# Patient Record
Sex: MALE | Race: WHITE | Employment: UNEMPLOYED | URBAN - METROPOLITAN AREA
[De-identification: names, ages, dates, MRNs, and addresses within clinical notes are randomized per-mention and may not be internally consistent; named-entity substitution may affect disease eponyms.]

---

## 2017-01-01 ENCOUNTER — HOSPITAL ENCOUNTER (EMERGENCY)
Age: 32
Discharge: HOME | End: 2017-01-01
Attending: EMERGENCY MEDICINE
Payer: COMMERCIAL

## 2017-01-01 VITALS
HEIGHT: 74 IN | RESPIRATION RATE: 16 BRPM | OXYGEN SATURATION: 99 % | TEMPERATURE: 97.8 F | HEART RATE: 75 BPM | WEIGHT: 235 LBS | DIASTOLIC BLOOD PRESSURE: 80 MMHG | SYSTOLIC BLOOD PRESSURE: 133 MMHG | BODY MASS INDEX: 30.16 KG/M2

## 2017-01-01 DIAGNOSIS — M54.12 CERVICAL RADICULOPATHY: Primary | ICD-10-CM

## 2017-01-01 DIAGNOSIS — M50.90 CERVICAL BACK PAIN WITH EVIDENCE OF DISC DISEASE: ICD-10-CM

## 2017-01-01 DIAGNOSIS — M54.2 CERVICAL MUSCLE PAIN: ICD-10-CM

## 2017-01-01 DIAGNOSIS — R51.9 ACUTE NONINTRACTABLE HEADACHE, UNSPECIFIED HEADACHE TYPE: ICD-10-CM

## 2017-01-01 PROCEDURE — 99282 EMERGENCY DEPT VISIT SF MDM: CPT

## 2017-01-01 PROCEDURE — 74011250636 HC RX REV CODE- 250/636: Performed by: PHYSICIAN ASSISTANT

## 2017-01-01 PROCEDURE — 96361 HYDRATE IV INFUSION ADD-ON: CPT

## 2017-01-01 PROCEDURE — 96375 TX/PRO/DX INJ NEW DRUG ADDON: CPT

## 2017-01-01 PROCEDURE — 96374 THER/PROPH/DIAG INJ IV PUSH: CPT

## 2017-01-01 RX ORDER — ONDANSETRON 2 MG/ML
4 INJECTION INTRAMUSCULAR; INTRAVENOUS
Status: COMPLETED | OUTPATIENT
Start: 2017-01-01 | End: 2017-01-01

## 2017-01-01 RX ORDER — METHOCARBAMOL 500 MG/1
500 TABLET, FILM COATED ORAL 4 TIMES DAILY
Qty: 20 TAB | Refills: 0 | Status: SHIPPED | OUTPATIENT
Start: 2017-01-01 | End: 2017-01-03 | Stop reason: ALTCHOICE

## 2017-01-01 RX ORDER — IBUPROFEN 800 MG/1
800 TABLET ORAL
Qty: 20 TAB | Refills: 0 | Status: SHIPPED | OUTPATIENT
Start: 2017-01-01 | End: 2017-01-08

## 2017-01-01 RX ORDER — DULOXETIN HYDROCHLORIDE 30 MG/1
30 CAPSULE, DELAYED RELEASE ORAL 3 TIMES DAILY
COMMUNITY
End: 2018-01-21

## 2017-01-01 RX ORDER — ONDANSETRON 4 MG/1
4 TABLET, ORALLY DISINTEGRATING ORAL
Qty: 15 TAB | Refills: 0 | Status: SHIPPED | OUTPATIENT
Start: 2017-01-01 | End: 2017-07-24 | Stop reason: ALTCHOICE

## 2017-01-01 RX ORDER — DIPHENHYDRAMINE HYDROCHLORIDE 50 MG/ML
25 INJECTION, SOLUTION INTRAMUSCULAR; INTRAVENOUS
Status: COMPLETED | OUTPATIENT
Start: 2017-01-01 | End: 2017-01-01

## 2017-01-01 RX ORDER — KETOROLAC TROMETHAMINE 30 MG/ML
30 INJECTION, SOLUTION INTRAMUSCULAR; INTRAVENOUS
Status: COMPLETED | OUTPATIENT
Start: 2017-01-01 | End: 2017-01-01

## 2017-01-01 RX ORDER — MORPHINE SULFATE 2 MG/ML
2 INJECTION, SOLUTION INTRAMUSCULAR; INTRAVENOUS
Status: COMPLETED | OUTPATIENT
Start: 2017-01-01 | End: 2017-01-01

## 2017-01-01 RX ADMIN — DIPHENHYDRAMINE HYDROCHLORIDE 25 MG: 50 INJECTION INTRAMUSCULAR; INTRAVENOUS at 13:55

## 2017-01-01 RX ADMIN — SODIUM CHLORIDE 1000 ML: 900 INJECTION, SOLUTION INTRAVENOUS at 13:55

## 2017-01-01 RX ADMIN — KETOROLAC TROMETHAMINE 30 MG: 30 INJECTION, SOLUTION INTRAMUSCULAR; INTRAVENOUS at 13:55

## 2017-01-01 RX ADMIN — ONDANSETRON 4 MG: 2 INJECTION INTRAMUSCULAR; INTRAVENOUS at 13:55

## 2017-01-01 RX ADMIN — Medication 2 MG: at 14:52

## 2017-01-01 NOTE — ED TRIAGE NOTES
Known history of 4 herniated discs in neck and spinal stenosis. Here because pain  Is not under control. Had injection into C4 and C5 with some effect. Has appt with neuro surgeon on 1/9/17.

## 2017-01-01 NOTE — Clinical Note
SPECIFIC PATIENT INSTRUCTIONS FROM THE PROVIDER WHO TREATED YOU IN THE ER TODAY: 
1. Ibuprofen and flexeril as prescribed until finished. Take zofran as prescribed as needed for nausea. 2. Continue taking all other medications as prescribed. 3. Return  if any concerns or worsening condition(s). 4. Follow up with:  Your primary doctor or neurosurgery specialist in 24-48 hours.

## 2017-01-01 NOTE — ED NOTES
Radha Mosley is a 36 y.o. male that was discharged in stable. Pt was accompanied by mother. Pt is not  driving. The patients diagnosis, condition and treatment were explained to  patient and aftercare instructions were given. The patient verbalized understanding. Patient armband removed and shredded.

## 2017-01-01 NOTE — ED PROVIDER NOTES
HPI Comments: Polo Rowe 1985, is a 36 y.o. Male with a hx of cervical radiculopathy, migraines, and anxiety here today for worsening of his chronic cervical pain of left neck radiating into the arm all the way to the hand into the left interscapular area and also going up the back of his head causing headaches to the point of nausea/vomiting and photophobia are almost to the point of migraines and have become unbearable. He got an epidural injection 2 weeks ago with only minimal relief. He also was put on topamax 1 week ago by his PCP for the HA but reports it has not helped yet. He is seen for pain management in [facility] by Dr. Emmanuel Castaneda (neurosurgery) and prescribed percocet for the pain, however he is supposed to be seeing a new more local pain management 01/09/17. He has been using Percocet for the pain but has been increasing the dose for relief and is concerned about this, noting that he will be out of the medication sooner than usual due to increasing his dose for this pain. He also recently had an MRI of the cervical spine at [facility] in 11/16, noting multiple \"slipped discs\" per patient. He denies any new injury/fall/accident since that time. Further denies fever, chills, numbness, weakness, gait problem, saddle anesthesia, bowel or bladder incontinence, CP, SOB, IVDA. Pt PSH included lumbar laminectomy and diskectomy. Patient is a 36 y.o. male presenting with neck pain. The history is provided by the patient and a parent. Neck Pain    This is a chronic problem. Episode onset: 4 months ago. The problem occurs constantly. The problem has been gradually worsening. The pain is associated with nothing. There has been no fever. The pain is present in the left side. The quality of the pain is described as aching and shooting. The pain radiates to the left scapula (Head). The pain is at a severity of 8/10. The symptoms are aggravated by certain positions. The pain is the same all the time. Associated symptoms include photophobia and headaches. Pertinent negatives include no visual change, no chest pain, no syncope, no numbness, no weight loss, no bowel incontinence, no bladder incontinence, no leg pain, no paresis, no tingling and no weakness. Treatments tried: Percocet, topirimate. The treatment provided mild relief. Past Medical History:   Diagnosis Date    ADD (attention deficit disorder)     Anxiety     Migraine     Obesity     PTSD (post-traumatic stress disorder)     Seasonal allergic rhinitis     Sleep apnea        Past Surgical History:   Procedure Laterality Date    Hx back surgery  7/7/2011     lamenectomy & diskectomy.  Hx tonsil and adenoidectomy           Family History:   Problem Relation Age of Onset    Asthma Mother        Social History     Social History    Marital status: SINGLE     Spouse name: N/A    Number of children: N/A    Years of education: N/A     Occupational History    Not on file. Social History Main Topics    Smoking status: Former Smoker    Smokeless tobacco: Never Used    Alcohol use 0.0 oz/week      Comment: occasionally    Drug use: No    Sexual activity: Not on file     Other Topics Concern    Not on file     Social History Narrative         ALLERGIES: Review of patient's allergies indicates no known allergies. Review of Systems   Constitutional: Negative for chills, fever, unexpected weight change and weight loss. HENT: Negative for congestion, ear pain, rhinorrhea and sore throat. Eyes: Positive for photophobia. Negative for pain and redness. Respiratory: Negative for cough and shortness of breath. Cardiovascular: Negative for chest pain and syncope. Gastrointestinal: Positive for nausea and vomiting. Negative for abdominal pain, bowel incontinence, constipation and diarrhea. Genitourinary: Negative for bladder incontinence and dysuria. Musculoskeletal: Positive for back pain, myalgias and neck pain.  Negative for arthralgias, gait problem, joint swelling and neck stiffness. Skin: Negative. Neurological: Positive for headaches. Negative for dizziness, tingling, tremors, seizures, syncope, facial asymmetry, speech difficulty, weakness, light-headedness and numbness. Hematological: Negative for adenopathy. Does not bruise/bleed easily. Psychiatric/Behavioral: Negative. Vitals:    01/01/17 1314 01/01/17 1318   BP: 133/75    Pulse: 75    Resp: 16    Temp: 97.8 °F (36.6 °C)    SpO2: 98%    Weight: 152 kg (335 lb) 106.6 kg (235 lb)   Height: 6' 2\" (1.88 m)             Physical Exam   Constitutional: He is oriented to person, place, and time. He appears well-developed and well-nourished. No distress. HENT:   Head: Normocephalic and atraumatic. Right Ear: Tympanic membrane, external ear and ear canal normal.   Left Ear: Tympanic membrane, external ear and ear canal normal.   Nose: Nose normal.   Mouth/Throat: Oropharynx is clear and moist and mucous membranes are normal. No oropharyngeal exudate. Eyes: Conjunctivae and EOM are normal. Pupils are equal, round, and reactive to light. Right eye exhibits no discharge. Left eye exhibits no discharge. Neck: Normal range of motion. Neck supple. Cardiovascular: Normal rate, regular rhythm, normal heart sounds and intact distal pulses. Pulmonary/Chest: Effort normal and breath sounds normal. No respiratory distress. He has no wheezes. He has no rales. Abdominal: Soft. Bowel sounds are normal. There is no tenderness. Musculoskeletal: Normal range of motion. Right shoulder: Normal.        Left shoulder: He exhibits tenderness, pain and spasm. He exhibits normal range of motion, no bony tenderness, no swelling, no effusion, no crepitus, no deformity, no laceration, normal pulse and normal strength. Right elbow: Normal.       Left elbow: Normal.        Cervical back: He exhibits tenderness, pain and spasm.  He exhibits normal range of motion, no bony tenderness, no swelling, no edema, no deformity, no laceration and normal pulse. Thoracic back: Normal.        Arms:       Right hand: Normal. Normal sensation noted. Decreased sensation is not present in the ulnar distribution, is not present in the medial distribution and is not present in the radial distribution. Normal strength noted. He exhibits no finger abduction, no thumb/finger opposition and no wrist extension trouble. Left hand: Normal. Normal sensation noted. Decreased sensation is not present in the ulnar distribution, is not present in the medial redistribution and is not present in the radial distribution. Normal strength noted. He exhibits no finger abduction, no thumb/finger opposition and no wrist extension trouble. Right foot: Normal.        Left foot: Normal.   Lymphadenopathy:     He has no cervical adenopathy. Neurological: He is alert and oriented to person, place, and time. He has normal strength and normal reflexes. He is not disoriented. He displays no atrophy and no tremor. No cranial nerve deficit or sensory deficit. He exhibits normal muscle tone. He displays no seizure activity. Coordination and gait normal. GCS eye subscore is 4. GCS verbal subscore is 5. GCS motor subscore is 6. Skin: Skin is warm and dry. He is not diaphoretic. Psychiatric: He has a normal mood and affect. His behavior is normal. Judgment and thought content normal.   Nursing note and vitals reviewed.        MDM  Number of Diagnoses or Management Options  Acute nonintractable headache, unspecified headache type: established and worsening  Cervical back pain with evidence of disc disease: established and worsening  Cervical muscle pain: established and worsening  Cervical radiculopathy: established and worsening  Diagnosis management comments: DDx: sciatica, spinal stenosis, arthritis, DJD, spondylitis, muscular pain/spasm, Fx (traumatic, compression, etc.), cauda equina, epidural abscess, UTI, pyelo, STD,  Axvw-Msro-Eroxrm syndrome, kidney stones, preg, ectopic, ovarian cyst, ovarian torsion, GI etiology (constipation, pancreatitis, hepatitis, gastritis, diverticulitis, colitis, gastric cancer, etc), acute abdomen (appendicitis, SBO, etc.), AAA, malignancy     Pain is reproducible on exam. Increases with range of motion and palpation. No abdominal pain on exam. Pulsatile mass not palpated. Normal neurologic exam. Not immunosupressed. Doubt epidural abscess, infection, neoplastic etiology. Not consistent with cauda equina. Likely musculoskeletal etiology. The patient shows no signs or symptoms of developing complication requiring inpatient treatment or management. Further laboratory or radiological investigation is not indicated. Patient having HA and nausea at this time as well as neck pain, will start IV and bolus fluids while giving toradol, zofran, and benadryl to achieve symptom control here in the ED. Patient reassessed, reports no pain improvement at all with Toradol, but improvement of nausea. Will give additional dose of pain medication and reassess again. Pastor Jose Angel PA-C 2:40 PM     [place]  reviewed and pt flagged with warning stating: Please note that this person has received controlled substances prescriptions written by 5 prescribers and had them filled at 4 pharmacies during the past 3 months. This equals or exceeds the threshold of 3 prescribers and 3 pharmacies and while there may be a valid reason for this, it also may be indicative of the practice of prescriber and pharmacy shopping. Most recent rx was for OXYCODON-ACETAMINOPHEN 7.5-325 quantity of 90, filled 12/21/16. Patients pain is currently being managed by neurosurgery/PCP with rx for percocet (above what we would rx in the ED of note), will not prescribe additional narcotics at this time.  Patient instructed to continue taking all medications AS PRESCRIBED at home for pain and to contact PCP or neurosurgery about possible medication adjustments within 1-2 days. Will discharge home with addition of motrin and muscle relaxant. Based upon the patients presentation with noted HPI and PE, along with the work up done in the emergency department, I believe that the patient is having cervical pain with radiculopathy and associated headache. Medically stable for d/c home w/ continued fu as directed, RTED for acute changes. Pt agrees w/ plan. DIAGNOSIS:  1. Cervical pain  2. Cervical radiculopathy  3. Headache    SPECIFIC PATIENT INSTRUCTIONS FROM THE PROVIDER WHO TREATED YOU IN THE ER TODAY:  1. Ibuprofen and flexeril as prescribed until finished. Take zofran as prescribed as needed for nausea. 2. Continue taking all other medications as prescribed. 3. Return if any concerns or worsening condition(s). 4. Follow up with:  Your primary doctor or neurosurgery specialist in 24-48 hours. Please return to the ED for any weakness, numbness, incontinence or worsening of condition. Follow your discharge instructions and take any prescription medication as directed. Follow up with the healthcare referral we provided in the time period specified, and in the meantime please contact or return to the ED for any questions or concerns. Pt results have been reviewed with them. They have been counseled regarding diagnosis, treatment, and plan. Pt verbally conveys understanding and agreement of the signs, symptoms, diagnosis, treatment and prognosis and additionally agrees to follow up as discussed. Pt also agrees with the care-plan and conveys that all of their questions have been answered. I have also provided discharge instructions for them that include: educational information regarding their diagnosis and treatment, and list of reasons why they would want to return to the ED prior to their follow-up appointment, should their condition change.    Mikaela Grullon PA-C          Amount and/or Complexity of Data Reviewed  Discussion of test results with the performing providers: yes  Decide to obtain previous medical records or to obtain history from someone other than the patient: yes  Obtain history from someone other than the patient: yes  Review and summarize past medical records: yes  Discuss the patient with other providers: yes  Independent visualization of images, tracings, or specimens: yes    Risk of Complications, Morbidity, and/or Mortality  Presenting problems: low  Diagnostic procedures: low  Management options: low    Patient Progress  Patient progress: stable    ED Course       Procedures    Diagnosis:   1. Cervical radiculopathy    2. Cervical back pain with evidence of disc disease    3. Cervical muscle pain    4. Acute nonintractable headache, unspecified headache type          Disposition: home    Follow-up Information     Follow up With Details Comments Contact Info    [facility] EMERGENCY DEPT  As needed, If symptoms worsen [address] [facility]    Zay Mejia MD Schedule an appointment as soon as possible for a visit in 2 days  [address] [facility] [address] [place]      Lata Steen MD Schedule an appointment as soon as possible for a visit in 1 week  [address] [facility] [address] [facility]            Patient's Medications   Start Taking    IBUPROFEN (MOTRIN) 800 MG TABLET    Take 1 Tab by mouth every six (6) hours as needed for Pain for up to 7 days. METHOCARBAMOL (ROBAXIN) 500 MG TABLET    Take 1 Tab by mouth four (4) times daily for 5 days. ONDANSETRON (ZOFRAN ODT) 4 MG DISINTEGRATING TABLET    Take 1 Tab by mouth every eight (8) hours as needed for Nausea. Continue Taking    CLONAZEPAM (KLONOPIN) 1 MG TABLET    One tablet at bedtime as needed for sleep    DULOXETINE (CYMBALTA) 30 MG CAPSULE    Take 30 mg by mouth daily.     INHALATIONAL SPACING DEVICE    1 Each by Does Not Apply route as needed. OXYCODONE-ACETAMINOPHEN (PERCOCET) 7.5-325 MG PER TABLET    Take 1 Tab by mouth every four (4) hours as needed for Pain. Max Daily Amount: 6 Tabs. QUETIAPINE (SEROQUEL) 25 MG TABLET    Take  by mouth. TOPIRAMATE (TOPAMAX) 50 MG TABLET    2 tablets at bedtime or as directed    ZOLPIDEM (AMBIEN) 10 MG TABLET    Take 1 Tab by mouth nightly as needed. These Medications have changed    No medications on file   Stop Taking    ESCITALOPRAM OXALATE (LEXAPRO) 20 MG TABLET    Take 1 Tab by mouth daily. LAMOTRIGINE (LAMICTAL) 25 MG TABLET        NAPROXEN SODIUM (ALEVE) 220 MG CAP    Take  by mouth. OXYMETAZOLINE HCL (AFRIN NASAL SPRAY NA)    by Nasal route.

## 2017-01-03 ENCOUNTER — OFFICE VISIT (OUTPATIENT)
Dept: INTERNAL MEDICINE CLINIC | Age: 32
End: 2017-01-03

## 2017-01-03 VITALS
RESPIRATION RATE: 12 BRPM | BODY MASS INDEX: 32.7 KG/M2 | OXYGEN SATURATION: 98 % | WEIGHT: 254.8 LBS | HEART RATE: 84 BPM | SYSTOLIC BLOOD PRESSURE: 138 MMHG | HEIGHT: 74 IN | DIASTOLIC BLOOD PRESSURE: 88 MMHG | TEMPERATURE: 98.6 F

## 2017-01-03 DIAGNOSIS — G43.011 INTRACTABLE MIGRAINE WITHOUT AURA AND WITH STATUS MIGRAINOSUS: Primary | ICD-10-CM

## 2017-01-03 DIAGNOSIS — M54.12 CERVICAL RADICULOPATHY: ICD-10-CM

## 2017-01-03 RX ORDER — PREDNISONE 20 MG/1
TABLET ORAL
Qty: 23 TAB | Refills: 0 | Status: SHIPPED | OUTPATIENT
Start: 2017-01-03 | End: 2017-02-01 | Stop reason: ALTCHOICE

## 2017-01-03 RX ORDER — LAMOTRIGINE 100 MG/1
TABLET ORAL DAILY
COMMUNITY
End: 2017-02-01 | Stop reason: ALTCHOICE

## 2017-01-03 RX ORDER — SUMATRIPTAN 100 MG/1
TABLET, FILM COATED ORAL
Qty: 9 TAB | Refills: 0 | Status: SHIPPED | OUTPATIENT
Start: 2017-01-03 | End: 2017-07-24 | Stop reason: ALTCHOICE

## 2017-01-03 RX ORDER — OXYCODONE AND ACETAMINOPHEN 7.5; 325 MG/1; MG/1
1 TABLET ORAL
Qty: 60 TAB | Refills: 0 | Status: SHIPPED | OUTPATIENT
Start: 2017-01-03 | End: 2017-01-13 | Stop reason: SDUPTHER

## 2017-01-03 RX ORDER — OXYCODONE AND ACETAMINOPHEN 7.5; 325 MG/1; MG/1
1 TABLET ORAL
Qty: 90 TAB | Refills: 0 | Status: CANCELLED | OUTPATIENT
Start: 2017-01-03

## 2017-01-03 NOTE — MR AVS SNAPSHOT
Visit Information Date & Time Provider Department Dept. Phone Encounter #  
 1/3/2017  4:00 PM Sue Mullins MD Internist of [facility] [id number] Your Appointments 12/6/2017 10:55 AM  
LAB with [facility] NURSE VISIT Internist of [facility] ([address]) Appt Note: PE lab fasting [address], [address] [place]  
  
   
 [address], [facility]  
  
    
 12/13/2017  1:00 PM  
PHYSICAL with Sue Mullins MD  
Internist of [place] [address] [place] [address]) Appt Note: Physical  
 [address], [address] [place]  
  
   
 [address], [facility] Upcoming Health Maintenance Date Due DTaP/Tdap/Td series (1 - Tdap) 3/8/2017* *Topic was postponed. The date shown is not the original due date. Allergies as of 1/3/2017  Review Complete On: 1/3/2017 By: Sue Mullins MD  
 No Known Allergies Current Immunizations  Reviewed on 10/18/2013 No immunizations on file. Not reviewed this visit You Were Diagnosed With   
  
 Codes Comments Intractable migraine without aura and with status migrainosus    -  Primary ICD-10-CM: P58.879 
ICD-9-CM: 346.13 Cervical radiculopathy     ICD-10-CM: M54.12 
ICD-9-CM: 723.4 Vitals BP Pulse Temp Resp Height(growth percentile) Weight(growth percentile) 138/88 84 98.6 °F (37 °C) (Oral) 12 6' 2\" (1.88 m) 254 lb 12.8 oz (115.6 kg) SpO2 BMI Smoking Status 98% 32.71 kg/m2 Former Smoker Vitals History BMI and BSA Data Body Mass Index Body Surface Area 32.71 kg/m 2 2.46 m 2 Preferred Pharmacy Pharmacy Name Phone [facility] [address], [address] [phone] Your Updated Medication List  
  
   
This list is accurate as of: 1/3/17  4:46 PM.  Always use your most recent med list.  
  
  
  
  
 clonazePAM 1 mg tablet Commonly known as:  Mari Patch One tablet at bedtime as needed for sleep CYMBALTA 30 mg capsule Generic drug:  DULoxetine Take 30 mg by mouth daily. ibuprofen 800 mg tablet Commonly known as:  MOTRIN Take 1 Tab by mouth every six (6) hours as needed for Pain for up to 7 days. inhalational spacing device 1 Each by Does Not Apply route as needed. LaMICtal 100 mg tablet Generic drug:  lamoTRIgine Take  by mouth daily. ondansetron 4 mg disintegrating tablet Commonly known as:  ZOFRAN ODT Take 1 Tab by mouth every eight (8) hours as needed for Nausea. oxyCODONE-acetaminophen 7.5-325 mg per tablet Commonly known as:  PERCOCET Take 1 Tab by mouth every four (4) hours as needed for Pain. Max Daily Amount: 6 Tabs. predniSONE 20 mg tablet Commonly known as:  DELTASONE  
3 tablets daily for 3 days, then 2 tablets daily SEROquel 25 mg tablet Generic drug:  QUEtiapine Take  by mouth. SUMAtriptan 100 mg tablet Commonly known as:  IMITREX  
1 tablet by mouth daily as needed for migraine headache  
  
 topiramate 50 mg tablet Commonly known as:  TOPAMAX 2 tablets at bedtime or as directed  
  
 zolpidem 10 mg tablet Commonly known as:  AMBIEN Take 1 Tab by mouth nightly as needed. Prescriptions Printed Refills  
 oxyCODONE-acetaminophen (PERCOCET) 7.5-325 mg per tablet 0 Sig: Take 1 Tab by mouth every four (4) hours as needed for Pain. Max Daily Amount: 6 Tabs. Class: Print Route: Oral  
 predniSONE (DELTASONE) 20 mg tablet 0 Sig: 3 tablets daily for 3 days, then 2 tablets daily Class: Print SUMAtriptan (IMITREX) 100 mg tablet 0 Si tablet by mouth daily as needed for migraine headache Class: Print Introducing [facility]! Dear Salome Hirsch: Thank you for requesting a VFA account.   Our records indicate that you already have an active Mobile[employer] account. You can access your account anytime at [email or web address]. [email or web address] Did you know that you can access your hospital and ER discharge instructions at any time in MobileReactor? You can also review all of your test results from your hospital stay or ER visit. Additional Information If you have questions, please visit the Frequently Asked Questions section of the Mobile[employer] website at [email or web address] Remember, MobileReactor is NOT to be used for urgent needs. For medical emergencies, dial 911. Now available from your iPhone and Android! Please provide this summary of care documentation to your next provider. Your primary care clinician is listed as Marlys Marcus. Nany Arreola. If you have any questions after today's visit, please call [phone].

## 2017-01-03 NOTE — PROGRESS NOTES
Carlie Valladares 1985, is a 36 y.o. male, who is seen today for ongoing and worsening pain her back and his head. The left side of his head to the forehead and around the occiput of been hurting along with his neck and his left arm. He says his whole back hurts him. His appointment is coming up with the neurosurgeon 1 week from today and he will be seeing his psychiatrist again soon as well. He is here today with his mother again, I saw him with his mother less than 2 weeks ago. He has been taking a lot of Percocet and has been getting Percocet from other providers as well. He and his mother are aware of this. He says nothing else is been helpful. He has started topiramate and for the last 5 days has been using 100 mg daily. He does have some nausea but no vomiting. He has a history of migraine dating back to childhood. Past Medical History   Diagnosis Date    ADD (attention deficit disorder)     Anxiety     Migraine     Obesity     PTSD (post-traumatic stress disorder)     Seasonal allergic rhinitis     Sleep apnea      Current Outpatient Prescriptions   Medication Sig Dispense Refill    lamoTRIgine (LAMICTAL) 100 mg tablet Take  by mouth daily.  DULoxetine (CYMBALTA) 30 mg capsule Take 30 mg by mouth daily.  ibuprofen (MOTRIN) 800 mg tablet Take 1 Tab by mouth every six (6) hours as needed for Pain for up to 7 days. 20 Tab 0    ondansetron (ZOFRAN ODT) 4 mg disintegrating tablet Take 1 Tab by mouth every eight (8) hours as needed for Nausea. 15 Tab 0    zolpidem (AMBIEN) 10 mg tablet Take 1 Tab by mouth nightly as needed. 30 Tab 0    QUEtiapine (SEROQUEL) 25 mg tablet Take  by mouth.  oxyCODONE-acetaminophen (PERCOCET) 7.5-325 mg per tablet Take 1 Tab by mouth every four (4) hours as needed for Pain. Max Daily Amount: 6 Tabs.  90 Tab 0    topiramate (TOPAMAX) 50 mg tablet 2 tablets at bedtime or as directed 60 Tab 2    clonazePAM (KLONOPIN) 1 mg tablet One tablet at bedtime as needed for sleep 30 Tab 2    inhalational spacing device 1 Each by Does Not Apply route as needed. 1 Device 0     Visit Vitals    /88    Pulse 84    Temp 98.6 °F (37 °C) (Oral)    Resp 12    Ht 6' 2\" (1.88 m)    Wt 254 lb 12.8 oz (115.6 kg)    SpO2 98%    BMI 32.71 kg/m2     Extension of the neck with counterpressure produces no symptoms beyond the neck, no pain into the intrascapular region shoulder or arm on the left. There is no tenderness of the muscles. Back is all nontender. Scalp is nontender. Neck is actually quite supple. Assessment: Pain is far out of proportion to any findings. He has been using Percocet for well over a year now prescribed elsewhere mainly but I have given him a prescription recently. I will give him 60 more tablets but he must cut back on frequency of use. He may not use more than every 4 hours. This will last at least until he sees the neurosurgeon in a week. For his radicular pain he will continue topiramate at 100 mg daily but that could be increased. #2.  He may well have migraine but no significant nausea no vomiting. We will try him on sumatriptan 100 mg up to once a day but also started on steroids for his neck and back and head pain, hopefully that will help all of his pain except for any psychogenic component. He will be treated with 60 mg of prednisone this afternoon and then 60 mg each of the next 2 mornings and then 40 mg daily for 7 more days. Follow-up to be arranged. Tomy Starr MD FACP    Please note: This document has been produced using voice recognition software. Unrecognized errors in transcription may be present.

## 2017-01-13 RX ORDER — OXYCODONE AND ACETAMINOPHEN 7.5; 325 MG/1; MG/1
1 TABLET ORAL
Qty: 60 TAB | Refills: 0 | Status: SHIPPED | OUTPATIENT
Start: 2017-01-13 | End: 2017-01-25 | Stop reason: SDUPTHER

## 2017-01-13 NOTE — TELEPHONE ENCOUNTER
Patient is requesting a refill of percocet last office visit 01/03/17 last script date 01/03/17 60 tabs patient awaiting a call for neurology to reschedule his appointment due to snow

## 2017-01-13 NOTE — TELEPHONE ENCOUNTER
Pt asking for refill. Says his appt with neurology was cancelled on Monday due to the snow. Says he has not been able to get rescheduled as of yet. Asking for refill til he can get back in there.

## 2017-01-25 NOTE — TELEPHONE ENCOUNTER
Last date seen:1/13/17  Last date filled:1/16/17     report was pulled on 36 y.o. Graves Click, No discrepancies were found.

## 2017-01-25 NOTE — TELEPHONE ENCOUNTER
60 tablets given 9 days ago. Reviewed Dr Denisse Mantilla last office note 1/3/17, patient had been advised to use less percocet, and to follow up with his neurologist for headaches. Recommend following up with his neurologist for further pain control options.

## 2017-01-26 NOTE — TELEPHONE ENCOUNTER
We will need to wait to ask [facility] tomorrow for a 2 day supply, Im not sure if he will give it to him or not

## 2017-01-26 NOTE — TELEPHONE ENCOUNTER
Pt calling again. Says the neurologist - Dr. Gwen Reid in [facility] advised patient to continue to get any meds from primary care. Pt says RM was giving him a 10 day supply which will run out tomorrow. Asking if he can get refill til he can again meet with RM and figure out who is going to help with the meds? Did we get office note from Dr. Gwen Reid?

## 2017-01-27 RX ORDER — OXYCODONE AND ACETAMINOPHEN 7.5; 325 MG/1; MG/1
1 TABLET ORAL
Qty: 25 TAB | Refills: 0 | Status: SHIPPED | OUTPATIENT
Start: 2017-01-27 | End: 2017-02-01 | Stop reason: SDUPTHER

## 2017-02-01 ENCOUNTER — OFFICE VISIT (OUTPATIENT)
Dept: INTERNAL MEDICINE CLINIC | Age: 32
End: 2017-02-01

## 2017-02-01 VITALS
OXYGEN SATURATION: 98 % | BODY MASS INDEX: 33.11 KG/M2 | HEIGHT: 74 IN | DIASTOLIC BLOOD PRESSURE: 74 MMHG | SYSTOLIC BLOOD PRESSURE: 134 MMHG | TEMPERATURE: 98.8 F | RESPIRATION RATE: 12 BRPM | WEIGHT: 258 LBS | HEART RATE: 82 BPM

## 2017-02-01 DIAGNOSIS — Z86.69 HISTORY OF MIGRAINE: ICD-10-CM

## 2017-02-01 DIAGNOSIS — M54.12 CERVICAL RADICULOPATHY: Primary | ICD-10-CM

## 2017-02-01 DIAGNOSIS — F51.01 PRIMARY INSOMNIA: ICD-10-CM

## 2017-02-01 RX ORDER — TOPIRAMATE 100 MG/1
100 TABLET, FILM COATED ORAL 2 TIMES DAILY
Qty: 180 TAB | Refills: 1 | Status: SHIPPED | OUTPATIENT
Start: 2017-02-01 | End: 2017-11-17 | Stop reason: SDUPTHER

## 2017-02-01 RX ORDER — ZOLPIDEM TARTRATE 10 MG/1
10 TABLET ORAL
Qty: 30 TAB | Refills: 0 | Status: SHIPPED | OUTPATIENT
Start: 2017-02-01 | End: 2017-03-02 | Stop reason: SDUPTHER

## 2017-02-01 RX ORDER — OXYCODONE AND ACETAMINOPHEN 7.5; 325 MG/1; MG/1
1 TABLET ORAL
Qty: 60 TAB | Refills: 0 | Status: SHIPPED | OUTPATIENT
Start: 2017-02-01 | End: 2017-02-13 | Stop reason: ALTCHOICE

## 2017-02-01 NOTE — PROGRESS NOTES
Dorys Hull 1985, is a 36 y.o. male, who is seen today for reevaluation of migraine headaches neck pain diffuse back pain. Since starting the topiramate and increasing the dose he finds his migraine headaches occur about every 2 days instead of every day and the twitching in his left eye is better. He can stand his back pain a little better. He is almost out of Percocet. He is sleeping fairly well with the help of Ambien at this point. Cymbalta was increased to 60 mg daily by his psychiatrist recently. He will be getting an epidural injection in the cervical region tomorrow and follow-up with his neurosurgeon February 22. Past Medical History   Diagnosis Date    ADD (attention deficit disorder)     Anxiety     Migraine     Obesity     PTSD (post-traumatic stress disorder)     Seasonal allergic rhinitis     Sleep apnea      Current Outpatient Prescriptions   Medication Sig Dispense Refill    zolpidem (AMBIEN) 10 mg tablet Take 1 Tab by mouth nightly as needed. 30 Tab 0    oxyCODONE-acetaminophen (PERCOCET) 7.5-325 mg per tablet Take 1 Tab by mouth every four (4) hours as needed for Pain. Max Daily Amount: 6 Tabs. 60 Tab 0    topiramate (TOPAMAX) 100 mg tablet Take 1 Tab by mouth two (2) times a day. 180 Tab 1    SUMAtriptan (IMITREX) 100 mg tablet 1 tablet by mouth daily as needed for migraine headache 9 Tab 0    DULoxetine (CYMBALTA) 30 mg capsule Take 60 mg by mouth daily.  ondansetron (ZOFRAN ODT) 4 mg disintegrating tablet Take 1 Tab by mouth every eight (8) hours as needed for Nausea. 15 Tab 0    QUEtiapine (SEROQUEL) 25 mg tablet Take  by mouth.  clonazePAM (KLONOPIN) 1 mg tablet One tablet at bedtime as needed for sleep 30 Tab 2    inhalational spacing device 1 Each by Does Not Apply route as needed.  1 Device 0     Visit Vitals    /74    Pulse 82    Temp 98.8 °F (37.1 °C) (Oral)    Resp 12    Ht 6' 2\" (1.88 m)    Wt 258 lb (117 kg)    SpO2 98%    BMI 33.13 kg/m2     I did not reexamine him today. He was thoroughly examined couple weeks ago. Assessment: #1. Migraine headaches moderately improved. Will increase topiramate to 200 mg per day in divided doses. He will continue sumatriptan as needed. #2. Neck and diffuse back pain somewhat out of proportion to findings. We will see how he does with the epidural and follow-up with neurosurgery will give him some more Percocet for now but this should not be long-term. He understands. #3.  Chronic insomnia, I did renew his zolpidem for as needed use. #4.  Depression and chronic pain, increase in Cymbalta may be somewhat helpful. He will follow-up with his psychiatrist as well. Follow-up not currently scheduled but will be arranged based on results of upcoming treatments elsewhere. This visit lasted 30 minutes, greater than 50% of the time spent counseling on all of those issues in the assessment listed above. Tomy Krause MD FACP    Please note: This document has been produced using voice recognition software. Unrecognized errors in transcription may be present.

## 2017-02-01 NOTE — MR AVS SNAPSHOT
Visit Information Date & Time Provider Department Dept. Phone Encounter #  
 2/1/2017  3:30 PM Ric Retana MD Internist of [address] [id number] Your Appointments 12/6/2017 10:55 AM  
LAB with [facility] NURSE VISIT Internist of [facility] ([place]ing [place]) Appt Note: PE lab fasting [address], [address] [place] [address] [address] [place]  
  
   
 [address], [address]  
  
    
 12/13/2017  1:00 PM  
PHYSICAL with Ric Retana MD  
Internist of [address] [place] [address] [place]) Appt Note: Physical  
 [address], [address] [place] [address] [address]  
  
   
 [address], [address] Upcoming Health Maintenance Date Due DTaP/Tdap/Td series (1 - Tdap) 3/8/2017* *Topic was postponed. The date shown is not the original due date. Allergies as of 2/1/2017  Review Complete On: 2/1/2017 By: Ric Retana MD  
 No Known Allergies Current Immunizations  Reviewed on 10/18/2013 No immunizations on file. Not reviewed this visit Vitals BP Pulse Temp Resp Height(growth percentile) Weight(growth percentile) 134/74 82 98.8 °F (37.1 °C) (Oral) 12 6' 2\" (1.88 m) 258 lb (117 kg) SpO2 BMI Smoking Status 98% 33.13 kg/m2 Former Smoker BMI and BSA Data Body Mass Index Body Surface Area  
 33.13 kg/m 2 2.47 m 2 Preferred Pharmacy Pharmacy Name Phone [address] [address] [facility] [phone] Your Updated Medication List  
  
   
This list is accurate as of: 2/1/17  3:47 PM.  Always use your most recent med list.  
  
  
  
  
 clonazePAM 1 mg tablet Commonly known as:  Oliver Magallanes One tablet at bedtime as needed for sleep CYMBALTA 30 mg capsule Generic drug:  DULoxetine Take 60 mg by mouth daily. inhalational spacing device 1 Each by Does Not Apply route as needed. ondansetron 4 mg disintegrating tablet Commonly known as:  ZOFRAN ODT Take 1 Tab by mouth every eight (8) hours as needed for Nausea. oxyCODONE-acetaminophen 7.5-325 mg per tablet Commonly known as:  PERCOCET Take 1 Tab by mouth every four (4) hours as needed for Pain. Max Daily Amount: 6 Tabs. SEROquel 25 mg tablet Generic drug:  QUEtiapine Take  by mouth. SUMAtriptan 100 mg tablet Commonly known as:  IMITREX  
1 tablet by mouth daily as needed for migraine headache  
  
 topiramate 100 mg tablet Commonly known as:  TOPAMAX Take 1 Tab by mouth two (2) times a day. zolpidem 10 mg tablet Commonly known as:  AMBIEN Take 1 Tab by mouth nightly as needed. Prescriptions Printed Refills  
 zolpidem (AMBIEN) 10 mg tablet 0 Sig: Take 1 Tab by mouth nightly as needed. Class: Print Route: Oral  
 oxyCODONE-acetaminophen (PERCOCET) 7.5-325 mg per tablet 0 Sig: Take 1 Tab by mouth every four (4) hours as needed for Pain. Max Daily Amount: 6 Tabs. Class: Print Route: Oral  
  
Prescriptions Sent to Pharmacy Refills  
 topiramate (TOPAMAX) 100 mg tablet 1 Sig: Take 1 Tab by mouth two (2) times a day. Class: Normal  
 Pharmacy: [address] [place] [address] #: [phone] Route: Oral  
  
Introducing hospitals & HEALTH SERVICES! Dear Alejos Mohs: Thank you for requesting a Mapflow account. Our records indicate that you already have an active Mapflow account. You can access your account anytime at [email or web address] Did you know that you can access your hospital and ER discharge instructions at any time in Mapflow? You can also review all of your test results from your hospital stay or ER visit. Additional Information If you have questions, please visit the Frequently Asked Questions section of the Farmanhart website at [email or web address] Remember, CreatorBox is NOT to be used for urgent needs. For medical emergencies, dial 911. Now available from your iPhone and Android! Please provide this summary of care documentation to your next provider. Your primary care clinician is listed as Erika Lopez. Rachel Lynch. If you have any questions after today's visit, please call [phone].

## 2017-02-10 ENCOUNTER — TELEPHONE (OUTPATIENT)
Dept: INTERNAL MEDICINE CLINIC | Age: 32
End: 2017-02-10

## 2017-02-10 NOTE — TELEPHONE ENCOUNTER
PT says he called yesterday asking about decreasing Percocet. Please advise.   He needs a refill with the decreased dosage

## 2017-02-13 RX ORDER — OXYCODONE AND ACETAMINOPHEN 5; 325 MG/1; MG/1
1 TABLET ORAL
Qty: 60 TAB | Refills: 0 | Status: SHIPPED | OUTPATIENT
Start: 2017-02-13 | End: 2017-02-21 | Stop reason: DRUGHIGH

## 2017-02-13 NOTE — TELEPHONE ENCOUNTER
Pt is in office waiting for rx refill he has appt tomorrow at 130 pm but stated he needs his meds today he is out

## 2017-02-13 NOTE — TELEPHONE ENCOUNTER
Father came in to  rx. Scheduled appt for tomorrow afternoon to discuss med change.  If RM can change before appt call pt to cancel

## 2017-02-21 ENCOUNTER — TELEPHONE (OUTPATIENT)
Dept: INTERNAL MEDICINE CLINIC | Age: 32
End: 2017-02-21

## 2017-02-21 ENCOUNTER — OFFICE VISIT (OUTPATIENT)
Dept: INTERNAL MEDICINE CLINIC | Age: 32
End: 2017-02-21

## 2017-02-21 VITALS
WEIGHT: 255 LBS | RESPIRATION RATE: 12 BRPM | DIASTOLIC BLOOD PRESSURE: 80 MMHG | TEMPERATURE: 98.6 F | HEIGHT: 74 IN | HEART RATE: 91 BPM | SYSTOLIC BLOOD PRESSURE: 124 MMHG | OXYGEN SATURATION: 98 % | BODY MASS INDEX: 32.73 KG/M2

## 2017-02-21 DIAGNOSIS — G43.011 INTRACTABLE MIGRAINE WITHOUT AURA AND WITH STATUS MIGRAINOSUS: Primary | ICD-10-CM

## 2017-02-21 RX ORDER — PREDNISONE 20 MG/1
TABLET ORAL
Qty: 12 TAB | Refills: 0 | Status: SHIPPED | OUTPATIENT
Start: 2017-02-21 | End: 2017-04-20 | Stop reason: ALTCHOICE

## 2017-02-21 RX ORDER — OXYCODONE AND ACETAMINOPHEN 10; 325 MG/1; MG/1
1 TABLET ORAL
Qty: 120 TAB | Refills: 0 | Status: SHIPPED | OUTPATIENT
Start: 2017-02-21 | End: 2017-03-22 | Stop reason: SDUPTHER

## 2017-02-21 NOTE — MR AVS SNAPSHOT
Visit Information Date & Time Provider Department Dept. Phone Encounter #  
 2/21/2017 11:30 AM Bean Hodge MD Internist of [address] [id number] Your Appointments 4/10/2017 10:00 AM  
New Patient with Ranjana Valdez MD  
[facility] [address] [place] [address]) Appt Note: NP REF BY DR GUERRERO - [facility] DIVISION FOR CERVICAL PAIN -  [id number]  
 [address] [facility] [address]  
[phone] [address]  
  
    
 12/6/2017 10:55 AM  
LAB with [facility] [place] NURSE VISIT Internist of [facility] ([address] [facility]) Appt Note: PE lab fasting [address], [address] [place] [address] [place]  
  
   
 [address], [address]  
  
    
 12/13/2017  1:00 PM  
PHYSICAL with Bean Hodge MD  
Internist of [address] [place] [address]) Appt Note: Physical  
 [address], [address] [place] [address] [place]  
  
   
 [address], [address] Upcoming Health Maintenance Date Due DTaP/Tdap/Td series (1 - Tdap) 3/8/2017* *Topic was postponed. The date shown is not the original due date. Allergies as of 2/21/2017  Review Complete On: 2/21/2017 By: Bean Hodge MD  
 No Known Allergies Current Immunizations  Reviewed on 10/18/2013 No immunizations on file. Not reviewed this visit You Were Diagnosed With   
  
 Codes Comments Intractable migraine without aura and with status migrainosus    -  Primary ICD-10-CM: M36.749 
ICD-9-CM: 346.13 Vitals BP Pulse Temp Resp Height(growth percentile) Weight(growth percentile) 124/80 91 98.6 °F (37 °C) (Oral) 12 6' 2\" (1.88 m) 255 lb (115.7 kg) SpO2 BMI Smoking Status 98% 32.74 kg/m2 Former Smoker BMI and BSA Data Body Mass Index Body Surface Area 32.74 kg/m 2 2.46 m 2 Preferred Pharmacy Pharmacy Name Phone [address] [facility] [phone] Your Updated Medication List  
  
   
This list is accurate as of: 2/21/17 11:58 AM.  Always use your most recent med list.  
  
  
  
  
 clonazePAM 1 mg tablet Commonly known as:  Catha Daniel One tablet at bedtime as needed for sleep CYMBALTA 30 mg capsule Generic drug:  DULoxetine Take 60 mg by mouth daily. inhalational spacing device 1 Each by Does Not Apply route as needed. ondansetron 4 mg disintegrating tablet Commonly known as:  ZOFRAN ODT Take 1 Tab by mouth every eight (8) hours as needed for Nausea. oxyCODONE-acetaminophen  mg per tablet Commonly known as:  PERCOCET Take 1 Tab by mouth every six (6) hours as needed for Pain. Max Daily Amount: 4 Tabs. predniSONE 20 mg tablet Commonly known as:  DELTASONE  
3 tablets by mouth daily for 2 days, then 2 tablets daily for 3 days. SEROquel 25 mg tablet Generic drug:  QUEtiapine Take  by mouth. SUMAtriptan 100 mg tablet Commonly known as:  IMITREX  
1 tablet by mouth daily as needed for migraine headache  
  
 topiramate 100 mg tablet Commonly known as:  TOPAMAX Take 1 Tab by mouth two (2) times a day. zolpidem 10 mg tablet Commonly known as:  AMBIEN Take 1 Tab by mouth nightly as needed. Prescriptions Printed Refills  
 oxyCODONE-acetaminophen (PERCOCET)  mg per tablet 0 Sig: Take 1 Tab by mouth every six (6) hours as needed for Pain. Max Daily Amount: 4 Tabs. Class: Print Route: Oral  
 predniSONE (DELTASONE) 20 mg tablet 0 Sig: 3 tablets by mouth daily for 2 days, then 2 tablets daily for 3 days. Class: Print Introducing [facility] [place] & HEALTH SERVICES! Dear Antoni Castro: Thank you for requesting a Volvant account. Our records indicate that you already have an active Volvant account.   You can access your account anytime at [email or web address] Did you know that you can access your hospital and ER discharge instructions at any time in Explorer.io? You can also review all of your test results from your hospital stay or ER visit. Additional Information If you have questions, please visit the Frequently Asked Questions section of the Explorer.io website at [email or web address] Remember, Explorer.io is NOT to be used for urgent needs. For medical emergencies, dial 911. Now available from your iPhone and Android! Please provide this summary of care documentation to your next provider. Your primary care clinician is listed as Dwayne Prescott. Elliott Wilkerson. If you have any questions after today's visit, please call [phone].

## 2017-02-21 NOTE — PROGRESS NOTES
Martina Portillo 1985, is a 36 y.o. male, who is seen today for evaluation for headache and chronic back pain. He has a history of migraine headaches that usually occur on the left but have been occurring every day for the last week on the left, not totally relieved with sumatriptan or Percocet and then yesterday about 430 had a similar head pain in the right frontal area which several hours later moved to the left behind and under his eyes especially. He has also associated photophobia and phonophobia and nausea but no vomiting. He did try sumatriptan again since last evening without benefit and motor strength Percocet does not really take the edge off significantly. We had tapered his dose but he did finally get an appointment to see pain management, scheduled for April 10. He will be seeing his neurosurgeon tomorrow to see if anything needs to be done surgically again with his chronic back pains including neck and mid back pain. Past Medical History   Diagnosis Date    ADD (attention deficit disorder)     Anxiety     Migraine     Obesity     PTSD (post-traumatic stress disorder)     Seasonal allergic rhinitis     Sleep apnea      Current Outpatient Prescriptions   Medication Sig Dispense Refill    oxyCODONE-acetaminophen (PERCOCET)  mg per tablet Take 1 Tab by mouth every six (6) hours as needed for Pain. Max Daily Amount: 4 Tabs. 120 Tab 0    predniSONE (DELTASONE) 20 mg tablet 3 tablets by mouth daily for 2 days, then 2 tablets daily for 3 days. 12 Tab 0    zolpidem (AMBIEN) 10 mg tablet Take 1 Tab by mouth nightly as needed. 30 Tab 0    topiramate (TOPAMAX) 100 mg tablet Take 1 Tab by mouth two (2) times a day. 180 Tab 1    SUMAtriptan (IMITREX) 100 mg tablet 1 tablet by mouth daily as needed for migraine headache 9 Tab 0    DULoxetine (CYMBALTA) 30 mg capsule Take 60 mg by mouth daily.       ondansetron (ZOFRAN ODT) 4 mg disintegrating tablet Take 1 Tab by mouth every eight (8) hours as needed for Nausea. 15 Tab 0    QUEtiapine (SEROQUEL) 25 mg tablet Take  by mouth.  clonazePAM (KLONOPIN) 1 mg tablet One tablet at bedtime as needed for sleep 30 Tab 2    inhalational spacing device 1 Each by Does Not Apply route as needed. 1 Device 0     Visit Vitals    /80    Pulse 91    Temp 98.6 °F (37 °C) (Oral)    Resp 12    Ht 6' 2\" (1.88 m)    Wt 255 lb (115.7 kg)    SpO2 98%    BMI 32.74 kg/m2     Neck reveals no adenopathy or thyromegaly. Neck is quite supple. Scalp is all nontender. It was not further examined today. Assessment: #1. Migraine headaches now with status migrainous, daily headaches not totally going away and worsening since yesterday as described above. Will treat with prednisone 60 mg daily for 2 days and then 40 mg daily for 3 days. #2.  Chronic neck and back pain, he will be seeing his neurosurgeon tomorrow. He will be seeing pain management but not for another 6 weeks or so. I will increase his strength in his Percocet for now, to be taken up to every 6 hours as needed for back and neck pain. This visit lasted 25 minutes, greater than 50% of the time was spent counseling on migraine headaches and how that is best treated depending on the duration and type of head pain. We also discussed pain management and follow-up with his neurosurgeon. Tried to explain the processes and answers questions and he seemed satisfied with explanations given. Tomy Mann MD FACP    Please note: This document has been produced using voice recognition software. Unrecognized errors in transcription may be present.

## 2017-03-05 RX ORDER — ZOLPIDEM TARTRATE 10 MG/1
10 TABLET ORAL
Qty: 30 TAB | Refills: 0 | OUTPATIENT
Start: 2017-03-05 | End: 2017-04-04 | Stop reason: SDUPTHER

## 2017-03-22 RX ORDER — OXYCODONE AND ACETAMINOPHEN 10; 325 MG/1; MG/1
1 TABLET ORAL
Qty: 120 TAB | Refills: 0 | Status: SHIPPED | OUTPATIENT
Start: 2017-03-22 | End: 2017-03-22 | Stop reason: SDUPTHER

## 2017-03-22 RX ORDER — OXYCODONE AND ACETAMINOPHEN 10; 325 MG/1; MG/1
1 TABLET ORAL
Qty: 120 TAB | Refills: 0 | Status: SHIPPED | OUTPATIENT
Start: 2017-03-22 | End: 2017-04-20 | Stop reason: SDUPTHER

## 2017-03-22 NOTE — TELEPHONE ENCOUNTER
Refill req for percocet, pt's mom said pt left on vm yesterday, she is here in the lobby and said she will wait, RM

## 2017-04-05 RX ORDER — ZOLPIDEM TARTRATE 10 MG/1
10 TABLET ORAL
Qty: 30 TAB | Refills: 0 | OUTPATIENT
Start: 2017-04-05 | End: 2017-05-15 | Stop reason: SDUPTHER

## 2017-04-20 ENCOUNTER — OFFICE VISIT (OUTPATIENT)
Dept: INTERNAL MEDICINE CLINIC | Age: 32
End: 2017-04-20

## 2017-04-20 VITALS
BODY MASS INDEX: 32.06 KG/M2 | HEIGHT: 74 IN | TEMPERATURE: 98.5 F | WEIGHT: 249.8 LBS | RESPIRATION RATE: 12 BRPM | SYSTOLIC BLOOD PRESSURE: 130 MMHG | OXYGEN SATURATION: 97 % | HEART RATE: 106 BPM | DIASTOLIC BLOOD PRESSURE: 92 MMHG

## 2017-04-20 DIAGNOSIS — K52.9 ACUTE GASTROENTERITIS: Primary | ICD-10-CM

## 2017-04-20 DIAGNOSIS — M54.12 CERVICAL RADICULOPATHY: ICD-10-CM

## 2017-04-20 RX ORDER — OXYCODONE AND ACETAMINOPHEN 10; 325 MG/1; MG/1
1 TABLET ORAL
Qty: 120 TAB | Refills: 0 | Status: SHIPPED | OUTPATIENT
Start: 2017-04-20 | End: 2017-05-15 | Stop reason: SDUPTHER

## 2017-04-20 NOTE — MR AVS SNAPSHOT
Visit Information Date & Time Provider Department Dept. Phone Encounter #  
 4/20/2017 11:00 AM Klever Little MD Internist of [address] [place] [address] Place [id number] Your Appointments 4/26/2017  1:40 PM  
New Patient with Eva Trivedi MD  
[facility] [address] [facility]) Appt Note: NP REF BY DR Komal Peralta FOR CERVICAL PAIN - CS [id number]; PT WILL COME AND GET NP PKG BF APPT - IS AWARE NEEDS TO ARRIVE 1 HR EARLY TO APPT - BARRY 10:53AM; LM on both numbers for need to reschedule; NP REF BY DR Komal Peralta FOR CERVICAL PAIN - CS [id number]  
 [address] [facility] [address]  
[phone] [address]  
  
    
 12/6/2017 10:55 AM  
LAB with [facility] NURSE VISIT Internist of [facility] ([facility]) Appt Note: PE lab fasting [address], [address]  
  
   
 [address], [address]  
  
    
 12/13/2017  1:00 PM  
PHYSICAL with Klever Little MD  
Internist of [address] [place] [address]) Appt Note: Physical  
 [address], [address] [place] [address] [address]  
  
   
 [address], [address] Upcoming Health Maintenance Date Due DTaP/Tdap/Td series (1 - Tdap) 11/30/2008 Allergies as of 4/20/2017  Review Complete On: 4/20/2017 By: Klever Little MD  
 No Known Allergies Current Immunizations  Reviewed on 10/18/2013 No immunizations on file. Not reviewed this visit You Were Diagnosed With   
  
 Codes Comments Acute gastroenteritis    -  Primary ICD-10-CM: K52.9 ICD-9-CM: 558. 9 Cervical radiculopathy     ICD-10-CM: M54.12 
ICD-9-CM: 723.4 Vitals BP Pulse Temp Resp Height(growth percentile) Weight(growth percentile) (!) 130/92 (!) 106 98.5 °F (36.9 °C) (Oral) 12 6' 2\" (1.88 m) 249 lb 12.8 oz (113.3 kg) SpO2 BMI Smoking Status 97% 32.07 kg/m2 Former Smoker Vitals History BMI and BSA Data Body Mass Index Body Surface Area 32.07 kg/m 2 2.43 m 2 Preferred Pharmacy Pharmacy Name Phone [facility], [address] [place] [address] Road [phone] Your Updated Medication List  
  
   
This list is accurate as of: 4/20/17 11:49 AM.  Always use your most recent med list.  
  
  
  
  
 clonazePAM 1 mg tablet Commonly known as:  Shelba Hock One tablet at bedtime as needed for sleep CYMBALTA 30 mg capsule Generic drug:  DULoxetine Take 60 mg by mouth daily. inhalational spacing device 1 Each by Does Not Apply route as needed. ondansetron 4 mg disintegrating tablet Commonly known as:  ZOFRAN ODT Take 1 Tab by mouth every eight (8) hours as needed for Nausea. oxyCODONE-acetaminophen  mg per tablet Commonly known as:  PERCOCET Take 1 Tab by mouth every six (6) hours as needed for Pain. Max Daily Amount: 4 Tabs. SEROquel 25 mg tablet Generic drug:  QUEtiapine Take  by mouth. SUMAtriptan 100 mg tablet Commonly known as:  IMITREX  
1 tablet by mouth daily as needed for migraine headache  
  
 topiramate 100 mg tablet Commonly known as:  TOPAMAX Take 1 Tab by mouth two (2) times a day. zolpidem 10 mg tablet Commonly known as:  AMBIEN Take 1 Tab by mouth nightly as needed. Prescriptions Printed Refills  
 oxyCODONE-acetaminophen (PERCOCET)  mg per tablet 0 Sig: Take 1 Tab by mouth every six (6) hours as needed for Pain. Max Daily Amount: 4 Tabs. Class: Print Route: Oral  
  
Introducing [facility] & HEALTH SERVICES! Dear Valentina Castillo: Thank you for requesting a wufoo account. Our records indicate that you already have an active wufoo account. You can access your account anytime at [email or web address] Did you know that you can access your hospital and ER discharge instructions at any time in Zhenpu Education? You can also review all of your test results from your hospital stay or ER visit. Additional Information If you have questions, please visit the Frequently Asked Questions section of the Zhenpu Education website at [email or web address] [email or web address]/. Remember, Zhenpu Education is NOT to be used for urgent needs. For medical emergencies, dial 911. Now available from your iPhone and Android! Please provide this summary of care documentation to your next provider. Your primary care clinician is listed as Estela Thompson. Horatio Gitelman. If you have any questions after today's visit, please call [phone].

## 2017-04-20 NOTE — PROGRESS NOTES
Mary Handler 1985, is a 36 y.o. male, who is seen today for abdominal symptoms, chronic neck and back pain, nasal soreness. He says that yesterday was a heaviness in his mid upper abdomen all day and he did not eat much and on 4 AM he vomited. He has been burping quite a bit yesterday and through the night. Sometimes burps and brings up a little acid into his throat. He has had no real abdominal pain. Bowels it worked well until yesterday and nothing since then. No chills or fever. He does not feel nauseated right now. He has had chronic neck and back pain and does need a refill on Percocet. He last got 120 tablets a month ago. He also complains of a several month history of intermittent discomfort in the left narrowing medially. It comes and goes to some extent. Past Medical History:   Diagnosis Date    ADD (attention deficit disorder)     Anxiety     Migraine     Obesity     PTSD (post-traumatic stress disorder)     Seasonal allergic rhinitis     Sleep apnea      Current Outpatient Prescriptions   Medication Sig Dispense Refill    oxyCODONE-acetaminophen (PERCOCET)  mg per tablet Take 1 Tab by mouth every six (6) hours as needed for Pain. Max Daily Amount: 4 Tabs. 120 Tab 0    zolpidem (AMBIEN) 10 mg tablet Take 1 Tab by mouth nightly as needed. 30 Tab 0    topiramate (TOPAMAX) 100 mg tablet Take 1 Tab by mouth two (2) times a day. 180 Tab 1    SUMAtriptan (IMITREX) 100 mg tablet 1 tablet by mouth daily as needed for migraine headache 9 Tab 0    DULoxetine (CYMBALTA) 30 mg capsule Take 60 mg by mouth daily.  ondansetron (ZOFRAN ODT) 4 mg disintegrating tablet Take 1 Tab by mouth every eight (8) hours as needed for Nausea. 15 Tab 0    QUEtiapine (SEROQUEL) 25 mg tablet Take  by mouth.  clonazePAM (KLONOPIN) 1 mg tablet One tablet at bedtime as needed for sleep 30 Tab 2    inhalational spacing device 1 Each by Does Not Apply route as needed.  1 Device 0 Visit Vitals    BP (!) 130/92    Pulse (!) 106    Temp 98.5 °F (36.9 °C) (Oral)    Resp 12    Ht 6' 2\" (1.88 m)    Wt 249 lb 12.8 oz (113.3 kg)    SpO2 97%    BMI 32.07 kg/m2     There is no tenderness of the nose. I do not see any abnormalities internally, redness drainage swelling etc.  Abdomen is not distended and there is no tympany. Slightly diminished bowel sounds throughout. There is minimal periumbilical tenderness but no other tenderness and no rebound tenderness. Abdomen is very soft. No hepatosplenomegaly or masses. Assessment: #1. Abdominal symptoms as above probably a viral gastroenteritis, no indication of pancreatitis cholecystitis appendicitis bowel obstruction or other serious etiology. I recommended he use Gaviscon since he is still burping a fair amount and hopefully symptoms will resolve fairly quickly. He will gradually advance diet as he feels better and stick with liquids for now. #2. Shortness of the nose intermittently, I do not see anything abnormal but I recommended he consider using Polysporin twice daily for about 10 days. #3.  Persistent neck and back pain, will renew oxycodone, he is being evaluated by neurosurgery as well. Tomy Wayne MD FACP    Please note: This document has been produced using voice recognition software. Unrecognized errors in transcription may be present.

## 2017-05-09 ENCOUNTER — OFFICE VISIT (OUTPATIENT)
Dept: INTERNAL MEDICINE CLINIC | Age: 32
End: 2017-05-09

## 2017-05-09 VITALS
OXYGEN SATURATION: 98 % | RESPIRATION RATE: 20 BRPM | HEART RATE: 78 BPM | HEIGHT: 74 IN | TEMPERATURE: 98.9 F | DIASTOLIC BLOOD PRESSURE: 79 MMHG | BODY MASS INDEX: 31.7 KG/M2 | SYSTOLIC BLOOD PRESSURE: 124 MMHG | WEIGHT: 247 LBS

## 2017-05-09 DIAGNOSIS — R14.0 ABDOMINAL BLOATING: ICD-10-CM

## 2017-05-09 DIAGNOSIS — R10.13 EPIGASTRIC PAIN: Primary | ICD-10-CM

## 2017-05-09 NOTE — PROGRESS NOTES
1. Have you been to the ER, urgent care clinic since your last visit? Hospitalized since your last visit? No    2. Have you seen or consulted any other health care providers outside of the [address] [place] [address] since your last visit? Include any pap smears or colon screening.  No

## 2017-05-09 NOTE — MR AVS SNAPSHOT
Visit Information Date & Time Provider Department Dept. Phone Encounter #  
 5/9/2017  3:30 PM Nabila Duke MD Internist of [address] [place] [address] Place [id number] Your Appointments 5/11/2017  1:00 PM  
New Patient with Vicky Flores MD  
[facility] Neri Johnson) Appt Note: NP REF BY DR Ashish Rai FOR CERVICAL PAIN - CS [id number]; PT WILL COME AND GET NP PKG BF APPT - IS AWARE NEEDS TO ARRIVE 1 HR EARLY TO APPT - BARRY 10:53AM; LM on both numbers for need to reschedule; NP REF BY DR Ashish Rai FOR CERVICAL PAIN - [id number]; LMVM REMINDER CALL - BARRY [id number] 1:43PM; NP REF BY DR Ashish Rai FOR CERVICAL PAIN - [id number]  
 [address] [place] [address]  
[phone] [address]  
  
    
 12/6/2017 10:55 AM  
LAB with [facility] NURSE VISIT Internist of [facility] (Neri Alex) Appt Note: PE lab fasting [address], [address] [place] [id number] [place] [address] [address]  
  
   
 [address], [address]  
  
    
 12/13/2017  1:00 PM  
PHYSICAL with Nabila Duke MD  
Internist of [address] [place] [address] Neri Johnson) Appt Note: Physical  
 [address], [address] [place] [id number] [place] [address] [address]  
  
   
 [address], [address] Upcoming Health Maintenance Date Due DTaP/Tdap/Td series (1 - Tdap) 11/30/2008 INFLUENZA AGE 9 TO ADULT 8/1/2017 Allergies as of 5/9/2017  Review Complete On: 5/9/2017 By: Nabila Duke MD  
 No Known Allergies Current Immunizations  Reviewed on 10/18/2013 No immunizations on file. Not reviewed this visit You Were Diagnosed With   
  
 Codes Comments Epigastric pain    -  Primary ICD-10-CM: R10.13 ICD-9-CM: 789.06 Abdominal bloating     ICD-10-CM: R14.0 ICD-9-CM: 020. 3 Vitals BP Pulse Temp Resp Height(growth percentile) Weight(growth percentile) 124/79 78 98.9 °F (37.2 °C) 20 6' 2\" (1.88 m) 247 lb (112 kg) SpO2 BMI Smoking Status 98% 31.71 kg/m2 Former Smoker BMI and BSA Data Body Mass Index Body Surface Area 31.71 kg/m 2 2.42 m 2 Preferred Pharmacy Pharmacy Name Phone [facility] [address], [address] [phone] Your Updated Medication List  
  
   
This list is accurate as of: 5/9/17  4:11 PM.  Always use your most recent med list.  
  
  
  
  
 clonazePAM 1 mg tablet Commonly known as:  Georgette Puff One tablet at bedtime as needed for sleep CYMBALTA 30 mg capsule Generic drug:  DULoxetine Take 60 mg by mouth daily. inhalational spacing device 1 Each by Does Not Apply route as needed. ondansetron 4 mg disintegrating tablet Commonly known as:  ZOFRAN ODT Take 1 Tab by mouth every eight (8) hours as needed for Nausea. oxyCODONE-acetaminophen  mg per tablet Commonly known as:  PERCOCET Take 1 Tab by mouth every six (6) hours as needed for Pain. Max Daily Amount: 4 Tabs. SEROquel 25 mg tablet Generic drug:  QUEtiapine Take  by mouth. SUMAtriptan 100 mg tablet Commonly known as:  IMITREX  
1 tablet by mouth daily as needed for migraine headache  
  
 topiramate 100 mg tablet Commonly known as:  TOPAMAX Take 1 Tab by mouth two (2) times a day. zolpidem 10 mg tablet Commonly known as:  AMBIEN Take 1 Tab by mouth nightly as needed. To-Do List   
 Around 05/10/2017 Imaging:  US GALLBLADDER Introducing hospitals & HEALTH SERVICES! Dear Lissett Anaya: Thank you for requesting a [employer] account. Our records indicate that you already have an active [employer] account. You can access your account anytime at [email or web address] Did you know that you can access your hospital and ER discharge instructions at any time in [employer]? You can also review all of your test results from your hospital stay or ER visit. Additional Information If you have questions, please visit the Frequently Asked Questions section of the [employer] website at [email or web address] Remember, [employer] is NOT to be used for urgent needs. For medical emergencies, dial 911. Now available from your iPhone and Android! Please provide this summary of care documentation to your next provider. Your primary care clinician is listed as Seda James. If you have any questions after today's visit, please call [phone].

## 2017-05-09 NOTE — PROGRESS NOTES
Tabitha Prior 1985, is a 36 y.o. male, who is seen today for continuing GI symptoms. He was seen April 20 with what seemed like viral gastroenteritis including vomiting. The vomiting stopped soon thereafter but he has had ongoing belching diffuse upper and mid abdominal bloating initially constipation. He was constipated for about 5 days and since then the bowels are back to normal.  That has made no difference in the upper abdominal symptoms. When he swallows sometimes it burns briefly with the first bite and then that does not recur. He feels bloated after eating and Tums seems to relieve that. He often has a great deal of belching first thing in the morning when he wakes up and less so the rest of the day. He has been using omeprazole 20 mg daily for at least a week and that does not seem to have helped any of the symptoms. Different types of food do not seem to affect the symptoms. Past Medical History:   Diagnosis Date    ADD (attention deficit disorder)     Anxiety     Migraine     Obesity     PTSD (post-traumatic stress disorder)     Seasonal allergic rhinitis     Sleep apnea      Current Outpatient Prescriptions   Medication Sig Dispense Refill    oxyCODONE-acetaminophen (PERCOCET)  mg per tablet Take 1 Tab by mouth every six (6) hours as needed for Pain. Max Daily Amount: 4 Tabs. 120 Tab 0    zolpidem (AMBIEN) 10 mg tablet Take 1 Tab by mouth nightly as needed. 30 Tab 0    topiramate (TOPAMAX) 100 mg tablet Take 1 Tab by mouth two (2) times a day. 180 Tab 1    SUMAtriptan (IMITREX) 100 mg tablet 1 tablet by mouth daily as needed for migraine headache 9 Tab 0    DULoxetine (CYMBALTA) 30 mg capsule Take 60 mg by mouth daily.  ondansetron (ZOFRAN ODT) 4 mg disintegrating tablet Take 1 Tab by mouth every eight (8) hours as needed for Nausea. 15 Tab 0    QUEtiapine (SEROQUEL) 25 mg tablet Take  by mouth.       clonazePAM (KLONOPIN) 1 mg tablet One tablet at bedtime as needed for sleep 30 Tab 2    inhalational spacing device 1 Each by Does Not Apply route as needed. 1 Device 0     Visit Vitals    /79    Pulse 78    Temp 98.9 °F (37.2 °C)    Resp 20    Ht 6' 2\" (1.88 m)    Wt 247 lb (112 kg)    SpO2 98%    BMI 31.71 kg/m2     Abdomen is not distended and not tympanitic. Normoactive bowel sounds. No obvious hepatosplenomegaly or masses though he is moderately obese. Abdomen is actually soft and nontender throughout. Chest wall is nontender. Heart reveals a regular rhythm with normal S1 and S2 no murmur gallop click or rub. Assessment: Abdominal pain as above, cholelithiasis as a possible etiology so we will get an ultrasound of the gallbladder and right upper quadrant to start with. If this is negative we will have him see a gastroenterologist.  He agrees with this plan. He will continue using Tums as needed and for now will continue omeprazole even though that does not seem to be helping him. I have explained several of the more common etiologies for these symptoms. Tomy Rey MD [facility]    Please note: This document has been produced using voice recognition software. Unrecognized errors in transcription may be present. This visit lasted 25 minutes, greater than 50% of the time spent counseling on symptomatic treatment for now as noted above as well as proposed evaluation including ultrasound and what may follow that.

## 2017-05-11 ENCOUNTER — OFFICE VISIT (OUTPATIENT)
Dept: PAIN MANAGEMENT | Age: 32
End: 2017-05-11

## 2017-05-15 ENCOUNTER — HOSPITAL ENCOUNTER (OUTPATIENT)
Dept: ULTRASOUND IMAGING | Age: 32
Discharge: HOME | End: 2017-05-15
Attending: INTERNAL MEDICINE
Payer: COMMERCIAL

## 2017-05-15 DIAGNOSIS — R14.0 ABDOMINAL BLOATING: ICD-10-CM

## 2017-05-15 DIAGNOSIS — R10.13 EPIGASTRIC PAIN: ICD-10-CM

## 2017-05-15 PROCEDURE — 76705 ECHO EXAM OF ABDOMEN: CPT

## 2017-05-15 RX ORDER — ZOLPIDEM TARTRATE 10 MG/1
10 TABLET ORAL
Qty: 30 TAB | Refills: 0 | Status: SHIPPED | OUTPATIENT
Start: 2017-05-15 | End: 2017-06-15 | Stop reason: SDUPTHER

## 2017-05-15 RX ORDER — OXYCODONE AND ACETAMINOPHEN 10; 325 MG/1; MG/1
1 TABLET ORAL
Qty: 120 TAB | Refills: 0 | Status: SHIPPED | OUTPATIENT
Start: 2017-05-15 | End: 2017-07-11 | Stop reason: SDUPTHER

## 2017-05-15 NOTE — PROGRESS NOTES
Please notify the patient his gallbladder ultrasound does not show stones. I would recommend that she see Dr. Kendrick Arango or someone in that group.

## 2017-05-15 NOTE — TELEPHONE ENCOUNTER
Last ov 5/9/17  Last filled ambien 4/5/17 percocet 4/20/17  Pulled  report and did not see any discrepancies

## 2017-05-25 ENCOUNTER — OFFICE VISIT (OUTPATIENT)
Dept: PAIN MANAGEMENT | Age: 32
End: 2017-05-25

## 2017-05-25 VITALS
DIASTOLIC BLOOD PRESSURE: 78 MMHG | BODY MASS INDEX: 31.71 KG/M2 | WEIGHT: 247 LBS | SYSTOLIC BLOOD PRESSURE: 126 MMHG | HEART RATE: 60 BPM

## 2017-05-25 DIAGNOSIS — M54.12 CERVICAL RADICULOPATHY: Primary | ICD-10-CM

## 2017-05-25 DIAGNOSIS — M47.22 OSTEOARTHRITIS OF SPINE WITH RADICULOPATHY, CERVICAL REGION: ICD-10-CM

## 2017-05-25 DIAGNOSIS — Z79.899 ENCOUNTER FOR LONG-TERM (CURRENT) USE OF HIGH-RISK MEDICATION: ICD-10-CM

## 2017-05-25 DIAGNOSIS — M50.30 DDD (DEGENERATIVE DISC DISEASE), CERVICAL: ICD-10-CM

## 2017-05-25 DIAGNOSIS — G89.4 CHRONIC PAIN SYNDROME: ICD-10-CM

## 2017-05-25 DIAGNOSIS — Z86.69 HISTORY OF MIGRAINE: ICD-10-CM

## 2017-05-25 LAB
ALCOHOL UR POC: NORMAL
AMPHETAMINES UR POC: NEGATIVE
BARBITURATES UR POC: NEGATIVE
BENZODIAZEPINES UR POC: NEGATIVE
BUPRENORPHINE UR POC: NORMAL
CANNABINOIDS UR POC: NEGATIVE
CARISOPRODOL UR POC: NORMAL
COCAINE UR POC: NEGATIVE
FENTANYL UR POC: NORMAL
MDMA/ECSTASY UR POC: NEGATIVE
METHADONE UR POC: NEGATIVE
METHAMPHETAMINE UR POC: NEGATIVE
METHYLPHENIDATE UR POC: NEGATIVE
OPIATES UR POC: NEGATIVE
OXYCODONE UR POC: NEGATIVE
PHENCYCLIDINE UR POC: NEGATIVE
PROPOXYPHENE UR POC: NORMAL
TRAMADOL UR POC: NORMAL
TRICYCLICS UR POC: NEGATIVE

## 2017-05-25 RX ORDER — HYDROCODONE BITARTRATE AND ACETAMINOPHEN 10; 325 MG/1; MG/1
1 TABLET ORAL
Qty: 120 TAB | Refills: 0 | Status: SHIPPED | OUTPATIENT
Start: 2017-06-23 | End: 2017-07-11

## 2017-05-25 RX ORDER — DICLOFENAC SODIUM 75 MG/1
75 TABLET, DELAYED RELEASE ORAL 2 TIMES DAILY
Qty: 60 TAB | Refills: 5 | Status: SHIPPED | OUTPATIENT
Start: 2017-05-25 | End: 2017-05-25 | Stop reason: SDUPTHER

## 2017-05-25 RX ORDER — HYDROCODONE BITARTRATE AND ACETAMINOPHEN 10; 325 MG/1; MG/1
1 TABLET ORAL
Qty: 120 TAB | Refills: 0 | Status: SHIPPED | OUTPATIENT
Start: 2017-05-25 | End: 2017-06-24

## 2017-05-25 RX ORDER — TIZANIDINE 4 MG/1
TABLET ORAL
Qty: 60 TAB | Refills: 5 | Status: SHIPPED | OUTPATIENT
Start: 2017-05-25 | End: 2017-05-25 | Stop reason: SDUPTHER

## 2017-05-25 NOTE — MR AVS SNAPSHOT
Visit Information Date & Time Provider Department Dept. Phone Encounter #  
 5/25/2017  1:00 PM J[facility]itting[facility] [phone] [id number] Follow-up Instructions Return in about 2 months (around 7/25/2017). Your Appointments 7/21/2017  1:00 PM  
Office Visit with [facility] EDUC [facility] ([facility] SCHEDUL[facility]) Appt Note: new pt edu class [address] [facility] [address]  
[phone] [id number]  
  
    
 7/21/2017  2:00 PM  
Follow Up with Slava Stacy PA-C [facility]) Appt Note: Return in about 2 months (around 7/25/2017). [address] [facility] [address]  
[phone] [address]  
  
    
 12/6/2017 10:55 AM  
LAB with [facility] NURSE VISIT Internist of [facility]) Appt Note: PE lab fasting [address], [address] [place] [address] [place]  
  
   
 [address], [address]  
  
    
 12/13/2017  1:00 PM  
PHYSICAL with Janet Samson MD  
Internist of [facility] [address] [facility]) Appt Note: Physical  
 [address], [address] [place] [address]  
  
   
 [address], [address] Upcoming Health Maintenance Date Due DTaP/Tdap/Td series (1 - Tdap) 11/30/2008 INFLUENZA AGE 9 TO ADULT 8/1/2017 Allergies as of 5/25/2017  Review Complete On: 5/25/2017 By: Janna Whittington MD  
 No Known Allergies Current Immunizations  Reviewed on 10/18/2013 No immunizations on file. Not reviewed this visit You Were Diagnosed With   
  
 Codes Comments Encounter for long-term (current) use of high-risk medication    -  Primary ICD-10-CM: F78.533 ICD-9-CM: V58.69 Cervical radiculopathy     ICD-10-CM: M54.12 
ICD-9-CM: 723.4 DDD (degenerative disc disease), cervical     ICD-10-CM: M50.30 ICD-9-CM: 722.4 Osteoarthritis of spine with radiculopathy, cervical region     ICD-10-CM: M47.22 
ICD-9-CM: 721.0 Chronic pain syndrome     ICD-10-CM: G89.4 ICD-9-CM: 338.4 History of migraine     ICD-10-CM: Z86.69 
ICD-9-CM: V12.49 Vitals BP Pulse Weight(growth percentile) BMI Smoking Status 126/78 60 247 lb (112 kg) 31.71 kg/m2 Former Smoker Vitals History BMI and BSA Data Body Mass Index Body Surface Area 31.71 kg/m 2 2.42 m 2 Preferred Pharmacy Pharmacy Name Phone [address], [address] [place] [address] [address] [facility] [phone] Your Updated Medication List  
  
   
This list is accurate as of: 5/25/17  2:02 PM.  Always use your most recent med list.  
  
  
  
  
 clonazePAM 1 mg tablet Commonly known as:  Shanon Loge One tablet at bedtime as needed for sleep CYMBALTA 30 mg capsule Generic drug:  DULoxetine Take 60 mg by mouth daily. diclofenac EC 75 mg EC tablet Commonly known as:  VOLTAREN Take 1 Tab by mouth two (2) times a day for 30 days. Indications: OSTEOARTHRITIS  
  
 * HYDROcodone-acetaminophen  mg tablet Commonly known as:  [address] Take 1 Tab by mouth four (4) times daily as needed for Pain for up to 30 days. Max Daily Amount: 4 Tabs. Indications: Pain  
  
 * HYDROcodone-acetaminophen  mg tablet Commonly known as:  [address] Take 1 Tab by mouth four (4) times daily as needed for Pain for up to 30 days. Max Daily Amount: 4 Tabs. Indications: Pain Start taking on:  6/23/2017  
  
 ondansetron 4 mg disintegrating tablet Commonly known as:  ZOFRAN ODT Take 1 Tab by mouth every eight (8) hours as needed for Nausea. oxyCODONE-acetaminophen  mg per tablet Commonly known as:  PERCOCET  
 Take 1 Tab by mouth every six (6) hours as needed for Pain. Max Daily Amount: 4 Tabs. SEROquel 25 mg tablet Generic drug:  QUEtiapine Take  by mouth. SUMAtriptan 100 mg tablet Commonly known as:  IMITREX  
1 tablet by mouth daily as needed for migraine headache  
  
 tiZANidine 4 mg tablet Commonly known as:  Romy Culberson 1 po qhs x 14 days, then 2 po qhs  Indications: MUSCLE SPASM  
  
 topiramate 100 mg tablet Commonly known as:  TOPAMAX Take 1 Tab by mouth two (2) times a day. zolpidem 10 mg tablet Commonly known as:  AMBIEN Take 1 Tab by mouth nightly as needed. * Notice: This list has 2 medication(s) that are the same as other medications prescribed for you. Read the directions carefully, and ask your doctor or other care provider to review them with you. Prescriptions Printed Refills HYDROcodone-acetaminophen (NORCO)  mg tablet 0 Starting on: 2017 Sig: Take 1 Tab by mouth four (4) times daily as needed for Pain for up to 30 days. Max Daily Amount: 4 Tabs. Indications: Pain Class: Print Route: Oral  
 HYDROcodone-acetaminophen (NORCO)  mg tablet 0 Sig: Take 1 Tab by mouth four (4) times daily as needed for Pain for up to 30 days. Max Daily Amount: 4 Tabs. Indications: Pain Class: Print Route: Oral  
  
Prescriptions Sent to Pharmacy Refills  
 diclofenac EC (VOLTAREN) 75 mg EC tablet 5 Sig: Take 1 Tab by mouth two (2) times a day for 30 days. Indications: OSTEOARTHRITIS Class: Normal  
 Pharmacy: [address] [place] [address] Ph #: [phone] Route: Oral  
 tiZANidine (ZANAFLEX) 4 mg tablet 5 Si po qhs x 14 days, then 2 po qhs  Indications: MUSCLE SPASM Class: Normal  
 Pharmacy: [address] [place] [address] Ph #: [phone] We Performed the Following AMB POC DRUG SCREEN () [ [facility]] DRUG SCREEN [[id number] Custom] Follow-up Instructions Return in about 2 months (around 7/25/2017). Patient Instructions Current health maintenance issues were reviewed and the patient was advised to followup with his/her PCP for completion of these items. Introducing [facility] & HEALTH SERVICES! Dear Lieutenant Watkins: Thank you for requesting a "[employer] account. Our records indicate that you already have an active "[employer] account. You can access your account anytime at [email or web address] Did you know that you can access your hospital and ER discharge instructions at any time in "[employer]? You can also review all of your test results from your hospital stay or ER visit. Additional Information If you have questions, please visit the Frequently Asked Questions section of the "[employer] website at [email or web address] Remember, "[employer] is NOT to be used for urgent needs. For medical emergencies, dial 911. Now available from your iPhone and Android! Please provide this summary of care documentation to your next provider. Your primary care clinician is listed as Seda James. If you have any questions after today's visit, please call [phone].

## 2017-05-25 NOTE — PROGRESS NOTES
HISTORY OF PRESENT ILLNESS  Elijah Gamez is a 36 y.o. male. HPI he is seen in [facility]. Is referred for evaluation and treatment of chronic, severe pain involving the cervical spine with radiation towards the occiput and bifrontally as well as down the left upper extremity posteriorly into the hand where there is associated numbness and tingling. Extensive external medical records pertaining to his prior treatment were reviewed. He has long-standing problems with the cervical spine which includes pain radiating as noted above. He has been seen by Dr. Feliberto Mitchell in neurosurgical consultation who suggested epidural steroid injections which were performed with some relief ×2. Has not had an injection in a considerable period of time  He has also been treated with physical therapy and traction without benefit. He has been seen by a rheumatologist who did not find evidence for other underlying rheumatologic illness. He did undergo an MRI of the cervical spine which was reviewed with results as follows:    Findings: The visualized craniocervical junction and posterior fossa are unremarkable. Cervical cord:  No intrinsic signal abnormalities are demonstrated in the cervical cord. Marrow: No T1 or STIR marrow signal abnormalities are seen to suggest acute facet, marrow or soft tissue edema.      Alignment: Straightening to slight reversal  of cervical lordosis at C5/6.    C2/3: No significant disc protrusion or limiting stenosis is seen.        C3/4: Left >right uncovertebral and facet hypertrophy.  Relatively severe left and moderate to severe right foraminal narrowing.  No limiting central stenosis.         C4/5: Left >right uncovertebral and facet hypertrophy.  Severe left and relatively severe right foraminal narrowing.   No limiting central stenosis.     C5/6: Central disc protrusion with ventral cord deformity.  Moderate central stenosis (0.7 cm).  Mild foraminal stenosis.        C6/7:Left paracentral disc protrusion, with ventral cord deformity, and mild-moderate central stenosis (0.7-0.8 cm).  Mild left foraminal narrowing.        C7/T1: No significant disc protrusion or limiting stenosis is seen.        Upper thoracic spine:    Evaluated only in limited, visualized sagittal extent. The significance of the MRI findings was discussed at great length with the patient and interventional procedures to include cervical epidural steroid injection will be scheduled. The possibility of surgery was also discussed. Associated symptoms: Chills, weight loss, insomnia, excessive daytime sleepiness, fatigue, blurry vision, hearing loss, pressure in the ears, vertigo, dizziness, imbalance, tinnitus, hoarseness, shortness of breath, cold hands and feet, poor appetite, heartburn, diarrhea, constipation headache, nausea, vomiting, numbness and tingling in the face, arms, and legs, difficulty walking and speaking, poor memory and concentration, depression, oscar, muscle pain and weakness, joint pain and stiffness. Past history: ADD, anxiety and depression, PTSD, migraine, sleep apnea, lumbar degenerative disc disease. Past surgery: Tonsillectomy and adenoidectomy. Family history: Hypertension, coronary artery disease, myocardial infarction, headache. Social history:. He is single. He is a former smoker and a nondrinker. He does admit to rare and infrequent marijuana usage. He was counseled that, as marijuana is an illegal substance in the state of [place], continued usage of marijuana or any other illegal substance would preclude our ability to prescribe controlled substances to him and could result in termination of the therapeutic relationship. He understands the policy and is agreed to comply fully. Moderate risk is identified on the opioid risk screening tool.   This is due to his history of marijuana usage, his age, and history of ADHD and depression. Severe affect of distress is noted on the PHQ-9. His PCS score 42 is consistent with significant catastrophic behavior. Elevation of the rumination, magnification, and helplessness subscales is appreciated. A current review of the  does not identify any inconsistency. UDS obtained and reviewed; formal confirmation from laboratory is pending. A salivary fluid specimen is obtained and forwarded to the laboratory for cytogenetic analysis. Review of Systems   Constitutional: Positive for malaise/fatigue. Gastrointestinal: Positive for constipation. Musculoskeletal: Positive for myalgias (spasms in neck) and neck pain. Neurological: Positive for weakness (generalized). Psychiatric/Behavioral: The patient has insomnia. All other systems reviewed and are negative. Physical Exam   Constitutional: He is oriented to person, place, and time. No distress. HENT:   Head: Normocephalic and atraumatic. Right Ear: External ear normal.   Left Ear: External ear normal.   Nose: Nose normal.   Mouth/Throat: Oropharynx is clear and moist. No oropharyngeal exudate. Eyes: Conjunctivae and EOM are normal. Pupils are equal, round, and reactive to light. Right eye exhibits no discharge. Left eye exhibits no discharge. No scleral icterus. Neck: Muscular tenderness (spasms) present. Decreased range of motion present. No Brudzinski's sign and no Kernig's sign noted. No thyromegaly present. Cardiovascular: Normal rate, regular rhythm and normal heart sounds. Pulmonary/Chest: Effort normal and breath sounds normal. No respiratory distress. He has no wheezes. He has no rales. He exhibits no tenderness. Abdominal: Soft. He exhibits no distension. There is no tenderness. There is no rebound and no guarding. Neurological: He is alert and oriented to person, place, and time. He has normal reflexes. No cranial nerve deficit. He exhibits normal muscle tone.  Coordination normal.   Skin: Skin is warm and dry. No rash noted. Psychiatric: He has a normal mood and affect. His behavior is normal. Judgment and thought content normal.   Nursing note and vitals reviewed. ASSESSMENT and PLAN  Encounter Diagnoses   Name Primary?  Cervical radiculopathy Yes    Encounter for long-term (current) use of high-risk medication     DDD (degenerative disc disease), cervical     Osteoarthritis of spine with radiculopathy, cervical region     Chronic pain syndrome     History of migraine      Voltaren EC, 75 mm, twice daily will be initiated for its anti-arthritic, analgesic, and anti-inflammatory properties. Zanaflex will be initiated at 4 mg at bedtime which may be increased to 8 mg after 2 weeks for better control of his muscle spasm and headaches. Percocet is being discontinued and hydrocodone, 10/325, up to 4 times daily as needed for his chronic pain will be prescribed. Further recommendations will be based upon his response to the above. 1. Pain medications are prescribed with the objective of pain relief and improved physical and psychosocial function in this patient. 2. Counseled patient on proper use of prescribed medications and reviewed opioid contract. 3. Counseled patient about chronic medical conditions and their relationship to anxiety and depression and recommended mental health support as needed. 4. Reviewed with patient self-help tools, home exercise, and lifestyle changes to assist the patient in self-management of symptoms. 5. Advised patient to have a primary care provider to continue care for health maintenance and general medical conditions and support for referral to specialty care as needed. 6. Reviewed with patient the treatment plan, goals of treatment plan, and limitations of treatment plan, to include the potential for side effects from medications and procedures.  If side effects occur, it is the responsibility of the patient to inform the clinic so that a change in the treatment plan can be made in a safe manner. The patient is advised that stopping prescribed medication may cause an increase in symptoms and possible medication withdrawal symptoms. The patient is informed an emergency room evaluation may be necessary if this occurs. DISPOSITION: The patients condition and plan were discussed at length and all questions were answered. The patient agrees with the plan.     Counseling occupied > 50% of visit:  Total time: 65 minutes

## 2017-05-30 ENCOUNTER — TELEPHONE (OUTPATIENT)
Dept: INTERNAL MEDICINE CLINIC | Age: 32
End: 2017-05-30

## 2017-06-09 RX ORDER — SODIUM CHLORIDE 0.9 % (FLUSH) 0.9 %
5-10 SYRINGE (ML) INJECTION AS NEEDED
Status: CANCELLED | OUTPATIENT
Start: 2017-06-12

## 2017-06-09 RX ORDER — MIDAZOLAM HYDROCHLORIDE 1 MG/ML
.5-6 INJECTION, SOLUTION INTRAMUSCULAR; INTRAVENOUS
Status: CANCELLED | OUTPATIENT
Start: 2017-06-12

## 2017-06-12 ENCOUNTER — TELEPHONE (OUTPATIENT)
Dept: PAIN MANAGEMENT | Age: 32
End: 2017-06-12

## 2017-06-12 NOTE — TELEPHONE ENCOUNTER
Pt called requesting status of paperwork he gave to [facility]. Have returned call and asked him to call back wed when provider is in office to ask about such.

## 2017-06-16 RX ORDER — ZOLPIDEM TARTRATE 10 MG/1
10 TABLET ORAL
Qty: 30 TAB | Refills: 0 | OUTPATIENT
Start: 2017-06-16 | End: 2017-07-18 | Stop reason: SDUPTHER

## 2017-06-21 ENCOUNTER — TELEPHONE (OUTPATIENT)
Dept: PAIN MANAGEMENT | Age: 32
End: 2017-06-21

## 2017-06-21 NOTE — TELEPHONE ENCOUNTER
Attempted to contact patient to update regarding provider response; no answer at number noted; vm left to contact nurse triage line.

## 2017-06-21 NOTE — TELEPHONE ENCOUNTER
Received call from patient stating that muscle relaxer prescribed is causing him some stomach upset ; patient goes on to say that norco prescribed is not as effective as previous medication regimen; patient is requesting to go back to old medication regimen; patient is a new patient as of 05/25/2017, and has not had follow-up visit as of this time.

## 2017-06-23 ENCOUNTER — TELEPHONE (OUTPATIENT)
Dept: PAIN MANAGEMENT | Age: 32
End: 2017-06-23

## 2017-06-23 NOTE — TELEPHONE ENCOUNTER
The pt called the office requesting a call back. I called and spoke to the pt. He stated that he was having too much GI upset with the voltaren 75 mg medication. He was having nausea and vomiting and stopped the medication. He was asked if he was taking the medication with food and he stated that he was. He is tolerating the zanaflex. However, he is getting poor pain control with is norco . It was explained to the pt that the office did receive a message from the pt on 06/21/17 and it was documented. The message was relayed to  [facility] about the pt's med issues. He replied that the pt will need to wait to discuss these things with the provider at his follow up appt. It was explained to the pt that we do not make changes to medications without or between office visits. He verbalized understanding. His next follow up is not until 07/21/17. The pt was offered an earlier appt to 07/11/17 at 1540 with DIDI Stanton. The pt was ok with this and has no other questions or concerns to voice at this time. He states that he can still come to the 07/21/17 med ed class.

## 2017-07-11 ENCOUNTER — OFFICE VISIT (OUTPATIENT)
Dept: PAIN MANAGEMENT | Age: 32
End: 2017-07-11

## 2017-07-11 VITALS
DIASTOLIC BLOOD PRESSURE: 88 MMHG | HEART RATE: 78 BPM | BODY MASS INDEX: 30.81 KG/M2 | WEIGHT: 240 LBS | SYSTOLIC BLOOD PRESSURE: 132 MMHG

## 2017-07-11 DIAGNOSIS — M50.30 DDD (DEGENERATIVE DISC DISEASE), CERVICAL: ICD-10-CM

## 2017-07-11 DIAGNOSIS — M48.02 STENOSIS, CERVICAL SPINE: ICD-10-CM

## 2017-07-11 DIAGNOSIS — Z86.69 HISTORY OF MIGRAINE: ICD-10-CM

## 2017-07-11 DIAGNOSIS — M51.06 INTERVERTEBRAL DISC DISORDER OF LUMBAR REGION WITH MYELOPATHY: ICD-10-CM

## 2017-07-11 DIAGNOSIS — G03.9 ADHESIVE ARACHNOIDITIS: ICD-10-CM

## 2017-07-11 DIAGNOSIS — M47.26 OSTEOARTHRITIS OF SPINE WITH RADICULOPATHY, LUMBAR REGION: ICD-10-CM

## 2017-07-11 DIAGNOSIS — M54.2 CERVICALGIA: Primary | ICD-10-CM

## 2017-07-11 DIAGNOSIS — M48.061 SPINAL STENOSIS, LUMBAR: ICD-10-CM

## 2017-07-11 DIAGNOSIS — M54.12 CERVICAL RADICULOPATHY: ICD-10-CM

## 2017-07-11 RX ORDER — OXYCODONE AND ACETAMINOPHEN 10; 325 MG/1; MG/1
1 TABLET ORAL
Qty: 120 TAB | Refills: 0 | Status: SHIPPED | OUTPATIENT
Start: 2017-08-09 | End: 2017-10-06 | Stop reason: SDUPTHER

## 2017-07-11 RX ORDER — OXYCODONE AND ACETAMINOPHEN 10; 325 MG/1; MG/1
1 TABLET ORAL
Qty: 120 TAB | Refills: 0 | Status: SHIPPED | OUTPATIENT
Start: 2017-07-11 | End: 2017-07-11 | Stop reason: SDUPTHER

## 2017-07-11 NOTE — PROGRESS NOTES
Nursing Notes    Patient presents to the office today in follow-up. Patient rates his pain at 5/10 on the numerical pain scale. Reviewed medications with counts as follows:          Comments:  Patient is new to the practice he states he did not bring in his medicine. I will inform him that they are needed every appt. POC UDS was performed in office today    Any new labs or imaging since last appointment? NO    Have you been to an emergency room (ER) or urgent care clinic since your last visit? Yes [facility]          Have you been hospitalized since your last visit? NO     If yes, where, when, and reason for visit? Have you seen or consulted any other health care providers outside of the [address] [place] [address]  since your last visit? NO    Mr. Henry Vazquez has a reminder for a \"due or due soon\" health maintenance. I have asked that he contact his primary care provider for follow-up on this health maintenance. If yes, where, when, and reason for visit?

## 2017-07-11 NOTE — PROGRESS NOTES
HISTORY OF PRESENT ILLNESS  Sae Armas is a 36 y.o. male. He reports that back pain has been much worse over the past several weeks. This pain is described as stabbing, aching, pulling, tight, and burning. There is more frequent radicular pain in the right lower extremity, but he denies weakness or numbness. Brisk sacroiliac tenderness noted on both sides. He denies saddle anesthesia or bowel/bladder dysfunction. We discussed the results of his most recent MRI of the lumbar spine, which revealed the following:   Impression:  1.  Laminectomy changes at L4.  2.  There is a lobulated right paracentral recurrent protrusion/extrusion with a paracentral and more lateral component.  This does appear likely to affect the right L5 root which has a high takeoff in the upper lateral recess.  Only mild thecal sac distortion without significant central stenosis.  There is also some clumping of the nerve roots of the cauda equina at this operative level at L4-L5 consistent with an element of focal arachnoiditis which certainly could be asymptomatic. 3.  Broad central disc protrusion at L3-L4.  Laminectomy extends up to this level.  Only mild central stenosis at this level. 4.  No additional evidence of herniation or high-grade stenosis. Of note, he has lost over 100 lbs in the past three years through a combination of diet and regular exercise. He is exercising several days per week (usually swimming and light weight training), but we discussed that his years spent morbidly overweight likely accelerated damage to a genetically compromised spine. He is interested in pursuing SCS, and specifically Nevro HF10 device, which he has researched extensively. We discussed treatment options at length--see treatment plan below.    We discussed the role of central sensitization in the pathophysiology of chronic multifocal pain, particularly with his history of chronic anxiety, chronic insomnia, TMJ dysfunction, migraines, and daytime fatigue. He has seen multiple specialists for all of his multiple pain generators over the past ten years. We discussed the critical role of exercise in the management of osteoarthritic-related fatigue and pain. We also discussed finding ways to address the anxiety surrounding him pain using techniques such as meditation, biofeedback, and finding enjoyable activities to distract him from pain. Arturo Angelica is not effective for pain, and he would like to go back to percocet, which he notes was more effective and better tolerated for pain. We discussed the risks and limitations of using opioids to treat chronic spine pain, particularly in someone so young. We discussed treatment options at length--see treatment plan below. A total of 55 minutes was spent with the patient of which more than 50% of the time was spent counseling the patient. HPI--see above    ROS  Constitutional: Positive for malaise/fatigue. Gastrointestinal: Positive for constipation. Musculoskeletal: Positive for myalgias (spasms in neck) and neck pain. Neurological: Positive for weakness (generalized). Psychiatric/Behavioral: The patient has insomnia. All other systems reviewed and are negative. Physical Exam  Constitutional: He is oriented to person, place, and time. No distress. HENT:   Head: Normocephalic and atraumatic. Right Ear: External ear normal.   Left Ear: External ear normal.   Nose: Nose normal.   Mouth/Throat: Oropharynx is clear and moist. No oropharyngeal exudate. Eyes: Conjunctivae and EOM are normal. Pupils are equal, round, and reactive to light. Right eye exhibits no discharge. Left eye exhibits no discharge. No scleral icterus. Neck: Muscular tenderness (spasms) present. Decreased range of motion present. No Brudzinski's sign and no Kernig's sign noted. No thyromegaly present. Cardiovascular: Normal rate, regular rhythm and normal heart sounds.     Pulmonary/Chest: Effort normal and breath sounds normal. No respiratory distress. He has no wheezes. He has no rales. He exhibits no tenderness. Abdominal: Soft. He exhibits no distension. There is no tenderness. There is no rebound and no guarding. Neurological: He is alert and oriented to person, place, and time. He has normal reflexes. No cranial nerve deficit. He exhibits normal muscle tone. Coordination normal.   Skin: Skin is warm and dry. No rash noted. Psychiatric: He has a normal mood and affect. His behavior is normal. Judgment and thought content normal.   ASSESSMENT and PLAN    ICD-10-CM ICD-9-CM    1. Cervicalgia M54.2 723.1    2. Cervical radiculopathy M54.12 723.4    3. 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 Spinal stenosis, lumbar M48.06 724.02    5. History of migraine Z86.69 V12.49    6. 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 Osteoarthritis of spine with radiculopathy, lumbar region M47.26 721.3    8. DDD (degenerative disc disease), cervical M50.30 722.4    9. Stenosis, cervical spine M48.02 723.0       Plan:   We will return to [address] [place] [address] 10/325 up to four times daily as needed for chronic, severe pain  We will arrange a consult with Dr. Radha Warner to discuss the possibility of pursuing trialing for the Nevro HF 10 spinal cord stimulator  Consider starting methylated B vitamin complex supplements once daily for optimal nerve health and to prevent muscle cramps/twitches  Follow up in 2 months or sooner if needed  Regular exercise and attention to emotional health and diet remain the most effective ways to treat chronic pain of all kinds  You may contact me with questions or concerns through [address]

## 2017-07-11 NOTE — MR AVS SNAPSHOT
Visit Information Date & Time Provider Department Dept. Phone Encounter #  
 7/11/2017  3:40 PM Jillian Galvez [facility] [facility]  Follow-up Instructions Return in about 2 months (around 9/11/2017). Your Appointments 7/21/2017  1:00 PM  
Office Visit with [facility] [facility] (AZAM SCHEDULING) Appt Note: new pt [facility] class [address] [address]  
[phone] [address]  
  
    
 12/6/2017 10:55 AM  
LAB with [facility] NURSE VISIT Internist of [facility] ([address] [place] [address]) Appt Note: PE lab fasting [address], [address] [place]  
  
   
 [address], [address]  
  
    
 12/13/2017  1:00 PM  
PHYSICAL with Heber Sotomayor MD  
Internist of [address]) Appt Note: Physical  
 [address], [address] [place] [id number] [place] [address] [address] [place]  
  
   
 [address], [address] Upcoming Health Maintenance Date Due DTaP/Tdap/Td series (1 - Tdap) 11/30/2008 INFLUENZA AGE 9 TO ADULT 8/1/2017 Allergies as of 7/11/2017  Review Complete On: 7/11/2017 By: Uvaldo Mccarty LPN No Known Allergies Current Immunizations  Reviewed on 10/18/2013 No immunizations on file. Not reviewed this visit You Were Diagnosed With   
  
 Codes Comments Cervicalgia    -  Primary ICD-10-CM: M54.2 ICD-9-CM: 723.1 Cervical radiculopathy     ICD-10-CM: M54.12 
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 
ICD-9-CM: 722.73 Spinal stenosis, lumbar     ICD-10-CM: M48.06 
ICD-9-CM: 724.02 History of migraine     ICD-10-CM: Z86.69 
ICD-9-CM: V12.49 Adhesive arachnoiditis     ICD-10-CM: G03.9 ICD-9-CM: 322.9 Osteoarthritis of spine with radiculopathy, lumbar region     ICD-10-CM: M47.26 
ICD-9-CM: 721.3 DDD (degenerative disc disease), cervical     ICD-10-CM: M50.30 ICD-9-CM: 722.4 Stenosis, cervical spine     ICD-10-CM: M48.02 
ICD-9-CM: 723.0 Vitals BP Pulse Weight(growth percentile) BMI Smoking Status 132/88 78 240 lb (108.9 kg) 30.81 kg/m2 Former Smoker Vitals History BMI and BSA Data Body Mass Index Body Surface Area  
 30.81 kg/m 2 2.38 m 2 Preferred Pharmacy Pharmacy Name Phone [address], [address] [place] [address] [phone] [facility] [phone] Your Updated Medication List  
  
   
This list is accurate as of: 7/11/17  4:40 PM.  Always use your most recent med list.  
  
  
  
  
 clonazePAM 1 mg tablet Commonly known as:  Rosa Mortimer One tablet at bedtime as needed for sleep CYMBALTA 30 mg capsule Generic drug:  DULoxetine Take 60 mg by mouth daily. diclofenac EC 75 mg EC tablet Commonly known as:  VOLTAREN  
TAKE 1 TABLET BY MOUTH TWICE DAILY  
  
 ondansetron 4 mg disintegrating tablet Commonly known as:  ZOFRAN ODT Take 1 Tab by mouth every eight (8) hours as needed for Nausea. oxyCODONE-acetaminophen  mg per tablet Commonly known as:  PERCOCET Take 1 Tab by mouth every six (6) hours as needed for Pain. Max Daily Amount: 4 Tabs. Start taking on:  8/9/2017 SEROquel 25 mg tablet Generic drug:  QUEtiapine Take  by mouth. SUMAtriptan 100 mg tablet Commonly known as:  IMITREX  
1 tablet by mouth daily as needed for migraine headache  
  
 tiZANidine 4 mg tablet Commonly known as:  Ora Jerome TAKE 1 BY MOUTH EVERY NIGHT AT BEDTIME FOR 14 DAYS THEN 2 BY MOUTH EVERY NIGHT AT BEDTIME AFTER  
  
 topiramate 100 mg tablet Commonly known as:  TOPAMAX Take 1 Tab by mouth two (2) times a day. zolpidem 10 mg tablet Commonly known as:  AMBIEN  
 Take 1 Tab by mouth nightly as needed. Prescriptions Printed Refills  
 oxyCODONE-acetaminophen (PERCOCET)  mg per tablet 0 Starting on: 8/9/2017 Sig: Take 1 Tab by mouth every six (6) hours as needed for Pain. Max Daily Amount: 4 Tabs. Class: Print Route: Oral  
  
Follow-up Instructions Return in about 2 months (around 9/11/2017). Patient Instructions Plan: We will return to [address] [place] [address] 10/325 up to four times daily as needed for chronic, severe pain We will arrange a consult with Dr. Arielle Betancourt to discuss the possibility of pursuing trialing for the [facility] HF 10 spinal cord stimulator Consider starting methylated B vitamin complex supplements once daily for optimal nerve health and to prevent muscle cramps/twitches Follow up in 2 months or sooner if needed Regular exercise and attention to emotional health and diet remain the most effective ways to treat chronic pain of all kinds You may contact me with questions or concerns through [address] [facility] [place] [facility]! Dear Philipp Salazar: Thank you for requesting a [employer] account. Our records indicate that you already have an active [employer] account. You can access your account anytime at [email or web address] Did you know that you can access your hospital and ER discharge instructions at any time in [employer]? You can also review all of your test results from your hospital stay or ER visit. Additional Information If you have questions, please visit the Frequently Asked Questions section of the [employer] website at [email or web address] Remember, [employer]ela is NOT to be used for urgent needs. For medical emergencies, dial 911. Now available from your iPhone and Android! Please provide this summary of care documentation to your next provider. Your primary care clinician is listed as Nasrin Mcguire. Penny De La Rosa.  If you have any questions after today's visit, please call [phone].

## 2017-07-11 NOTE — PATIENT INSTRUCTIONS
Plan:  We will return to [address] [place] [address] 10/325 up to four times daily as needed for chronic, severe pain  We will arrange a consult with Dr. Radha Warner to discuss the possibility of pursuing trialing for the [employer] HF 10 spinal cord stimulator  Consider starting methylated B vitamin complex supplements once daily for optimal nerve health and to prevent muscle cramps/twitches  Follow up in 2 months or sooner if needed  Regular exercise and attention to emotional health and diet remain the most effective ways to treat chronic pain of all kinds  You may contact me with questions or concerns through [address]

## 2017-07-18 ENCOUNTER — TELEPHONE (OUTPATIENT)
Dept: INTERNAL MEDICINE CLINIC | Age: 32
End: 2017-07-18

## 2017-07-18 RX ORDER — ZOLPIDEM TARTRATE 10 MG/1
10 TABLET ORAL
Qty: 30 TAB | Refills: 0 | OUTPATIENT
Start: 2017-07-18 | End: 2017-08-18 | Stop reason: SDUPTHER

## 2017-07-18 NOTE — TELEPHONE ENCOUNTER
Called in Zolpidem (Ambien) 10 mg tablets to [facility] on [facility] in [place] per the directions of Dr. Ann Thomas.

## 2017-07-24 ENCOUNTER — OFFICE VISIT (OUTPATIENT)
Dept: INTERNAL MEDICINE CLINIC | Age: 32
End: 2017-07-24

## 2017-07-24 VITALS
SYSTOLIC BLOOD PRESSURE: 132 MMHG | TEMPERATURE: 98.4 F | OXYGEN SATURATION: 98 % | HEIGHT: 74 IN | RESPIRATION RATE: 14 BRPM | WEIGHT: 249 LBS | BODY MASS INDEX: 31.95 KG/M2 | HEART RATE: 79 BPM | DIASTOLIC BLOOD PRESSURE: 86 MMHG

## 2017-07-24 DIAGNOSIS — R30.0 DYSURIA: Primary | ICD-10-CM

## 2017-07-24 LAB
BILIRUB UR QL STRIP: NEGATIVE
GLUCOSE UR-MCNC: NEGATIVE MG/DL
KETONES P FAST UR STRIP-MCNC: NEGATIVE MG/DL
PH UR STRIP: 7.5 [PH] (ref 4.6–8)
PROT UR QL STRIP: NEGATIVE MG/DL
SP GR UR STRIP: 1.02 (ref 1–1.03)
UA UROBILINOGEN AMB POC: NORMAL (ref 0.2–1)
URINALYSIS CLARITY POC: NORMAL
URINALYSIS COLOR POC: YELLOW
URINE BLOOD POC: NEGATIVE
URINE LEUKOCYTES POC: NEGATIVE
URINE NITRITES POC: NEGATIVE

## 2017-07-24 NOTE — PROGRESS NOTES
1. Have you been to the ER, urgent care clinic or hospitalized since your last visit? YES. Patient states he went to [facility] for groin pain over a month ago. 2. Have you seen or consulted any other health care providers outside of the [address] [place] [address] since your last visit (Include any pap smears or colon screening)? NO          Patient states that he went swimming about a month ago and as he was swimming he started to feel a shooting pain in his inner thigh and it has gotten worse and started to affect his right lower abdomen and left side of his groin area. Patient states that he has rested and used his Percocet to try to subside the pain but it is still there.

## 2017-07-24 NOTE — PROGRESS NOTES
Jose Us 1985, is a 36 y.o. male, who is seen today for a one-month history of discomfort in the right inguinal region, medial thigh and sometimes in the right lower abdomen. He first noticed it soon after her workout in the gym but was actually lying supine when he felt it. He went to an urgent care center and was told he did not have UTI and did not have STD. His symptoms feels a vague sense of dysuria but generally not. No change in urinary frequency. When he sits and bends forward that seems to cause symptoms to be a little worse but no other activity makes symptoms worse. Walking and supine does not seem to cause pain, though pain sometimes is present at those times. No chills sweats fever. Sometimes he feels something moving in his scrotum. Past Medical History:   Diagnosis Date    ADD (attention deficit disorder)     Anxiety     Depression     Migraine     Obesity     PTSD (post-traumatic stress disorder)     Seasonal allergic rhinitis     Sleep apnea      Current Outpatient Prescriptions   Medication Sig Dispense Refill    zolpidem (AMBIEN) 10 mg tablet Take 1 Tab by mouth nightly as needed. 30 Tab 0    [START ON 8/9/2017] oxyCODONE-acetaminophen (PERCOCET)  mg per tablet Take 1 Tab by mouth every six (6) hours as needed for Pain. Max Daily Amount: 4 Tabs. 120 Tab 0    tiZANidine (ZANAFLEX) 4 mg tablet TAKE 1 BY MOUTH EVERY NIGHT AT BEDTIME FOR 14 DAYS THEN 2 BY MOUTH EVERY NIGHT AT BEDTIME AFTER 180 Tab 3    topiramate (TOPAMAX) 100 mg tablet Take 1 Tab by mouth two (2) times a day. 180 Tab 1    DULoxetine (CYMBALTA) 30 mg capsule Take 60 mg by mouth daily.  QUEtiapine (SEROQUEL) 25 mg tablet Take  by mouth.       clonazePAM (KLONOPIN) 1 mg tablet One tablet at bedtime as needed for sleep 30 Tab 2     Visit Vitals    /86    Pulse 79    Temp 98.4 °F (36.9 °C) (Oral)    Resp 14    Ht 6' 2\" (1.88 m)    Wt 249 lb (112.9 kg)    SpO2 98%    BMI 31.97 kg/m2     There is no CVA tenderness. Quite good range of motion of the lumbar spine with no pain with flexion or extension or side-to-side movement of the back. There is no definite right lower abdominal tenderness or other abdominal tenderness. No tenderness of the inner thigh. Good range of motion of his right hip without pain or crepitation. The testicles are equal in size and there is no tenderness. No hernia is detectable in the inguinal region. Penis is nontender. No rash. Assessment: Vague symptoms for a month sounds more musculoskeletal than anything else but not improving and no change with changes in position and other maneuvers as indicated above. We will check urinalysis now. Results for orders placed or performed in visit on 07/24/17   AMB POC URINALYSIS DIP STICK AUTO W/ MICRO   Result Value Ref Range    Color (UA POC) Yellow     Clarity (UA POC) Cloudy     Glucose (UA POC) Negative Negative    Bilirubin (UA POC) Negative Negative    Ketones (UA POC) Negative Negative    Specific gravity (UA POC) 1.020 1.001 - 1.035    Blood (UA POC) Negative Negative    pH (UA POC) 7.5 4.6 - 8.0    Protein (UA POC) Negative Negative mg/dL    Urobilinogen (UA POC) 0.2 mg/dL 0.2 - 1    Nitrites (UA POC) Negative Negative    Leukocyte esterase (UA POC) Negative Negative     Addendum: Urinalysis is normal as above. Symptoms could be musculoskeletal versus urologic, will arrange for him to see Dr. Anuj Carson for further evaluation. Patient is agreeable. Tomy Morocho MD FACP    Please note: This document has been produced using voice recognition software. Unrecognized errors in transcription may be present. This visit lasted 25 minutes, greater than 50% of the time spent counseling on the various possible etiologies and their treatments.

## 2017-07-24 NOTE — MR AVS SNAPSHOT
Visit Information Date & Time Provider Department Dept. Phone Encounter #  
 7/24/2017  4:30 PM Heber Sotomayor MD Internist of [address] [id number] Your Appointments 9/7/2017  3:20 PM  
Follow Up with Guillermina Hull PA-C [facility] ([facility]AM SCHEDULING) Appt Note: Return in about 2 months (around 9/11/2017). [address] [place] [address]  
[phone] [address]  
  
    
 12/6/2017 10:55 AM  
LAB with [facility] NURSE VISIT Internist of [facility] Appt Note: PE lab fasting [address], [address]  
  
   
 [address], [address]  
  
    
 12/13/2017  1:00 PM  
PHYSICAL with Heber Sotomayor MD  
Internist of [address] [place] [address] [facility] Appt Note: Physical  
 [address], [address]  
  
   
 [address], [address] Upcoming Health Maintenance Date Due DTaP/Tdap/Td series (1 - Tdap) 11/30/2008 INFLUENZA AGE 9 TO ADULT 8/1/2017 Allergies as of 7/24/2017  Review Complete On: 7/24/2017 By: Heber Sotomayor MD  
 No Known Allergies Current Immunizations  Reviewed on 10/18/2013 No immunizations on file. Not reviewed this visit You Were Diagnosed With   
  
 Codes Comments Dysuria    -  Primary ICD-10-CM: R30.0 ICD-9-CM: 114. 1 Vitals BP Pulse Temp Resp Height(growth percentile) Weight(growth percentile) 132/86 79 98.4 °F (36.9 °C) (Oral) 14 6' 2\" (1.88 m) 249 lb (112.9 kg) SpO2 BMI Smoking Status 98% 31.97 kg/m2 Former Smoker Vitals History BMI and BSA Data Body Mass Index Body Surface Area  
 31.97 kg/m 2 2.43 m 2 Preferred Pharmacy Pharmacy Name Phone [address], [place] [phone]  [facility] [phone] Your Updated Medication List  
  
   
This list is accurate as of: 7/24/17  5:08 PM.  Always use your most recent med list.  
  
  
  
  
 clonazePAM 1 mg tablet Commonly known as:  Marbekah Baronald One tablet at bedtime as needed for sleep CYMBALTA 30 mg capsule Generic drug:  DULoxetine Take 60 mg by mouth daily. oxyCODONE-acetaminophen  mg per tablet Commonly known as:  PERCOCET Take 1 Tab by mouth every six (6) hours as needed for Pain. Max Daily Amount: 4 Tabs. Start taking on:  8/9/2017 SEROquel 25 mg tablet Generic drug:  QUEtiapine Take  by mouth. tiZANidine 4 mg tablet Commonly known as:  [employer] [place] [employer] TAKE 1 BY MOUTH EVERY NIGHT AT BEDTIME FOR 14 DAYS THEN 2 BY MOUTH EVERY NIGHT AT BEDTIME AFTER  
  
 topiramate 100 mg tablet Commonly known as:  TOPAMAX Take 1 Tab by mouth two (2) times a day. zolpidem 10 mg tablet Commonly known as:  AMBIEN Take 1 Tab by mouth nightly as needed. We Performed the Following AMB POC URINALYSIS DIP STICK AUTO W/ MICRO [32408 CPT(R)] Introducing [facility]! Dear Elvin Villafana: Thank you for requesting a StatusPage account. Our records indicate that you already have an active StatusPage account. You can access your account anytime at [email or web address] Did you know that you can access your hospital and ER discharge instructions at any time in StatusPage? You can also review all of your test results from your hospital stay or ER visit. Additional Information If you have questions, please visit the Frequently Asked Questions section of the StatusPage website at [email or web address] Remember, StatusPage is NOT to be used for urgent needs. For medical emergencies, dial 911. Now available from your iPhone and Android! Please provide this summary of care documentation to your next provider. Your primary care clinician is listed as Huong Gordon. Leonela Livingston. If you have any questions after today's visit, please call [phone].

## 2017-08-18 RX ORDER — ZOLPIDEM TARTRATE 10 MG/1
10 TABLET ORAL
Qty: 30 TAB | Refills: 0 | OUTPATIENT
Start: 2017-08-18 | End: 2017-09-19 | Stop reason: SDUPTHER

## 2017-09-19 RX ORDER — ZOLPIDEM TARTRATE 10 MG/1
10 TABLET ORAL
Qty: 30 TAB | Refills: 0 | OUTPATIENT
Start: 2017-09-19 | End: 2017-10-19 | Stop reason: SDUPTHER

## 2017-09-29 NOTE — PROGRESS NOTES
Frequent Urination: Care Instructions  Your Care Instructions  An urge to urinate frequently but usually passing only small amounts of urine is a common symptom of urinary problems, such as urinary tract infections. The bladder may become inflamed. This can cause the urge to urinate. You may try to urinate more often than usual to try to soothe that urge. Frequent urination also may be caused by sexually transmitted infections (STIs) or kidney stones. Or it may happen when something irritates the tube that carries urine from the bladder to the outside of the body (urethra). It may also be a sign of diabetes. The cause may be hard to find. You may need tests. Follow-up care is a key part of your treatment and safety. Be sure to make and go to all appointments, and call your doctor if you are having problems. It's also a good idea to know your test results and keep a list of the medicines you take. How can you care for yourself at home? · Drink extra water for the next day or two. This will help make the urine less concentrated. (If you have kidney, heart, or liver disease and have to limit fluids, talk with your doctor before you increase the amount of fluids you drink.)  · Avoid drinks that are carbonated or have caffeine. They can irritate the bladder. For women:  · Urinate right after you have sex. · After you go to the bathroom, wipe from front to back. · Avoid douches, bubble baths, and feminine hygiene sprays. And avoid other feminine hygiene products that have deodorants. When should you call for help? Call your doctor now or seek immediate medical care if:  · You have new symptoms, such as fever, nausea, or vomiting. · You have new or worse symptoms of a urinary problem. For example:  ¨ You have blood or pus in your urine. ¨ You have chills or body aches. ¨ It hurts to urinate. ¨ You have groin or belly pain. ¨ You have pain in your back just below your rib cage (the flank area).   Watch Patient notified of ultrasound results via my chart closely for changes in your health, and be sure to contact your doctor if you feel thirstier than usual.  Where can you learn more? Go to http://french-conner.info/. Enter [id number] in the search box to learn more about \"Frequent Urination: Care Instructions. \"  Current as of: May 5, 2017  Content Version: 11.3  © 7442-9814 [employer]. Care instructions adapted under license by [employer] (which disclaims liability or warranty for this information). If you have questions about a medical condition or this instruction, always ask your healthcare professional. [place] [address] any warranty or liability for your use of this information.

## 2017-10-06 ENCOUNTER — TELEPHONE (OUTPATIENT)
Dept: PAIN MANAGEMENT | Age: 32
End: 2017-10-06

## 2017-10-06 NOTE — TELEPHONE ENCOUNTER
He may  11 day bridge. No further bridge RX will be offered. No show letter should be sent.  Thanks (Routing comment) /kg

## 2017-10-06 NOTE — TELEPHONE ENCOUNTER
Patient is requesting bridge prescription until appt scheduled on 10/17/17; patient is on percocet 10/325mg (#120) and last filled on 09/07/17; patient is new patient as of 04/2017 and has multiple cancelled appts and no show appts ; please advise.

## 2017-10-09 RX ORDER — OXYCODONE AND ACETAMINOPHEN 10; 325 MG/1; MG/1
1 TABLET ORAL
Qty: 44 TAB | Refills: 0 | Status: SHIPPED | OUTPATIENT
Start: 2017-10-09 | End: 2018-02-15

## 2017-10-17 ENCOUNTER — OFFICE VISIT (OUTPATIENT)
Dept: PAIN MANAGEMENT | Age: 32
End: 2017-10-17

## 2017-10-17 VITALS
SYSTOLIC BLOOD PRESSURE: 134 MMHG | DIASTOLIC BLOOD PRESSURE: 83 MMHG | BODY MASS INDEX: 30.81 KG/M2 | WEIGHT: 240 LBS | HEART RATE: 75 BPM | TEMPERATURE: 98.4 F | RESPIRATION RATE: 14 BRPM

## 2017-10-17 DIAGNOSIS — M48.02 STENOSIS, CERVICAL SPINE: ICD-10-CM

## 2017-10-17 DIAGNOSIS — R51.9 CHRONIC DAILY HEADACHE: ICD-10-CM

## 2017-10-17 DIAGNOSIS — M47.26 OSTEOARTHRITIS OF SPINE WITH RADICULOPATHY, LUMBAR REGION: ICD-10-CM

## 2017-10-17 DIAGNOSIS — M50.30 DDD (DEGENERATIVE DISC DISEASE), CERVICAL: ICD-10-CM

## 2017-10-17 DIAGNOSIS — F51.01 PRIMARY INSOMNIA: ICD-10-CM

## 2017-10-17 DIAGNOSIS — M51.06 INTERVERTEBRAL DISC DISORDER OF LUMBAR REGION WITH MYELOPATHY: ICD-10-CM

## 2017-10-17 DIAGNOSIS — G03.9 ADHESIVE ARACHNOIDITIS: Primary | ICD-10-CM

## 2017-10-17 RX ORDER — OXYCODONE AND ACETAMINOPHEN 10; 325 MG/1; MG/1
1 TABLET ORAL
Qty: 120 TAB | Refills: 0 | Status: SHIPPED | OUTPATIENT
Start: 2017-11-15 | End: 2018-01-03 | Stop reason: SDUPTHER

## 2017-10-17 RX ORDER — CLONAZEPAM 0.5 MG/1
0.5 TABLET ORAL
Refills: 0 | COMMUNITY
Start: 2017-10-15 | End: 2018-01-21

## 2017-10-17 RX ORDER — OXYCODONE AND ACETAMINOPHEN 10; 325 MG/1; MG/1
1 TABLET ORAL
Qty: 44 TAB | Refills: 0 | Status: SHIPPED | OUTPATIENT
Start: 2017-12-14 | End: 2017-12-25

## 2017-10-17 RX ORDER — OXYCODONE AND ACETAMINOPHEN 10; 325 MG/1; MG/1
1 TABLET ORAL
Qty: 120 TAB | Refills: 0 | Status: SHIPPED | OUTPATIENT
Start: 2017-10-17 | End: 2018-01-03 | Stop reason: SDUPTHER

## 2017-10-17 RX ORDER — DULOXETIN HYDROCHLORIDE 30 MG/1
60 CAPSULE, DELAYED RELEASE ORAL DAILY
COMMUNITY
End: 2019-05-22 | Stop reason: DRUGHIGH

## 2017-10-17 NOTE — PROGRESS NOTES
HISTORY OF PRESENT ILLNESS  Ashia Caicedo is a 36 y.o. male. Patient presents for follow up of chronic pain due to lumbar spondylosis and degenerative disc disease. He reports that back pain has been much worse over the past several weeks. This pain is described as stabbing, aching, pulling, tight, and burning. There is more frequent radicular pain in the right lower extremity, but he denies weakness or numbness. Brisk sacroiliac tenderness noted on both sides. He denies saddle anesthesia or bowel/bladder dysfunction. Pt reports average of 4-5/10 pain scale most days. He feels that the changes made to his regimen several months ago (changing to percocet) has been much more beneficial for his pain overall but he may wish to discuss interventions that may improve his back pain further. He declines at this time setting up a consult with Dr. Dorie Jimenez, but will let us know if he wishes to proceed in the future. We discussed options that he has not yet tried, such as RFA and SCS interventions. We also discussed the departure of Dr. Ayala Joyce and myself from the practice and discussed at length \"next steps\". He chooses to stay with the practice for the time being but may choose to follow Dr. Ayala Joyce if this practice declines to continue this current regimen. Medications continue to work well for pain control overall. Ashia Caicedo is tolerating medications well, with no untoward side effects noted. He is able to stay more active with less discomfort with these current doses. The patient reports an average of 60% relief with this regimen. Most recent UDS and  were consistent with prescribed medications. Pill counts are appropriate. He is informed of side effects, risks, and benefits of this regimen, and emphasizes that he derives a significant improvement in functionality and quality of life, and notes that non-opioid medications and therapies in the past have not offered significant benefit.    He denies new or worsening insomnia or constipation issues. He denies any falls, injuries, or hospitalizations since the last visit. A total of 45 minutes was spent with the patient of which more than 50% of the time was spent counseling the patient. HPI--see above    ROS  Constitutional: Positive for malaise/fatigue. Gastrointestinal: Positive for constipation. Musculoskeletal: Positive for myalgias (spasms in neck) and neck pain. Neurological: Positive for weakness (generalized). Psychiatric/Behavioral: The patient has insomnia. All other systems reviewed and are negative. Physical Exam  Constitutional: He is oriented to person, place, and time. No distress. HENT:   Head: Normocephalic and atraumatic. Right Ear: External ear normal.   Left Ear: External ear normal.   Nose: Nose normal.   Mouth/Throat: Oropharynx is clear and moist. No oropharyngeal exudate. Eyes: Conjunctivae and EOM are normal. Pupils are equal, round, and reactive to light. Right eye exhibits no discharge. Left eye exhibits no discharge. No scleral icterus. Neck: Muscular tenderness (spasms) present. Decreased range of motion present. No Brudzinski's sign and no Kernig's sign noted. No thyromegaly present. Cardiovascular: Normal rate, regular rhythm and normal heart sounds. Pulmonary/Chest: Effort normal and breath sounds normal. No respiratory distress. He has no wheezes. He has no rales. He exhibits no tenderness. Abdominal: Soft. He exhibits no distension. There is no tenderness. There is no rebound and no guarding. Neurological: He is alert and oriented to person, place, and time. He has normal reflexes. No cranial nerve deficit. He exhibits normal muscle tone. Coordination normal.   Skin: Skin is warm and dry. No rash noted. Psychiatric: He has a normal mood and affect. His behavior is normal. Judgment and thought content normal.   ASSESSMENT and PLAN    ICD-10-CM ICD-9-CM    1. 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 Chronic daily headache R51 784.0    3. 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 Osteoarthritis of spine with radiculopathy, lumbar region M47.26 721.3    5. DDD (degenerative disc disease), cervical M50.30 722.4    6. Stenosis, cervical spine M48.02 723.0    7.  Primary insomnia F51.01 307.42       Plan:  Continue same medications as prescribed for chronic pain  Follow up in 3 months or sooner if needed  Regular exercise and attention to emotional health and diet remain the most effective ways to treat chronic pain of all kinds  You may contact me with questions or concerns through [employer]

## 2017-10-17 NOTE — PROGRESS NOTES
Nursing Notes    Patient presents to the office today in follow-up. Patient rates his pain at 5/10 on the numerical pain scale. Reviewed medications with counts as follows:    Rx Date filled Qty Dispensed Pill Count Last Dose Short   Percocet 10 mg 09/07/17 120 0 Week ago no         Comments:  Patient is here today for a follow up appt today he states his pain level today is a 5  He states his head is hurting worst today. POC UDS was not performed in office today    Any new labs or imaging since last appointment? NO    Have you been to an emergency room (ER) or urgent care clinic since your last visit? NO            Have you been hospitalized since your last visit? NO     If yes, where, when, and reason for visit? Have you seen or consulted any other health care providers outside of the [address] [place] [address]  since your last visit? NO     If yes, where, when, and reason for visit? Mr. Kevin Bowser has a reminder for a \"due or due soon\" health maintenance. I have asked that he contact his primary care provider for follow-up on this health maintenance.

## 2017-10-17 NOTE — MR AVS SNAPSHOT
Visit Information Date & Time Provider Department Dept. Phone Encounter #  
 10/17/2017  4:00 PM [facility] [phone] [id number] Follow-up Instructions Return in about 12 weeks (around 1/9/2018). Your Appointments 12/6/2017 10:55 AM  
LAB with [facility] NURSE VISIT Internists of [facility] ([facility]) Appt Note: PE lab fasting [address], Suite [facility] [address]  
  
   
 [address], [address]  
  
    
 12/13/2017  1:00 PM  
PHYSICAL with Carolyne Santiago MD  
Internists of [facility]) Appt Note: Physical  
 [address], [facility] [address] [address]  
  
   
 [address], [address]  
  
    
  
 11/27/2017  1:45 PM  
Any with Helga Villareal MD  
Urology of [facility] ([facility]) Appt Note: 2 mo fu US prior at [facility]  
 [address] 
[address] [facility] [address]  
[address] [facility] [address] [facility] [address] Upcoming Health Maintenance Date Due DTaP/Tdap/Td series (1 - Tdap) 11/30/2008 INFLUENZA AGE 9 TO ADULT 8/1/2017 Allergies as of 10/17/2017  Review Complete On: 10/17/2017 By: Marbella Balderrama LPN No Known Allergies Current Immunizations  Reviewed on 10/18/2013 No immunizations on file. Not reviewed this visit You Were Diagnosed With   
  
 Codes Comments Adhesive arachnoiditis    -  Primary ICD-10-CM: G03.9 ICD-9-CM: 322.9 Chronic daily headache     ICD-10-CM: R51 ICD-9-CM: 784.0 Intervertebral disc disorder of lumbar region with myelopathy     ICD-10-CM: M51.06 
ICD-9-CM: 722.73 Osteoarthritis of spine with radiculopathy, lumbar region     ICD-10-CM: M47.26 
ICD-9-CM: 721.3 DDD (degenerative disc disease), cervical     ICD-10-CM: M50.30 ICD-9-CM: 722.4 Stenosis, cervical spine     ICD-10-CM: M48.02 
ICD-9-CM: 723.0 Primary insomnia     ICD-10-CM: F51.01 
ICD-9-CM: 307.42 Vitals BP Pulse Temp Resp Weight(growth percentile) BMI  
 134/83 75 98.4 °F (36.9 °C) 14 240 lb (108.9 kg) 30.81 kg/m2 Smoking Status Former Smoker BMI and BSA Data Body Mass Index Body Surface Area  
 30.81 kg/m 2 2.38 m 2 Preferred Pharmacy Pharmacy Name Phone [address], [address] [facility] [address] [facility] [phone] Your Updated Medication List  
  
   
This list is accurate as of: 10/17/17  5:05 PM.  Always use your most recent med list.  
  
  
  
  
 Kiley Bonilla Take  by mouth. * clonazePAM 1 mg tablet Commonly known as:  Rosio Blaze One tablet at bedtime as needed for sleep * clonazePAM 0.5 mg tablet Commonly known as:  Rosio Blaze Take 0.5 mg by mouth nightly. * CYMBALTA 30 mg capsule Generic drug:  DULoxetine Take 30 mg by mouth three (3) times daily. * CYMBALTA 30 mg capsule Generic drug:  DULoxetine Take 30 mg by mouth daily. * oxyCODONE-acetaminophen  mg per tablet Commonly known as:  PERCOCET Take 1 Tab by mouth every six (6) hours as needed for Pain. Max Daily Amount: 4 Tabs. * oxyCODONE-acetaminophen  mg per tablet Commonly known as:  PERCOCET Take 1 Tab by mouth every six (6) hours as needed for Pain. Max Daily Amount: 4 Tabs. Start taking on:  11/15/2017 * oxyCODONE-acetaminophen  mg per tablet Commonly known as:  PERCOCET Take 1 Tab by mouth every six (6) hours as needed for Pain for up to 11 days. Max Daily Amount: 4 Tabs. Start taking on:  12/14/2017 SEROquel 25 mg tablet Generic drug:  QUEtiapine Take  by mouth. tiZANidine 4 mg tablet Commonly known as:  Kip Schirmer TAKE 1 BY MOUTH EVERY NIGHT AT BEDTIME FOR 14 DAYS THEN 2 BY MOUTH EVERY NIGHT AT BEDTIME AFTER  
  
 topiramate 100 mg tablet Commonly known as:  TOPAMAX Take 1 Tab by mouth two (2) times a day. zolpidem 10 mg tablet Commonly known as:  AMBIEN Take 1 Tab by mouth nightly as needed. * Notice: This list has 7 medication(s) that are the same as other medications prescribed for you. Read the directions carefully, and ask your doctor or other care provider to review them with you. Prescriptions Printed Refills  
 oxyCODONE-acetaminophen (PERCOCET)  mg per tablet 0 Sig: Take 1 Tab by mouth every six (6) hours as needed for Pain. Max Daily Amount: 4 Tabs. Class: Print Route: Oral  
 oxyCODONE-acetaminophen (PERCOCET)  mg per tablet 0 Starting on: 11/15/2017 Sig: Take 1 Tab by mouth every six (6) hours as needed for Pain. Max Daily Amount: 4 Tabs. Class: Print Route: Oral  
 oxyCODONE-acetaminophen (PERCOCET)  mg per tablet 0 Starting on: 12/14/2017 Sig: Take 1 Tab by mouth every six (6) hours as needed for Pain for up to 11 days. Max Daily Amount: 4 Tabs. Class: Print Route: Oral  
  
Follow-up Instructions Return in about 12 weeks (around 1/9/2018). Patient Instructions Plan: 
Continue same medications as prescribed for chronic pain Follow up in 3 months or sooner if needed Regular exercise and attention to emotional health and diet remain the most effective ways to treat chronic pain of all kinds You may contact me with questions or concerns through [address] [facility]! Dear Ziggy Petit: Thank you for requesting a OneDoc account. Our records indicate that you already have an active OneDoc account. You can access your account anytime at [email or web address] Did you know that you can access your hospital and ER discharge instructions at any time in OneDoc? You can also review all of your test results from your hospital stay or ER visit. Additional Information If you have questions, please visit the Frequently Asked Questions section of the Embedded Chatt website at [email or web address] Remember, [employer] is NOT to be used for urgent needs. For medical emergencies, dial 911. Now available from your iPhone and Android! Please provide this summary of care documentation to your next provider. Your primary care clinician is listed as Ashutosh Castellanos. Violeta Chapman. If you have any questions after today's visit, please call [phone].

## 2017-10-19 NOTE — TELEPHONE ENCOUNTER
PHONE IN [facility] reports the last fill date as 09/20/2017. Appropriate start date has been added to the pending order. Last Visit: 07/24/2017 with MD Slava Gomez    Next Appointment: 12/13/2017 with MD Slava Gomez   Previous Refill Encounters: 09/19/2017 per MD Slava Gomez #[id number]     Requested Prescriptions     Pending Prescriptions Disp Refills    zolpidem (AMBIEN) 10 mg tablet 30 Tab 0     Sig: Take 1 Tab by mouth nightly as needed.  DO NOT FILL BEFORE 10/20/2017

## 2017-10-20 RX ORDER — ZOLPIDEM TARTRATE 10 MG/1
10 TABLET ORAL
Qty: 30 TAB | Refills: 0 | OUTPATIENT
Start: 2017-10-20 | End: 2017-11-22 | Stop reason: SDUPTHER

## 2017-11-17 RX ORDER — TOPIRAMATE 100 MG/1
100 TABLET, FILM COATED ORAL 2 TIMES DAILY
Qty: 180 TAB | Refills: 1 | Status: SHIPPED | OUTPATIENT
Start: 2017-11-17 | End: 2018-11-22 | Stop reason: SDUPTHER

## 2017-11-17 NOTE — TELEPHONE ENCOUNTER
Last Visit: 07/24/2017 with MD Jessica Alicia    Next Appointment: 12/13/2017 with MD Jessica Alicia   Previous Refill Encounters: 02/01/2017 per MD Jessica Alicia #[id number] with 1 refill     Requested Prescriptions     Pending Prescriptions Disp Refills    topiramate (TOPAMAX) 100 mg tablet 180 Tab 1     Sig: Take 1 Tab by mouth two (2) times a day.

## 2017-11-22 RX ORDER — ZOLPIDEM TARTRATE 10 MG/1
10 TABLET ORAL
Qty: 30 TAB | Refills: 0 | OUTPATIENT
Start: 2017-11-22 | End: 2017-12-29 | Stop reason: SDUPTHER

## 2017-11-22 NOTE — TELEPHONE ENCOUNTER
PHONE IN [facility] reports the last fill date for Ambien as 10/20/2017 for a 30 d/s. There appears to be no inconsistencies in regards to the prescribing of this medication. Last Visit: 07/24/2017 with MD Darylene Mori    Next Appointment: 12/13/2017 with MD Darylene Mori   Previous Refill Encounters: 10/20/2017 per MD Darylene Mori #[id number]     Requested Prescriptions     Pending Prescriptions Disp Refills    zolpidem (AMBIEN) 10 mg tablet 30 Tab 0     Sig: Take 1 Tab by mouth nightly as needed.

## 2017-12-06 ENCOUNTER — TELEPHONE (OUTPATIENT)
Dept: INTERNAL MEDICINE CLINIC | Age: 32
End: 2017-12-06

## 2017-12-29 ENCOUNTER — TELEPHONE (OUTPATIENT)
Dept: PAIN MANAGEMENT | Age: 32
End: 2017-12-29

## 2017-12-29 DIAGNOSIS — F51.01 PRIMARY INSOMNIA: Primary | ICD-10-CM

## 2017-12-29 NOTE — TELEPHONE ENCOUNTER
Received call from patient stating that his December prescription for percocet was erroneous; prescription noted to be for 11 days/ 44 tabs as opposed to 120 tabs; upon chart review , it is noted that patient last saw (KG) in 10/2017 , and at that time there was no mention of changes in regimen, and prescriptions for 10/2017 and 11/2017 reflect 120 tabs; patient asked when did he fill December prescription as it was dated to be filled on 12/14/17; patient states he filled it on 12/14/17, however he did not notify this office until 12/28/17, according to patient, and voice message was not noted until 4pm on 12/29/17; patient has upcoming appt on 01/03/17; patient advised that concern would be forwarded to provider for review and if any changes were made , he would be notified; please advise if further is needed.

## 2017-12-29 NOTE — TELEPHONE ENCOUNTER
From: Chad Gurrola  To: Thao Burgos MD  Sent: 12/29/2017 1:52 PM EST  Subject: Medication Renewal Request    Original authorizing provider: MD Chad Reddy would like a refill of the following medications:  zolpidem (AMBIEN) 10 mg tablet Thao Burgos MD]    Preferred pharmacy: [facility] , Christian Walden [address] [place] [address] [facility]    Comment:

## 2018-01-02 RX ORDER — ZOLPIDEM TARTRATE 10 MG/1
10 TABLET ORAL
Qty: 30 TAB | Refills: 0 | OUTPATIENT
Start: 2018-01-02 | End: 2018-02-01 | Stop reason: SDUPTHER

## 2018-01-03 ENCOUNTER — OFFICE VISIT (OUTPATIENT)
Dept: PAIN MANAGEMENT | Age: 33
End: 2018-01-03

## 2018-01-03 VITALS
WEIGHT: 240 LBS | DIASTOLIC BLOOD PRESSURE: 90 MMHG | RESPIRATION RATE: 22 BRPM | HEART RATE: 81 BPM | BODY MASS INDEX: 30.81 KG/M2 | TEMPERATURE: 96.3 F | SYSTOLIC BLOOD PRESSURE: 134 MMHG

## 2018-01-03 DIAGNOSIS — M48.02 STENOSIS, CERVICAL SPINE: ICD-10-CM

## 2018-01-03 DIAGNOSIS — M48.061 SPINAL STENOSIS OF LUMBAR REGION, UNSPECIFIED WHETHER NEUROGENIC CLAUDICATION PRESENT: ICD-10-CM

## 2018-01-03 DIAGNOSIS — M51.06 INTERVERTEBRAL DISC DISORDER OF LUMBAR REGION WITH MYELOPATHY: ICD-10-CM

## 2018-01-03 DIAGNOSIS — F43.10 PTSD (POST-TRAUMATIC STRESS DISORDER): ICD-10-CM

## 2018-01-03 DIAGNOSIS — M50.30 DDD (DEGENERATIVE DISC DISEASE), CERVICAL: ICD-10-CM

## 2018-01-03 DIAGNOSIS — M47.26 OSTEOARTHRITIS OF SPINE WITH RADICULOPATHY, LUMBAR REGION: ICD-10-CM

## 2018-01-03 DIAGNOSIS — G03.9 ADHESIVE ARACHNOIDITIS: ICD-10-CM

## 2018-01-03 DIAGNOSIS — M54.12 CERVICAL RADICULOPATHY: ICD-10-CM

## 2018-01-03 DIAGNOSIS — Z86.59 HISTORY OF DEPRESSION: ICD-10-CM

## 2018-01-03 DIAGNOSIS — M54.2 CERVICALGIA: Primary | ICD-10-CM

## 2018-01-03 DIAGNOSIS — Z79.899 ENCOUNTER FOR LONG-TERM (CURRENT) USE OF HIGH-RISK MEDICATION: ICD-10-CM

## 2018-01-03 LAB
ALCOHOL UR POC: NORMAL
AMPHETAMINES UR POC: NEGATIVE
BARBITURATES UR POC: NEGATIVE
BENZODIAZEPINES UR POC: NEGATIVE
BUPRENORPHINE UR POC: NORMAL
CANNABINOIDS UR POC: NEGATIVE
CARISOPRODOL UR POC: NORMAL
COCAINE UR POC: NEGATIVE
FENTANYL UR POC: NORMAL
MDMA/ECSTASY UR POC: NEGATIVE
METHADONE UR POC: NEGATIVE
METHAMPHETAMINE UR POC: NEGATIVE
METHYLPHENIDATE UR POC: NEGATIVE
OPIATES UR POC: NEGATIVE
OXYCODONE UR POC: NEGATIVE
PHENCYCLIDINE UR POC: NORMAL
PROPOXYPHENE UR POC: NORMAL
TRAMADOL UR POC: NORMAL
TRICYCLICS UR POC: NEGATIVE

## 2018-01-03 RX ORDER — OXYCODONE AND ACETAMINOPHEN 10; 325 MG/1; MG/1
1 TABLET ORAL
Qty: 120 TAB | Refills: 0 | Status: SHIPPED | OUTPATIENT
Start: 2018-03-01 | End: 2018-01-21

## 2018-01-03 RX ORDER — TIZANIDINE 4 MG/1
TABLET ORAL
Qty: 180 TAB | Refills: 3 | Status: SHIPPED | OUTPATIENT
Start: 2018-01-03 | End: 2019-01-01

## 2018-01-03 RX ORDER — OXYCODONE AND ACETAMINOPHEN 10; 325 MG/1; MG/1
1 TABLET ORAL
Qty: 120 TAB | Refills: 0 | Status: SHIPPED | OUTPATIENT
Start: 2018-02-02 | End: 2018-01-21

## 2018-01-03 RX ORDER — OXYCODONE AND ACETAMINOPHEN 10; 325 MG/1; MG/1
1 TABLET ORAL
Qty: 120 TAB | Refills: 0 | Status: SHIPPED | OUTPATIENT
Start: 2018-01-03 | End: 2018-03-28 | Stop reason: SDUPTHER

## 2018-01-03 NOTE — MR AVS SNAPSHOT
Visit Information Date & Time Provider Department Dept. Phone Encounter #  
 1/3/2018  3:45 PM Jacoby Taylor MD [address] [place] [address] for Pain Management [phone] Follow-up Instructions Return in about 3 months (around 4/3/2018). Your Appointments 1/18/2018  9:15 AM  
PHYSICAL with Martina Diaz MD  
[facility] [address] [place] [address]) Appt Note: rpe  
 [address] [facility], [facility] [address] Wilfrid [address] [place]  
  
   
 [address], [address] Upcoming Health Maintenance Date Due DTaP/Tdap/Td series (1 - Tdap) 11/30/2008 Influenza Age 5 to Adult 8/1/2017 Allergies as of 1/3/2018  Review Complete On: 1/3/2018 By: Jacoby Taylor MD  
 No Known Allergies Current Immunizations  Reviewed on 10/18/2013 No immunizations on file. Not reviewed this visit You Were Diagnosed With   
  
 Codes Comments Cervicalgia    -  Primary ICD-10-CM: M54.2 ICD-9-CM: 723.1 Encounter for long-term (current) use of high-risk medication     ICD-10-CM: Z79.899 ICD-9-CM: V58.69 Adhesive arachnoiditis     ICD-10-CM: G03.9 ICD-9-CM: 322.9 Osteoarthritis of spine with radiculopathy, lumbar region     ICD-10-CM: M47.26 
ICD-9-CM: 721.3 DDD (degenerative disc disease), cervical     ICD-10-CM: M50.30 ICD-9-CM: 722.4 Stenosis, cervical spine     ICD-10-CM: M48.02 
ICD-9-CM: 723.0 Cervical radiculopathy     ICD-10-CM: M54.12 
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 
ICD-9-CM: 722.73 Spinal stenosis of lumbar region, unspecified whether neurogenic claudication present     ICD-10-CM: M48.061 
ICD-9-CM: 724.02   
 PTSD (post-traumatic stress disorder)     ICD-10-CM: F43.10 ICD-9-CM: 309.81 History of depression     ICD-10-CM: Z86.59 
ICD-9-CM: V11.8 Vitals BP Pulse Temp Resp Weight(growth percentile) BMI  
 134/90 81 96.3 °F (35.7 °C) (Axillary) 22 240 lb (108.9 kg) 30.81 kg/m2 Smoking Status Former Smoker BMI and BSA Data Body Mass Index Body Surface Area  
 30.81 kg/m 2 2.38 m 2 Preferred Pharmacy Pharmacy Name Phone [address], [address] [facility] 22 17[address] [facility] [phone] Your Updated Medication List  
  
   
This list is accurate as of: 1/3/18  4:29 PM.  Always use your most recent med list.  
  
  
  
  
 Eva Ruiz Take  by mouth. * clonazePAM 1 mg tablet Commonly known as:  Tiney Player One tablet at bedtime as needed for sleep * clonazePAM 0.5 mg tablet Commonly known as:  Tiney Player Take 0.5 mg by mouth nightly. * CYMBALTA 30 mg capsule Generic drug:  DULoxetine Take 30 mg by mouth three (3) times daily. * CYMBALTA 30 mg capsule Generic drug:  DULoxetine Take 30 mg by mouth daily. * oxyCODONE-acetaminophen  mg per tablet Commonly known as:  PERCOCET Take 1 Tab by mouth every six (6) hours as needed for Pain. Max Daily Amount: 4 Tabs. * oxyCODONE-acetaminophen  mg per tablet Commonly known as:  PERCOCET Take 1 Tab by mouth every six (6) hours as needed for Pain. Max Daily Amount: 4 Tabs. Start taking on:  2/2/2018 * oxyCODONE-acetaminophen  mg per tablet Commonly known as:  PERCOCET Take 1 Tab by mouth every six (6) hours as needed for Pain. Max Daily Amount: 4 Tabs. Start taking on:  3/1/2018 SEROquel 25 mg tablet Generic drug:  QUEtiapine Take  by mouth. tiZANidine 4 mg tablet Commonly known as:  Loetta Torres TAKE 1 BY MOUTH EVERY NIGHT AT BEDTIME FOR 14 DAYS THEN 2 BY MOUTH EVERY NIGHT AT BEDTIME AFTER  
  
 topiramate 100 mg tablet Commonly known as:  TOPAMAX Take 1 Tab by mouth two (2) times a day. zolpidem 10 mg tablet Commonly known as:  AMBIEN Take 1 Tab by mouth nightly as needed. * Notice: This list has 7 medication(s) that are the same as other medications prescribed for you. Read the directions carefully, and ask your doctor or other care provider to review them with you. Prescriptions Printed Refills  
 oxyCODONE-acetaminophen (PERCOCET)  mg per tablet 0 Sig: Take 1 Tab by mouth every six (6) hours as needed for Pain. Max Daily Amount: 4 Tabs. Class: Print Route: Oral  
 oxyCODONE-acetaminophen (PERCOCET)  mg per tablet 0 Starting on: 3/1/2018 Sig: Take 1 Tab by mouth every six (6) hours as needed for Pain. Max Daily Amount: 4 Tabs. Class: Print Route: Oral  
 oxyCODONE-acetaminophen (PERCOCET)  mg per tablet 0 Starting on: 2/2/2018 Sig: Take 1 Tab by mouth every six (6) hours as needed for Pain. Max Daily Amount: 4 Tabs. Class: Print Route: Oral  
  
Prescriptions Sent to Pharmacy Refills  
 tiZANidine (ZANAFLEX) 4 mg tablet 3 Sig: TAKE 1 BY MOUTH EVERY NIGHT AT BEDTIME FOR 14 DAYS THEN 2 BY MOUTH EVERY NIGHT AT BEDTIME AFTER Class: Normal  
 Pharmacy: [address] [place] [address] #: [phone] We Performed the Following AMB POC DRUG SCREEN () [ [facility]] DRUG SCREEN [[id number] Custom] Follow-up Instructions Return in about 3 months (around 4/3/2018). Introducing [facility]! Dear Lilia Lo: Thank you for requesting a Decurate account. Our records indicate that you already have an active Decurate account. You can access your account anytime at [email or web address] Did you know that you can access your hospital and ER discharge instructions at any time in Decurate? You can also review all of your test results from your hospital stay or ER visit. Additional Information If you have questions, please visit the Frequently Asked Questions section of the [employer] website at [email or web address] Remember, [employer] is NOT to be used for urgent needs. For medical emergencies, dial 911. Now available from your iPhone and Android! Please provide this summary of care documentation to your next provider. Your primary care clinician is listed as Ernestine He. Mita Pal. If you have any questions after today's visit, please call [phone].

## 2018-01-03 NOTE — PROGRESS NOTES
HISTORY OF PRESENT ILLNESS  Hang Chua is a 29 y.o. male. HPI Comments: Visit survey reviewed  Chief complaint- chronic pain syndrome-neck pain, low back pain  The patient will continue with Percocet 10 mg, 4 times a day as needed  Urine test today, I will not be surprised if oxycodone is not noted. The last prescription for Percocet was not for the full, total number of tablets. Therefore he has been out for a few days. Cymbalta, Seroquel prescribed by psychiatry, along with his Klonopin. His Topamax is prescribed by his primary care physician to help with headaches. He does also use Zanaflex, prescribed by our clinic  He has reported a history of depression, anxiety, posttraumatic stress disorder.    -The visit survey indicates that medications help general activity, mood, walking, sleep, enjoyment of life. The patient will continue medications. No new significant side effects reported  Medication continues to help improve quality of life. Medications reviewed including risk and benefits. Recent average level of pain-5    The patient's care with this clinic has been under the direction of Dr. Yany Cruz. I have reviewed medical information today including progress notes. Review of past treatments, past medications, current medications. Review of diagnoses. I have obtained history from the patient today as well. There have been changes concerning pain management across our country and in the state of [place]. Also, changes within our own clinic. Nursing discussed these changes with the patient. I also spoke with the patient on these matters today. The patient's questions were answered. The patient is aware that Dr. Yany Cruz is no longer with this clinic. The patient is also aware that they are free to choose a different pain clinic if they wish and our clinic will help transition them if necessary. Chronic pain and opioids.   Also, when appropriate, the patient will receive a copy of a research study, titled- Benzodiazepines and risk of all cause mortality in adults. Back Pain    Associated symptoms include weakness (generalized). Neck Pain         Review of Systems   Constitutional: Positive for malaise/fatigue. Gastrointestinal: Positive for constipation. Musculoskeletal: Positive for back pain, myalgias (spasms in neck) and neck pain. Neurological: Positive for weakness (generalized). Psychiatric/Behavioral: The patient has insomnia. All other systems reviewed and are negative. Physical Exam   Constitutional: He appears well-developed and well-nourished. He is cooperative. He does not have a sickly appearance. HENT:   Head: Normocephalic and atraumatic. Right Ear: External ear normal. No drainage. Left Ear: External ear normal. No drainage. Nose: Nose normal.   Eyes: Lids are normal. Right eye exhibits no discharge. Left eye exhibits no discharge. Right conjunctiva has no hemorrhage. Left conjunctiva has no hemorrhage. Neck: Neck supple. No tracheal deviation present. No thyroid mass present. Pulmonary/Chest: Effort normal. No respiratory distress. Neurological: He is alert. No cranial nerve deficit. Skin: Skin is intact. No rash noted. Psychiatric: His speech is normal. His affect is not angry. He does not express inappropriate judgment. Nursing note and vitals reviewed. ASSESSMENT and PLAN  Encounter Diagnoses   Name Primary?    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  reviewed. Pain Meds and Quality Of Life have been reviewed. Nonpharmacologic therapy and non-opioid pharmacologic therapy were considered. If opioid therapy is prescribed, this is only if the expected benefits are anticipated to outweigh risks. Possible changes to treatment plan considered. Support/education given as needed. Today-medications are as listed. No significant changes to medications. Follow up --3 months. Pain is a complex sensory and emotional experience intimately related to the concept of suffering and the life experiences and psychological makeup of the individual. There is a host of psychosocial issues,including depression,anxiety,fear,altered social roles,and loss of activities which have a strong scientific basis for contributing to the chronic pain state. The effective treatment of chronic pain involves understanding the individual with pain. Issues of fear avoidance,catastrophizing,belief systems about exercise and injury,sleep disturbance,family dynamics,and other factors may play a role in the patient's experience of pain.

## 2018-01-03 NOTE — PROGRESS NOTES
Nursing Notes    Patient presents to the office today in follow-up. Patient rates his pain at 5/10 on the numerical pain scale. Reviewed medications with counts as follows:    Rx Date filled Qty Dispensed Pill Count Last Dose Short   Percocet 10/325mg 12/14/17 44 ( 11 day supply )  0 12/31/17 No                                           Comments:     POC UDS was performed in office today per verbal order of provider (GS) ; rbv'd. Any new labs or imaging since last appointment? NO    Have you been to an emergency room (ER) or urgent care clinic since your last visit? NO            Have you been hospitalized since your last visit? NO     If yes, where, when, and reason for visit? Have you seen or consulted any other health care providers outside of the [address] [place] [address]  since your last visit? YES     If yes, where, when, and reason for visit? Psych       HM deferred to pcp.

## 2018-01-21 ENCOUNTER — HOSPITAL ENCOUNTER (EMERGENCY)
Age: 33
Discharge: HOME | End: 2018-01-21
Attending: EMERGENCY MEDICINE | Admitting: EMERGENCY MEDICINE
Payer: COMMERCIAL

## 2018-01-21 VITALS
HEIGHT: 74 IN | TEMPERATURE: 98.2 F | OXYGEN SATURATION: 96 % | HEART RATE: 61 BPM | DIASTOLIC BLOOD PRESSURE: 72 MMHG | BODY MASS INDEX: 32.08 KG/M2 | WEIGHT: 250 LBS | SYSTOLIC BLOOD PRESSURE: 146 MMHG | RESPIRATION RATE: 18 BRPM

## 2018-01-21 DIAGNOSIS — N20.1 URETER, CALCULUS: Primary | ICD-10-CM

## 2018-01-21 LAB
AMORPH CRY URNS QL MICRO: ABNORMAL
APPEARANCE UR: ABNORMAL
BACTERIA URNS QL MICRO: NEGATIVE /HPF
BILIRUB UR QL: NEGATIVE
COLOR UR: ABNORMAL
EPITH CASTS URNS QL MICRO: ABNORMAL /LPF (ref 0–5)
GLUCOSE UR STRIP.AUTO-MCNC: NEGATIVE MG/DL
HGB UR QL STRIP: ABNORMAL
KETONES UR QL STRIP.AUTO: NEGATIVE MG/DL
LEUKOCYTE ESTERASE UR QL STRIP.AUTO: ABNORMAL
MUCOUS THREADS URNS QL MICRO: ABNORMAL /LPF
NITRITE UR QL STRIP.AUTO: NEGATIVE
PH UR STRIP: 7.5 [PH] (ref 5–8)
PROT UR STRIP-MCNC: ABNORMAL MG/DL
RBC #/AREA URNS HPF: ABNORMAL /HPF (ref 0–5)
SP GR UR REFRACTOMETRY: 1.02 (ref 1–1.03)
UROBILINOGEN UR QL STRIP.AUTO: 0.2 EU/DL (ref 0.2–1)
WBC URNS QL MICRO: ABNORMAL /HPF (ref 0–4)

## 2018-01-21 PROCEDURE — 96361 HYDRATE IV INFUSION ADD-ON: CPT

## 2018-01-21 PROCEDURE — 96375 TX/PRO/DX INJ NEW DRUG ADDON: CPT

## 2018-01-21 PROCEDURE — 81001 URINALYSIS AUTO W/SCOPE: CPT | Performed by: EMERGENCY MEDICINE

## 2018-01-21 PROCEDURE — 96376 TX/PRO/DX INJ SAME DRUG ADON: CPT

## 2018-01-21 PROCEDURE — 99284 EMERGENCY DEPT VISIT MOD MDM: CPT

## 2018-01-21 PROCEDURE — 74011250636 HC RX REV CODE- 250/636: Performed by: EMERGENCY MEDICINE

## 2018-01-21 PROCEDURE — 96374 THER/PROPH/DIAG INJ IV PUSH: CPT

## 2018-01-21 RX ORDER — ONDANSETRON 2 MG/ML
4 INJECTION INTRAMUSCULAR; INTRAVENOUS
Status: COMPLETED | OUTPATIENT
Start: 2018-01-21 | End: 2018-01-21

## 2018-01-21 RX ORDER — KETOROLAC TROMETHAMINE 30 MG/ML
15 INJECTION, SOLUTION INTRAMUSCULAR; INTRAVENOUS
Status: COMPLETED | OUTPATIENT
Start: 2018-01-21 | End: 2018-01-21

## 2018-01-21 RX ORDER — HYDROMORPHONE HYDROCHLORIDE 1 MG/ML
0.5 INJECTION, SOLUTION INTRAMUSCULAR; INTRAVENOUS; SUBCUTANEOUS ONCE
Status: COMPLETED | OUTPATIENT
Start: 2018-01-21 | End: 2018-01-21

## 2018-01-21 RX ORDER — HYDROMORPHONE HYDROCHLORIDE 1 MG/ML
1 INJECTION, SOLUTION INTRAMUSCULAR; INTRAVENOUS; SUBCUTANEOUS ONCE
Status: COMPLETED | OUTPATIENT
Start: 2018-01-21 | End: 2018-01-21

## 2018-01-21 RX ORDER — IBUPROFEN 800 MG/1
TABLET ORAL
Qty: 20 TAB | Refills: 0 | Status: SHIPPED | OUTPATIENT
Start: 2018-01-21 | End: 2018-04-04 | Stop reason: ALTCHOICE

## 2018-01-21 RX ADMIN — ONDANSETRON 4 MG: 2 INJECTION INTRAMUSCULAR; INTRAVENOUS at 14:01

## 2018-01-21 RX ADMIN — SODIUM CHLORIDE 1000 ML: 900 INJECTION, SOLUTION INTRAVENOUS at 12:48

## 2018-01-21 RX ADMIN — KETOROLAC TROMETHAMINE 15 MG: 30 INJECTION, SOLUTION INTRAMUSCULAR at 12:47

## 2018-01-21 RX ADMIN — ONDANSETRON 4 MG: 2 INJECTION INTRAMUSCULAR; INTRAVENOUS at 12:47

## 2018-01-21 RX ADMIN — HYDROMORPHONE HYDROCHLORIDE 1 MG: 1 INJECTION, SOLUTION INTRAMUSCULAR; INTRAVENOUS; SUBCUTANEOUS at 14:01

## 2018-01-21 RX ADMIN — HYDROMORPHONE HYDROCHLORIDE 0.5 MG: 1 INJECTION, SOLUTION INTRAMUSCULAR; INTRAVENOUS; SUBCUTANEOUS at 12:47

## 2018-01-21 NOTE — ED NOTES
Gilford Harrow is a 29 y.o. male that was discharged in stable condition. The patients diagnosis, condition and treatment were explained to  patient and aftercare instructions were given. The patient verbalized understanding. Patient armband removed and shredded.

## 2018-01-21 NOTE — ED PROVIDER NOTES
EMERGENCY DEPARTMENT HISTORY AND PHYSICAL EXAM    12:34 PM      Date: 1/21/2018  Patient Name: Hang Chua    History of Presenting Illness     Chief Complaint   Patient presents with    Flank Pain         History Provided By: Patient    Chief Complaint: Flank Pain   Duration: 6 Hours  Timing:  Constant and Worsening  Location: Right flank   Quality: Sharp  Severity: 8 out of 10  Modifying Factors: Pain worsens with deep breaths   Associated Symptoms: nausea, vomiting, abdominal pain and hematuria      Additional History (Context): Hang Chua is a 29 y.o. male with hx of back surgery and kidney stones presenting to the ED with c/o constant, worsening right flank pain that began 6 hours ago. Pt reports he has sharp right sided flank pain. States sx are similar to last onset of kidney stones, however notes current pain is much worse. Pt denies CP, SOB, fever, chills, diarrhea or dysuria. Associated sx include nausea, vomiting, abdominal pain and hematuria. Severity is 8/10. Notes pain radiates from right flank to abdomen and worsens with deep breaths. Pt took Percocet PTA with no relief. No other sx or complaints given at this time. PCP: Mark Steinberg MD    Current Outpatient Prescriptions   Medication Sig Dispense Refill    tiZANidine (ZANAFLEX) 4 mg tablet TAKE 1 BY MOUTH EVERY NIGHT AT BEDTIME FOR 14 DAYS THEN 2 BY MOUTH EVERY NIGHT AT BEDTIME AFTER 180 Tab 3    oxyCODONE-acetaminophen (PERCOCET)  mg per tablet Take 1 Tab by mouth every six (6) hours as needed for Pain. Max Daily Amount: 4 Tabs. 120 Tab 0    zolpidem (AMBIEN) 10 mg tablet Take 1 Tab by mouth nightly as needed. 30 Tab 0    topiramate (TOPAMAX) 100 mg tablet Take 1 Tab by mouth two (2) times a day. 180 Tab 1    QUEtiapine (SEROQUEL) 25 mg tablet Take  by mouth.  DULoxetine (CYMBALTA) 30 mg capsule Take 30 mg by mouth daily.  NAPROXEN SODIUM (ALEVE PO) Take  by mouth.       clonazePAM (KLONOPIN) 1 mg tablet One tablet at bedtime as needed for sleep 30 Tab 2       Past History     Past Medical History:  Past Medical History:   Diagnosis Date    ADD (attention deficit disorder)     Anxiety     Depression     Migraine     Obesity     PTSD (post-traumatic stress disorder)     Seasonal allergic rhinitis     Sleep apnea        Past Surgical History:  Past Surgical History:   Procedure Laterality Date    HX BACK SURGERY  7/7/2011    lamenectomy & diskectomy.  HX TONSIL AND ADENOIDECTOMY         Family History:  Family History   Problem Relation Age of Onset    Asthma Mother        Social History:  Social History   Substance Use Topics    Smoking status: Former Smoker    Smokeless tobacco: Never Used    Alcohol use 0.0 oz/week      Comment: occasionally       Allergies:  No Known Allergies      Review of Systems       Review of Systems   Constitutional: Negative for activity change, fatigue and fever. HENT: Negative for congestion and rhinorrhea. Eyes: Negative for visual disturbance. Respiratory: Negative for shortness of breath. Cardiovascular: Negative for chest pain and palpitations. Gastrointestinal: Positive for abdominal pain, nausea and vomiting. Negative for diarrhea. Genitourinary: Positive for flank pain (right) and hematuria. Negative for dysuria. Musculoskeletal: Negative for back pain. Skin: Negative for rash. Neurological: Negative for dizziness, weakness and light-headedness. All other systems reviewed and are negative. Physical Exam     Visit Vitals    /72    Pulse 61    Temp 98.2 °F (36.8 °C)    Resp 18    Ht 6' 2\" (1.88 m)    Wt 113.4 kg (250 lb)    SpO2 96%    BMI 32.1 kg/m2         Physical Exam   Constitutional: He is oriented to person, place, and time. He appears well-developed and well-nourished. No distress. HENT:   Head: Normocephalic and atraumatic.    Right Ear: External ear normal.   Left Ear: External ear normal.   Nose: Nose normal. Mouth/Throat: Oropharynx is clear and moist.   Eyes: Conjunctivae and EOM are normal. Pupils are equal, round, and reactive to light. No scleral icterus. Neck: Normal range of motion. Neck supple. No JVD present. No tracheal deviation present. No thyromegaly present. Cardiovascular: Normal rate, regular rhythm, normal heart sounds and intact distal pulses. Exam reveals no gallop and no friction rub. No murmur heard. Pulmonary/Chest: Effort normal and breath sounds normal. He exhibits no tenderness. Abdominal: Soft. Bowel sounds are normal. He exhibits no distension. There is no tenderness. There is no rebound and no guarding. Musculoskeletal: Normal range of motion. He exhibits no edema. Mild right CVA tenderness    Lymphadenopathy:     He has no cervical adenopathy. Neurological: He is alert and oriented to person, place, and time. No cranial nerve deficit. Coordination normal.   No sensory loss, Gait normal, Motor 5/5   Skin: Skin is warm and dry. Psychiatric: He has a normal mood and affect. His behavior is normal. Judgment and thought content normal.   Nursing note and vitals reviewed.         Diagnostic Study Results     Labs -  Recent Results (from the past 12 hour(s))   URINALYSIS W/ RFLX MICROSCOPIC    Collection Time: 01/21/18 12:55 PM   Result Value Ref Range    Color VANESSA      Appearance TURBID      Specific gravity 1.017 1.005 - 1.030      pH (UA) 7.5 5.0 - 8.0      Protein TRACE (A) NEG mg/dL    Glucose NEGATIVE  NEG mg/dL    Ketone NEGATIVE  NEG mg/dL    Bilirubin NEGATIVE  NEG      Blood LARGE (A) NEG      Urobilinogen 0.2 0.2 - 1.0 EU/dL    Nitrites NEGATIVE  NEG      Leukocyte Esterase SMALL (A) NEG     URINE MICROSCOPIC ONLY    Collection Time: 01/21/18 12:55 PM   Result Value Ref Range    WBC 0 to 3 0 - 4 /hpf    RBC TOO NUMEROUS TO COUNT 0 - 5 /hpf    Epithelial cells FEW 0 - 5 /lpf    Bacteria NEGATIVE  NEG /hpf    Mucus 1+ (A) NEG /lpf    Amorphous Crystals 3+ (A) NEG Radiologic Studies -   No orders to display         Medical Decision Making   I am the first provider for this patient. I reviewed the vital signs, available nursing notes, past medical history, past surgical history, family history and social history. Vital Signs-Reviewed the patient's vital signs. Pulse Oximetry Analysis -  98% on room air, stable     Cardiac Monitor:  Rate: 61  Rhythm:  Normal Sinus Rhythm     Records Reviewed: Nursing Notes and Old Medical Records (Time of Review: 12:34 PM)    ED Course: Progress Notes, Reevaluation, and Consults:      Provider Notes (Medical Decision Making):     Diagnosis     Clinical Impression:   1. Ureter, calculus        Disposition: Discharge     Follow-up Information     Follow up With Details Comments [address] Cantu MD Call in 1 week  [place] [address] [address] [facility] [address] [phone] [address] EMERGENCY DEPT  As needed, If symptoms worsen [address] [facility]  [phone]           Patient's Medications   Start Taking    No medications on file   Continue Taking    CLONAZEPAM (KLONOPIN) 1 MG TABLET    One tablet at bedtime as needed for sleep    DULOXETINE (CYMBALTA) 30 MG CAPSULE    Take 30 mg by mouth daily. NAPROXEN SODIUM (ALEVE PO)    Take  by mouth. OXYCODONE-ACETAMINOPHEN (PERCOCET)  MG PER TABLET    Take 1 Tab by mouth every six (6) hours as needed for Pain. Max Daily Amount: 4 Tabs. QUETIAPINE (SEROQUEL) 25 MG TABLET    Take  by mouth. TIZANIDINE (ZANAFLEX) 4 MG TABLET    TAKE 1 BY MOUTH EVERY NIGHT AT BEDTIME FOR 14 DAYS THEN 2 BY MOUTH EVERY NIGHT AT BEDTIME AFTER    TOPIRAMATE (TOPAMAX) 100 MG TABLET    Take 1 Tab by mouth two (2) times a day. ZOLPIDEM (AMBIEN) 10 MG TABLET    Take 1 Tab by mouth nightly as needed.    These Medications have changed    No medications on file   Stop Taking    CLONAZEPAM (KLONOPIN) 0.5 MG TABLET    Take 0.5 mg by mouth nightly. DULOXETINE (CYMBALTA) 30 MG CAPSULE    Take 30 mg by mouth three (3) times daily. OXYCODONE-ACETAMINOPHEN (PERCOCET)  MG PER TABLET    Take 1 Tab by mouth every six (6) hours as needed for Pain. Max Daily Amount: 4 Tabs. OXYCODONE-ACETAMINOPHEN (PERCOCET)  MG PER TABLET    Take 1 Tab by mouth every six (6) hours as needed for Pain. Max Daily Amount: 4 Tabs.     _______________________________    Attestations:  Scribe [address] [place] [address] acting as a scribe for and in the presence of Lolita Vieyra MD      January 21, 2018 at 12:34 PM       Provider Attestation:      I personally performed the services described in the documentation, reviewed the documentation, as recorded by the scribe in my presence, and it accurately and completely records my words and actions. January 21, 2018 at 12:34 PM - Lolita Vieyra MD    _______________________________  Pt feeling better after meds, wants to go home, agrees with dispo and F/U plan.   Lolita Vieyra MD  4:35 PM

## 2018-01-21 NOTE — DISCHARGE INSTRUCTIONS

## 2018-01-21 NOTE — ED NOTES
Rounding completed:    Paid had improved but is not starting to worsen again. Family member at bedside. Call bell within reach.

## 2018-01-21 NOTE — ED NOTES
Rounding completed:  Patient pain improved to 4/10. Call bell within reach. Awaiting further orders from MD.  No other needs at this time.

## 2018-01-23 ENCOUNTER — HOSPITAL ENCOUNTER (EMERGENCY)
Age: 33
Discharge: HOME | End: 2018-01-23
Attending: EMERGENCY MEDICINE | Admitting: EMERGENCY MEDICINE
Payer: COMMERCIAL

## 2018-01-23 ENCOUNTER — APPOINTMENT (OUTPATIENT)
Dept: CT IMAGING | Age: 33
End: 2018-01-23
Attending: EMERGENCY MEDICINE
Payer: COMMERCIAL

## 2018-01-23 VITALS
BODY MASS INDEX: 32.08 KG/M2 | SYSTOLIC BLOOD PRESSURE: 137 MMHG | TEMPERATURE: 97.8 F | RESPIRATION RATE: 18 BRPM | HEIGHT: 74 IN | DIASTOLIC BLOOD PRESSURE: 83 MMHG | HEART RATE: 90 BPM | WEIGHT: 250 LBS | OXYGEN SATURATION: 98 %

## 2018-01-23 DIAGNOSIS — N13.5 URETEROVESICAL JUNCTION (UVJ) OBSTRUCTION: ICD-10-CM

## 2018-01-23 DIAGNOSIS — N20.1 RIGHT URETERAL STONE: Primary | ICD-10-CM

## 2018-01-23 DIAGNOSIS — Z87.898 HISTORY OF CHRONIC PAIN: ICD-10-CM

## 2018-01-23 DIAGNOSIS — R11.2 NON-INTRACTABLE VOMITING WITH NAUSEA, UNSPECIFIED VOMITING TYPE: ICD-10-CM

## 2018-01-23 LAB
ANION GAP SERPL CALC-SCNC: 10 MMOL/L (ref 3–18)
APPEARANCE UR: CLEAR
BACTERIA URNS QL MICRO: NEGATIVE /HPF
BASOPHILS # BLD: 0 K/UL (ref 0–0.06)
BASOPHILS NFR BLD: 0 % (ref 0–2)
BILIRUB UR QL: NEGATIVE
BUN SERPL-MCNC: 12 MG/DL (ref 7–18)
BUN/CREAT SERPL: 14 (ref 12–20)
CALCIUM SERPL-MCNC: 8.8 MG/DL (ref 8.5–10.1)
CHLORIDE SERPL-SCNC: 103 MMOL/L (ref 100–108)
CO2 SERPL-SCNC: 25 MMOL/L (ref 21–32)
COLOR UR: YELLOW
CREAT SERPL-MCNC: 0.86 MG/DL (ref 0.6–1.3)
DIFFERENTIAL METHOD BLD: ABNORMAL
EOSINOPHIL # BLD: 0.2 K/UL (ref 0–0.4)
EOSINOPHIL NFR BLD: 2 % (ref 0–5)
EPITH CASTS URNS QL MICRO: ABNORMAL /LPF (ref 0–5)
ERYTHROCYTE [DISTWIDTH] IN BLOOD BY AUTOMATED COUNT: 13.1 % (ref 11.6–14.5)
GLUCOSE SERPL-MCNC: 93 MG/DL (ref 74–99)
GLUCOSE UR STRIP.AUTO-MCNC: NEGATIVE MG/DL
HCT VFR BLD AUTO: 41.8 % (ref 36–48)
HGB BLD-MCNC: 13.8 G/DL (ref 13–16)
HGB UR QL STRIP: ABNORMAL
KETONES UR QL STRIP.AUTO: ABNORMAL MG/DL
LEUKOCYTE ESTERASE UR QL STRIP.AUTO: ABNORMAL
LYMPHOCYTES # BLD: 3.6 K/UL (ref 0.9–3.6)
LYMPHOCYTES NFR BLD: 38 % (ref 21–52)
MCH RBC QN AUTO: 29.9 PG (ref 24–34)
MCHC RBC AUTO-ENTMCNC: 33 G/DL (ref 31–37)
MCV RBC AUTO: 90.5 FL (ref 74–97)
MONOCYTES # BLD: 1 K/UL (ref 0.05–1.2)
MONOCYTES NFR BLD: 11 % (ref 3–10)
MUCOUS THREADS URNS QL MICRO: ABNORMAL /LPF
NEUTS SEG # BLD: 4.7 K/UL (ref 1.8–8)
NEUTS SEG NFR BLD: 49 % (ref 40–73)
NITRITE UR QL STRIP.AUTO: NEGATIVE
PH UR STRIP: 5 [PH] (ref 5–8)
PLATELET # BLD AUTO: 323 K/UL (ref 135–420)
PMV BLD AUTO: 8.5 FL (ref 9.2–11.8)
POTASSIUM SERPL-SCNC: 3.4 MMOL/L (ref 3.5–5.5)
PROT UR STRIP-MCNC: ABNORMAL MG/DL
RBC # BLD AUTO: 4.62 M/UL (ref 4.7–5.5)
RBC #/AREA URNS HPF: ABNORMAL /HPF (ref 0–5)
SODIUM SERPL-SCNC: 138 MMOL/L (ref 136–145)
SP GR UR REFRACTOMETRY: 1.02 (ref 1–1.03)
UROBILINOGEN UR QL STRIP.AUTO: 1 EU/DL (ref 0.2–1)
WBC # BLD AUTO: 9.5 K/UL (ref 4.6–13.2)
WBC URNS QL MICRO: ABNORMAL /HPF (ref 0–4)

## 2018-01-23 PROCEDURE — 74011250637 HC RX REV CODE- 250/637: Performed by: EMERGENCY MEDICINE

## 2018-01-23 PROCEDURE — 74011250636 HC RX REV CODE- 250/636: Performed by: EMERGENCY MEDICINE

## 2018-01-23 PROCEDURE — 85025 COMPLETE CBC W/AUTO DIFF WBC: CPT | Performed by: EMERGENCY MEDICINE

## 2018-01-23 PROCEDURE — 74176 CT ABD & PELVIS W/O CONTRAST: CPT

## 2018-01-23 PROCEDURE — 99283 EMERGENCY DEPT VISIT LOW MDM: CPT

## 2018-01-23 PROCEDURE — 87086 URINE CULTURE/COLONY COUNT: CPT | Performed by: EMERGENCY MEDICINE

## 2018-01-23 PROCEDURE — 96374 THER/PROPH/DIAG INJ IV PUSH: CPT

## 2018-01-23 PROCEDURE — 80048 BASIC METABOLIC PNL TOTAL CA: CPT | Performed by: EMERGENCY MEDICINE

## 2018-01-23 PROCEDURE — 81001 URINALYSIS AUTO W/SCOPE: CPT | Performed by: EMERGENCY MEDICINE

## 2018-01-23 PROCEDURE — 96375 TX/PRO/DX INJ NEW DRUG ADDON: CPT

## 2018-01-23 PROCEDURE — 74011250636 HC RX REV CODE- 250/636

## 2018-01-23 RX ORDER — MORPHINE SULFATE 4 MG/ML
4 INJECTION, SOLUTION INTRAMUSCULAR; INTRAVENOUS
Status: COMPLETED | OUTPATIENT
Start: 2018-01-23 | End: 2018-01-23

## 2018-01-23 RX ORDER — MORPHINE SULFATE 4 MG/ML
INJECTION INTRAVENOUS
Status: DISCONTINUED
Start: 2018-01-23 | End: 2018-01-23 | Stop reason: HOSPADM

## 2018-01-23 RX ORDER — KETOROLAC TROMETHAMINE 30 MG/ML
30 INJECTION, SOLUTION INTRAMUSCULAR; INTRAVENOUS
Status: COMPLETED | OUTPATIENT
Start: 2018-01-23 | End: 2018-01-23

## 2018-01-23 RX ORDER — ONDANSETRON 2 MG/ML
4 INJECTION INTRAMUSCULAR; INTRAVENOUS
Status: COMPLETED | OUTPATIENT
Start: 2018-01-23 | End: 2018-01-23

## 2018-01-23 RX ORDER — OXYCODONE AND ACETAMINOPHEN 5; 325 MG/1; MG/1
2 TABLET ORAL
Status: COMPLETED | OUTPATIENT
Start: 2018-01-23 | End: 2018-01-23

## 2018-01-23 RX ORDER — TAMSULOSIN HYDROCHLORIDE 0.4 MG/1
0.4 CAPSULE ORAL DAILY
Qty: 4 CAP | Refills: 0 | Status: SHIPPED | OUTPATIENT
Start: 2018-01-23 | End: 2018-01-27

## 2018-01-23 RX ORDER — TAMSULOSIN HYDROCHLORIDE 0.4 MG/1
0.4 CAPSULE ORAL
Status: COMPLETED | OUTPATIENT
Start: 2018-01-23 | End: 2018-01-23

## 2018-01-23 RX ADMIN — MORPHINE SULFATE 4 MG: 4 INJECTION, SOLUTION INTRAMUSCULAR; INTRAVENOUS at 08:16

## 2018-01-23 RX ADMIN — TAMSULOSIN HYDROCHLORIDE 0.4 MG: 0.4 CAPSULE ORAL at 09:12

## 2018-01-23 RX ADMIN — KETOROLAC TROMETHAMINE 30 MG: 30 INJECTION, SOLUTION INTRAMUSCULAR at 07:34

## 2018-01-23 RX ADMIN — OXYCODONE HYDROCHLORIDE AND ACETAMINOPHEN 2 TABLET: 5; 325 TABLET ORAL at 09:21

## 2018-01-23 RX ADMIN — ONDANSETRON 4 MG: 2 INJECTION INTRAMUSCULAR; INTRAVENOUS at 07:34

## 2018-01-23 NOTE — ED PROVIDER NOTES
EMERGENCY DEPARTMENT HISTORY AND PHYSICAL EXAM    7:12 AM      Date: 1/23/2018  Patient Name: Gilford Harrow    History of Presenting Illness     Chief Complaint   Patient presents with    Groin Pain     right         History Provided By: Patient    Chief Complaint: right flank pain  Duration:  Days  Timing:  Acute  Location: right flank  Quality: Aching  Severity: 8 out of 10  Modifying Factors: n/a  Associated Symptoms: Nausea, vomiting, back pain, and dysuria onset when he is finishing urinating. All other sx denied. No other complaints at this time. Additional History (Context): HPI Comments: 7:12 AM Gilford Harrow is a 29 y.o. male with a h/o Anxiety, Kidney stones, and Smoking presents to the ED with c/o sharp right flank pain onset 3  Days ago. Pt was evaluated 2 days ago in the ED for similar sx's. Pt noted his pain to be 8/10. Pt reports nausea, vomiting, back pain, and dysuria onset when he is finishing urinating. Pt denied n/v/d, CP, SOB, fever, numbness, weakness, or cough. Pt noted he has been taking Ibuprofen that he last took 14 hours ago, and Percocet that he takes through pain management for spinal stenosis. All other sx denied. No other complaints at this time. PCP-Tomy Ritter MD            PCP: Martina Diaz MD        Past History     Past Medical History:  Past Medical History:   Diagnosis Date    ADD (attention deficit disorder)     Anxiety     Depression     Migraine     Obesity     PTSD (post-traumatic stress disorder)     Seasonal allergic rhinitis     Sleep apnea        Past Surgical History:  Past Surgical History:   Procedure Laterality Date    HX BACK SURGERY  7/7/2011    lamenectomy & diskectomy.     HX TONSIL AND ADENOIDECTOMY         Family History:  Family History   Problem Relation Age of Onset    Asthma Mother        Social History:  Social History   Substance Use Topics    Smoking status: Former Smoker    Smokeless tobacco: Never Used  Alcohol use 0.0 oz/week      Comment: occasionally       Allergies:  No Known Allergies      Review of Systems     Review of Systems   Constitutional: Negative for fever. HENT: Negative for sore throat. Eyes: Negative for redness and visual disturbance. Respiratory: Negative for shortness of breath and wheezing. Cardiovascular: Negative for chest pain. Gastrointestinal: Positive for nausea and vomiting. Negative for abdominal pain. Endocrine: Negative for polyuria. Genitourinary: Positive for dysuria and flank pain (right). Musculoskeletal: Positive for back pain. Negative for arthralgias and neck stiffness. Skin: Negative for rash. Neurological: Negative for headaches. All other systems reviewed and are negative. Physical Exam     Visit Vitals    /83 (BP 1 Location: Left arm, BP Patient Position: At rest)    Pulse 90    Temp 97.8 °F (36.6 °C)    Resp 18    Ht 6' 2\" (1.88 m)    Wt 113.4 kg (250 lb)    SpO2 98%    BMI 32.1 kg/m2       Physical Exam   Constitutional: He is oriented to person, place, and time. He appears well-developed and well-nourished. No distress. HENT:   Head: Normocephalic and atraumatic. Mouth/Throat: Oropharynx is clear and moist.   Eyes: Conjunctivae are normal. Pupils are equal, round, and reactive to light. No scleral icterus. Neck: Normal range of motion. Neck supple. Cardiovascular: Normal rate and intact distal pulses. Capillary refill < 3 seconds   Pulmonary/Chest: Effort normal and breath sounds normal. No respiratory distress. He has no wheezes. Abdominal: Soft. Bowel sounds are normal. He exhibits no distension. There is no tenderness. There is CVA tenderness. Mild right flak tenderness. CVA tenderness. Genitourinary:   Genitourinary Comments: Inguinal tenderness. Musculoskeletal: Normal range of motion. He exhibits no edema. Lymphadenopathy:     He has no cervical adenopathy.    Neurological: He is alert and oriented to person, place, and time. No cranial nerve deficit. Skin: Skin is warm and dry. He is not diaphoretic. Nursing note and vitals reviewed. Diagnostic Study Results     Labs -  Recent Results (from the past 12 hour(s))   CBC WITH AUTOMATED DIFF    Collection Time: 01/23/18  7:30 AM   Result Value Ref Range    WBC 9.5 4.6 - 13.2 K/uL    RBC 4.62 (L) 4.70 - 5.50 M/uL    HGB 13.8 13.0 - 16.0 g/dL    HCT 41.8 36.0 - 48.0 %    MCV 90.5 74.0 - 97.0 FL    MCH 29.9 24.0 - 34.0 PG    MCHC 33.0 31.0 - 37.0 g/dL    RDW 13.1 11.6 - 14.5 %    PLATELET 579 624 - 948 K/uL    MPV 8.5 (L) 9.2 - 11.8 FL    NEUTROPHILS 49 40 - 73 %    LYMPHOCYTES 38 21 - 52 %    MONOCYTES 11 (H) 3 - 10 %    EOSINOPHILS 2 0 - 5 %    BASOPHILS 0 0 - 2 %    ABS. NEUTROPHILS 4.7 1.8 - 8.0 K/UL    ABS. LYMPHOCYTES 3.6 0.9 - 3.6 K/UL    ABS. MONOCYTES 1.0 0.05 - 1.2 K/UL    ABS. EOSINOPHILS 0.2 0.0 - 0.4 K/UL    ABS.  BASOPHILS 0.0 0.0 - 0.06 K/UL    DF AUTOMATED     METABOLIC PANEL, BASIC    Collection Time: 01/23/18  7:30 AM   Result Value Ref Range    Sodium 138 136 - 145 mmol/L    Potassium 3.4 (L) 3.5 - 5.5 mmol/L    Chloride 103 100 - 108 mmol/L    CO2 25 21 - 32 mmol/L    Anion gap 10 3.0 - 18 mmol/L    Glucose 93 74 - 99 mg/dL    BUN 12 7.0 - 18 MG/DL    Creatinine 0.86 0.6 - 1.3 MG/DL    BUN/Creatinine ratio 14 12 - 20      GFR est AA >60 >60 ml/min/1.73m2    GFR est non-AA >60 >60 ml/min/1.73m2    Calcium 8.8 8.5 - 10.1 MG/DL   URINALYSIS W/ RFLX MICROSCOPIC    Collection Time: 01/23/18  7:30 AM   Result Value Ref Range    Color YELLOW      Appearance CLEAR      Specific gravity 1.024 1.005 - 1.030      pH (UA) 5.0 5.0 - 8.0      Protein TRACE (A) NEG mg/dL    Glucose NEGATIVE  NEG mg/dL    Ketone TRACE (A) NEG mg/dL    Bilirubin NEGATIVE  NEG      Blood LARGE (A) NEG      Urobilinogen 1.0 0.2 - 1.0 EU/dL    Nitrites NEGATIVE  NEG      Leukocyte Esterase TRACE (A) NEG     URINE MICROSCOPIC ONLY    Collection Time: 01/23/18  7:30 AM   Result Value Ref Range    WBC 0 to 3 0 - 4 /hpf    RBC 21 to 35 0 - 5 /hpf    Epithelial cells FEW 0 - 5 /lpf    Bacteria NEGATIVE  NEG /hpf    Mucus 1+ (A) NEG /lpf       Radiologic Studies -   CT ABD PELV WO CONT   Final Result   IMPRESSIONS:           Right ureteric calculus, 4 mm in diameter, located at the inner ostium of the  distal end of right ureter at the inner aspect of right UVJ, as described. Mild  right hydronephrosis and hydroureter.     No additional urinary calculus.     No additional diagnostic finding         Medical Decision Making   I am the first provider for this patient. I reviewed the vital signs, available nursing notes, past medical history, past surgical history, family history and social history. Vital Signs-Reviewed the patient's vital signs. Pulse Oximetry Analysis -  98 on room air (Interpretation)normal      Records Reviewed: Nursing Notes and Old Medical Records (Time of Review: 7:12 AM)    ED Course: Progress Notes, Reevaluation, and Consults:      Provider Notes (Medical Decision Making):   MDM  Number of Diagnoses or Management Options  Diagnosis management comments: 7:23 AM   DDX: Uretal stone, polynephritis, infectious, muscular, hernia, appendicitis. Will get CT abd pelvis, check labs urine, give IV fluid, Toradol, and IV Zofran. Has 4mm uvj stone    Gave another dose analgesia, dose flomax in ED. Pain improved  Pt admits he was not taking his percocet for this kidney stone pain. He will take at home. I have reassessed the patient. I have discussed the workup, results and plan with the patient and patient is in agreement. Patient is feeling better. Patient will be prescribed flomax. Patient was discharge in stable condition. Patient was given outpatient follow up. Patient is to return to emergency department if any new or worsening condition.        Medications   ondansetron (ZOFRAN) injection 4 mg (4 mg IntraVENous Given 1/23/18 0734) ketorolac (TORADOL) injection 30 mg (30 mg IntraVENous Given 1/23/18 8042)   morphine injection 4 mg (4 mg IntraVENous Given 1/23/18 0816)   tamsulosin (FLOMAX) capsule 0.4 mg (0.4 mg Oral Given 1/23/18 0912)   oxyCODONE-acetaminophen (PERCOCET) 5-325 mg per tablet 2 Tab (2 Tabs Oral Given 1/23/18 0184)         Diagnosis     Clinical Impression:   1. Right ureteral stone    2. Ureterovesical junction (UVJ) obstruction    3. Non-intractable vomiting with nausea, unspecified vomiting type    4. History of chronic pain        Disposition: d/c    Follow-up Information     Follow up With Details Comments Contact Info    Emerson Talley MD Go in 2 days For follow up [facility] at [address] [place] [address] EMERGENCY DEPT Go to As needed, If symptoms worsen [address] [facility]  [phone]          Discharge Medication List as of 1/23/2018  9:09 AM      START taking these medications    Details   tamsulosin (FLOMAX) 0.4 mg capsule Take 1 Cap by mouth daily for 4 days. Indications: Urolithiasis, Print, Disp-4 Cap, R-0         CONTINUE these medications which have NOT CHANGED    Details   ibuprofen (MOTRIN) 800 mg tablet 1 tab q 6-8 hours PRN pain, Print, Disp-20 Tab, R-0      tiZANidine (ZANAFLEX) 4 mg tablet TAKE 1 BY MOUTH EVERY NIGHT AT BEDTIME FOR 14 DAYS THEN 2 BY MOUTH EVERY NIGHT AT BEDTIME AFTER, Normal**Patient requests 90 days supply**Disp-180 Tab, R-3      oxyCODONE-acetaminophen (PERCOCET)  mg per tablet Take 1 Tab by mouth every six (6) hours as needed for Pain. Max Daily Amount: 4 Tabs., Print, Disp-120 Tab, R-0      zolpidem (AMBIEN) 10 mg tablet Take 1 Tab by mouth nightly as needed. , Phone In, Disp-30 Tab, R-0      topiramate (TOPAMAX) 100 mg tablet Take 1 Tab by mouth two (2) times a day., Normal, Disp-180 Tab, R-1      DULoxetine (CYMBALTA) 30 mg capsule Take 30 mg by mouth daily. , Historical Med      QUEtiapine (SEROQUEL) 25 mg tablet Take  by mouth., Historical Med             _______________________________    Attestations:  Rubén [address] [place] [address] acting as a scribe for and in the presence of Riley Rai,       January 23, 2018 at 8:58 AM       Provider Attestation:      I personally performed the services described in the documentation, reviewed the documentation, as recorded by the scribe in my presence, and it accurately and completely records my words and actions.  January 23, 2018 at 8:58 AM - Riley Rai DO    _______________________________

## 2018-01-23 NOTE — ED NOTES
Current Discharge Medication List      START taking these medications    Details   tamsulosin (FLOMAX) 0.4 mg capsule Take 1 Cap by mouth daily for 4 days. Indications: Urolithiasis  Qty: 4 Cap, Refills: 0         CONTINUE these medications which have NOT CHANGED    Details   ibuprofen (MOTRIN) 800 mg tablet 1 tab q 6-8 hours PRN pain  Qty: 20 Tab, Refills: 0      oxyCODONE-acetaminophen (PERCOCET)  mg per tablet Take 1 Tab by mouth every six (6) hours as needed for Pain. Max Daily Amount: 4 Tabs. Qty: 120 Tab, Refills: 0    Associated Diagnoses: Cervicalgia           Patient armband removed and shredded  Prescription given and reviewed with patient.

## 2018-01-25 LAB
BACTERIA SPEC CULT: NORMAL
SERVICE CMNT-IMP: NORMAL

## 2018-02-01 DIAGNOSIS — F51.01 PRIMARY INSOMNIA: ICD-10-CM

## 2018-02-02 RX ORDER — ZOLPIDEM TARTRATE 10 MG/1
10 TABLET ORAL
Qty: 30 TAB | Refills: 0 | OUTPATIENT
Start: 2018-02-02 | End: 2018-03-01 | Stop reason: SDUPTHER

## 2018-02-07 ENCOUNTER — OFFICE VISIT (OUTPATIENT)
Dept: INTERNAL MEDICINE CLINIC | Age: 33
End: 2018-02-07

## 2018-02-07 VITALS
RESPIRATION RATE: 14 BRPM | WEIGHT: 273 LBS | DIASTOLIC BLOOD PRESSURE: 80 MMHG | OXYGEN SATURATION: 98 % | HEIGHT: 74 IN | BODY MASS INDEX: 35.04 KG/M2 | HEART RATE: 84 BPM | SYSTOLIC BLOOD PRESSURE: 136 MMHG | TEMPERATURE: 98.7 F

## 2018-02-07 DIAGNOSIS — B34.9 VIRAL SYNDROME: Primary | ICD-10-CM

## 2018-02-07 NOTE — PROGRESS NOTES
1. Have you been to the ER, urgent care clinic or hospitalized since your last visit? YES. ER, kidney stone    2. Have you seen or consulted any other health care providers outside of the [address] [place] [address] since your last visit (Include any pap smears or colon screening)? NO      Do you have an Advanced Directive? NO    Would you like information on Advanced Directives?  NO

## 2018-02-07 NOTE — PROGRESS NOTES
Dario Ray 1985, is a 29 y.o. male, who is seen today for a one-week history of just not feeling right. Initially he had some chilliness and sweatiness but checked his temperature several times and had no fever. He had a slight cough which is nonproductive and that seems to be better. He does have an area on his scrotum they thought it might be staph and is worried about that. No other area in the right inguinal area he wanted me to check that has not gone away and has been a little sore. He has had no nausea or vomiting or diarrhea and in fact is a little constipated. Past Medical History:   Diagnosis Date    ADD (attention deficit disorder)     Anxiety     Depression     Migraine     Obesity     PTSD (post-traumatic stress disorder)     Seasonal allergic rhinitis     Sleep apnea      Current Outpatient Prescriptions   Medication Sig Dispense Refill    zolpidem (AMBIEN) 10 mg tablet Take 1 Tab by mouth nightly as needed. 30 Tab 0    ibuprofen (MOTRIN) 800 mg tablet 1 tab q 6-8 hours PRN pain 20 Tab 0    tiZANidine (ZANAFLEX) 4 mg tablet TAKE 1 BY MOUTH EVERY NIGHT AT BEDTIME FOR 14 DAYS THEN 2 BY MOUTH EVERY NIGHT AT BEDTIME AFTER 180 Tab 3    oxyCODONE-acetaminophen (PERCOCET)  mg per tablet Take 1 Tab by mouth every six (6) hours as needed for Pain. Max Daily Amount: 4 Tabs. 120 Tab 0    topiramate (TOPAMAX) 100 mg tablet Take 1 Tab by mouth two (2) times a day. 180 Tab 1    DULoxetine (CYMBALTA) 30 mg capsule Take 30 mg by mouth daily.  QUEtiapine (SEROQUEL) 25 mg tablet Take  by mouth. Visit Vitals    /80 (BP 1 Location: Left arm, BP Patient Position: Sitting)    Pulse 84    Temp 98.7 °F (37.1 °C) (Oral)    Resp 14    Ht 6' 2\" (1.88 m)    Wt 273 lb (123.8 kg)    SpO2 98%    BMI 35.05 kg/m2     Pharynx reveals no redness exudate or drainage. Neck reveals no adenopathy. Lungs are clear to percussion.   Good breath sounds with no wheezing or crackles. Abdomen is soft and nontender with no hepatosplenomegaly or masses. There is a tiny cyst on his scrotum that he had a hard time finding on the left that is certainly just assist, does not seem to be coming to ahead and is not really tender. In the right inguinal area there is an area of hyperpigmentation but no nodularity or tenderness. Assessment: Probably viral syndrome and concerns about 2 areas on his skin that seem to be just fine. I reassured him there is no evidence of a staph infection. Symptoms should clear on their own and he will call me if symptoms worsen.

## 2018-02-07 NOTE — MR AVS SNAPSHOT
Anabelle Janelle 
 
 
 [address] 
[phone] Patient: Vicky Caputo MRN: [id number] Visit Information Date & Time Provider Department Dept. Phone Encounter #  
 2/7/2018  4:00 PM Kristen Root MD [facility] [id number] Follow-up Instructions Follow-up and Disposition History Your Appointments 3/28/2018  3:00 PM  
Follow Up with DIDI Perez [facility] (AZAM SCHEDULING) Appt Note: Return in about 3 months (around 4/3/2018). [address] [facility] [address]  
[phone] [facility] [id number] Upcoming Health Maintenance Date Due DTaP/Tdap/Td series (1 - Tdap) 11/30/2008 Influenza Age 5 to Adult 8/1/2017 Allergies as of 2/7/2018  Review Complete On: 2/7/2018 By: Kristen Root MD  
 No Known Allergies Current Immunizations  Reviewed on 10/18/2013 No immunizations on file. Not reviewed this visit You Were Diagnosed With   
  
 Codes Comments Viral syndrome    -  Primary ICD-10-CM: B34.9 ICD-9-CM: 079.99 Vitals BP Pulse Temp Resp Height(growth percentile) Weight(growth percentile) 136/80 (BP 1 Location: Left arm, BP Patient Position: Sitting) 84 98.7 °F (37.1 °C) (Oral) 14 6' 2\" (1.88 m) 273 lb (123.8 kg) SpO2 BMI Smoking Status 98% 35.05 kg/m2 Former Smoker Vitals History BMI and BSA Data Body Mass Index Body Surface Area 35.05 kg/m 2 2.54 m 2 Preferred Pharmacy Pharmacy Name Phone [address] [place] [phone] [facility] [phone] Your Updated Medication List  
  
   
This list is accurate as of: 2/7/18  4:25 PM.  Always use your most recent med list.  
  
  
  
  
 CYMBALTA 30 mg capsule Generic drug:  DULoxetine Take 30 mg by mouth daily. ibuprofen 800 mg tablet Commonly known as:  MOTRIN  
1 tab q 6-8 hours PRN pain  
  
 oxyCODONE-acetaminophen  mg per tablet Commonly known as:  PERCOCET Take 1 Tab by mouth every six (6) hours as needed for Pain. Max Daily Amount: 4 Tabs. SEROquel 25 mg tablet Generic drug:  QUEtiapine Take  by mouth. tiZANidine 4 mg tablet Commonly known as:  Caleen Glatter TAKE 1 BY MOUTH EVERY NIGHT AT BEDTIME FOR 14 DAYS THEN 2 BY MOUTH EVERY NIGHT AT BEDTIME AFTER  
  
 topiramate 100 mg tablet Commonly known as:  TOPAMAX Take 1 Tab by mouth two (2) times a day. zolpidem 10 mg tablet Commonly known as:  AMBIEN Take 1 Tab by mouth nightly as needed. Introducing [facility]! Dear Anai Wynn: Thank you for requesting a M[employer] account. Our records indicate that you already have an active Mpax account. You can access your account anytime at [email or web address]. [employer] Did you know that you can access your hospital and ER discharge instructions at any time in M[employer]ax? You can also review all of your test results from your hospital stay or ER visit. Additional Information If you have questions, please visit the Frequently Asked Questions section of the Mpax website at [email or web address]. [employer]/. Remember, Mpax is NOT to be used for urgent needs. For medical emergencies, dial 911. Now available from your iPhone and Android! Please provide this summary of care documentation to your next provider. Your primary care clinician is listed as Norma Gilbert. If you have any questions after today's visit, please call [phone].

## 2018-03-01 DIAGNOSIS — F51.01 PRIMARY INSOMNIA: ICD-10-CM

## 2018-03-01 NOTE — TELEPHONE ENCOUNTER
PHONE IN RX    [address] [facility] reports the last fill date for Ambien as 02/02/2018 for a 30 d/s. There appears to be no inconsistencies in regards to the prescribing of this medication. Last Visit: 02/07/2018 with MD January Iglesias    Next Appointment: none   Previous Refill Encounters: 02/02/2018 per MD January Iglesias #[id number]    Requested Prescriptions     Pending Prescriptions Disp Refills    zolpidem (AMBIEN) 10 mg tablet 30 Tab 0     Sig: Take 1 Tab by mouth nightly as needed.

## 2018-03-02 ENCOUNTER — HOSPITAL ENCOUNTER (OUTPATIENT)
Dept: LAB | Age: 33
Discharge: HOME | End: 2018-03-02

## 2018-03-02 LAB — SENTARA SPECIMEN COL,SENBCF: NORMAL

## 2018-03-02 PROCEDURE — 99001 SPECIMEN HANDLING PT-LAB: CPT

## 2018-03-02 RX ORDER — ZOLPIDEM TARTRATE 10 MG/1
10 TABLET ORAL
Qty: 30 TAB | Refills: 0 | OUTPATIENT
Start: 2018-03-02 | End: 2018-04-02 | Stop reason: SDUPTHER

## 2018-03-15 ENCOUNTER — HOSPITAL ENCOUNTER (OUTPATIENT)
Dept: NUCLEAR MEDICINE | Age: 33
Discharge: HOME | End: 2018-03-15
Attending: PHYSICIAN ASSISTANT
Payer: COMMERCIAL

## 2018-03-15 VITALS — BODY MASS INDEX: 35.31 KG/M2 | WEIGHT: 275 LBS

## 2018-03-15 DIAGNOSIS — R10.11 RUQ ABDOMINAL PAIN: ICD-10-CM

## 2018-03-15 PROCEDURE — 74011000258 HC RX REV CODE- 258

## 2018-03-15 PROCEDURE — 74011250636 HC RX REV CODE- 250/636

## 2018-03-15 PROCEDURE — 78227 HEPATOBIL SYST IMAGE W/DRUG: CPT

## 2018-03-15 RX ORDER — SODIUM CHLORIDE 9 MG/ML
50 INJECTION, SOLUTION INTRAVENOUS CONTINUOUS
Status: DISCONTINUED | OUTPATIENT
Start: 2018-03-15 | End: 2018-03-19 | Stop reason: HOSPADM

## 2018-03-15 RX ADMIN — SODIUM CHLORIDE 50 ML/HR: 900 INJECTION, SOLUTION INTRAVENOUS at 10:31

## 2018-03-15 RX ADMIN — SINCALIDE 2.4 MCG: 5 INJECTION, POWDER, LYOPHILIZED, FOR SOLUTION INTRAVENOUS at 10:30

## 2018-03-28 ENCOUNTER — OFFICE VISIT (OUTPATIENT)
Dept: PAIN MANAGEMENT | Age: 33
End: 2018-03-28

## 2018-03-28 VITALS
DIASTOLIC BLOOD PRESSURE: 84 MMHG | TEMPERATURE: 98.8 F | HEART RATE: 87 BPM | HEIGHT: 74 IN | RESPIRATION RATE: 16 BRPM | BODY MASS INDEX: 34.91 KG/M2 | SYSTOLIC BLOOD PRESSURE: 131 MMHG | WEIGHT: 272 LBS

## 2018-03-28 DIAGNOSIS — M54.2 CERVICALGIA: ICD-10-CM

## 2018-03-28 DIAGNOSIS — M51.06 INTERVERTEBRAL DISC DISORDER OF LUMBAR REGION WITH MYELOPATHY: ICD-10-CM

## 2018-03-28 DIAGNOSIS — M54.12 CERVICAL RADICULOPATHY: ICD-10-CM

## 2018-03-28 DIAGNOSIS — M54.41 CHRONIC BILATERAL LOW BACK PAIN WITH BILATERAL SCIATICA: Primary | ICD-10-CM

## 2018-03-28 DIAGNOSIS — M54.42 CHRONIC BILATERAL LOW BACK PAIN WITH BILATERAL SCIATICA: Primary | ICD-10-CM

## 2018-03-28 DIAGNOSIS — G89.29 CHRONIC BILATERAL LOW BACK PAIN WITH BILATERAL SCIATICA: Primary | ICD-10-CM

## 2018-03-28 DIAGNOSIS — M48.061 SPINAL STENOSIS OF LUMBAR REGION, UNSPECIFIED WHETHER NEUROGENIC CLAUDICATION PRESENT: ICD-10-CM

## 2018-03-28 DIAGNOSIS — M48.02 STENOSIS, CERVICAL SPINE: ICD-10-CM

## 2018-03-28 DIAGNOSIS — M50.30 DDD (DEGENERATIVE DISC DISEASE), CERVICAL: ICD-10-CM

## 2018-03-28 RX ORDER — OXYCODONE AND ACETAMINOPHEN 10; 325 MG/1; MG/1
1 TABLET ORAL
Qty: 120 TAB | Refills: 0 | Status: SHIPPED | OUTPATIENT
Start: 2018-04-29 | End: 2018-04-09

## 2018-03-28 RX ORDER — OXYCODONE AND ACETAMINOPHEN 10; 325 MG/1; MG/1
1 TABLET ORAL
Qty: 120 TAB | Refills: 0 | Status: SHIPPED | OUTPATIENT
Start: 2018-05-28 | End: 2018-04-09

## 2018-03-28 RX ORDER — OXYCODONE AND ACETAMINOPHEN 10; 325 MG/1; MG/1
1 TABLET ORAL
Qty: 120 TAB | Refills: 0 | Status: SHIPPED | OUTPATIENT
Start: 2018-03-30 | End: 2018-06-27 | Stop reason: SDUPTHER

## 2018-03-28 NOTE — PATIENT INSTRUCTIONS
1. Continue current plan with no evidence of addiction or diversion. Stable on current medication without adverse events. 2. Refill oxycodone 10/325 mg up to 4 times daily as needed. 3. Continue tizanidine 4 mg. Please discuss this medication further treatment for migraine headaches with your PCP as soon as possible. 4. Naloxone 4 mg nasal spray for opioid induced respiratory depression emergency only. 5. Discussed risks of addiction, dependency, and opioid induced hyperalgesia.    6. Return to clinic in 3 months with Dr. Lisa Frye

## 2018-03-28 NOTE — PROGRESS NOTES
Nursing Notes    Patient presents to the office today in follow-up. Patient rates his pain at 5/10 on the numerical pain scale. Reviewed medications with counts as follows:    Rx Date filled Qty Dispensed Pill Count Last Dose Short   Percocet 10 mg 03/01/18 120 0 2 days ago           Comments:  Patient is here today for a follow up appt today he states his pain level today is a 5  He states labs were taken at [facility] in [address] [place] [address] was not performed in office today    Any new labs or imaging since last appointment? Labs taken at [facility] in [place]    Have you been to an emergency room (ER) or urgent care clinic since your last visit? NO            Have you been hospitalized since your last visit? NO     If yes, where, when, and reason for visit? Have you seen or consulted any other health care providers outside of the [facility]  since your last visit? NO     If yes, where, when, and reason for visit? HM deferred to pcp.

## 2018-03-28 NOTE — MR AVS SNAPSHOT
[address] [facility] [address] 
[phone] Patient: Dio Ulloa MRN: [id number]:[id number] Visit Information Date & Time Provider Department Dept. Phone Encounter #  
 3/28/2018  3:00 PM [facility], [address] [facility] [phone] [id number] Follow-up Instructions Return in about 3 months (around 6/28/2018). Follow-up and Disposition History Your Appointments 5/9/2019  3:30 PM  
Office Visit with Apolinar Graves MD  
[facility]) Appt Note: check up [address] [facility] [address] [facility] [id number] [phone] [address] [facility] [address] [place] [address] Upcoming Health Maintenance Date Due DTaP/Tdap/Td series (1 - Tdap) 11/30/2008 Influenza Age 5 to Adult 8/1/2018 Allergies as of 3/28/2018  Review Complete On: 3/28/2018 By: DIDI Sanchez No Known Allergies Current Immunizations  Reviewed on 10/18/2013 No immunizations on file. Not reviewed this visit You Were Diagnosed With   
  
 Codes Comments Chronic bilateral low back pain with bilateral sciatica    -  Primary ICD-10-CM: M54.42, M54.41, G89.29 ICD-9-CM: 724.2, 724.3, 338.29 Cervicalgia     ICD-10-CM: M54.2 ICD-9-CM: 723.1 Cervical radiculopathy     ICD-10-CM: M54.12 
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 
ICD-9-CM: 722.73   
 DDD (degenerative disc disease), cervical     ICD-10-CM: M50.30 ICD-9-CM: 722.4 Stenosis, cervical spine     ICD-10-CM: M48.02 
ICD-9-CM: 723.0 Spinal stenosis of lumbar region, unspecified whether neurogenic claudication present     ICD-10-CM: M48.061 
ICD-9-CM: 724.02 Vitals BP Pulse Temp Resp Height(growth percentile) Weight(growth percentile) 131/84 (BP 1 Location: Right arm, BP Patient Position: Sitting) 87 98.8 °F (37.1 °C) 16 6' 2\" (1.88 m) 272 lb (123.4 kg) BMI Smoking Status 34.92 kg/m2 Former Smoker BMI and BSA Data Body Mass Index Body Surface Area 34.92 kg/m 2 2.54 m 2 Preferred Pharmacy Pharmacy Name Phone [address], [address] [place] [phone] [facility] [phone] Your Updated Medication List  
  
   
This list is accurate as of 3/28/18 11:59 PM.  Always use your most recent med list.  
  
  
  
  
 CYMBALTA 30 mg capsule Generic drug:  DULoxetine Take 30 mg by mouth daily. ibuprofen 800 mg tablet Commonly known as:  MOTRIN  
1 tab q 6-8 hours PRN pain * oxyCODONE-acetaminophen  mg per tablet Commonly known as:  PERCOCET Take 1 Tab by mouth every six (6) hours as needed for Pain. Max Daily Amount: 4 Tabs. * oxyCODONE-acetaminophen  mg per tablet Commonly known as:  PERCOCET Take 1 Tab by mouth every six (6) hours as needed for Pain. Max Daily Amount: 4 Tabs. * oxyCODONE-acetaminophen  mg per tablet Commonly known as:  PERCOCET Take 1 Tab by mouth every six (6) hours as needed for Pain. Max Daily Amount: 4 Tabs. SEROquel 25 mg tablet Generic drug:  QUEtiapine Take  by mouth. tiZANidine 4 mg tablet Commonly known as:  Jolinda Puff TAKE 1 BY MOUTH EVERY NIGHT AT BEDTIME FOR 14 DAYS THEN 2 BY MOUTH EVERY NIGHT AT BEDTIME AFTER  
  
 topiramate 100 mg tablet Commonly known as:  TOPAMAX Take 1 Tab by mouth two (2) times a day. zolpidem 10 mg tablet Commonly known as:  AMBIEN Take 1 Tab by mouth nightly as needed. * Notice: This list has 3 medication(s) that are the same as other medications prescribed for you. Read the directions carefully, and ask your doctor or other care provider to review them with you. Prescriptions Printed Refills oxyCODONE-acetaminophen (PERCOCET)  mg per tablet 0 Starting on: 3/30/2018 Sig: Take 1 Tab by mouth every six (6) hours as needed for Pain. Max Daily Amount: 4 Tabs. Class: Print Route: Oral  
 oxyCODONE-acetaminophen (PERCOCET)  mg per tablet (Discontinued) 0 Sig: Take 1 Tab by mouth every six (6) hours as needed for Pain. Max Daily Amount: 4 Tabs. Class: Print Route: Oral  
 Reason for Discontinue: Not A Current Medication  
 oxyCODONE-acetaminophen (PERCOCET)  mg per tablet (Discontinued) 0 Sig: Take 1 Tab by mouth every six (6) hours as needed for Pain. Max Daily Amount: 4 Tabs. Class: Print Route: Oral  
 Reason for Discontinue: Not A Current Medication Follow-up Instructions Return in about 3 months (around 6/28/2018). Patient Instructions 1. Continue current plan with no evidence of addiction or diversion. Stable on current medication without adverse events. 2. Refill oxycodone 10/325 mg up to 4 times daily as needed. 3. Continue tizanidine 4 mg. Please discuss this medication further treatment for migraine headaches with your PCP as soon as possible. 4. Naloxone 4 mg nasal spray for opioid induced respiratory depression emergency only. 5. Discussed risks of addiction, dependency, and opioid induced hyperalgesia. 6. Return to clinic in 3 months with Dr. Pratt Introducing \[facility]\"" & HEALTH SERVICES! Dear Rik Cost: Thank you for requesting a Met[employer] account. Our records indicate that you already have an active MetrixLab account. You can access your account anytime at [email or web address] Did you know that you can access your hospital and ER discharge instructions at any time in MetrixLab? You can also review all of your test results from your hospital stay or ER visit. Additional Information If you have questions, please visit the Frequently Asked Questions section of the [employer] website at [email or web address] Remember, Indisys is NOT to be used for urgent needs. For medical emergencies, dial 911. Now available from your iPhone and Android! Please provide this summary of care documentation to your next provider. Your primary care clinician is listed as Miranda Huffman. Johnny Kay. If you have any questions after today's visit, please call [phone].

## 2018-03-28 NOTE — PROGRESS NOTES
HISTORY OF PRESENT ILLNESS  Sylvia Valera is a 29 y.o. male    HPI: Mr. Loki Tomlinson  returns today for f/u of chronic pain due to lumbar spondylosis and degenerative disc disease. Today is my first visit with Mr. Loki Tomlinson. He continues with pain unchanged since last visit. We discussed his current condition medications in detail today. He is doing well with his current treatment plan which has been offering significant pain control. He has been receiving treatment for headaches in the past.  We discussed that his provider, Dr. Everardo Oliver is no longer with our practice and I have asked him to discuss anything related to his headaches with his PCP moving forward. He has been receiving tizanidine through our practice for headaches and will discuss this medication further with his PCP. He is otherwise doing well with no new complaints today. I will have him follow-up in 3 months with Dr. Juliana Fortune for further evaluation and recommendation. Current medication management includes Oxycodone 10/325 mg 4 times daily PRN and Zanaflex. Cymbalta, Topamax, Seroquel, and Klonopin prescribed from outside provider. Medications are helping with pain control and quality of life. His pain is 2/10 with medication and 8/10 without. Pt describes pain as aching, stabbing, pulling, tight, and burning. . Aggravating factors include bending and certain movments. Relieved with rest, medication, and avoiding painful activities. Current treatment is helping to improve general activity, mood, sleep, and enjoyment of life    Mr. Loki Tomlinson is tolerating medications well, with no side effects noted. He is able to stay more active with less discomfort with these current doses. The patient reports an average of 70% pain relief with current treatment/medications.   He is informed of side effects, risks, and benefits of this regimen, and emphasizes that he derives a significant improvement in functionality and quality of life, and notes that non-opioid medications and therapies in the past have not offered significant benefit. Pt does report constipation but is well controlled with conservative treatment. He  is otherwise doing well with no other complaints today. He denies any adverse events including nausea, vomiting, dizziness, increased constipation, hallucinations, or seizures. Because the patient's current regimen places him/her at increased risk for possible overdose, a prescription for naloxone nasal spray has been provided. The patient understands that this medication is only to be used in the setting of a possible overdose and that inadvertent use of this medication could precipitate overt withdrawal.       No Known Allergies    Past Surgical History:   Procedure Laterality Date    HX BACK SURGERY  7/7/2011    lamenectomy & diskectomy.  HX TONSIL AND ADENOIDECTOMY           Review of Systems   Constitutional: Negative for chills, fever and weight loss. HENT: Negative for congestion and sore throat. Eyes: Negative for blurred vision and double vision. Respiratory: Negative for cough, shortness of breath and wheezing. Cardiovascular: Negative for chest pain and palpitations. Gastrointestinal: Positive for heartburn. Negative for abdominal pain, constipation, diarrhea, nausea and vomiting. Genitourinary: Negative. Musculoskeletal: Positive for back pain and neck pain. Negative for joint pain. Neurological: Positive for headaches. Negative for dizziness, seizures and loss of consciousness. Endo/Heme/Allergies: Does not bruise/bleed easily. Psychiatric/Behavioral: Positive for depression. The patient is nervous/anxious and has insomnia. Physical Exam   Constitutional: He is oriented to person, place, and time and well-developed, well-nourished, and in no distress. No distress. HENT:   Head: Normocephalic and atraumatic.    Eyes: EOM are normal.   Pulmonary/Chest: Effort normal.   Neurological: He is alert and oriented to person, place, and time. Skin: Skin is dry. No rash noted. No erythema. Psychiatric: Mood, memory, affect and judgment normal.   Nursing note and vitals reviewed. ASSESSMENT:    1. Chronic bilateral low back pain with bilateral sciatica    2. Cervicalgia    3. Cervical radiculopathy    4. 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 DDD (degenerative disc disease), cervical    6. Stenosis, cervical spine    7. Spinal stenosis of lumbar region, unspecified whether neurogenic claudication present           [address] [facility] Program was reviewed which does not demonstrate aberrancies and/or inconsistencies with regard to the historical prescribing of controlled medications to this patient by other providers. PLAN / Pt Instructions:  1. Continue current plan with no evidence of addiction or diversion. Stable on current medication without adverse events. 2. Refill oxycodone 10/325 mg up to 4 times daily as needed. 3. Continue tizanidine 4 mg. Please discuss this medication further treatment for migraine headaches with your PCP as soon as possible. 4. Naloxone 4 mg nasal spray for opioid induced respiratory depression emergency only. 5. Discussed risks of addiction, dependency, and opioid induced hyperalgesia. 6. Return to clinic in 3 months with Dr. Aundrea Urbina      Medications Ordered Today   Medications    oxyCODONE-acetaminophen (PERCOCET)  mg per tablet     Sig: Take 1 Tab by mouth every six (6) hours as needed for Pain. Max Daily Amount: 4 Tabs. Dispense:  120 Tab     Refill:  0    oxyCODONE-acetaminophen (PERCOCET)  mg per tablet     Sig: Take 1 Tab by mouth every six (6) hours as needed for Pain. Max Daily Amount: 4 Tabs. Dispense:  120 Tab     Refill:  0    oxyCODONE-acetaminophen (PERCOCET)  mg per tablet     Sig: Take 1 Tab by mouth every six (6) hours as needed for Pain. Max Daily Amount: 4 Tabs.      Dispense:  120 Tab     Refill:  0         DISPOSITION     Pain medications are prescribed with the objective of pain relief and improved physical and psychosocial function in this patient.  Pain Meds and Quality Of Life have been reviewed. Nonpharmacologic therapy and non-opioid pharmacologic therapy have been considered. Opioid therapy is only prescribed if the expected benefits are anticipated to outweigh risks.  Counseled patient on proper use of prescribed medications.  Reviewed with patient self-help tools, home exercise, and lifestyle changes to assist the patient in self-management of symptoms.  Reviewed with patient the treatment plan, goals of treatment plan, and limitations of treatment plan, to include the potential for side effects from medications and procedures. If side effects occur, it is the responsibility of the patient to inform the clinic so that a change in the treatment plan can be made in a safe manner. The patient is advised that stopping prescribed medication may cause an increase in symptoms and possible medication withdrawal symptoms. The patient is informed an emergency room evaluation may be necessary if this occurs. Spent 25 minutes with patient today which more than 50% of that time was spent on counseling and coordination of care. Romain Salo, [place]ama 3/28/2018        Note: Please excuse any typographical errors. Voice recognition software was used for this note and may cause mistakes.

## 2018-04-02 DIAGNOSIS — F51.01 PRIMARY INSOMNIA: ICD-10-CM

## 2018-04-02 RX ORDER — ZOLPIDEM TARTRATE 10 MG/1
10 TABLET ORAL
Qty: 30 TAB | Refills: 0 | OUTPATIENT
Start: 2018-04-02 | End: 2018-05-01 | Stop reason: SDUPTHER

## 2018-04-04 ENCOUNTER — OFFICE VISIT (OUTPATIENT)
Dept: INTERNAL MEDICINE CLINIC | Age: 33
End: 2018-04-04

## 2018-04-04 VITALS
DIASTOLIC BLOOD PRESSURE: 88 MMHG | RESPIRATION RATE: 12 BRPM | OXYGEN SATURATION: 98 % | HEART RATE: 72 BPM | HEIGHT: 74 IN | WEIGHT: 276 LBS | SYSTOLIC BLOOD PRESSURE: 138 MMHG | TEMPERATURE: 98.3 F | BODY MASS INDEX: 35.42 KG/M2

## 2018-04-04 DIAGNOSIS — E66.9 OBESITY (BMI 30-39.9): ICD-10-CM

## 2018-04-04 DIAGNOSIS — E55.9 HYPOVITAMINOSIS D: ICD-10-CM

## 2018-04-04 DIAGNOSIS — L30.9 ECZEMA, UNSPECIFIED TYPE: Primary | ICD-10-CM

## 2018-04-04 RX ORDER — TRIAMCINOLONE ACETONIDE 5 MG/G
CREAM TOPICAL
Qty: 15 G | Refills: 11 | Status: SHIPPED | OUTPATIENT
Start: 2018-04-04 | End: 2018-10-11

## 2018-04-04 NOTE — PROGRESS NOTES
Kylah Mo 1985, is a 29 y.o. male, who is seen today for evaluation of a one-week history of rash. There has been no itchiness and he first noticed it on the back of his left hand and then the dorsal aspect of his lower left arm and then within a couple of days it was on the other hand and arm and he has had 2 other spots that he has found one on his abdomen one on his chest but nowhere else. They are not itchy but he has a history of anxiety and is nervous about it. He asked whether this could be STD related even though he has not been sexually active for a long time. He also notes that another doctor was testing him for diabetes and found that he had a low vitamin D level and he tells me that that doctor wanted me to order vitamin D. His record does not indicate what vitamin D level he might of had, I see no recent lab other than what the emergency room has done and that was not included in that lab. He cannot tell me why the doctor order vitamin D, possibly for GI reasons possibly while looking for diabetes. Past Medical History:   Diagnosis Date    ADD (attention deficit disorder)     Anxiety     Depression     Migraine     Obesity     PTSD (post-traumatic stress disorder)     Seasonal allergic rhinitis     Sleep apnea      Current Outpatient Prescriptions   Medication Sig Dispense Refill    zolpidem (AMBIEN) 10 mg tablet Take 1 Tab by mouth nightly as needed. 30 Tab 0    oxyCODONE-acetaminophen (PERCOCET)  mg per tablet Take 1 Tab by mouth every six (6) hours as needed for Pain. Max Daily Amount: 4 Tabs. 120 Tab 0    [START ON 4/29/2018] oxyCODONE-acetaminophen (PERCOCET)  mg per tablet Take 1 Tab by mouth every six (6) hours as needed for Pain. Max Daily Amount: 4 Tabs. 120 Tab 0    [START ON 5/28/2018] oxyCODONE-acetaminophen (PERCOCET)  mg per tablet Take 1 Tab by mouth every six (6) hours as needed for Pain. Max Daily Amount: 4 Tabs.  120 Tab 0    tiZANidine (ZANAFLEX) 4 mg tablet TAKE 1 BY MOUTH EVERY NIGHT AT BEDTIME FOR 14 DAYS THEN 2 BY MOUTH EVERY NIGHT AT BEDTIME AFTER 180 Tab 3    topiramate (TOPAMAX) 100 mg tablet Take 1 Tab by mouth two (2) times a day. 180 Tab 1    DULoxetine (CYMBALTA) 30 mg capsule Take 30 mg by mouth daily.  QUEtiapine (SEROQUEL) 25 mg tablet Take  by mouth. Visit Vitals    /88    Pulse 72    Temp 98.3 °F (36.8 °C) (Oral)    Resp 12    Ht 6' 2\" (1.88 m)    Wt 276 lb (125.2 kg)    SpO2 98%    BMI 35.44 kg/m2     Skin reveals minimal scaly slightly reddish rash on the dorsum of the hands and lower arms 2 tiny spots on the trunk one on the chest when the upper abdomen, each of which appears to be very very mild eczema. It is not specific otherwise. Does not look like a drug rash or food related rash or contact dermatitis. Assessment: Probable eczema, very mild. Will treat with triamcinolone cream 0.5% and if the area enlarges considerably he might be a candidate for short-term prednisone treatment which we discussed. #2.  Obesity, is working on that it has improved. #3. He says he has low vitamin D but I do not have access to the vitamin D level and I told him I have no idea why the doctor was checking vitamin D level, it makes no sense to me. Nonetheless, since his D was low and will hurt him to supplement with 2000 units per day so have written it down for him and he will take 2000 units daily indefinitely. He will not need vitamin D checked again while taking this fairly high-dose supplement. #4.  He is generally anxious and hence came in for this minimal rash. He is treated elsewhere for that. Follow-up as needed    Rajan Dacosta. Mirza Almeida MD FACP    Please note: This document has been produced using voice recognition software. Unrecognized errors in transcription may be present.     This visit lasted 25 minutes, greater than 50% of the time was spent counseling on the rash, possible causes for an eczematous rash, and we talked about vitamin D at some length, it is unfortunate that the doctor even bother checking a vitamin D level.

## 2018-04-04 NOTE — MR AVS SNAPSHOT
[address] [address] [facility], [address] [facility] 
[phone] Patient: Carlos Enrique Sawant MRN: [id number] Visit Information Date & Time Provider Department Dept. Phone Encounter #  
 4/4/2018  2:30 PM Cristy Rea MD [facility] [id number] Your Appointments 4/6/2018 10:30 AM  
New Patient with Rajinder Monroy MD  
[facility] [address] [facility]) Appt Note: urination problems [address] [place] [address] [facility] [address]  
[phone] [address] [place] [address] Upcoming Health Maintenance Date Due DTaP/Tdap/Td series (1 - Tdap) 11/30/2008 Influenza Age 5 to Adult 8/1/2017 Allergies as of 4/4/2018  Review Complete On: 4/4/2018 By: Cristy Rea MD  
 No Known Allergies Current Immunizations  Reviewed on 10/18/2013 No immunizations on file. Not reviewed this visit You Were Diagnosed With   
  
 Codes Comments Eczema, unspecified type    -  Primary ICD-10-CM: L30.9 ICD-9-CM: 692.9 Obesity (BMI 30-39. 9)     ICD-10-CM: E66.9 ICD-9-CM: 278.00 Hypovitaminosis D     ICD-10-CM: E55.9 ICD-9-CM: 268.9 Vitals BP Pulse Temp Resp Height(growth percentile) Weight(growth percentile) 138/88 72 98.3 °F (36.8 °C) (Oral) 12 6' 2\" (1.88 m) 276 lb (125.2 kg) SpO2 BMI Smoking Status 98% 35.44 kg/m2 Former Smoker Vitals History BMI and BSA Data Body Mass Index Body Surface Area  
 35.44 kg/m 2 2.56 m 2 Preferred Pharmacy Pharmacy Name Phone [address], [address] [facility] 22 [address] [facility] [phone] Your Updated Medication List  
  
   
This list is accurate as of 4/4/18  3:06 PM.  Always use your most recent med list.  
  
  
  
  
 CYMBALTA 30 mg capsule Generic drug:  DULoxetine Take 30 mg by mouth daily. * oxyCODONE-acetaminophen  mg per tablet Commonly known as:  PERCOCET Take 1 Tab by mouth every six (6) hours as needed for Pain. Max Daily Amount: 4 Tabs. * oxyCODONE-acetaminophen  mg per tablet Commonly known as:  PERCOCET Take 1 Tab by mouth every six (6) hours as needed for Pain. Max Daily Amount: 4 Tabs. Start taking on:  4/29/2018 * oxyCODONE-acetaminophen  mg per tablet Commonly known as:  PERCOCET Take 1 Tab by mouth every six (6) hours as needed for Pain. Max Daily Amount: 4 Tabs. Start taking on:  5/28/2018 SEROquel 25 mg tablet Generic drug:  QUEtiapine Take  by mouth. tiZANidine 4 mg tablet Commonly known as:  [place] Bouche TAKE 1 BY MOUTH EVERY NIGHT AT BEDTIME FOR 14 DAYS THEN 2 BY MOUTH EVERY NIGHT AT BEDTIME AFTER  
  
 topiramate 100 mg tablet Commonly known as:  TOPAMAX Take 1 Tab by mouth two (2) times a day. zolpidem 10 mg tablet Commonly known as:  AMBIEN Take 1 Tab by mouth nightly as needed. * Notice: This list has 3 medication(s) that are the same as other medications prescribed for you. Read the directions carefully, and ask your doctor or other care provider to review them with you. Introducing \[facility]\"" & HEALTH SERVICES! Dear Artemio Escobedo: Thank you for requesting a OGPlan[employer] account. Our records indicate that you already have an active OGPlan[employer] account. You can access your account anytime at [email or web address] Did you know that you can access your hospital and ER discharge instructions at any time in OGPlanet? You can also review all of your test results from your hospital stay or ER visit. Additional Information If you have questions, please visit the Frequently Asked Questions section of the OGPlan[employer] website at [email or web address] Remember, OGPlanet is NOT to be used for urgent needs. For medical emergencies, dial 911. Now available from your iPhone and Android! Please provide this summary of care documentation to your next provider. Your primary care clinician is listed as Corine Ford. Gregory Cisneros. If you have any questions after today's visit, please call [phone].

## 2018-04-06 ENCOUNTER — OFFICE VISIT (OUTPATIENT)
Dept: UROLOGY | Age: 33
End: 2018-04-06

## 2018-04-06 VITALS
DIASTOLIC BLOOD PRESSURE: 91 MMHG | BODY MASS INDEX: 34.78 KG/M2 | TEMPERATURE: 97.9 F | HEART RATE: 69 BPM | OXYGEN SATURATION: 98 % | SYSTOLIC BLOOD PRESSURE: 136 MMHG | HEIGHT: 74 IN | WEIGHT: 271 LBS

## 2018-04-06 DIAGNOSIS — R35.1 NOCTURIA: ICD-10-CM

## 2018-04-06 DIAGNOSIS — R35.0 URINARY FREQUENCY: Primary | ICD-10-CM

## 2018-04-06 LAB
BILIRUB UR QL STRIP: NEGATIVE
GLUCOSE UR-MCNC: NEGATIVE MG/DL
KETONES P FAST UR STRIP-MCNC: NEGATIVE MG/DL
PH UR STRIP: 6.5 [PH] (ref 4.6–8)
PROT UR QL STRIP: NEGATIVE
SP GR UR STRIP: 1.02 (ref 1–1.03)
UA UROBILINOGEN AMB POC: NORMAL (ref 0.2–1)
URINALYSIS CLARITY POC: CLEAR
URINALYSIS COLOR POC: YELLOW
URINE BLOOD POC: NEGATIVE
URINE LEUKOCYTES POC: NEGATIVE
URINE NITRITES POC: NEGATIVE

## 2018-04-06 RX ORDER — TAMSULOSIN HYDROCHLORIDE 0.4 MG/1
0.4 CAPSULE ORAL DAILY
Qty: 30 CAP | Refills: 0 | Status: SHIPPED | OUTPATIENT
Start: 2018-04-06 | End: 2018-10-11

## 2018-04-06 NOTE — MR AVS SNAPSHOT
[address] [facility] [place] [address] [address] 
[phone] Patient: Riay Humphreys MRN: [id number] Visit Information Date & Time Provider Department Dept. Phone Encounter #  
 4/6/2018 10:30 AM Danilo Quiroz, [place] [facility] [phone] [id number] Follow-up Instructions Return in about 4 weeks (around 5/4/2018). Follow-up and Disposition History Your Appointments 5/10/2018  3:30 PM  
Office Visit with Danilo Quiroz MD  
[facility]) Appt Note: check up [address] [place] [address] [address]  
[phone] [address] [facility] [address] Upcoming Health Maintenance Date Due DTaP/Tdap/Td series (1 - Tdap) 11/30/2008 Influenza Age 5 to Adult 8/1/2017 Allergies as of 4/6/2018  Review Complete On: 4/6/2018 By: Alisha Pelaez No Known Allergies Current Immunizations  Reviewed on 10/18/2013 No immunizations on file. Not reviewed this visit You Were Diagnosed With   
  
 Codes Comments Urinary frequency    -  Primary ICD-10-CM: R35.0 ICD-9-CM: 788.41 Nocturia     ICD-10-CM: R35.1 ICD-9-CM: 788.43 Vitals BP Pulse Temp Height(growth percentile) Weight(growth percentile) SpO2  
 (!) 136/91 (BP 1 Location: Left arm, BP Patient Position: Sitting) 69 97.9 °F (36.6 °C) (Oral) 6' 2\" (1.88 m) 271 lb (122.9 kg) 98% BMI Smoking Status 34.79 kg/m2 Former Smoker Vitals History BMI and BSA Data Body Mass Index Body Surface Area 34.79 kg/m 2 2.53 m 2 Preferred Pharmacy Pharmacy Name Phone [address], [place] [facility] [phone] Your Updated Medication List  
  
   
This list is accurate as of 4/6/18  3:52 PM.  Always use your most recent med list.  
  
  
  
  
 CYMBALTA 30 mg capsule Generic drug:  DULoxetine Take 30 mg by mouth daily. * oxyCODONE-acetaminophen  mg per tablet Commonly known as:  PERCOCET Take 1 Tab by mouth every six (6) hours as needed for Pain. Max Daily Amount: 4 Tabs. * oxyCODONE-acetaminophen  mg per tablet Commonly known as:  PERCOCET Take 1 Tab by mouth every six (6) hours as needed for Pain. Max Daily Amount: 4 Tabs. Start taking on:  4/29/2018 * oxyCODONE-acetaminophen  mg per tablet Commonly known as:  PERCOCET Take 1 Tab by mouth every six (6) hours as needed for Pain. Max Daily Amount: 4 Tabs. Start taking on:  5/28/2018 SEROquel 25 mg tablet Generic drug:  QUEtiapine Take  by mouth. tamsulosin 0.4 mg capsule Commonly known as:  FLOMAX Take 1 Cap by mouth daily. tiZANidine 4 mg tablet Commonly known as:  Eugena Babinski TAKE 1 BY MOUTH EVERY NIGHT AT BEDTIME FOR 14 DAYS THEN 2 BY MOUTH EVERY NIGHT AT BEDTIME AFTER  
  
 topiramate 100 mg tablet Commonly known as:  TOPAMAX Take 1 Tab by mouth two (2) times a day. triamcinolone 0.5 % topical cream  
Commonly known as:  ARISTOCORT Apply thin layer to rash twice a day  
  
 zolpidem 10 mg tablet Commonly known as:  AMBIEN Take 1 Tab by mouth nightly as needed. * Notice: This list has 3 medication(s) that are the same as other medications prescribed for you. Read the directions carefully, and ask your doctor or other care provider to review them with you. Prescriptions Sent to Pharmacy Refills  
 tamsulosin (FLOMAX) 0.4 mg capsule 0 Sig: Take 1 Cap by mouth daily. Class: Normal  
 Pharmacy: [address] [place] [address] #: [phone] Route: Oral  
  
We Performed the Following AMB POC URINALYSIS DIP STICK AUTO W/O MICRO [46398 CPT(R)] [place] MIRTHA,POST-VOID RES,US,NON-IMAGING [id number] CPT(R)] Follow-up Instructions Return in about 4 weeks (around 5/4/2018). Introducing [address]! Dear Yung Estevez: Thank you for requesting a [employer] account. Our records indicate that you already have an active [employer] account. You can access your account anytime at [email or web address] Did you know that you can access your hospital and ER discharge instructions at any time in [employer]? You can also review all of your test results from your hospital stay or ER visit. Additional Information If you have questions, please visit the Frequently Asked Questions section of the [employer] website at [email or web address] Remember, [employer] is NOT to be used for urgent needs. For medical emergencies, dial 911. Now available from your iPhone and Android! Please provide this summary of care documentation to your next provider. Your primary care clinician is listed as Duy Ruth. Vidya Lino. If you have any questions after today's visit, please call [phone].

## 2018-04-06 NOTE — PROGRESS NOTES
Mr. Jarrell Jasmine has a reminder for a \"due or due soon\" health maintenance. I have asked that he contact his primary care provider for follow-up on this health maintenance.

## 2018-04-06 NOTE — PROGRESS NOTES
Chief Complaint   Patient presents with    New Patient    Urinary Frequency    Nocturia       HISTORY OF PRESENT ILLNESS:  Tabitha Morales is a 29 y.o. male who presents today with a history of urinary frequency and intermittent urinary issues going back for several months. In summary, in January 2018 he passed a small stone from the right ureter measuring 4 mm. Since that time, he has had some progressive problems with urinary frequency and some urgency. As an aside, he also has some significant GI issues and is being evaluated by his gastroenterologist.  He is had an EGD and he just had a HIDA scan for suspicious cholelithiasis but that was normal.  He vacillates between having GI issues with constipation and urinary issues with frequency. No blood, no urinary retention but just the urinary urgency and frequency. ROS documented on the chart    Past Medical History:   Diagnosis Date    ADD (attention deficit disorder)     Anxiety     Depression     Migraine     Obesity     PTSD (post-traumatic stress disorder)     Seasonal allergic rhinitis     Sleep apnea        Past Surgical History:   Procedure Laterality Date    HX BACK SURGERY  7/7/2011    lamenectomy & diskectomy.  HX TONSIL AND ADENOIDECTOMY         Social History   Substance Use Topics    Smoking status: Former Smoker    Smokeless tobacco: Never Used    Alcohol use 0.0 oz/week      Comment: occasionally       No Known Allergies    Family History   Problem Relation Age of Onset    Asthma Mother        Current Outpatient Prescriptions   Medication Sig Dispense Refill    tamsulosin (FLOMAX) 0.4 mg capsule Take 1 Cap by mouth daily. 30 Cap 0    triamcinolone (ARISTOCORT) 0.5 % topical cream Apply thin layer to rash twice a day 15 g 11    zolpidem (AMBIEN) 10 mg tablet Take 1 Tab by mouth nightly as needed.  30 Tab 0    [START ON 5/28/2018] oxyCODONE-acetaminophen (PERCOCET)  mg per tablet Take 1 Tab by mouth every six (6) hours as needed for Pain. Max Daily Amount: 4 Tabs. 120 Tab 0    tiZANidine (ZANAFLEX) 4 mg tablet TAKE 1 BY MOUTH EVERY NIGHT AT BEDTIME FOR 14 DAYS THEN 2 BY MOUTH EVERY NIGHT AT BEDTIME AFTER 180 Tab 3    topiramate (TOPAMAX) 100 mg tablet Take 1 Tab by mouth two (2) times a day. 180 Tab 1    DULoxetine (CYMBALTA) 30 mg capsule Take 30 mg by mouth daily.  QUEtiapine (SEROQUEL) 25 mg tablet Take  by mouth.  oxyCODONE-acetaminophen (PERCOCET)  mg per tablet Take 1 Tab by mouth every six (6) hours as needed for Pain. Max Daily Amount: 4 Tabs. 120 Tab 0    [START ON 4/29/2018] oxyCODONE-acetaminophen (PERCOCET)  mg per tablet Take 1 Tab by mouth every six (6) hours as needed for Pain. Max Daily Amount: 4 Tabs. 120 Tab 0         PHYSICAL EXAMINATION:   Visit Vitals    BP (!) 136/91 (BP 1 Location: Left arm, BP Patient Position: Sitting)    Pulse 69    Temp 97.9 °F (36.6 °C) (Oral)    Ht 6' 2\" (1.88 m)    Wt 271 lb (122.9 kg)    SpO2 98%    BMI 34.79 kg/m2     Constitutional: WDWN, Pleasant and appropriate affect, No acute distress. CV:  No peripheral swelling noted  Respiratory: No respiratory distress or difficulties  Abdomen:  No abdominal masses or tenderness. No CVA tenderness. No inguinal hernias noted.  Male:    JOSE ALFREDO:Perineum normal to visual inspection, no erythema or irritation, sphincter is normal and I do not feel any rectal lesions and there is no blood. His prostate is about 20 g in size and compatible with his age. I do not feel any pathology noted in it and it is nonnodular and generally benign in consistency. SCROTUM:  No scrotal rash or lesions noticed. Normal bilateral testes and epididymis. He does have some small sebaceous cysts on his scrotum but the biggest one is about 6 mm in diameter. PENIS: Urethral meatus normal in location and size. No urethral discharge. Skin: No jaundice. Neuro/Psych:  Alert and oriented x 3, affect appropriate. Lymphatic:   No enlarged inguinal lymph nodes. Results for orders placed or performed in visit on 04/06/18   AMB POC URINALYSIS DIP STICK AUTO W/O MICRO   Result Value Ref Range    Color (UA POC) Yellow     Clarity (UA POC) Clear     Glucose (UA POC) Negative Negative    Bilirubin (UA POC) Negative Negative    Ketones (UA POC) Negative Negative    Specific gravity (UA POC) 1.020 1.001 - 1.035    Blood (UA POC) Negative Negative    pH (UA POC) 6.5 4.6 - 8.0    Protein (UA POC) Negative Negative    Urobilinogen (UA POC) 0.2 mg/dL 0.2 - 1    Nitrites (UA POC) Negative Negative    Leukocyte esterase (UA POC) Negative Negative     Bladder scan shows only a small amount of post void residual.  Less than 35 cc. REVIEW OF LABS AND IMAGING:     Imaging Report Reviewed? YES     Images Reviewed? YES           Other Lab Data Reviewed? YES         ASSESSMENT:     ICD-10-CM ICD-9-CM    1. Urinary frequency R35.0 788.41 AMB POC URINALYSIS DIP STICK AUTO W/O MICRO      MD MIRTHA,POST-VOID RES,US,NON-IMAGING      tamsulosin (FLOMAX) 0.4 mg capsule   2. Nocturia R35.1 788.43 AMB POC URINALYSIS DIP STICK AUTO W/O MICRO      MD MIRTHA,POST-VOID RES,US,NON-IMAGING      tamsulosin (FLOMAX) 0.4 mg capsule            PLAN / DISCUSSION: : I had a talk with him about his urinary frequency and urgency. I really do not think this has anything to do with his prior stone but I have no good explanation for it either. I am going to give him an empiric trial of Flomax daily and will see him back in a month's for follow-up to see if there is anything else that needs to be done. The patient expresses understanding and agreement of the discussion and plan. Maryana Genao MD on 4/6/2018         Please note: This document has been produced using voice recognition software. Unrecognized errors in transcription may be present.

## 2018-04-09 ENCOUNTER — HOSPITAL ENCOUNTER (EMERGENCY)
Age: 33
Discharge: HOME | End: 2018-04-09
Attending: EMERGENCY MEDICINE
Payer: COMMERCIAL

## 2018-04-09 ENCOUNTER — APPOINTMENT (OUTPATIENT)
Dept: GENERAL RADIOLOGY | Age: 33
End: 2018-04-09
Attending: EMERGENCY MEDICINE
Payer: COMMERCIAL

## 2018-04-09 VITALS
DIASTOLIC BLOOD PRESSURE: 72 MMHG | BODY MASS INDEX: 34.65 KG/M2 | HEART RATE: 68 BPM | HEIGHT: 74 IN | RESPIRATION RATE: 20 BRPM | WEIGHT: 270 LBS | SYSTOLIC BLOOD PRESSURE: 130 MMHG | OXYGEN SATURATION: 100 %

## 2018-04-09 DIAGNOSIS — R10.9 NONSPECIFIC ABDOMINAL PAIN: Primary | ICD-10-CM

## 2018-04-09 LAB
ALBUMIN SERPL-MCNC: 4.2 G/DL (ref 3.4–5)
ALBUMIN/GLOB SERPL: 1.4 {RATIO} (ref 0.8–1.7)
ALP SERPL-CCNC: 86 U/L (ref 45–117)
ALT SERPL-CCNC: 38 U/L (ref 16–61)
ANION GAP SERPL CALC-SCNC: 10 MMOL/L (ref 3–18)
APPEARANCE UR: NORMAL
AST SERPL-CCNC: 19 U/L (ref 15–37)
BASOPHILS # BLD: 0 K/UL (ref 0–0.06)
BASOPHILS NFR BLD: 0 % (ref 0–2)
BILIRUB SERPL-MCNC: 0.4 MG/DL (ref 0.2–1)
BILIRUB UR QL: NEGATIVE
BUN SERPL-MCNC: 8 MG/DL (ref 7–18)
BUN/CREAT SERPL: 10 (ref 12–20)
CALCIUM SERPL-MCNC: 9 MG/DL (ref 8.5–10.1)
CHLORIDE SERPL-SCNC: 107 MMOL/L (ref 100–108)
CO2 SERPL-SCNC: 27 MMOL/L (ref 21–32)
COLOR UR: YELLOW
CREAT SERPL-MCNC: 0.83 MG/DL (ref 0.6–1.3)
DIFFERENTIAL METHOD BLD: ABNORMAL
EOSINOPHIL # BLD: 0.1 K/UL (ref 0–0.4)
EOSINOPHIL NFR BLD: 1 % (ref 0–5)
ERYTHROCYTE [DISTWIDTH] IN BLOOD BY AUTOMATED COUNT: 13 % (ref 11.6–14.5)
GLOBULIN SER CALC-MCNC: 2.9 G/DL (ref 2–4)
GLUCOSE SERPL-MCNC: 87 MG/DL (ref 74–99)
GLUCOSE UR STRIP.AUTO-MCNC: NEGATIVE MG/DL
HCT VFR BLD AUTO: 44.1 % (ref 36–48)
HGB BLD-MCNC: 14.8 G/DL (ref 13–16)
HGB UR QL STRIP: NEGATIVE
KETONES UR QL STRIP.AUTO: NEGATIVE MG/DL
LEUKOCYTE ESTERASE UR QL STRIP.AUTO: NEGATIVE
LIPASE SERPL-CCNC: 155 U/L (ref 73–393)
LYMPHOCYTES # BLD: 1.9 K/UL (ref 0.9–3.6)
LYMPHOCYTES NFR BLD: 29 % (ref 21–52)
MCH RBC QN AUTO: 30.2 PG (ref 24–34)
MCHC RBC AUTO-ENTMCNC: 33.6 G/DL (ref 31–37)
MCV RBC AUTO: 90 FL (ref 74–97)
MONOCYTES # BLD: 0.5 K/UL (ref 0.05–1.2)
MONOCYTES NFR BLD: 8 % (ref 3–10)
NEUTS SEG # BLD: 4.1 K/UL (ref 1.8–8)
NEUTS SEG NFR BLD: 62 % (ref 40–73)
NITRITE UR QL STRIP.AUTO: NEGATIVE
PH UR STRIP: 7.5 [PH] (ref 5–8)
PLATELET # BLD AUTO: 363 K/UL (ref 135–420)
PMV BLD AUTO: 8.3 FL (ref 9.2–11.8)
POTASSIUM SERPL-SCNC: 3.5 MMOL/L (ref 3.5–5.5)
PROT SERPL-MCNC: 7.1 G/DL (ref 6.4–8.2)
PROT UR STRIP-MCNC: NEGATIVE MG/DL
RBC # BLD AUTO: 4.9 M/UL (ref 4.7–5.5)
SODIUM SERPL-SCNC: 144 MMOL/L (ref 136–145)
SP GR UR REFRACTOMETRY: 1.01 (ref 1–1.03)
UROBILINOGEN UR QL STRIP.AUTO: 0.2 EU/DL (ref 0.2–1)
WBC # BLD AUTO: 6.6 K/UL (ref 4.6–13.2)

## 2018-04-09 PROCEDURE — 83690 ASSAY OF LIPASE: CPT | Performed by: EMERGENCY MEDICINE

## 2018-04-09 PROCEDURE — 81003 URINALYSIS AUTO W/O SCOPE: CPT | Performed by: EMERGENCY MEDICINE

## 2018-04-09 PROCEDURE — 99284 EMERGENCY DEPT VISIT MOD MDM: CPT

## 2018-04-09 PROCEDURE — 80053 COMPREHEN METABOLIC PANEL: CPT | Performed by: EMERGENCY MEDICINE

## 2018-04-09 PROCEDURE — 85025 COMPLETE CBC W/AUTO DIFF WBC: CPT | Performed by: EMERGENCY MEDICINE

## 2018-04-09 PROCEDURE — 74019 RADEX ABDOMEN 2 VIEWS: CPT

## 2018-04-09 RX ORDER — DICYCLOMINE HYDROCHLORIDE 20 MG/1
20 TABLET ORAL EVERY 6 HOURS
Qty: 20 TAB | Refills: 0 | Status: SHIPPED | OUTPATIENT
Start: 2018-04-09 | End: 2018-04-14

## 2018-04-09 NOTE — ED TRIAGE NOTES
Pt c/o abd pain for several months. Also c/o urinary freq and constipation. Saw his urologist on 4/6 and has U/A which was neg. Wasplaced on Flomax. Has also seen a GI doc for this. Does not appear to be in any distress.  Pt is here alone

## 2018-04-09 NOTE — DISCHARGE INSTRUCTIONS
Abdominal Pain: Care Instructions  Your Care Instructions    Abdominal pain has many possible causes. Some aren't serious and get better on their own in a few days. Others need more testing and treatment. If your pain continues or gets worse, you need to be rechecked and may need more tests to find out what is wrong. You may need surgery to correct the problem. Don't ignore new symptoms, such as fever, nausea and vomiting, urination problems, pain that gets worse, and dizziness. These may be signs of a more serious problem. Your doctor may have recommended a follow-up visit in the next 8 to 12 hours. If you are not getting better, you may need more tests or treatment. The doctor has checked you carefully, but problems can develop later. If you notice any problems or new symptoms, get medical treatment right away. Follow-up care is a key part of your treatment and safety. Be sure to make and go to all appointments, and call your doctor if you are having problems. It's also a good idea to know your test results and keep a list of the medicines you take. How can you care for yourself at home? · Rest until you feel better. · To prevent dehydration, drink plenty of fluids, enough so that your urine is light yellow or clear like water. Choose water and other caffeine-free clear liquids until you feel better. If you have kidney, heart, or liver disease and have to limit fluids, talk with your doctor before you increase the amount of fluids you drink. · If your stomach is upset, eat mild foods, such as rice, dry toast or crackers, bananas, and applesauce. Try eating several small meals instead of two or three large ones. · Wait until 48 hours after all symptoms have gone away before you have spicy foods, alcohol, and drinks that contain caffeine. · Do not eat foods that are high in fat. · Avoid anti-inflammatory medicines such as aspirin, ibuprofen (Advil, Motrin), and naproxen (Aleve).  These can cause stomach upset. Talk to your doctor if you take daily aspirin for another health problem. When should you call for help? Call 911 anytime you think you may need emergency care. For example, call if:  ? · You passed out (lost consciousness). ? · You pass maroon or very bloody stools. ? · You vomit blood or what looks like coffee grounds. ? · You have new, severe belly pain. ?Call your doctor now or seek immediate medical care if:  ? · Your pain gets worse, especially if it becomes focused in one area of your belly. ? · You have a new or higher fever. ? · Your stools are black and look like tar, or they have streaks of blood. ? · You have unexpected vaginal bleeding. ? · You have symptoms of a urinary tract infection. These may include:  ¨ Pain when you urinate. ¨ Urinating more often than usual.  ¨ Blood in your urine. ? · You are dizzy or lightheaded, or you feel like you may faint. ? Watch closely for changes in your health, and be sure to contact your doctor if:  ? · You are not getting better after 1 day (24 hours). Where can you learn more? Go to http://natalia-pardeep.info/. Enter Z710 in the search box to learn more about \"Abdominal Pain: Care Instructions. \"  Current as of: March 20, 2017  Content Version: 11.4  © 1963-8670 [employer]. Care instructions adapted under license by [employer] (which disclaims liability or warranty for this information). If you have questions about a medical condition or this instruction, always ask your healthcare professional. [place] [address] any warranty or liability for your use of this information.

## 2018-04-09 NOTE — ED PROVIDER NOTES
EMERGENCY DEPARTMENT HISTORY AND PHYSICAL EXAM    4:35 PM      Date: 4/9/2018  Patient Name: Tabitha Morales    History of Presenting Illness     Chief Complaint   Patient presents with    Abdominal Pain    Urinary Frequency    Constipation         History Provided By: Patient    Chief Complaint: Abdominal Pain   Duration: 2 Months  Timing:  Intermittent and Worsening  Location: Right side of abdomen   Quality: Pressure   Severity: Moderate  Modifying Factors: Tried Miralax, no relief of Sx   Associated Symptoms: fatigue, diaphoresis, constipation, diarrhea, decrease in appetite, testicular pain, urine urgency, urine retention sensation       Additional History (Context): Tabitha Morales is a 29 y.o. male with PMHx of obesity, anxiety, sleep apnea, chronic neck and back pain who presents c/o intermittent/worsening moderate right sided abdominal pain ongoing for the past x 2 months. Associated Sx include fatigue, diaphoresis, constipation, diarrhea, testicular pain, urine urgency, urine retention sensation, and a decrease in appetite. He reports being unable to sleep at night secondary to the Sx worsening. Pt states in the morning he has an episode of diarrhea, however, as the day continues he still has constipation and \"feels backed up\". After straining he reports he starts to have some testicular pain. He is followed by a GI Physician (Dr. Vera Davis) and was prescribed medication to help relieve his constipation. Dr. Raya Bowling also instructed the pt to take Miralax. Pt reports since starting Miralax he has diarrhea in the morning. Prior to taking the Miralax he admits to having intermittent irregular bowel movements. He is not taking a probiotic or Bentyl. Pt has a hx of a herniated disc in his neck and lower back. States Dr. Ky Vann thought the cause of Sx were from the herniated disc. Pt saw a Urologist x 3 days ago for his urinary Sx, had an ultrasound which was unremarkable and was prescribed Flomax.  Pt also admits to a decrease in appetite secondary to an increase in having \"gas\". He tried Lisinopril for the acid reflux, no relief of Sx. Pt is a former smoker. No EtOH use. Denies any further complaints or symptoms at the moment. PCP: Kera Matias MD    Current Outpatient Prescriptions   Medication Sig Dispense Refill    dicyclomine (BENTYL) 20 mg tablet Take 1 Tab by mouth every six (6) hours for 20 doses. 20 Tab 0    tamsulosin (FLOMAX) 0.4 mg capsule Take 1 Cap by mouth daily. 30 Cap 0    triamcinolone (ARISTOCORT) 0.5 % topical cream Apply thin layer to rash twice a day 15 g 11    oxyCODONE-acetaminophen (PERCOCET)  mg per tablet Take 1 Tab by mouth every six (6) hours as needed for Pain. Max Daily Amount: 4 Tabs. 120 Tab 0    tiZANidine (ZANAFLEX) 4 mg tablet TAKE 1 BY MOUTH EVERY NIGHT AT BEDTIME FOR 14 DAYS THEN 2 BY MOUTH EVERY NIGHT AT BEDTIME AFTER 180 Tab 3    topiramate (TOPAMAX) 100 mg tablet Take 1 Tab by mouth two (2) times a day. 180 Tab 1    DULoxetine (CYMBALTA) 30 mg capsule Take 30 mg by mouth daily.  zolpidem (AMBIEN) 10 mg tablet Take 1 Tab by mouth nightly as needed. 30 Tab 0    QUEtiapine (SEROQUEL) 25 mg tablet Take  by mouth. Past History     Past Medical History:  Past Medical History:   Diagnosis Date    ADD (attention deficit disorder)     Anxiety     Depression     Ill-defined condition     chronic neck and back pain    Migraine     Obesity     PTSD (post-traumatic stress disorder)     Seasonal allergic rhinitis     Sleep apnea        Past Surgical History:  Past Surgical History:   Procedure Laterality Date    HX BACK SURGERY  7/7/2011    lamenectomy & diskectomy.     HX TONSIL AND ADENOIDECTOMY         Family History:  Family History   Problem Relation Age of Onset    Asthma Mother        Social History:  Social History   Substance Use Topics    Smoking status: Former Smoker    Smokeless tobacco: Never Used    Alcohol use No       Allergies:  No Known Allergies      Review of Systems       Review of Systems   Gastrointestinal: Positive for abdominal pain, constipation and diarrhea. Genitourinary: Positive for difficulty urinating. All other systems reviewed and are negative. Physical Exam     Visit Vitals    /83    Pulse 68    Resp 20    Ht 6' 2\" (1.88 m)    Wt 122.5 kg (270 lb)    SpO2 100%    BMI 34.67 kg/m2         Physical Exam   Constitutional: He is oriented to person, place, and time. He appears well-developed and well-nourished. No distress. HENT:   Head: Normocephalic and atraumatic. Nose: Nose normal.   Eyes: Right eye exhibits no discharge. Left eye exhibits no discharge. No scleral icterus. Neck: Neck supple. No JVD present. Cardiovascular: Normal rate, regular rhythm and normal heart sounds. No murmur heard. Pulmonary/Chest: Effort normal and breath sounds normal. No respiratory distress. He has no wheezes. Abdominal: Soft. Bowel sounds are normal. He exhibits no distension. There is no tenderness. There is no rebound and no guarding. Musculoskeletal: He exhibits no edema or tenderness. Neurological: He is alert and oriented to person, place, and time. He exhibits normal muscle tone. Coordination normal.   Skin: Skin is warm and dry. No rash noted. He is not diaphoretic. Psychiatric: He has a normal mood and affect. Nursing note and vitals reviewed.         Diagnostic Study Results     Labs -  Recent Results (from the past 12 hour(s))   CBC WITH AUTOMATED DIFF    Collection Time: 04/09/18  5:15 PM   Result Value Ref Range    WBC 6.6 4.6 - 13.2 K/uL    RBC 4.90 4.70 - 5.50 M/uL    HGB 14.8 13.0 - 16.0 g/dL    HCT 44.1 36.0 - 48.0 %    MCV 90.0 74.0 - 97.0 FL    MCH 30.2 24.0 - 34.0 PG    MCHC 33.6 31.0 - 37.0 g/dL    RDW 13.0 11.6 - 14.5 %    PLATELET 462 294 - 361 K/uL    MPV 8.3 (L) 9.2 - 11.8 FL    NEUTROPHILS 62 40 - 73 %    LYMPHOCYTES 29 21 - 52 %    MONOCYTES 8 3 - 10 % EOSINOPHILS 1 0 - 5 %    BASOPHILS 0 0 - 2 %    ABS. NEUTROPHILS 4.1 1.8 - 8.0 K/UL    ABS. LYMPHOCYTES 1.9 0.9 - 3.6 K/UL    ABS. MONOCYTES 0.5 0.05 - 1.2 K/UL    ABS. EOSINOPHILS 0.1 0.0 - 0.4 K/UL    ABS. BASOPHILS 0.0 0.0 - 0.06 K/UL    DF AUTOMATED     METABOLIC PANEL, COMPREHENSIVE    Collection Time: 04/09/18  5:15 PM   Result Value Ref Range    Sodium 144 136 - 145 mmol/L    Potassium 3.5 3.5 - 5.5 mmol/L    Chloride 107 100 - 108 mmol/L    CO2 27 21 - 32 mmol/L    Anion gap 10 3.0 - 18 mmol/L    Glucose 87 74 - 99 mg/dL    BUN 8 7.0 - 18 MG/DL    Creatinine 0.83 0.6 - 1.3 MG/DL    BUN/Creatinine ratio 10 (L) 12 - 20      GFR est AA >60 >60 ml/min/1.73m2    GFR est non-AA >60 >60 ml/min/1.73m2    Calcium 9.0 8.5 - 10.1 MG/DL    Bilirubin, total 0.4 0.2 - 1.0 MG/DL    ALT (SGPT) 38 16 - 61 U/L    AST (SGOT) 19 15 - 37 U/L    Alk. phosphatase 86 45 - 117 U/L    Protein, total 7.1 6.4 - 8.2 g/dL    Albumin 4.2 3.4 - 5.0 g/dL    Globulin 2.9 2.0 - 4.0 g/dL    A-G Ratio 1.4 0.8 - 1.7     LIPASE    Collection Time: 04/09/18  5:15 PM   Result Value Ref Range    Lipase 155 73 - 393 U/L   URINALYSIS W/ RFLX MICROSCOPIC    Collection Time: 04/09/18  5:20 PM   Result Value Ref Range    Color YELLOW      Appearance CLOUDY      Specific gravity 1.011 1.005 - 1.030      pH (UA) 7.5 5.0 - 8.0      Protein NEGATIVE  NEG mg/dL    Glucose NEGATIVE  NEG mg/dL    Ketone NEGATIVE  NEG mg/dL    Bilirubin NEGATIVE  NEG      Blood NEGATIVE  NEG      Urobilinogen 0.2 0.2 - 1.0 EU/dL    Nitrites NEGATIVE  NEG      Leukocyte Esterase NEGATIVE  NEG         Radiologic Studies -   XR ABD FLAT/ ERECT   Final Result      XR Abd Flat  IMPRESSION:  Normal abdominal series. Medical Decision Making   I am the first provider for this patient. I reviewed the vital signs, available nursing notes, past medical history, past surgical history, family history and social history. Vital Signs-Reviewed the patient's vital signs.     Pulse Oximetry Analysis -  99 % on RA, normal     Records Reviewed: Nursing Notes (Time of Review: 4:35 PM)    ED Course: Progress Notes, Reevaluation, and Consults:      Provider Notes (Medical Decision Making):     Imp: Chronic abdominal pain w/ sxs most suggestive of IBS. Screening labs ok. Benign abdominal exam.      Diagnosis     Clinical Impression:   1. Nonspecific abdominal pain      1) Routine labs normal  2) X-rays with moderate stool in the right side of the colon without blockage. 3) Continue current medications  4) Add a probiotic (over the counter)  5) Bentyl 20 mg for cramping and pain  6) GI follow up for ongoing treatment management. Disposition: home    Follow-up Information     Follow up With Details Comments Contact Info    Edith Fernandez MD Schedule an appointment as soon as possible for a visit in 1 week for recheck of ongoing symptoms [address] [place] [address]  [phone]             Patient's Medications   Start Taking    DICYCLOMINE (BENTYL) 20 MG TABLET    Take 1 Tab by mouth every six (6) hours for 20 doses. Continue Taking    DULOXETINE (CYMBALTA) 30 MG CAPSULE    Take 30 mg by mouth daily. OXYCODONE-ACETAMINOPHEN (PERCOCET)  MG PER TABLET    Take 1 Tab by mouth every six (6) hours as needed for Pain. Max Daily Amount: 4 Tabs. QUETIAPINE (SEROQUEL) 25 MG TABLET    Take  by mouth. TAMSULOSIN (FLOMAX) 0.4 MG CAPSULE    Take 1 Cap by mouth daily. TIZANIDINE (ZANAFLEX) 4 MG TABLET    TAKE 1 BY MOUTH EVERY NIGHT AT BEDTIME FOR 14 DAYS THEN 2 BY MOUTH EVERY NIGHT AT BEDTIME AFTER    TOPIRAMATE (TOPAMAX) 100 MG TABLET    Take 1 Tab by mouth two (2) times a day. TRIAMCINOLONE (ARISTOCORT) 0.5 % TOPICAL CREAM    Apply thin layer to rash twice a day    ZOLPIDEM (AMBIEN) 10 MG TABLET    Take 1 Tab by mouth nightly as needed.    These Medications have changed    No medications on file   Stop Taking    OXYCODONE-ACETAMINOPHEN (PERCOCET)  MG PER TABLET    Take 1 Tab by mouth every six (6) hours as needed for Pain. Max Daily Amount: 4 Tabs. OXYCODONE-ACETAMINOPHEN (PERCOCET)  MG PER TABLET    Take 1 Tab by mouth every six (6) hours as needed for Pain. Max Daily Amount: 4 Tabs.     _______________________________    Attestations:  Scribe Attestation     Ramandeep Suarez (Aj) acting as a scribe for and in the presence of Aleta Trinidad MD      April 09, 2018 at 4:35 PM       Provider Attestation:      I personally performed the services described in the documentation, reviewed the documentation, as recorded by the scribe in my presence, and it accurately and completely records my words and actions.  April 09, 2018 at 4:35 PM - Aleta Trinidad MD    _______________________________

## 2018-05-01 DIAGNOSIS — F51.01 PRIMARY INSOMNIA: ICD-10-CM

## 2018-05-01 RX ORDER — ZOLPIDEM TARTRATE 10 MG/1
10 TABLET ORAL
Qty: 30 TAB | Refills: 0 | OUTPATIENT
Start: 2018-05-01 | End: 2018-06-05 | Stop reason: SDUPTHER

## 2018-05-01 NOTE — TELEPHONE ENCOUNTER
PHONE IN [facility] reports the last fill date for Ambien as 04/02/2018. There appears to be no inconsistencies in regards to the prescribing of this medication. Last Visit: 04/04/2018 with MD Kenny Rea    Next Appointment: none   Previous Refill Encounters: 04/02/2018 per MD Kenny Rea #[id number]    Requested Prescriptions     Pending Prescriptions Disp Refills    zolpidem (AMBIEN) 10 mg tablet 30 Tab 0     Sig: Take 1 Tab by mouth nightly as needed.

## 2018-05-10 ENCOUNTER — OFFICE VISIT (OUTPATIENT)
Dept: UROLOGY | Age: 33
End: 2018-05-10

## 2018-05-10 VITALS
HEART RATE: 91 BPM | SYSTOLIC BLOOD PRESSURE: 131 MMHG | OXYGEN SATURATION: 96 % | BODY MASS INDEX: 34.39 KG/M2 | DIASTOLIC BLOOD PRESSURE: 87 MMHG | WEIGHT: 268 LBS | HEIGHT: 74 IN

## 2018-05-10 DIAGNOSIS — R35.1 NOCTURIA: Primary | ICD-10-CM

## 2018-05-10 DIAGNOSIS — R35.0 URINARY FREQUENCY: ICD-10-CM

## 2018-05-10 LAB
BILIRUB UR QL STRIP: NORMAL
GLUCOSE UR-MCNC: NEGATIVE MG/DL
KETONES P FAST UR STRIP-MCNC: NORMAL MG/DL
PH UR STRIP: 6 [PH] (ref 4.6–8)
PROT UR QL STRIP: NEGATIVE
SP GR UR STRIP: 1.02 (ref 1–1.03)
UA UROBILINOGEN AMB POC: NORMAL (ref 0.2–1)
URINALYSIS CLARITY POC: CLEAR
URINALYSIS COLOR POC: YELLOW
URINE BLOOD POC: NEGATIVE
URINE LEUKOCYTES POC: NEGATIVE
URINE NITRITES POC: NEGATIVE

## 2018-05-10 RX ORDER — TAMSULOSIN HYDROCHLORIDE 0.4 MG/1
0.4 CAPSULE ORAL DAILY
Qty: 30 CAP | Refills: 11 | Status: SHIPPED | OUTPATIENT
Start: 2018-05-10 | End: 2018-07-24 | Stop reason: SDUPTHER

## 2018-05-10 RX ORDER — PANTOPRAZOLE SODIUM 20 MG/1
20 TABLET, DELAYED RELEASE ORAL DAILY
COMMUNITY
End: 2018-10-11

## 2018-05-10 NOTE — MR AVS SNAPSHOT
[address] [facility] [place] [address] [address] 
[phone] Patient: Jade Wilkinson MRN: [id number] Visit Information Date & Time Provider Department Dept. Phone Encounter #  
 5/10/2018  3:30 PM Aaron Rice [place] [facility] [phone] [id number] Your Appointments 5/9/2019  3:30 PM  
Office Visit with Governor Jessica MD  
[facility] Appt Note: check up [address] [place] [address] [address]  
[phone] [address] [facility] [address] Upcoming Health Maintenance Date Due DTaP/Tdap/Td series (1 - Tdap) 11/30/2008 Influenza Age 5 to Adult 8/1/2018 Allergies as of 5/10/2018  Review Complete On: 5/10/2018 By: Governor Jessica MD  
 No Known Allergies Current Immunizations  Reviewed on 10/18/2013 No immunizations on file. Not reviewed this visit You Were Diagnosed With   
  
 Codes Comments Nocturia    -  Primary ICD-10-CM: R35.1 ICD-9-CM: 788.43 Urinary frequency     ICD-10-CM: R35.0 ICD-9-CM: 788.41 Vitals BP Pulse Height(growth percentile) Weight(growth percentile) SpO2 BMI  
 131/87 (BP 1 Location: Left arm, BP Patient Position: Sitting) 91 6' 2\" (1.88 m) 268 lb (121.6 kg) 96% 34.41 kg/m2 Smoking Status Former Smoker Vitals History BMI and BSA Data Body Mass Index Body Surface Area 34.41 kg/m 2 2.52 m 2 Preferred Pharmacy Pharmacy Name Phone [address] [facility] [phone] [facility] [phone] Your Updated Medication List  
  
   
This list is accurate as of 5/10/18  4:31 PM.  Always use your most recent med list.  
  
  
  
  
 CYMBALTA 30 mg capsule Generic drug:  DULoxetine Take 30 mg by mouth daily. oxyCODONE-acetaminophen  mg per tablet Commonly known as:  PERCOCET Take 1 Tab by mouth every six (6) hours as needed for Pain. Max Daily Amount: 4 Tabs. pantoprazole 20 mg tablet Commonly known as:  PROTONIX Take 20 mg by mouth daily. SEROquel 25 mg tablet Generic drug:  QUEtiapine Take  by mouth. tamsulosin 0.4 mg capsule Commonly known as:  FLOMAX Take 1 Cap by mouth daily. tiZANidine 4 mg tablet Commonly known as:  Eugena Babinski TAKE 1 BY MOUTH EVERY NIGHT AT BEDTIME FOR 14 DAYS THEN 2 BY MOUTH EVERY NIGHT AT BEDTIME AFTER  
  
 topiramate 100 mg tablet Commonly known as:  TOPAMAX Take 1 Tab by mouth two (2) times a day. triamcinolone 0.5 % topical cream  
Commonly known as:  ARISTOCORT Apply thin layer to rash twice a day  
  
 zolpidem 10 mg tablet Commonly known as:  AMBIEN Take 1 Tab by mouth nightly as needed. We Performed the Following AMB POC URINALYSIS DIP STICK AUTO W/O MICRO [49090 CPT(R)] Patient Instructions Frequent Urination: Care Instructions Your Care Instructions An urge to urinate frequently but usually passing only small amounts of urine is a common symptom of urinary problems, such as urinary tract infections. The bladder may become inflamed. This can cause the urge to urinate. You may try to urinate more often than usual to try to soothe that urge. Frequent urination also may be caused by sexually transmitted infections (STIs) or kidney stones. Or it may happen when something irritates the tube that carries urine from the bladder to the outside of the body (urethra). It may also be a sign of diabetes. The cause may be hard to find. You may need tests. Follow-up care is a key part of your treatment and safety. Be sure to make and go to all appointments, and call your doctor if you are having problems. It's also a good idea to know your test results and keep a list of the medicines you take. How can you care for yourself at home? · Drink extra water for the next day or two. This will help make the urine less concentrated. (If you have kidney, heart, or liver disease and have to limit fluids, talk with your doctor before you increase the amount of fluids you drink.) · Avoid drinks that are carbonated or have caffeine. They can irritate the bladder. For women: · Urinate right after you have sex. · After you go to the bathroom, wipe from front to back. · Avoid douches, bubble baths, and feminine hygiene sprays. And avoid other feminine hygiene products that have deodorants. When should you call for help? Call your doctor now or seek immediate medical care if: 
? · You have new symptoms, such as fever, nausea, or vomiting. ? · You have new or worse symptoms of a urinary problem. For example: ¨ You have blood or pus in your urine. ¨ You have chills or body aches. ¨ It hurts to urinate. ¨ You have groin or belly pain. ¨ You have pain in your back just below your rib cage (the flank area). ? Watch closely for changes in your health, and be sure to contact your doctor if you feel thirstier than usual. 
Where can you learn more? Go to http://natalia-pardeep.info/. Enter [id number] [id number] in the search box to learn more about \"Frequent Urination: Care Instructions. \" Current as of: May 12, 2017 Content Version: 11.4 © 0420-6222 [employer]. Care instructions adapted under license by [employer] (which disclaims liability or warranty for this information). If you have questions about a medical condition or this instruction, always ask your healthcare professional. [place] [address] any warranty or liability for your use of this information. Introducing [facility]! Dear Xiomara Newsome: Thank you for requesting a [employer] account. Our records indicate that you already have an active [employer] account.   You can access your account anytime at [email or web address] Did you know that you can access your hospital and ER discharge instructions at any time in [employer]? You can also review all of your test results from your hospital stay or ER visit. Additional Information If you have questions, please visit the Frequently Asked Questions section of the [employer] website at [email or web address] Remember, [employer] is NOT to be used for urgent needs. For medical emergencies, dial 911. Now available from your iPhone and Android! Please provide this summary of care documentation to your next provider. Your primary care clinician is listed as Alea Duran. If you have any questions after today's visit, please call [phone].

## 2018-05-10 NOTE — PROGRESS NOTES
Chief Complaint   Patient presents with    Urinary Frequency    Nocturia       HISTORY OF PRESENT ILLNESS:  Rosa Chappell is a 29 y.o. male who presents today in follow-up to his urinary frequency. I had placed him on Flomax last month with the idea that he was having some frequency and urgency following his stone surgery but he does have better flow and now sleeps through the night. He is now having more problems with neuropathy in his legs and his urinary symptoms have taken a backseat in irritative problems to him. No further pain or stone symptoms at all. He is having some ejaculatory disturbances related to the Flomax but he thinks taking the medication is worth the trade off. ROS documented on the chart    Past Medical History:   Diagnosis Date    ADD (attention deficit disorder)     Anxiety     Depression     Ill-defined condition     chronic neck and back pain    Migraine     Obesity     PTSD (post-traumatic stress disorder)     Seasonal allergic rhinitis     Sleep apnea        Past Surgical History:   Procedure Laterality Date    HX BACK SURGERY  7/7/2011    lamenectomy & diskectomy.  HX TONSIL AND ADENOIDECTOMY         Social History   Substance Use Topics    Smoking status: Former Smoker    Smokeless tobacco: Never Used    Alcohol use No       No Known Allergies    Family History   Problem Relation Age of Onset    Asthma Mother        Current Outpatient Prescriptions   Medication Sig Dispense Refill    pantoprazole (PROTONIX) 20 mg tablet Take 20 mg by mouth daily.  zolpidem (AMBIEN) 10 mg tablet Take 1 Tab by mouth nightly as needed. 30 Tab 0    tamsulosin (FLOMAX) 0.4 mg capsule Take 1 Cap by mouth daily. 30 Cap 0    triamcinolone (ARISTOCORT) 0.5 % topical cream Apply thin layer to rash twice a day 15 g 11    oxyCODONE-acetaminophen (PERCOCET)  mg per tablet Take 1 Tab by mouth every six (6) hours as needed for Pain. Max Daily Amount: 4 Tabs.  106 Naida Claros Tab 0    tiZANidine (ZANAFLEX) 4 mg tablet TAKE 1 BY MOUTH EVERY NIGHT AT BEDTIME FOR 14 DAYS THEN 2 BY MOUTH EVERY NIGHT AT BEDTIME AFTER 180 Tab 3    topiramate (TOPAMAX) 100 mg tablet Take 1 Tab by mouth two (2) times a day. 180 Tab 1    DULoxetine (CYMBALTA) 30 mg capsule Take 30 mg by mouth daily.  QUEtiapine (SEROQUEL) 25 mg tablet Take  by mouth. PHYSICAL EXAMINATION:   Visit Vitals    /87 (BP 1 Location: Left arm, BP Patient Position: Sitting)    Pulse 91    Ht 6' 2\" (1.88 m)    Wt 268 lb (121.6 kg)    SpO2 96%    BMI 34.41 kg/m2     Constitutional: WDWN, Pleasant and appropriate affect, No acute distress. CV:  No peripheral swelling noted  Respiratory: No respiratory distress or difficulties  Abdomen:  No abdominal masses or tenderness. No CVA tenderness. No inguinal hernias noted.  Male:    Deferred today. Skin: No jaundice. Neuro/Psych:  Alert and oriented x 3, affect appropriate. Lymphatic:   No enlarged inguinal lymph nodes. Results for orders placed or performed in visit on 05/10/18   AMB POC URINALYSIS DIP STICK AUTO W/O MICRO   Result Value Ref Range    Color (UA POC) Yellow     Clarity (UA POC) Clear     Glucose (UA POC) Negative Negative    Bilirubin (UA POC) 1+ Negative    Ketones (UA POC) Trace Negative    Specific gravity (UA POC) 1.020 1.001 - 1.035    Blood (UA POC) Negative Negative    pH (UA POC) 6.0 4.6 - 8.0    Protein (UA POC) Negative Negative    Urobilinogen (UA POC) 0.2 mg/dL 0.2 - 1    Nitrites (UA POC) Negative Negative    Leukocyte esterase (UA POC) Negative Negative         REVIEW OF LABS AND IMAGING:     Imaging Report Reviewed? NO     Images Reviewed? NO           Other Lab Data Reviewed? YES         ASSESSMENT:     ICD-10-CM ICD-9-CM    1. Nocturia R35.1 788.43 AMB POC URINALYSIS DIP STICK AUTO W/O MICRO   2.  Urinary frequency R35.0 788.41 AMB POC URINALYSIS DIP STICK AUTO W/O MICRO            PLAN / DISCUSSION: : Having a good improvement with the Flomax so he wants to stay on that. I will continue that unless he has any problems I will see him back in a year. The patient expresses understanding and agreement of the discussion and plan. Rip Yu MD on 5/10/2018         Please note: This document has been produced using voice recognition software. Unrecognized errors in transcription may be present.

## 2018-05-10 NOTE — PATIENT INSTRUCTIONS
Frequent Urination: Care Instructions  Your Care Instructions  An urge to urinate frequently but usually passing only small amounts of urine is a common symptom of urinary problems, such as urinary tract infections. The bladder may become inflamed. This can cause the urge to urinate. You may try to urinate more often than usual to try to soothe that urge. Frequent urination also may be caused by sexually transmitted infections (STIs) or kidney stones. Or it may happen when something irritates the tube that carries urine from the bladder to the outside of the body (urethra). It may also be a sign of diabetes. The cause may be hard to find. You may need tests. Follow-up care is a key part of your treatment and safety. Be sure to make and go to all appointments, and call your doctor if you are having problems. It's also a good idea to know your test results and keep a list of the medicines you take. How can you care for yourself at home? · Drink extra water for the next day or two. This will help make the urine less concentrated. (If you have kidney, heart, or liver disease and have to limit fluids, talk with your doctor before you increase the amount of fluids you drink.)  · Avoid drinks that are carbonated or have caffeine. They can irritate the bladder. For women:  · Urinate right after you have sex. · After you go to the bathroom, wipe from front to back. · Avoid douches, bubble baths, and feminine hygiene sprays. And avoid other feminine hygiene products that have deodorants. When should you call for help? Call your doctor now or seek immediate medical care if:  ? · You have new symptoms, such as fever, nausea, or vomiting. ? · You have new or worse symptoms of a urinary problem. For example:  ¨ You have blood or pus in your urine. ¨ You have chills or body aches. ¨ It hurts to urinate. ¨ You have groin or belly pain. ¨ You have pain in your back just below your rib cage (the flank area). ?Watch closely for changes in your health, and be sure to contact your doctor if you feel thirstier than usual.  Where can you learn more? Go to http://natalia-pardeep.info/. Enter [id number] in the search box to learn more about \"Frequent Urination: Care Instructions. \"  Current as of: May 12, 2017  Content Version: 11.4  © 4894-8488 [employer]. Care instructions adapted under license by [employer] (which disclaims liability or warranty for this information). If you have questions about a medical condition or this instruction, always ask your healthcare professional. [place] [address] any warranty or liability for your use of this information.

## 2018-05-10 NOTE — PROGRESS NOTES
Mr. Dorothy Colin has a reminder for a \"due or due soon\" health maintenance. I have asked that he contact his primary care provider for follow-up on this health maintenance. RBV Per Dr. Chad Santacruzet Flomax 0.4 mg one daily #30 with no refills sent to pharmacy.

## 2018-06-05 DIAGNOSIS — F51.01 PRIMARY INSOMNIA: ICD-10-CM

## 2018-06-05 RX ORDER — ZOLPIDEM TARTRATE 10 MG/1
10 TABLET ORAL
Qty: 30 TAB | Refills: 0 | OUTPATIENT
Start: 2018-06-05 | End: 2018-07-05 | Stop reason: SDUPTHER

## 2018-06-05 NOTE — TELEPHONE ENCOUNTER
PHONE IN [facility] reports the last fill date for Ambien as 05/01/2018. There appears to be no inconsistencies in regards to the prescribing of this medication. Last Visit: 04/04/2018 with MD Mihaela Chung    Next Appointment: noted to f/u prn   Previous Refill Encounters: 05/01/2018 per MD Mihaela Chung #30    Requested Prescriptions     Pending Prescriptions Disp Refills    zolpidem (AMBIEN) 10 mg tablet 30 Tab 0     Sig: Take 1 Tab by mouth nightly as needed.

## 2018-06-27 DIAGNOSIS — M54.2 CERVICALGIA: ICD-10-CM

## 2018-06-27 RX ORDER — OXYCODONE AND ACETAMINOPHEN 10; 325 MG/1; MG/1
1 TABLET ORAL
Qty: 120 TAB | Refills: 0 | Status: SHIPPED | OUTPATIENT
Start: 2018-06-27 | End: 2018-10-11

## 2018-06-27 NOTE — TELEPHONE ENCOUNTER
Jeanie Julian has called requesting a refill of their controlled medication,Oxycodone/apap 10/325mg, for the management of chronic low back pain . Last office visit date: 03/28/18    Date last  was pulled and reviewed : 06/27/2018 (Pt receiving ambien from outside provider last filled 6/6/18)    Was the patient compliant when the above report was pulled? Yes    Analgesia: Pt states 50% relief with oxycodone/apap    Aberrancies: None within the past 30 days    ADL's: Pt is able to do ADL's while on current medication regimen    Adverse Reaction: No adverse reactions, only problem is some constipation    Provider's last note and plan of care reviewed? Yes    Request forwarded to provider for review.

## 2018-07-05 DIAGNOSIS — F51.01 PRIMARY INSOMNIA: ICD-10-CM

## 2018-07-05 RX ORDER — ZOLPIDEM TARTRATE 10 MG/1
10 TABLET ORAL
Qty: 30 TAB | Refills: 0 | OUTPATIENT
Start: 2018-07-05 | End: 2018-08-03 | Stop reason: SDUPTHER

## 2018-07-05 NOTE — TELEPHONE ENCOUNTER
PHONE IN RX    [facility] reports the last fill date for Ambien as 06/06/2018. There appears to be no inconsistencies in regards to the prescribing of this medication. Last Visit: 04/04/2018 with MD Marcy Bailey    Next Appointment: noted to f/u prn   Previous Refill Encounters: 06/05/2018 per MD Marcy Bailey #30    Requested Prescriptions     Pending Prescriptions Disp Refills    zolpidem (AMBIEN) 10 mg tablet 30 Tab 0     Sig: Take 1 Tab by mouth nightly as needed.

## 2018-07-23 RX ORDER — QUETIAPINE FUMARATE 50 MG/1
100 TABLET, FILM COATED ORAL
Qty: 60 TAB | Refills: 0 | Status: SHIPPED | OUTPATIENT
Start: 2018-07-23 | End: 2018-08-19 | Stop reason: SDUPTHER

## 2018-07-23 NOTE — TELEPHONE ENCOUNTER
Last Visit: 04/04/2018 with MD Yolanda Lacey    Next Appointment: noted to f/u prn     Requested Prescriptions     Pending Prescriptions Disp Refills    QUEtiapine (SEROQUEL) 50 mg tablet 60 Tab 0     Sig: Take 2 Tabs by mouth nightly.

## 2018-07-24 ENCOUNTER — OFFICE VISIT (OUTPATIENT)
Dept: PAIN MANAGEMENT | Age: 33
End: 2018-07-24

## 2018-07-24 VITALS
RESPIRATION RATE: 18 BRPM | HEIGHT: 74 IN | SYSTOLIC BLOOD PRESSURE: 140 MMHG | BODY MASS INDEX: 34.39 KG/M2 | DIASTOLIC BLOOD PRESSURE: 89 MMHG | WEIGHT: 268 LBS | HEART RATE: 79 BPM | TEMPERATURE: 98.6 F

## 2018-07-24 DIAGNOSIS — G89.4 CHRONIC PAIN SYNDROME: ICD-10-CM

## 2018-07-24 DIAGNOSIS — M54.2 CERVICALGIA: ICD-10-CM

## 2018-07-24 DIAGNOSIS — M46.1 SACROILIITIS (HCC): ICD-10-CM

## 2018-07-24 DIAGNOSIS — M54.41 CHRONIC BILATERAL LOW BACK PAIN WITH BILATERAL SCIATICA: Primary | ICD-10-CM

## 2018-07-24 DIAGNOSIS — Z79.899 ENCOUNTER FOR LONG-TERM (CURRENT) USE OF HIGH-RISK MEDICATION: ICD-10-CM

## 2018-07-24 DIAGNOSIS — G89.29 CHRONIC BILATERAL LOW BACK PAIN WITH BILATERAL SCIATICA: Primary | ICD-10-CM

## 2018-07-24 DIAGNOSIS — M47.817 FACET ARTHROPATHY, LUMBOSACRAL: ICD-10-CM

## 2018-07-24 DIAGNOSIS — M54.12 CERVICAL RADICULOPATHY: ICD-10-CM

## 2018-07-24 DIAGNOSIS — M47.817 SPONDYLOSIS OF LUMBOSACRAL REGION, UNSPECIFIED SPINAL OSTEOARTHRITIS COMPLICATION STATUS: ICD-10-CM

## 2018-07-24 DIAGNOSIS — M54.42 CHRONIC BILATERAL LOW BACK PAIN WITH BILATERAL SCIATICA: Primary | ICD-10-CM

## 2018-07-24 LAB
ALCOHOL UR POC: NORMAL
AMPHETAMINES UR POC: NEGATIVE
BARBITURATES UR POC: NORMAL
BENZODIAZEPINES UR POC: NORMAL
BUPRENORPHINE UR POC: NEGATIVE
CANNABINOIDS UR POC: NORMAL
CARISOPRODOL UR POC: NORMAL
COCAINE UR POC: NEGATIVE
FENTANYL UR POC: NORMAL
MDMA/ECSTASY UR POC: NORMAL
METHADONE UR POC: NEGATIVE
METHAMPHETAMINE UR POC: NORMAL
METHYLPHENIDATE UR POC: NORMAL
OPIATES UR POC: NEGATIVE
OXYCODONE UR POC: NEGATIVE
PHENCYCLIDINE UR POC: NORMAL
PROPOXYPHENE UR POC: NORMAL
TRAMADOL UR POC: NORMAL
TRICYCLICS UR POC: NORMAL

## 2018-07-24 RX ORDER — OXYCODONE AND ACETAMINOPHEN 7.5; 325 MG/1; MG/1
1 TABLET ORAL
Qty: 120 TAB | Refills: 0 | Status: SHIPPED | OUTPATIENT
Start: 2018-07-24 | End: 2018-08-23

## 2018-07-24 NOTE — PROGRESS NOTES
Referral date 05/25/17, source Ortho and for cervical radiculopathy and low back pain. HPI:  Thao Vazquez is a 29 y.o. male here for f/u visit for ongoing evaluation of neck and back pain. Pt was last seen here on 03/28/18. Pt denies interval changes on the character or distribution of pain. Pain is located lower back radiating down left leg to the foot and neck pain radiating down left arm to fingers. The pain is described as lower back aching, feet pins and needles, numbness and tignling. Pain at its best is 2/10. Pain at its worse is 9/10. The pain is worsened by standing and sitting prolonged period of time for back, sleeping the wrong for the neck Symptoms are relieved by stretching, physical therapy, [employer], cold packs,,laying down, decreasing noise,muscle relaxers. Pt has tried Lidocaine patches with no perceived benefit. Pt has not used a TENS unit,topicals, RFA, SCS trial. Pt has follow up with Neurosurgeon for new numbness in samuel feet. Pt S/P laminectomy, discectomy. Patient is optimistic to try new modalities to help with this pain. Social History     Social History    Marital status: SINGLE     Spouse name: N/A    Number of children: N/A    Years of education: N/A     Occupational History    Not on file.      Social History Main Topics    Smoking status: Former Smoker    Smokeless tobacco: Never Used    Alcohol use No    Drug use: No    Sexual activity: Not on file     Other Topics Concern    Not on file     Social History Narrative     Family History   Problem Relation Age of Onset    Asthma Mother      No Known Allergies  Past Medical History:   Diagnosis Date    ADD (attention deficit disorder)     Anxiety     Depression     Ill-defined condition     chronic neck and back pain    Migraine     Obesity     PTSD (post-traumatic stress disorder)     Seasonal allergic rhinitis     Sleep apnea      Past Surgical History:   Procedure Laterality Date    HX BACK SURGERY  7/7/2011 lamenectomy & diskectomy.  HX TONSIL AND ADENOIDECTOMY       Current Outpatient Prescriptions on File Prior to Visit   Medication Sig    QUEtiapine (SEROQUEL) 50 mg tablet Take 2 Tabs by mouth nightly.  zolpidem (AMBIEN) 10 mg tablet Take 1 Tab by mouth nightly as needed.  oxyCODONE-acetaminophen (PERCOCET)  mg per tablet Take 1 Tab by mouth every six (6) hours as needed for Pain. Max Daily Amount: 4 Tabs.  pantoprazole (PROTONIX) 20 mg tablet Take 20 mg by mouth daily.  tamsulosin (FLOMAX) 0.4 mg capsule Take 1 Cap by mouth daily.  triamcinolone (ARISTOCORT) 0.5 % topical cream Apply thin layer to rash twice a day    tiZANidine (ZANAFLEX) 4 mg tablet TAKE 1 BY MOUTH EVERY NIGHT AT BEDTIME FOR 14 DAYS THEN 2 BY MOUTH EVERY NIGHT AT BEDTIME AFTER    topiramate (TOPAMAX) 100 mg tablet Take 1 Tab by mouth two (2) times a day.  DULoxetine (CYMBALTA) 30 mg capsule Take 30 mg by mouth daily. No current facility-administered medications on file prior to visit. ROS:  Denies fever, chills, nausea, vomiting, diarrhea, constipation, abdominal pain, chest pain, shortness or breath/trouble breathing, weakness, trouble swallowing, changes in vision, changes in hearing, falls, dizziness, bladder incontinence, bowel incontinence, depression, anxiety, suicidal ideations, homicidal ideations or alcohol use. Positive findings include chills, nausea, diarrhea, constipation, abdominal pain, depression ongoing and anxiety ongoing       Opioid specific risk: sleep apnea, anxiety, depression, PTSD, obesity, ADD. Opioid specific history: concurrent use of opioids for approximately three years, with no opioid holiday.     Vitals:    07/24/18 1445 07/24/18 1446   BP: (!) 143/95 140/89   Pulse: 79 79   Resp: 18    Temp: 98.6 °F (37 °C)    TempSrc: Oral    Weight: 121.6 kg (268 lb)    Height: 6' 2\" (1.88 m)    PainSc:   4    PainLoc: Back         Imaging:    \"\"\"\"\" 7/14/15 MRI lumbar spine [facility] CONT IMPRESSION:   1. New L4/L5 right central disc extrusion with impingement on the right crossing  L5 nerve root as discussed. 2. Slightly increased L3/L4 central disc protrusion with right greater than left  narrowing of the superior lateral recesses and likely impingement on the right  crossing L4 nerve root. 3. Otherwise similar appearing postsurgical changes at L3/L4 and L4/L5. 4. No high-grade central canal stenosis or foraminal stenosis\"\"\"\"\"\"      Physical exam:  AFVS elevated BP, no acute distress, normal body habitus. A&OXs 3. Normocephalic, atraumatic. Conjugate gaze, clear sclerae. Speech is clear and appropriate. Mood is appropriate for situation  Gait and balance are within functional limits. Non-labored breathing. Lungs CTA B/L. No wheezing, rales or rhonchi. Heart RRR with S1 and S2 noted. No murmurs. Left SIJ, left piriformis TTP. UE:   Strength for right upper extremity is 5/5 for shoulder abduction, elbow flexion, wrist extension, elbow extension, finger abduction, flexor digitorum profundus to digits 2 through 5. Strength for left upper extremity is 5/5 for shoulder abduction, elbow flexion, wrist extension, elbow extension, finger abduction, flexor digitorum profundus to digits 2 through 5. Sensation to light touch is intact for left C4-T1. Sensation to light touch is intact for right C4-T1. Muscle stretch reflexes for right upper extremity are 1+ for C5, C6, C7. Muscle stretch reflexes for left upper extremity are 1+ for C5, C6, C7. LE:   Strength for right lower extremity is 5/5 for hip flexion, knee extension, dorsiflexion, extensor hallucis longus, plantar flexion. Strength for left lower extremity is 5/5 for hip flexion, knee extension, dorsiflexion, extensor hallucis longus, plantar flexion. Sensation to light touch is intact for right L2-S2. Sensation to light touch is intact for left L2, 3, 5, S1 and S2. Decreased sensation L4 compared to right  Muscle Stretch reflexes for right lower extremity are 1+ for L4, absent S1. Muscle Stretch reflexes for left lower extremity are 1+ for L4, absent S1. Full AROM lumbar flexion decreased by 50% to endrange reproduction of primary pain. Full AROM lumbar extension decreased by 50% to endrange reproduction of primary pain. Negative seated straight leg raise bilaterally. Negative supine straight leg raise bilaterally. Negative Stinchfield's on the right and a left. Negative FABERs on the right and the left. Negative FADIRS on the right and the left. Calculated MEQ - 60  Naloxone rescue - Yes  Prophylactic bowel program - no  Date of last OCA 07/24/18  Last UDS today, awaiting results    date checked today, findings consistent    Primary Care Physician  Gris Arceo [address]  [phone]    Today    ORT -     ROLO -40%    PGIC -4 and 6    COMM -23      PHQ -- . PHQ over the last two weeks 7/24/2018   PHQ Not Done Active Diagnosis of Depression or Bipolar Disorder   Little interest or pleasure in doing things -   Feeling down, depressed, irritable, or hopeless -   Total Score PHQ 2 -   Trouble falling or staying asleep, or sleeping too much -   Feeling tired or having little energy -   Poor appetite, weight loss, or overeating -   Feeling bad about yourself - or that you are a failure or have let yourself or your family down -   Trouble concentrating on things such as school, work, reading, or watching TV -   Moving or speaking so slowly that other people could have noticed; or the opposite being so fidgety that others notice -   Thoughts of being better off dead, or hurting yourself in some way -   PHQ 9 Score -   How difficult have these problems made it for you to do your work, take care of your home and get along with others -       DSM V-OUD Screen - deferred to next visit     Assessment/Plan:     ICD-10-CM ICD-9-CM    1.  Chronic bilateral low back pain with bilateral sciatica M54.42 724.2 oxyCODONE-acetaminophen (PERCOCET 7.5) 7.5-325 mg per tablet    M54.41 724.3 TENS Units (TENS 504) madai    G89.29 338.29 DISCONTINUED: TENS Units (TENS 504) madai   2. Encounter for long-term (current) use of high-risk medication Z79.899 V58.69 DRUG SCREEN      AMB POC DRUG SCREEN ()   3. Cervicalgia M54.2 723.1 oxyCODONE-acetaminophen (PERCOCET 7.5) 7.5-325 mg per tablet      TENS Units (TENS 504) madai      DISCONTINUED: TENS Units (TENS 504) madai   4. Chronic pain syndrome G89.4 338.4 oxyCODONE-acetaminophen (PERCOCET 7.5) 7.5-325 mg per tablet      TENS Units (TENS 504) madai      DISCONTINUED: TENS Units (TENS 504) madai   5. Sacroiliitis (HCC) M46.1 720.2    6. Cervical radiculopathy M54.12 723.4    7. Spondylosis of lumbosacral region, unspecified spinal osteoarthritis complication status K62.751 721.3    8. Facet arthropathy, lumbosacral (HCC) M46.97 721.3       UDS today    Patient counseled on positive THC found in UDS today and reinforced strict compliance of opioid care agreement. Patient to follow-up and obtain chiropractor care    Order Tens unit applied up to 3 times a day as needed for back pain, muscle spasms to possibly decrease the need for pharmaceuticals. Patient given piriformis stretching exercises to be performed at home. Do not recommend long term opioid therapy for this patient. Pt currently taking oxycodone/acetaminophen 10/325 mg tablet 1 tablet up to 4 times a day as needed for pain with MME of 60. Today, we will continue the weaning of patients opioid medication with a goal of being opioid free, pending safety and compliance. Pt instructed to call if they experience any signs of withdrawal. Today, pt given prescription for oxycodone/acetaminophen 7.5/325 mg tablet take 1 tablet every 6 hours as needed. Their new MME is 39. Next prescription will be oxycodone/acetaminophen 5/325 mg tablet take 1 tablet every 6 hours as needed for pain. Their new MME will be 30. If patient has difficulty with the wean or difficulty with cravings we will consider referral to mental health for ongoing assessment and treatment for opioid use disorder. Consider RFA, physical therapy, H wave, SCS trial.    Follow up ongoing assessment and ongoing development of integrative and comprehensive plan of care for chronic pain. Goals: To establish complementary and integrative plan of care to address chronic pain issues while minimizing pharmaceuticals to maximize patient's function improve quality of life. Education:  Patient again educated on the importance of strict compliance with the opioid care agreement while on opioid therapy. Patient also again educated that they should avoid driving while on chronic opioid therapy. Also advised to avoid alcohol and to avoid benzodiazepines while on opioid therapy. Handouts given regarding opioid safety and sleep health. Patient Homework:  Continue to independently investigate yoga for persons with chronic pain and core strengthening exercises. Follow-up Disposition:  Return in about 12 weeks (around 10/16/2018) for 30 mins.

## 2018-07-24 NOTE — PATIENT INSTRUCTIONS
Learning About Sleeping Well What does sleeping well mean? Sleeping well means getting enough sleep. How much sleep is enough varies among people. The number of hours you sleep is not as important as how you feel when you wake up. If you do not feel refreshed, you probably need more sleep. Another sign of not getting enough sleep is feeling tired during the day. The average total nightly sleep time is 7½ to 8 hours. Healthy adults may need a little more or a little less than this. Why is getting enough sleep important? Getting enough quality sleep is a basic part of good health. When your sleep suffers, your mood and your thoughts can suffer too. You may find yourself feeling more grumpy or stressed. Not getting enough sleep also can lead to serious problems, including injury, accidents, anxiety, and depression. What might cause poor sleeping? Many things can cause sleep problems, including: · Stress. Stress can be caused by fear about a single event, such as giving a speech. Or you may have ongoing stress, such as worry about work or school. · Depression, anxiety, and other mental or emotional conditions. · Changes in your sleep habits or surroundings. This includes changes that happen where you sleep, such as noise, light, or sleeping in a different bed. It also includes changes in your sleep pattern, such as having jet lag or working a late shift. · Health problems, such as pain, breathing problems, and restless legs syndrome. · Lack of regular exercise. How can you help yourself? Here are some tips that may help you sleep more soundly and wake up feeling more refreshed. Your sleeping area · Use your bedroom only for sleeping and sex. A bit of light reading may help you fall asleep. But if it doesn't, do your reading elsewhere in the house. Don't watch TV in bed. · Be sure your bed is big enough to stretch out comfortably, especially if you have a sleep partner.  
· Keep your bedroom quiet, dark, and cool. Use curtains, blinds, or a sleep mask to block out light. To block out noise, use earplugs, soothing music, or a \"white noise\" machine. Your evening and bedtime routine · Create a relaxing bedtime routine. You might want to take a warm shower or bath, listen to soothing music, or drink a cup of noncaffeinated tea. · Go to bed at the same time every night. And get up at the same time every morning, even if you feel tired. What to avoid · Limit caffeine (coffee, tea, caffeinated sodas) during the day, and don't have any for at least 4 to 6 hours before bedtime. · Don't drink alcohol before bedtime. Alcohol can cause you to wake up more often during the night. · Don't smoke or use tobacco, especially in the evening. Nicotine can keep you awake. · Don't take naps during the day, especially close to bedtime. · Don't lie in bed awake for too long. If you can't fall asleep, or if you wake up in the middle of the night and can't get back to sleep within 15 minutes or so, get out of bed and go to another room until you feel sleepy. · Don't take medicine right before bed that may keep you awake or make you feel hyper or energized. Your doctor can tell you if your medicine may do this and if you can take it earlier in the day. If you can't sleep · Imagine yourself in a peaceful, pleasant scene. Focus on the details and feelings of being in a place that is relaxing. · Get up and do a quiet or boring activity until you feel sleepy. · Don't drink any liquids after 6 p.m. if you wake up often because you have to go to the bathroom. Where can you learn more? Go to http://natalia-pardeep.info/. Enter P120 in the search box to learn more about \"Learning About Sleeping Well. \" Current as of: December 7, 2017 Content Version: 11.7 © 7538-6205 [employer].  Care instructions adapted under license by [employer] (which disclaims liability or warranty for this information). If you have questions about a medical condition or this instruction, always ask your healthcare professional. [address] any warranty or liability for your use of this information. Safe Use of Opioid Pain Medicine: Care Instructions Your Care Instructions Pain is your body's way of warning you that something is wrong. Pain feels different for everybody. Only you can describe your pain. A doctor can suggest or prescribe many types of medicines for pain. These range from over-the-counter medicines like acetaminophen (Tylenol) to powerful medicines called opioids. Examples of opioids are fentanyl, hydrocodone, morphine, and oxycodone. Heroin is an illegal opioid Opioids are strong medicines. They can help you manage pain when you use them the right way. But if you misuse them, they can cause serious harm and even death. For these reasons, doctors are very careful about how they prescribe opioids. If you decide to take opioids, here are some things to remember. · Keep your doctor informed. You can get addicted to opioids. The risk is higher if you have a history of substance use. Your doctor will monitor you closely for signs of misuse and addiction and to figure out when you no longer need to take opioids. · Make a treatment plan. The goal of your plan is to be able to function and do the things you need to do, even if you still have some pain. You might be able to manage your pain with other non-opioid options like physical therapy, relaxation, or over-the-counter pain medicines. · Be aware of the side effects. Opioids can cause serious side effects, such as constipation, dry mouth, and nausea. And over time, you may need a higher dose to get pain relief. This is called tolerance. Your body also gets used to opioids. This is called physical dependence. If you suddenly stop taking them, you may have withdrawal symptoms.  
The doctor carefully considered what pain medicine is right for you. You may not have received opioids if your doctor was concerned about drug interactions or your safety, or if he or she had other concerns. It is best to have one doctor or clinic treat your pain. This way you will get the pain medicine that will help you the most. And a doctor will be able to watch for any problems that the medicine might cause. The doctor has checked you carefully, but problems can develop later. If you notice any problems or new symptoms,  get medical treatment right away. Follow-up care is a key part of your treatment and safety. Be sure to make and go to all appointments, and call your doctor if you are having problems. It's also a good idea to know your test results and keep a list of the medicines you take. How can you care for yourself at home? · If you need to take opioids to manage your pain, remember these safety tips. ¨ Follow directions carefully. It's easy to misuse opioids if you take a dose other than what's prescribed by your doctor. This can lead to overdose and even death. Even sharing them with someone they weren't meant for is misuse. ¨ Be cautious. Opioids may affect your judgment and decision making. Do not drive or operate machinery until you can think clearly. Talk with your doctor about when it is safe to drive. ¨ Reduce the risk of drug interactions. Opioids can be dangerous if you take them with alcohol or with certain drugs like sleeping pills and muscle relaxers. Make sure your doctor knows about all the other medicines you take, including over-the-counter medicines. Don't start any new medicines before you talk to your doctor or pharmacist. 
There Space Keep others safe. Store opioids in a safe and secure place. Make sure that pets, children, friends, and family can't get to them. When you're done using opioids, make sure to properly dispose of them.  You can either use a community drug take-back program or your drugstore's mail-back program. If one of these programs isn't available, you can flush opioid skin patches and unused opioid pills down the toilet. ¨ Reduce the risk of overdose. Misuse of opioids can be very dangerous. Protect yourself by asking your doctor about a naloxone rescue kit. It can help you-and even save your life-if you take too much of an opioid. · Try other ways to reduce pain. ¨ Relax, and reduce stress. Relaxation techniques such as deep breathing or meditation can help. ¨ Keep moving. Gentle, daily exercise can help reduce pain over the long run. Try low- or no-impact exercises such as walking, swimming, and stationary biking. Do stretches to stay flexible. ¨ Try heat, cold packs, and massage. ¨ Get enough sleep. Pain can make you tired and drain your energy. Talk with your doctor if you have trouble sleeping because of pain. ¨ Think positive. Your thoughts can affect your pain level. Do things that you enjoy to distract yourself when you have pain instead of focusing on the pain. See a movie, read a book, listen to music, or spend time with a friend. · If you are not taking a prescription pain medicine, ask your doctor if you can take an over-the-counter medicine. When should you call for help? Call your doctor now or seek immediate medical care if: 
  · You have a new kind of pain.  
  · You have new symptoms, such as a fever or rash, along with the pain.  
 Watch closely for changes in your health, and be sure to contact your doctor if: 
  · You think you might be using too much pain medicine, and you need help to use less or stop.  
  · Your pain gets worse.  
  · You would like a referral to a doctor or clinic that specializes in pain management. Where can you learn more? Go to http://natalia-pardeep.info/. Enter R108 in the search box to learn more about \"Safe Use of Opioid Pain Medicine: Care Instructions. \" Current as of: September 10, 2017 Content Version: 11.7 © 6286-8343 [employer]. Care instructions adapted under license by "[employer]" (which disclaims liability or warranty for this information). If you have questions about a medical condition or this instruction, always ask your healthcare professional. [address] any warranty or liability for your use of this information.

## 2018-07-24 NOTE — PROGRESS NOTES
Nursing Notes    Patient presents to the office today in follow-up. Patient rates his pain at 4/10 on the numerical pain scale. Reviewed medications with counts as follows:    Rx Date filled Qty Dispensed Pill Count Last Dose Short   Percocet  mg 6/27/18 120 0 7/20/18 Yes 12 pills                                      Patient noted to be short on medications Percocet  mg 12 pills short this office visit; advised to follow dosing schedule as prescribed by [facility]. Patient stated that he been self increasing due to increase pain. Patient understand that he suppose to take opioid medication as prescribed. POC UDS was performed in office today    Any new labs or imaging since last appointment? NO    Have you been to an emergency room (ER) or urgent care clinic since your last visit? NO            Have you been hospitalized since your last visit? NO     If yes, where, when, and reason for visit? Have you seen or consulted any other health care providers outside of the [facility]  since your last visit? NO     If yes, where, when, and reason for visit? Mr. Zoraida Peacock has a reminder for a \"due or due soon\" health maintenance. I have asked that he contact his primary care provider for follow-up on this health maintenance. Patient stated that he does have Narcan.   PHQ over the last two weeks 7/24/2018   PHQ Not Done Active Diagnosis of Depression or Bipolar Disorder   Little interest or pleasure in doing things -   Feeling down, depressed, irritable, or hopeless -   Total Score PHQ 2 -   Trouble falling or staying asleep, or sleeping too much -   Feeling tired or having little energy -   Poor appetite, weight loss, or overeating -   Feeling bad about yourself - or that you are a failure or have let yourself or your family down -   Trouble concentrating on things such as school, work, reading, or watching TV -   Moving or speaking so slowly that other people could have noticed; or the opposite being so fidgety that others notice -   Thoughts of being better off dead, or hurting yourself in some way -   PHQ 9 Score -   How difficult have these problems made it for you to do your work, take care of your home and get along with others -

## 2018-07-24 NOTE — MR AVS SNAPSHOT
[address] [facility] [address] 
[phone] Patient: Damon Jo MRN: [id number] JCD:[id number]/15/[id number] Visit Information Date & Time Provider Department Dept. Phone Encounter #  
 7/24/2018  3:30 PM Placido Duncan NP [facility] [phone] Follow-up Instructions Return in about 12 weeks (around 10/16/2018) for 30 mins. Your Appointments 5/9/2019  3:30 PM  
Office Visit with Osiris Davila MD  
[facility] [address] [place] [address]) Appt Note: check up [address] [facility] [address] [facility] [id number] [phone] [address] [place] [address] Upcoming Health Maintenance Date Due DTaP/Tdap/Td series (1 - Tdap) 11/30/2008 Influenza Age 5 to Adult 8/1/2018 Allergies as of 7/24/2018  Review Complete On: 5/10/2018 By: Osiris Davila MD  
 No Known Allergies Current Immunizations  Reviewed on 10/18/2013 No immunizations on file. Not reviewed this visit You Were Diagnosed With   
  
 Codes Comments Encounter for long-term (current) use of high-risk medication    -  Primary ICD-10-CM: B93.714 ICD-9-CM: V58.69 Cervicalgia     ICD-10-CM: M54.2 ICD-9-CM: 723.1 Chronic bilateral low back pain with bilateral sciatica     ICD-10-CM: M54.42, M54.41, G89.29 ICD-9-CM: 724.2, 724.3, 338.29 Chronic pain syndrome     ICD-10-CM: G89.4 ICD-9-CM: 338. 4 Vitals BP Pulse Temp Resp Height(growth percentile) Weight(growth percentile) 140/89 (BP 1 Location: Right arm, BP Patient Position: Sitting) 79 98.6 °F (37 °C) (Oral) 18 6' 2\" (1.88 m) 268 lb (121.6 kg) BMI Smoking Status 34.41 kg/m2 Former Smoker Vitals History BMI and BSA Data Body Mass Index Body Surface Area 34.41 kg/m 2 2.52 m 2 Preferred Pharmacy Pharmacy Name Phone [address], [address] [facility] [address]  Tom [facility] [phone] Your Updated Medication List  
  
   
This list is accurate as of 7/24/18  3:46 PM.  Always use your most recent med list.  
  
  
  
  
 CYMBALTA 30 mg capsule Generic drug:  DULoxetine Take 30 mg by mouth daily. * oxyCODONE-acetaminophen  mg per tablet Commonly known as:  PERCOCET Take 1 Tab by mouth every six (6) hours as needed for Pain. Max Daily Amount: 4 Tabs. * oxyCODONE-acetaminophen 7.5-325 mg per tablet Commonly known as:  PERCOCET 7.5 Take 1 Tab by mouth every six (6) hours as needed for Pain for up to 30 days. Max Daily Amount: 4 Tabs. pantoprazole 20 mg tablet Commonly known as:  PROTONIX Take 20 mg by mouth daily. QUEtiapine 50 mg tablet Commonly known as:  SEROquel Take 2 Tabs by mouth nightly. tamsulosin 0.4 mg capsule Commonly known as:  FLOMAX Take 1 Cap by mouth daily. [facility] Commonly known as:  TENS 504 Please provide patient tens unit device and pads to be applied to affected area as needed for pain  
  
 tiZANidine 4 mg tablet Commonly known as:  Elenore Piggs TAKE 1 BY MOUTH EVERY NIGHT AT BEDTIME FOR 14 DAYS THEN 2 BY MOUTH EVERY NIGHT AT BEDTIME AFTER  
  
 topiramate 100 mg tablet Commonly known as:  TOPAMAX Take 1 Tab by mouth two (2) times a day. triamcinolone 0.5 % topical cream  
Commonly known as:  ARISTOCORT Apply thin layer to rash twice a day  
  
 zolpidem 10 mg tablet Commonly known as:  AMBIEN Take 1 Tab by mouth nightly as needed. * Notice: This list has 2 medication(s) that are the same as other medications prescribed for you. Read the directions carefully, and ask your doctor or other care provider to review them with you. Prescriptions Printed  Refills  
 oxyCODONE-acetaminophen (PERCOCET 7.5) 7.5-325 mg per tablet 0  
 Sig: Take 1 Tab by mouth every six (6) hours as needed for Pain for up to 30 days. Max Daily Amount: 4 Tabs. Class: Print Route: Oral  
 TENS Units (TENS 504) madai 0 Sig: Please provide patient tens unit device and pads to be applied to affected area as needed for pain  
 Class: Print We Performed the Following AMB POC DRUG SCREEN () [ [facility]] DRUG SCREEN [[id number] Custom] Follow-up Instructions Return in about 12 weeks (around 10/16/2018) for 30 mins. Patient Instructions Learning About Sleeping Well What does sleeping well mean? Sleeping well means getting enough sleep. How much sleep is enough varies among people. The number of hours you sleep is not as important as how you feel when you wake up. If you do not feel refreshed, you probably need more sleep. Another sign of not getting enough sleep is feeling tired during the day. The average total nightly sleep time is 7½ to 8 hours. Healthy adults may need a little more or a little less than this. Why is getting enough sleep important? Getting enough quality sleep is a basic part of good health. When your sleep suffers, your mood and your thoughts can suffer too. You may find yourself feeling more grumpy or stressed. Not getting enough sleep also can lead to serious problems, including injury, accidents, anxiety, and depression. What might cause poor sleeping? Many things can cause sleep problems, including: · Stress. Stress can be caused by fear about a single event, such as giving a speech. Or you may have ongoing stress, such as worry about work or school. · Depression, anxiety, and other mental or emotional conditions. · Changes in your sleep habits or surroundings. This includes changes that happen where you sleep, such as noise, light, or sleeping in a different bed. It also includes changes in your sleep pattern, such as having jet lag or working a late shift. · Health problems, such as pain, breathing problems, and restless legs syndrome. · Lack of regular exercise. How can you help yourself? Here are some tips that may help you sleep more soundly and wake up feeling more refreshed. Your sleeping area · Use your bedroom only for sleeping and sex. A bit of light reading may help you fall asleep. But if it doesn't, do your reading elsewhere in the house. Don't watch TV in bed. · Be sure your bed is big enough to stretch out comfortably, especially if you have a sleep partner. · Keep your bedroom quiet, dark, and cool. Use curtains, blinds, or a sleep mask to block out light. To block out noise, use earplugs, soothing music, or a \"white noise\" machine. Your evening and bedtime routine · Create a relaxing bedtime routine. You might want to take a warm shower or bath, listen to soothing music, or drink a cup of noncaffeinated tea. · Go to bed at the same time every night. And get up at the same time every morning, even if you feel tired. What to avoid · Limit caffeine (coffee, tea, caffeinated sodas) during the day, and don't have any for at least 4 to 6 hours before bedtime. · Don't drink alcohol before bedtime. Alcohol can cause you to wake up more often during the night. · Don't smoke or use tobacco, especially in the evening. Nicotine can keep you awake. · Don't take naps during the day, especially close to bedtime. · Don't lie in bed awake for too long. If you can't fall asleep, or if you wake up in the middle of the night and can't get back to sleep within 15 minutes or so, get out of bed and go to another room until you feel sleepy. · Don't take medicine right before bed that may keep you awake or make you feel hyper or energized. Your doctor can tell you if your medicine may do this and if you can take it earlier in the day. If you can't sleep · Imagine yourself in a peaceful, pleasant scene.  Focus on the details and feelings of being in a place that is relaxing. · Get up and do a quiet or boring activity until you feel sleepy. · Don't drink any liquids after 6 p.m. if you wake up often because you have to go to the bathroom. Where can you learn more? Go to http://natalia-pardeep.info/. Enter X881 in the search box to learn more about \"Learning About Sleeping Well. \" Current as of: December 7, 2017 Content Version: 11.7 © 9599-1876 [employer]. Care instructions adapted under license by [employer] (which disclaims liability or warranty for this information). If you have questions about a medical condition or this instruction, always ask your healthcare professional. [address] any warranty or liability for your use of this information. Safe Use of Opioid Pain Medicine: Care Instructions Your Care Instructions Pain is your body's way of warning you that something is wrong. Pain feels different for everybody. Only you can describe your pain. A doctor can suggest or prescribe many types of medicines for pain. These range from over-the-counter medicines like acetaminophen (Tylenol) to powerful medicines called opioids. Examples of opioids are fentanyl, hydrocodone, morphine, and oxycodone. Heroin is an illegal opioid Opioids are strong medicines. They can help you manage pain when you use them the right way. But if you misuse them, they can cause serious harm and even death. For these reasons, doctors are very careful about how they prescribe opioids. If you decide to take opioids, here are some things to remember. · Keep your doctor informed. You can get addicted to opioids. The risk is higher if you have a history of substance use. Your doctor will monitor you closely for signs of misuse and addiction and to figure out when you no longer need to take opioids. · Make a treatment plan.  The goal of your plan is to be able to function and do the things you need to do, even if you still have some pain. You might be able to manage your pain with other non-opioid options like physical therapy, relaxation, or over-the-counter pain medicines. · Be aware of the side effects. Opioids can cause serious side effects, such as constipation, dry mouth, and nausea. And over time, you may need a higher dose to get pain relief. This is called tolerance. Your body also gets used to opioids. This is called physical dependence. If you suddenly stop taking them, you may have withdrawal symptoms. The doctor carefully considered what pain medicine is right for you. You may not have received opioids if your doctor was concerned about drug interactions or your safety, or if he or she had other concerns. It is best to have one doctor or clinic treat your pain. This way you will get the pain medicine that will help you the most. And a doctor will be able to watch for any problems that the medicine might cause. The doctor has checked you carefully, but problems can develop later. If you notice any problems or new symptoms,  get medical treatment right away. Follow-up care is a key part of your treatment and safety. Be sure to make and go to all appointments, and call your doctor if you are having problems. It's also a good idea to know your test results and keep a list of the medicines you take. How can you care for yourself at home? · If you need to take opioids to manage your pain, remember these safety tips. ¨ Follow directions carefully. It's easy to misuse opioids if you take a dose other than what's prescribed by your doctor. This can lead to overdose and even death. Even sharing them with someone they weren't meant for is misuse. ¨ Be cautious. Opioids may affect your judgment and decision making. Do not drive or operate machinery until you can think clearly. Talk with your doctor about when it is safe to drive. ¨ Reduce the risk of drug interactions. Opioids can be dangerous if you take them with alcohol or with certain drugs like sleeping pills and muscle relaxers. Make sure your doctor knows about all the other medicines you take, including over-the-counter medicines. Don't start any new medicines before you talk to your doctor or pharmacist. 
Caro Jovanyjermaine Keep others safe. Store opioids in a safe and secure place. Make sure that pets, children, friends, and family can't get to them. When you're done using opioids, make sure to properly dispose of them. You can either use a community drug take-back program or your drugstore's mail-back program. If one of these programs isn't available, you can flush opioid skin patches and unused opioid pills down the toilet. ¨ Reduce the risk of overdose. Misuse of opioids can be very dangerous. Protect yourself by asking your doctor about a naloxone rescue kit. It can help you-and even save your life-if you take too much of an opioid. · Try other ways to reduce pain. ¨ Relax, and reduce stress. Relaxation techniques such as deep breathing or meditation can help. ¨ Keep moving. Gentle, daily exercise can help reduce pain over the long run. Try low- or no-impact exercises such as walking, swimming, and stationary biking. Do stretches to stay flexible. ¨ Try heat, cold packs, and massage. ¨ Get enough sleep. Pain can make you tired and drain your energy. Talk with your doctor if you have trouble sleeping because of pain. ¨ Think positive. Your thoughts can affect your pain level. Do things that you enjoy to distract yourself when you have pain instead of focusing on the pain. See a movie, read a book, listen to music, or spend time with a friend. · If you are not taking a prescription pain medicine, ask your doctor if you can take an over-the-counter medicine. When should you call for help? Call your doctor now or seek immediate medical care if: 
  · You have a new kind of pain.   · You have new symptoms, such as a fever or rash, along with the pain.  
 Watch closely for changes in your health, and be sure to contact your doctor if: 
  · You think you might be using too much pain medicine, and you need help to use less or stop.  
  · Your pain gets worse.  
  · You would like a referral to a doctor or clinic that specializes in pain management. Where can you learn more? Go to http://natalia-pardeep.info/. Enter R108 in the search box to learn more about \"Safe Use of Opioid Pain Medicine: Care Instructions. \" Current as of: September 10, 2017 Content Version: 11.7 © 1223-4338 [employer]. Care instructions adapted under license by [employer] (which disclaims liability or warranty for this information). If you have questions about a medical condition or this instruction, always ask your healthcare professional. [address] any warranty or liability for your use of this information. Introducing [facility]! Dear Sree Carrasco: Thank you for requesting a Lightspeed account. Our records indicate that you already have an active Lightspeed account. You can access your account anytime at [email or web address] Did you know that you can access your hospital and ER discharge instructions at any time in Lightspeed? You can also review all of your test results from your hospital stay or ER visit. Additional Information If you have questions, please visit the Frequently Asked Questions section of the Lightspeed website at [email or web address] Remember, Lightspeed is NOT to be used for urgent needs. For medical emergencies, dial 911. Now available from your iPhone and Android! Please provide this summary of care documentation to your next provider. Your primary care clinician is listed as Morene Hodgkin. April Dietrich.  If you have any questions after today's visit, please call [phone].

## 2018-07-31 ENCOUNTER — DOCUMENTATION ONLY (OUTPATIENT)
Dept: PAIN MANAGEMENT | Age: 33
End: 2018-07-31

## 2018-07-31 NOTE — PROGRESS NOTES
Fax sent to [facility] for a referral for TENS UNIT. Conformation received.

## 2018-08-03 DIAGNOSIS — F51.01 PRIMARY INSOMNIA: ICD-10-CM

## 2018-08-03 RX ORDER — ZOLPIDEM TARTRATE 10 MG/1
10 TABLET ORAL
Qty: 30 TAB | Refills: 0 | OUTPATIENT
Start: 2018-08-03 | End: 2018-09-05 | Stop reason: SDUPTHER

## 2018-08-20 RX ORDER — QUETIAPINE FUMARATE 50 MG/1
TABLET, FILM COATED ORAL
Qty: 60 TAB | Refills: 0 | Status: SHIPPED | OUTPATIENT
Start: 2018-08-20 | End: 2019-01-01

## 2018-08-21 ENCOUNTER — DOCUMENTATION ONLY (OUTPATIENT)
Dept: PAIN MANAGEMENT | Age: 33
End: 2018-08-21

## 2018-08-21 DIAGNOSIS — M54.42 CHRONIC BILATERAL LOW BACK PAIN WITH BILATERAL SCIATICA: Primary | ICD-10-CM

## 2018-08-21 DIAGNOSIS — M54.41 CHRONIC BILATERAL LOW BACK PAIN WITH BILATERAL SCIATICA: Primary | ICD-10-CM

## 2018-08-21 DIAGNOSIS — G89.29 CHRONIC BILATERAL LOW BACK PAIN WITH BILATERAL SCIATICA: Primary | ICD-10-CM

## 2018-08-21 RX ORDER — OXYCODONE AND ACETAMINOPHEN 5; 325 MG/1; MG/1
1 TABLET ORAL
Qty: 120 TAB | Refills: 0 | Status: SHIPPED | OUTPATIENT
Start: 2018-08-21 | End: 2018-09-12 | Stop reason: SDUPTHER

## 2018-08-21 NOTE — TELEPHONE ENCOUNTER
Kathy Peña has called requesting a refill of their controlled medication, Percocet 5-325 mg, for the management of Chronic bilateral low back pain with bilateral sciatica. Last office visit date: 7/24/18    Date last  was pulled and reviewed : 8/21/18 and compliant. Last filled 7/24/18    Was the patient compliant when the above report was pulled? yes    Analgesia: Patient report 60% of pain relief with current medication regimen    Aberrancies: No aberrancies in the last 30 days. ADL's: Patient report he is able to do basic ADL's at home. Adverse Reaction:Patient report no adverse reaction. Provider's last note and plan of care reviewed? yes  Request forwarded to provider for review. Provider was made aware.

## 2018-08-21 NOTE — TELEPHONE ENCOUNTER
Patient called for medication refill [id number] 3:42pm    1. , name verified  2. Percocet 7.5/325  3. .. [phone] or   4.  60  5.  ?  6.   Having some mild constipation

## 2018-08-29 ENCOUNTER — OFFICE VISIT (OUTPATIENT)
Dept: INTERNAL MEDICINE CLINIC | Age: 33
End: 2018-08-29

## 2018-08-29 VITALS
WEIGHT: 292 LBS | HEIGHT: 74 IN | BODY MASS INDEX: 37.47 KG/M2 | OXYGEN SATURATION: 98 % | SYSTOLIC BLOOD PRESSURE: 132 MMHG | TEMPERATURE: 98.8 F | RESPIRATION RATE: 14 BRPM | HEART RATE: 74 BPM | DIASTOLIC BLOOD PRESSURE: 86 MMHG

## 2018-08-29 DIAGNOSIS — J01.00 ACUTE NON-RECURRENT MAXILLARY SINUSITIS: Primary | ICD-10-CM

## 2018-08-29 DIAGNOSIS — E66.01 SEVERE OBESITY (BMI 35.0-39.9): ICD-10-CM

## 2018-08-29 DIAGNOSIS — F51.01 PRIMARY INSOMNIA: ICD-10-CM

## 2018-08-29 RX ORDER — AMOXICILLIN AND CLAVULANATE POTASSIUM 875; 125 MG/1; MG/1
1 TABLET, FILM COATED ORAL 2 TIMES DAILY
Qty: 20 TAB | Refills: 0 | Status: SHIPPED | OUTPATIENT
Start: 2018-08-29 | End: 2018-10-11

## 2018-08-29 NOTE — MR AVS SNAPSHOT
[address] [facility] 
 
 
 [address], [address] [facility] 
[phone] Patient: Sera Hillman MRN: [id number] JCR:55/49/2357 Visit Information Date & Time Provider Department Dept. Phone Encounter #  
 8/29/2018  3:30 PM Colleen Sotomayor MD [facility] [address] [place] [address] [phone] [id number] Your Appointments 10/11/2018  3:00 PM  
Office Visit with Gilberto Jefferson NP [facility] ([facility] SCHEDULING) Appt Note: 12 week f/u for 30 minutes per jw. ..to  
 [address] [facility] [address]  
[phone] [address]  
  
    
 5/9/2019  3:30 PM  
Office Visit with Ana Pierson MD  
[facility] [address] [facility]) Appt Note: check up [address] [facility] [address] [facility] [address] [phone] [address] [place] [address] Upcoming Health Maintenance Date Due DTaP/Tdap/Td series (1 - Tdap) 11/30/2008 Influenza Age 5 to Adult 8/1/2018 Allergies as of 8/29/2018  Review Complete On: 8/29/2018 By: Colleen Sotomayor MD  
 No Known Allergies Current Immunizations  Reviewed on 10/18/2013 No immunizations on file. Not reviewed this visit You Were Diagnosed With   
  
 Codes Comments Acute non-recurrent maxillary sinusitis    -  Primary ICD-10-CM: J01.00 ICD-9-CM: 461.0 Severe obesity (BMI 35.0-39.9) ([facility])     ICD-10-CM: E66.01 
ICD-9-CM: 278.01 Primary insomnia     ICD-10-CM: F51.01 
ICD-9-CM: 307.42 Vitals BP Pulse Temp Resp Height(growth percentile) Weight(growth percentile) 132/86 74 98.8 °F (37.1 °C) (Oral) 14 6' 2\" (1.88 m) 292 lb (132.5 kg) SpO2 BMI Smoking Status 98% 37.49 kg/m2 Former Smoker Vitals History BMI and BSA Data Body Mass Index Body Surface Area  
 37.49 kg/m 2 2.63 m 2 Preferred Pharmacy Pharmacy Name Phone [address], [address] [address]  Tom [facility] [phone] Your Updated Medication List  
  
   
This list is accurate as of 8/29/18  4:27 PM.  Always use your most recent med list.  
  
  
  
  
 amoxicillin-clavulanate 875-125 mg per tablet Commonly known as:  AUGMENTIN Take 1 Tab by mouth two (2) times a day. CYMBALTA 30 mg capsule Generic drug:  DULoxetine Take 30 mg by mouth daily. * oxyCODONE-acetaminophen  mg per tablet Commonly known as:  PERCOCET Take 1 Tab by mouth every six (6) hours as needed for Pain. Max Daily Amount: 4 Tabs. * oxyCODONE-acetaminophen 5-325 mg per tablet Commonly known as:  PERCOCET Take 1 Tab by mouth every six (6) hours as needed for Pain for up to 30 days. Max Daily Amount: 4 Tabs. pantoprazole 20 mg tablet Commonly known as:  PROTONIX Take 20 mg by mouth daily. QUEtiapine 50 mg tablet Commonly known as:  SEROquel TAKE 2 TABLETS BY MOUTH EVERY NIGHT  
  
 tamsulosin 0.4 mg capsule Commonly known as:  FLOMAX Take 1 Cap by mouth daily. TENS Units Jillian Commonly known as:  TENS 504 Please provide patient tens unit device and pads to be applied to affected area as needed for pain  
  
 tiZANidine 4 mg tablet Commonly known as:  Fabienne Muscat TAKE 1 BY MOUTH EVERY NIGHT AT BEDTIME FOR 14 DAYS THEN 2 BY MOUTH EVERY NIGHT AT BEDTIME AFTER  
  
 topiramate 100 mg tablet Commonly known as:  TOPAMAX Take 1 Tab by mouth two (2) times a day. triamcinolone 0.5 % topical cream  
Commonly known as:  ARISTOCORT Apply thin layer to rash twice a day  
  
 zolpidem 10 mg tablet Commonly known as:  AMBIEN Take 1 Tab by mouth nightly as needed. * Notice: This list has 2 medication(s) that are the same as other medications prescribed for you.  Read the directions carefully, and ask your doctor or other care provider to review them with you. Prescriptions Sent to Pharmacy Refills  
 amoxicillin-clavulanate (AUGMENTIN) 875-125 mg per tablet 0 Sig: Take 1 Tab by mouth two (2) times a day. Class: Normal  
 Pharmacy: [address] [place], [place] [address]rosaura Claros [facility] #: [phone] Route: Oral  
  
Introducing [facility]! Dear Sree Carrasco: Thank you for requesting a Swidjit account. Our records indicate that you already have an active Swidjit account. You can access your account anytime at [email or web address] Did you know that you can access your hospital and ER discharge instructions at any time in Swidjit? You can also review all of your test results from your hospital stay or ER visit. Additional Information If you have questions, please visit the Frequently Asked Questions section of the Swidjit website at [email or web address] Remember, Swidjit is NOT to be used for urgent needs. For medical emergencies, dial 911. Now available from your iPhone and Android! Please provide this summary of care documentation to your next provider. Your primary care clinician is listed as Morene Hodgkin. April Dietrich. If you have any questions after today's visit, please call [phone].

## 2018-09-05 DIAGNOSIS — F51.01 PRIMARY INSOMNIA: ICD-10-CM

## 2018-09-05 NOTE — TELEPHONE ENCOUNTER
PHONE IN [facility] reports the last fill date for Ambien as 08/06/2018. There appears to be no inconsistencies in regards to the prescribing of this medication. Last Visit: 08/29/2018 with MD Wendy Maldonado    Next Appointment: none   Previous Refill Encounters: 08/03/2018 per MD Wendy Maldonado ##0     Requested Prescriptions     Pending Prescriptions Disp Refills    zolpidem (AMBIEN) 10 mg tablet 30 Tab 0     Sig: Take 1 Tab by mouth nightly as needed.

## 2018-09-06 RX ORDER — ZOLPIDEM TARTRATE 10 MG/1
10 TABLET ORAL
Qty: 30 TAB | Refills: 0 | OUTPATIENT
Start: 2018-09-06 | End: 2018-10-02 | Stop reason: SDUPTHER

## 2018-09-12 ENCOUNTER — TELEPHONE (OUTPATIENT)
Dept: PAIN MANAGEMENT | Age: 33
End: 2018-09-12

## 2018-09-12 DIAGNOSIS — M54.42 CHRONIC BILATERAL LOW BACK PAIN WITH BILATERAL SCIATICA: ICD-10-CM

## 2018-09-12 DIAGNOSIS — G89.29 CHRONIC BILATERAL LOW BACK PAIN WITH BILATERAL SCIATICA: ICD-10-CM

## 2018-09-12 DIAGNOSIS — M54.41 CHRONIC BILATERAL LOW BACK PAIN WITH BILATERAL SCIATICA: ICD-10-CM

## 2018-09-12 RX ORDER — OXYCODONE AND ACETAMINOPHEN 5; 325 MG/1; MG/1
1 TABLET ORAL
Qty: 90 TAB | Refills: 0 | Status: SHIPPED | OUTPATIENT
Start: 2018-09-12 | End: 2018-10-11 | Stop reason: SDUPTHER

## 2018-09-12 NOTE — TELEPHONE ENCOUNTER
Lis Arroyo has called requesting a refill of their controlled medication, oxycodone, for the management of  low back pain. Last office visit date: 7/24/18 with Karen Iron, next 10/11/18 with Karen Iron    Date last  was pulled and reviewed : 9/12/18 last filled 8/21/18    Was the patient compliant when the above report was pulled? yes    Analgesia: 60%    Aberrancies: none    ADL's: yes, some days    Adverse Reaction: constipation    Provider's last note and plan of care reviewed? yes  Request forwarded to provider for review.

## 2018-10-02 DIAGNOSIS — F51.01 PRIMARY INSOMNIA: ICD-10-CM

## 2018-10-02 RX ORDER — ZOLPIDEM TARTRATE 10 MG/1
10 TABLET ORAL
Qty: 30 TAB | Refills: 0 | OUTPATIENT
Start: 2018-10-02 | End: 2018-10-02 | Stop reason: SDUPTHER

## 2018-10-02 NOTE — TELEPHONE ENCOUNTER
PHONE IN [facility] reports the last fill date for Ambien as 09/06/2018. There appears to be no inconsistencies in regards to the prescribing of this medication. Last Visit: 08/29/2018 with MD Gregor Schmidt    Next Appointment: none   Previous Refill Encounters: 09/06/2018 per MD Pruett Presumjermaine #30    Requested Prescriptions     Pending Prescriptions Disp Refills    zolpidem (AMBIEN) 10 mg tablet 30 Tab 0     Sig: Take 1 Tab by mouth nightly as needed.

## 2018-10-11 ENCOUNTER — OFFICE VISIT (OUTPATIENT)
Dept: PAIN MANAGEMENT | Age: 33
End: 2018-10-11

## 2018-10-11 VITALS
DIASTOLIC BLOOD PRESSURE: 87 MMHG | SYSTOLIC BLOOD PRESSURE: 142 MMHG | WEIGHT: 292 LBS | BODY MASS INDEX: 37.47 KG/M2 | TEMPERATURE: 98.4 F | HEART RATE: 89 BPM | HEIGHT: 74 IN | RESPIRATION RATE: 22 BRPM

## 2018-10-11 DIAGNOSIS — M54.41 CHRONIC BILATERAL LOW BACK PAIN WITH BILATERAL SCIATICA: Primary | ICD-10-CM

## 2018-10-11 DIAGNOSIS — M54.2 CERVICALGIA: ICD-10-CM

## 2018-10-11 DIAGNOSIS — M46.1 SACROILIITIS (HCC): ICD-10-CM

## 2018-10-11 DIAGNOSIS — G89.29 CHRONIC BILATERAL LOW BACK PAIN WITH BILATERAL SCIATICA: Primary | ICD-10-CM

## 2018-10-11 DIAGNOSIS — Z79.899 ENCOUNTER FOR LONG-TERM (CURRENT) USE OF HIGH-RISK MEDICATION: ICD-10-CM

## 2018-10-11 DIAGNOSIS — M54.42 CHRONIC BILATERAL LOW BACK PAIN WITH BILATERAL SCIATICA: Primary | ICD-10-CM

## 2018-10-11 DIAGNOSIS — G89.4 CHRONIC PAIN SYNDROME: ICD-10-CM

## 2018-10-11 LAB
ALCOHOL UR POC: NORMAL
AMPHETAMINES UR POC: NEGATIVE
BARBITURATES UR POC: NEGATIVE
BENZODIAZEPINES UR POC: NEGATIVE
BUPRENORPHINE UR POC: NEGATIVE
CANNABINOIDS UR POC: NEGATIVE
CARISOPRODOL UR POC: NORMAL
COCAINE UR POC: NEGATIVE
FENTANYL UR POC: NORMAL
MDMA/ECSTASY UR POC: NEGATIVE
METHADONE UR POC: NEGATIVE
METHAMPHETAMINE UR POC: NEGATIVE
METHYLPHENIDATE UR POC: NORMAL
OPIATES UR POC: NEGATIVE
OXYCODONE UR POC: NEGATIVE
PHENCYCLIDINE UR POC: NORMAL
PROPOXYPHENE UR POC: NORMAL
TRAMADOL UR POC: NORMAL
TRICYCLICS UR POC: NEGATIVE

## 2018-10-11 RX ORDER — OXYCODONE AND ACETAMINOPHEN 5; 325 MG/1; MG/1
1 TABLET ORAL
Qty: 85 TAB | Refills: 0 | Status: SHIPPED | OUTPATIENT
Start: 2018-10-16 | End: 2018-11-14 | Stop reason: SDUPTHER

## 2018-10-11 NOTE — PROGRESS NOTES
Nursing Notes    Patient presents to the office today in follow-up. Patient rates his pain at 4/10 on the numerical pain scale. Reviewed medications with counts as follows:    Rx Date filled Qty Dispensed Pill Count Last Dose Short   Patient did not bring in medications , percocet 5/325mg (#90) filled on 09/18/18 to this office; advised to bring in all medications to all office visit. Last opioid agreement 07/2018  Last urine drug screen today     Comments:     POC UDS was performed in office today per verbal order of provider ([address] [place] [address]) ; rbv'd. Any new labs or imaging since last appointment? NO    Have you been to an emergency room (ER) or urgent care clinic since your last visit? NO            Have you been hospitalized since your last visit? NO     If yes, where, when, and reason for visit? Have you seen or consulted any other health care providers outside of the [address] [place] [address]  since your last visit? YES     If yes, where, when, and reason for visit? Mr. Lula Shone has a reminder for a \"due or due soon\" health maintenance. I have asked that he contact his primary care provider for follow-up on this health maintenance.

## 2018-10-11 NOTE — PATIENT INSTRUCTIONS
Learning About Sleeping Well  What does sleeping well mean? Sleeping well means getting enough sleep. How much sleep is enough varies among people. The number of hours you sleep is not as important as how you feel when you wake up. If you do not feel refreshed, you probably need more sleep. Another sign of not getting enough sleep is feeling tired during the day. The average total nightly sleep time is 7½ to 8 hours. Healthy adults may need a little more or a little less than this. Why is getting enough sleep important? Getting enough quality sleep is a basic part of good health. When your sleep suffers, your mood and your thoughts can suffer too. You may find yourself feeling more grumpy or stressed. Not getting enough sleep also can lead to serious problems, including injury, accidents, anxiety, and depression. What might cause poor sleeping? Many things can cause sleep problems, including:  · Stress. Stress can be caused by fear about a single event, such as giving a speech. Or you may have ongoing stress, such as worry about work or school. · Depression, anxiety, and other mental or emotional conditions. · Changes in your sleep habits or surroundings. This includes changes that happen where you sleep, such as noise, light, or sleeping in a different bed. It also includes changes in your sleep pattern, such as having jet lag or working a late shift. · Health problems, such as pain, breathing problems, and restless legs syndrome. · Lack of regular exercise. How can you help yourself? Here are some tips that may help you sleep more soundly and wake up feeling more refreshed. Your sleeping area  · Use your bedroom only for sleeping and sex. A bit of light reading may help you fall asleep. But if it doesn't, do your reading elsewhere in the house. Don't watch TV in bed. · Be sure your bed is big enough to stretch out comfortably, especially if you have a sleep partner.   · Keep your bedroom quiet, dark, and cool. Use curtains, blinds, or a sleep mask to block out light. To block out noise, use earplugs, soothing music, or a \"white noise\" machine. Your evening and bedtime routine  · Create a relaxing bedtime routine. You might want to take a warm shower or bath, listen to soothing music, or drink a cup of noncaffeinated tea. · Go to bed at the same time every night. And get up at the same time every morning, even if you feel tired. What to avoid  · Limit caffeine (coffee, tea, caffeinated sodas) during the day, and don't have any for at least 4 to 6 hours before bedtime. · Don't drink alcohol before bedtime. Alcohol can cause you to wake up more often during the night. · Don't smoke or use tobacco, especially in the evening. Nicotine can keep you awake. · Don't take naps during the day, especially close to bedtime. · Don't lie in bed awake for too long. If you can't fall asleep, or if you wake up in the middle of the night and can't get back to sleep within 15 minutes or so, get out of bed and go to another room until you feel sleepy. · Don't take medicine right before bed that may keep you awake or make you feel hyper or energized. Your doctor can tell you if your medicine may do this and if you can take it earlier in the day. If you can't sleep  · Imagine yourself in a peaceful, pleasant scene. Focus on the details and feelings of being in a place that is relaxing. · Get up and do a quiet or boring activity until you feel sleepy. · Don't drink any liquids after 6 p.m. if you wake up often because you have to go to the bathroom. Where can you learn more? Go to http://natalia-pardeep.info/. Enter H655 in the search box to learn more about \"Learning About Sleeping Well. \"  Current as of: December 7, 2017  Content Version: 11.8  © 1336-0783 [employer], [employer].  Care instructions adapted under license by [employer] (which disclaims liability or warranty for this information). If you have questions about a medical condition or this instruction, always ask your healthcare professional. [address] any warranty or liability for your use of this information. Safe Use of Opioid Pain Medicine: Care Instructions  Your Care Instructions  Pain is your body's way of warning you that something is wrong. Pain feels different for everybody. Only you can describe your pain. A doctor can suggest or prescribe many types of medicines for pain. These range from over-the-counter medicines like acetaminophen (Tylenol) to powerful medicines called opioids. Examples of opioids are fentanyl, hydrocodone, morphine, and oxycodone. Heroin is an illegal opioid  Opioids are strong medicines. They can help you manage pain when you use them the right way. But if you misuse them, they can cause serious harm and even death. For these reasons, doctors are very careful about how they prescribe opioids. If you decide to take opioids, here are some things to remember. · Keep your doctor informed. You can get addicted to opioids. The risk is higher if you have a history of substance use. Your doctor will monitor you closely for signs of misuse and addiction and to figure out when you no longer need to take opioids. · Make a treatment plan. The goal of your plan is to be able to function and do the things you need to do, even if you still have some pain. You might be able to manage your pain with other non-opioid options like physical therapy, relaxation, or over-the-counter pain medicines. · Be aware of the side effects. Opioids can cause serious side effects, such as constipation, dry mouth, and nausea. And over time, you may need a higher dose to get pain relief. This is called tolerance. Your body also gets used to opioids. This is called physical dependence. If you suddenly stop taking them, you may have withdrawal symptoms.   The doctor carefully considered what pain medicine is right for you. You may not have received opioids if your doctor was concerned about drug interactions or your safety, or if he or she had other concerns. It is best to have one doctor or clinic treat your pain. This way you will get the pain medicine that will help you the most. And a doctor will be able to watch for any problems that the medicine might cause. The doctor has checked you carefully, but problems can develop later. If you notice any problems or new symptoms,  get medical treatment right away. Follow-up care is a key part of your treatment and safety. Be sure to make and go to all appointments, and call your doctor if you are having problems. It's also a good idea to know your test results and keep a list of the medicines you take. How can you care for yourself at home? · If you need to take opioids to manage your pain, remember these safety tips. ¨ Follow directions carefully. It's easy to misuse opioids if you take a dose other than what's prescribed by your doctor. This can lead to overdose and even death. Even sharing them with someone they weren't meant for is misuse. ¨ Be cautious. Opioids may affect your judgment and decision making. Do not drive or operate machinery until you can think clearly. Talk with your doctor about when it is safe to drive. ¨ Reduce the risk of drug interactions. Opioids can be dangerous if you take them with alcohol or with certain drugs like sleeping pills and muscle relaxers. Make sure your doctor knows about all the other medicines you take, including over-the-counter medicines. Don't start any new medicines before you talk to your doctor or pharmacist.  Hope Moore Keep others safe. Store opioids in a safe and secure place. Make sure that pets, children, friends, and family can't get to them. When you're done using opioids, make sure to properly dispose of them.  You can either use a community drug take-back program or your drugstore's mail-back program. If one of these programs isn't available, you can flush opioid skin patches and unused opioid pills down the toilet. ¨ Reduce the risk of overdose. Misuse of opioids can be very dangerous. Protect yourself by asking your doctor about a naloxone rescue kit. It can help you--and even save your life--if you take too much of an opioid. · Try other ways to reduce pain. ¨ Relax, and reduce stress. Relaxation techniques such as deep breathing or meditation can help. ¨ Keep moving. Gentle, daily exercise can help reduce pain over the long run. Try low- or no-impact exercises such as walking, swimming, and stationary biking. Do stretches to stay flexible. ¨ Try heat, cold packs, and massage. ¨ Get enough sleep. Pain can make you tired and drain your energy. Talk with your doctor if you have trouble sleeping because of pain. ¨ Think positive. Your thoughts can affect your pain level. Do things that you enjoy to distract yourself when you have pain instead of focusing on the pain. See a movie, read a book, listen to music, or spend time with a friend. · If you are not taking a prescription pain medicine, ask your doctor if you can take an over-the-counter medicine. When should you call for help? Call your doctor now or seek immediate medical care if:    · You have a new kind of pain.     · You have new symptoms, such as a fever or rash, along with the pain.    Watch closely for changes in your health, and be sure to contact your doctor if:    · You think you might be using too much pain medicine, and you need help to use less or stop.     · Your pain gets worse.     · You would like a referral to a doctor or clinic that specializes in pain management. Where can you learn more? Go to http://natalia-pardeep.info/. Enter R108 in the search box to learn more about \"Safe Use of Opioid Pain Medicine: Care Instructions. \"  Current as of: September 10, 2017  Content Version: 11.8  © 4517-2717 [employer]. Care instructions adapted under license by [employer] (which disclaims liability or warranty for this information). If you have questions about a medical condition or this instruction, always ask your healthcare professional. [address] any warranty or liability for your use of this information.

## 2018-10-11 NOTE — MR AVS SNAPSHOT
[address] [facility] [address] 
[phone] Patient: Ramona Crow MRN: [id number] OGH:[birth year] Visit Information Date & Time Provider Department Dept. Phone Encounter #  
 10/11/2018  3:00 PM Yuliya Rosenberg NP [address] [facility] [phone] [id number] Follow-up Instructions Return in about 3 months (around 1/11/2019). Your Appointments 5/9/2019  3:30 PM  
Office Visit with Demond Newby MD  
[facility] [address] [facility]) Appt Note: check up [address] [address] [phone] [address] [facility] [address] [place] [address] Upcoming Health Maintenance Date Due DTaP/Tdap/Td series (1 - Tdap) 11/30/2008 Influenza Age 5 to Adult 8/1/2018 Allergies as of 10/11/2018  Review Complete On: 10/11/2018 By: Yuliya Rosenberg NP No Known Allergies Current Immunizations  Reviewed on 10/18/2013 No immunizations on file. Not reviewed this visit You Were Diagnosed With   
  
 Codes Comments Chronic bilateral low back pain with bilateral sciatica    -  Primary ICD-10-CM: M54.42, M54.41, G89.29 ICD-9-CM: 724.2, 724.3, 338.29 Encounter for long-term (current) use of high-risk medication     ICD-10-CM: Z79.899 ICD-9-CM: V58.69 Cervicalgia     ICD-10-CM: M54.2 ICD-9-CM: 723.1 Chronic pain syndrome     ICD-10-CM: G89.4 ICD-9-CM: 338.4 Sacroiliitis ([facility] [address]     ICD-10-CM: M46.1 ICD-9-CM: 720.2 Vitals BP Pulse Temp Resp Height(growth percentile) Weight(growth percentile) 142/87 (BP 1 Location: Right arm, BP Patient Position: Sitting) 89 98.4 °F (36.9 °C) (Oral) 22 6' 2\" (1.88 m) 292 lb (132.5 kg) BMI Smoking Status 37.49 kg/m2 Former Smoker Vitals History BMI and BSA Data  Body Mass Index Body Surface Area  
 37.49 kg/m 2 2.63 m 2  
  
  
 Preferred Pharmacy Pharmacy Name Phone [address], [address] [facility] [phone] Your Updated Medication List  
  
   
This list is accurate as of 10/11/18  4:35 PM.  Always use your most recent med list.  
  
  
  
  
 CYMBALTA 30 mg capsule Generic drug:  DULoxetine Take 30 mg by mouth daily. oxyCODONE-acetaminophen 5-325 mg per tablet Commonly known as:  PERCOCET Take 1 Tab by mouth every eight (8) hours as needed for Pain for up to 30 days. Max Daily Amount: 3 Tabs. Start taking on:  10/16/2018 QUEtiapine 50 mg tablet Commonly known as:  SEROquel TAKE 2 TABLETS BY MOUTH EVERY NIGHT  
  
 tiZANidine 4 mg tablet Commonly known as:  Tk Andrey TAKE 1 BY MOUTH EVERY NIGHT AT BEDTIME FOR 14 DAYS THEN 2 BY MOUTH EVERY NIGHT AT BEDTIME AFTER  
  
 topiramate 100 mg tablet Commonly known as:  TOPAMAX Take 1 Tab by mouth two (2) times a day. Prescriptions Printed Refills  
 oxyCODONE-acetaminophen (PERCOCET) 5-325 mg per tablet 0 Starting on: 10/16/2018 Sig: Take 1 Tab by mouth every eight (8) hours as needed for Pain for up to 30 days. Max Daily Amount: 3 Tabs. Class: Print Route: Oral  
  
We Performed the Following AMB POC DRUG SCREEN () [ [facility]] DRUG SCREEN [[id number] Custom] Follow-up Instructions Return in about 3 months (around 1/11/2019). Patient Instructions Learning About Sleeping Well What does sleeping well mean? Sleeping well means getting enough sleep. How much sleep is enough varies among people. The number of hours you sleep is not as important as how you feel when you wake up. If you do not feel refreshed, you probably need more sleep. Another sign of not getting enough sleep is feeling tired during the day. The average total nightly sleep time is 7½ to 8 hours.  Healthy adults may need a little more or a little less than this. Why is getting enough sleep important? Getting enough quality sleep is a basic part of good health. When your sleep suffers, your mood and your thoughts can suffer too. You may find yourself feeling more grumpy or stressed. Not getting enough sleep also can lead to serious problems, including injury, accidents, anxiety, and depression. What might cause poor sleeping? Many things can cause sleep problems, including: · Stress. Stress can be caused by fear about a single event, such as giving a speech. Or you may have ongoing stress, such as worry about work or school. · Depression, anxiety, and other mental or emotional conditions. · Changes in your sleep habits or surroundings. This includes changes that happen where you sleep, such as noise, light, or sleeping in a different bed. It also includes changes in your sleep pattern, such as having jet lag or working a late shift. · Health problems, such as pain, breathing problems, and restless legs syndrome. · Lack of regular exercise. How can you help yourself? Here are some tips that may help you sleep more soundly and wake up feeling more refreshed. Your sleeping area · Use your bedroom only for sleeping and sex. A bit of light reading may help you fall asleep. But if it doesn't, do your reading elsewhere in the house. Don't watch TV in bed. · Be sure your bed is big enough to stretch out comfortably, especially if you have a sleep partner. · Keep your bedroom quiet, dark, and cool. Use curtains, blinds, or a sleep mask to block out light. To block out noise, use earplugs, soothing music, or a \"white noise\" machine. Your evening and bedtime routine · Create a relaxing bedtime routine. You might want to take a warm shower or bath, listen to soothing music, or drink a cup of noncaffeinated tea. · Go to bed at the same time every night. And get up at the same time every morning, even if you feel tired. What to avoid · Limit caffeine (coffee, tea, caffeinated sodas) during the day, and don't have any for at least 4 to 6 hours before bedtime. · Don't drink alcohol before bedtime. Alcohol can cause you to wake up more often during the night. · Don't smoke or use tobacco, especially in the evening. Nicotine can keep you awake. · Don't take naps during the day, especially close to bedtime. · Don't lie in bed awake for too long. If you can't fall asleep, or if you wake up in the middle of the night and can't get back to sleep within 15 minutes or so, get out of bed and go to another room until you feel sleepy. · Don't take medicine right before bed that may keep you awake or make you feel hyper or energized. Your doctor can tell you if your medicine may do this and if you can take it earlier in the day. If you can't sleep · Imagine yourself in a peaceful, pleasant scene. Focus on the details and feelings of being in a place that is relaxing. · Get up and do a quiet or boring activity until you feel sleepy. · Don't drink any liquids after 6 p.m. if you wake up often because you have to go to the bathroom. Where can you learn more? Go to http://natalia-pardeep.info/. Enter D494 in the search box to learn more about \"Learning About Sleeping Well. \" Current as of: December 7, 2017 Content Version: 11.8 © 2265-0094 [employer]. Care instructions adapted under license by [employer] (which disclaims liability or warranty for this information). If you have questions about a medical condition or this instruction, always ask your healthcare professional. [place] [address] any warranty or liability for your use of this information. Safe Use of Opioid Pain Medicine: Care Instructions Your Care Instructions Pain is your body's way of warning you that something is wrong. Pain feels different for everybody. Only you can describe your pain. A doctor can suggest or prescribe many types of medicines for pain. These range from over-the-counter medicines like acetaminophen (Tylenol) to powerful medicines called opioids. Examples of opioids are fentanyl, hydrocodone, morphine, and oxycodone. Heroin is an illegal opioid Opioids are strong medicines. They can help you manage pain when you use them the right way. But if you misuse them, they can cause serious harm and even death. For these reasons, doctors are very careful about how they prescribe opioids. If you decide to take opioids, here are some things to remember. · Keep your doctor informed. You can get addicted to opioids. The risk is higher if you have a history of substance use. Your doctor will monitor you closely for signs of misuse and addiction and to figure out when you no longer need to take opioids. · Make a treatment plan. The goal of your plan is to be able to function and do the things you need to do, even if you still have some pain. You might be able to manage your pain with other non-opioid options like physical therapy, relaxation, or over-the-counter pain medicines. · Be aware of the side effects. Opioids can cause serious side effects, such as constipation, dry mouth, and nausea. And over time, you may need a higher dose to get pain relief. This is called tolerance. Your body also gets used to opioids. This is called physical dependence. If you suddenly stop taking them, you may have withdrawal symptoms. The doctor carefully considered what pain medicine is right for you. You may not have received opioids if your doctor was concerned about drug interactions or your safety, or if he or she had other concerns. It is best to have one doctor or clinic treat your pain. This way you will get the pain medicine that will help you the most. And a doctor will be able to watch for any problems that the medicine might cause. The doctor has checked you carefully, but problems can develop later. If you notice any problems or new symptoms,  get medical treatment right away. Follow-up care is a key part of your treatment and safety. Be sure to make and go to all appointments, and call your doctor if you are having problems. It's also a good idea to know your test results and keep a list of the medicines you take. How can you care for yourself at home? · If you need to take opioids to manage your pain, remember these safety tips. ¨ Follow directions carefully. It's easy to misuse opioids if you take a dose other than what's prescribed by your doctor. This can lead to overdose and even death. Even sharing them with someone they weren't meant for is misuse. ¨ Be cautious. Opioids may affect your judgment and decision making. Do not drive or operate machinery until you can think clearly. Talk with your doctor about when it is safe to drive. ¨ Reduce the risk of drug interactions. Opioids can be dangerous if you take them with alcohol or with certain drugs like sleeping pills and muscle relaxers. Make sure your doctor knows about all the other medicines you take, including over-the-counter medicines. Don't start any new medicines before you talk to your doctor or pharmacist. 
Becky Blinks Keep others safe. Store opioids in a safe and secure place. Make sure that pets, children, friends, and family can't get to them. When you're done using opioids, make sure to properly dispose of them. You can either use a community drug take-back program or your drugstore's mail-back program. If one of these programs isn't available, you can flush opioid skin patches and unused opioid pills down the toilet. ¨ Reduce the risk of overdose. Misuse of opioids can be very dangerous. Protect yourself by asking your doctor about a naloxone rescue kit. It can help youand even save your lifeif you take too much of an opioid. · Try other ways to reduce pain. ¨ Relax, and reduce stress. Relaxation techniques such as deep breathing or meditation can help. ¨ Keep moving. Gentle, daily exercise can help reduce pain over the long run. Try low- or no-impact exercises such as walking, swimming, and stationary biking. Do stretches to stay flexible. ¨ Try heat, cold packs, and massage. ¨ Get enough sleep. Pain can make you tired and drain your energy. Talk with your doctor if you have trouble sleeping because of pain. ¨ Think positive. Your thoughts can affect your pain level. Do things that you enjoy to distract yourself when you have pain instead of focusing on the pain. See a movie, read a book, listen to music, or spend time with a friend. · If you are not taking a prescription pain medicine, ask your doctor if you can take an over-the-counter medicine. When should you call for help? Call your doctor now or seek immediate medical care if: 
  · You have a new kind of pain.  
  · You have new symptoms, such as a fever or rash, along with the pain.  
 Watch closely for changes in your health, and be sure to contact your doctor if: 
  · You think you might be using too much pain medicine, and you need help to use less or stop.  
  · Your pain gets worse.  
  · You would like a referral to a doctor or clinic that specializes in pain management. Where can you learn more? Go to http://natalia-pardeep.info/. Enter R108 in the search box to learn more about \"Safe Use of Opioid Pain Medicine: Care Instructions. \" Current as of: September 10, 2017 Content Version: 11.8 © 2589-7810 [employer]. Care instructions adapted under license by [employer] (which disclaims liability or warranty for this information). If you have questions about a medical condition or this instruction, always ask your healthcare professional. [address] any warranty or liability for your use of this information. Introducing [facility]! Dear Milda Cogan: Thank you for requesting a [employer] account. Our records indicate that you already have an active [employer] account. You can access your account anytime at [email or web address] Did you know that you can access your hospital and ER discharge instructions at any time in [employer]? You can also review all of your test results from your hospital stay or ER visit. Additional Information If you have questions, please visit the Frequently Asked Questions section of the [employer] website at [email or web address] Remember, [employer] is NOT to be used for urgent needs. For medical emergencies, dial 911. Now available from your iPhone and Android! Please provide this summary of care documentation to your next provider. Your primary care clinician is listed as Nelida Mancera. Andra Gee. If you have any questions after today's visit, please call [phone].

## 2018-10-11 NOTE — PROGRESS NOTES
Referral date 05/25/17, source Ortho and for cervical radiculopathy and low back pain. HPI:  Leretha Goltz is a 29 y.o. male here for f/u visit for ongoing evaluation of neck and back pain. Pt was last seen here on 07/24/18. Pt denies interval changes on the character or distribution of pain. Pain is located lower back radiating down left stopping at knee and neck pain radiating down left arm to fingers. The pain is described as lower back aching, pins ans stabbing. Pain at its best is 2/10. Pain at its worse is 9/10. The pain is worsened by standing and sitting prolonged period of time for back, sleeping the wrong for the neck Symptoms are relieved by stretching, physical therapy, [employer], cold packs,,laying down, decreasing noise,muscle relaxers. Pt has tried Lidocaine patches, compound cream with no perceived benefit. Pt has not used a TENS unit, RFA, SCS trial. Pt S/P laminectomy, discectomy. Patient is optimistic to try new modalities to help with this pain. Patient has been taking the medication four times a day and has been out of medications a couple days, counseled patient on strict compliance with opioid care agreement. Social History     Social History    Marital status: SINGLE     Spouse name: N/A    Number of children: N/A    Years of education: N/A     Occupational History    Not on file.      Social History Main Topics    Smoking status: Former Smoker    Smokeless tobacco: Never Used    Alcohol use No    Drug use: No    Sexual activity: Not on file     Other Topics Concern    Not on file     Social History Narrative     Family History   Problem Relation Age of Onset    Asthma Mother      No Known Allergies  Past Medical History:   Diagnosis Date    ADD (attention deficit disorder)     Anxiety     Depression     Ill-defined condition     chronic neck and back pain    Migraine     Obesity     PTSD (post-traumatic stress disorder)     Seasonal allergic rhinitis     Sleep apnea Past Surgical History:   Procedure Laterality Date    HX BACK SURGERY  7/7/2011    lamenectomy & diskectomy.  HX TONSIL AND ADENOIDECTOMY       Current Outpatient Prescriptions on File Prior to Visit   Medication Sig    QUEtiapine (SEROQUEL) 50 mg tablet TAKE 2 TABLETS BY MOUTH EVERY NIGHT    tiZANidine (ZANAFLEX) 4 mg tablet TAKE 1 BY MOUTH EVERY NIGHT AT BEDTIME FOR 14 DAYS THEN 2 BY MOUTH EVERY NIGHT AT BEDTIME AFTER    topiramate (TOPAMAX) 100 mg tablet Take 1 Tab by mouth two (2) times a day.  DULoxetine (CYMBALTA) 30 mg capsule Take 30 mg by mouth daily. No current facility-administered medications on file prior to visit. ROS:  Denies fever,vomiting,  abdominal pain, chest pain, shortness or breath/trouble breathing, weakness, trouble swallowing, changes in vision, changes in hearing, falls, dizziness, bladder incontinence, bowel incontinence, suicidal ideations, homicidal ideations or alcohol use. Positive findings include chills, nausea, diarrhea, constipation, depression ongoing and anxiety ongoing       Opioid specific risk: sleep apnea, anxiety, depression, PTSD, obesity, ADD. Opioid specific history: concurrent use of opioids for approximately three years, with no opioid holiday. Vitals:    10/11/18 1547   BP: 142/87   Pulse: 89   Resp: 22   Temp: 98.4 °F (36.9 °C)   TempSrc: Oral   Weight: 132.5 kg (292 lb)   Height: 6' 2\" (1.88 m)   PainSc:   4   PainLoc: Back        Imaging:    \"\"\"\"\" 7/14/15 MRI lumbar spine [facility] CONT IMPRESSION:   1. New L4/L5 right central disc extrusion with impingement on the right crossing  L5 nerve root as discussed. 2. Slightly increased L3/L4 central disc protrusion with right greater than left  narrowing of the superior lateral recesses and likely impingement on the right  crossing L4 nerve root. 3. Otherwise similar appearing postsurgical changes at L3/L4 and L4/L5.   4. No high-grade central canal stenosis or foraminal stenosis\"\"\"\"\"\"    Physical exam:  AFVS elevated BP, no acute distress, normal body habitus. A&OXs 3. Normocephalic, atraumatic. Conjugate gaze, clear sclerae. Speech is clear and appropriate. Mood is appropriate for situation  Gait and balance are within functional limits. Non-labored breathing. Lungs CTA B/L. No wheezing, rales or rhonchi. Heart RRR with S1 and S2 noted. No murmurs. Cervical, thoracic, lumbar not TTP    UE:   Strength for right upper extremity is 5/5 for shoulder abduction, elbow flexion, wrist extension, elbow extension, finger abduction, flexor digitorum profundus to digits 2 through 5. Strength for left upper extremity is 5/5 for shoulder abduction, elbow flexion, wrist extension, elbow extension, finger abduction, flexor digitorum profundus to digits 2 through 5. Sensation to light touch is intact for left C4, 5, 6, T1. Pre-sensation C7, C8  Sensation to light touch is intact for right C4-T1. Muscle stretch reflexes for right upper extremity are absent for C5, C6, C7. Muscle stretch reflexes for left upper extremity are sent for C5, C6, C7. Full active range of motion cervical flexion decreased by 25% to endrange reproduce primary pain  Full active range of motion cervical extension decreased by 50% to endrange reproduced primary pain    LE:   Strength for right lower extremity is 5/5 for hip flexion, knee extension, dorsiflexion, extensor hallucis longus, plantar flexion. Strength for left lower extremity is 5/5 for hip flexion, knee extension, dorsiflexion, extensor hallucis longus, plantar flexion. Sensation to light touch is intact for right L2-S2. Sensation to light touch is intact for left L2-S2. Muscle Stretch reflexes for right lower extremity are absent for L4, absent S1. Muscle Stretch reflexes for left lower extremity are absent for L4, absent S1. Full AROM lumbar flexion decreased by 50% to endrange reproduction of primary pain.   Full AROM lumbar extension did not cause reproduction of primary pain. Negative seated straight leg raise bilaterally. Negative supine straight leg raise right. Positive supine straight leg raise left   negative Stinchfield's on the right and a left. Negative FABERs on the right and the left. Negative FADIRS on the right and the left. Calculated MEQ - 22.5  Naloxone rescue - Yes  Prophylactic bowel program - no  Date of last OCA 07/24/18  Last UDS today, negative for opioids awaiting confirmatory results   date checked today, findings consistent    Primary Care Physician  Dinesh Garner  [id number] [facility] [place]   [phone]    Today   last visit  ORT -     PGIC - 2 and 4  4 and 6   ROLO - 46%   40%    COMM -15  23      PHQ -- . PHQ over the last two weeks 7/24/2018   PHQ Not Done Active Diagnosis of Depression or Bipolar Disorder   Little interest or pleasure in doing things -   Feeling down, depressed, irritable, or hopeless -   Total Score PHQ 2 -   Trouble falling or staying asleep, or sleeping too much -   Feeling tired or having little energy -   Poor appetite, weight loss, or overeating -   Feeling bad about yourself - or that you are a failure or have let yourself or your family down -   Trouble concentrating on things such as school, work, reading, or watching TV -   Moving or speaking so slowly that other people could have noticed; or the opposite being so fidgety that others notice -   Thoughts of being better off dead, or hurting yourself in some way -   PHQ 9 Score -   How difficult have these problems made it for you to do your work, take care of your home and get along with others -       DSM V-OUD Screen - deferred   Assessment/Plan:     ICD-10-CM ICD-9-CM    1. Chronic bilateral low back pain with bilateral sciatica M54.42 724.2 oxyCODONE-acetaminophen (PERCOCET) 5-325 mg per tablet    M54.41 724.3     G89.29 338.29    2.  Encounter for long-term (current) use of high-risk medication Z79.899 V58.69 DRUG SCREEN      AMB POC DRUG SCREEN ()   3. Cervicalgia M54.2 723.1    4. Chronic pain syndrome G89.4 338.4    5. Sacroiliitis (HCC) M46.1 720.2         Patient to take OTC tylenol 500 mg 1-2 tabs up to TID PRN pain. 3000 mg max daily dose. Patient to take OTC Mortin 800 mg every 8 hours as needed for pain. Patient has groupon for a neck massage he will seek out and obtain     UDS today    Patient to follow-up and obtain chiropractor care and is established care with [facility] chirop[facility]    Ordered Nexwave to be used as needed for pain. Patient has been taking yoga a laties class and has been going to the gym to build strengthening     Patient given piriformis stretching exercises to be performed at home. Do not recommend long term opioid therapy for this patient at this time. Pt currently taking oxycodone/acetaminophen 5/325 mg tablet take 1 tablet every 8 hours as needed for pain . Their MME is 22.5. Today, we will continue the weaning of patients opioid medication with a goal of being opioid free, pending safety and compliance. Pt instructed to call if they experience any signs of withdrawal (diarrhea, nausea, vomiting, sweating or chills, agitation, itching). Today, pt given prescription for oxycodone/acetaminophen 5/325 mg tablet take 1 tablet every 8 hours as needed for pain with a total of 85 tabs to be budgeted over 30 days. Their new MME is 22.5. Pt instructed to call 7-10 days before they run out of their medications for refill. At this time pt will be provided with oxycodone/acetaminophen 5/325 mg tablet take 1 tablet every 8 hours as needed for pain with a total of 80 tabs to be budgeted over 30 days. pending safety and compliance. At following refill, pt will be provided with oxycodone/acetaminophen 5/325 mg tablet take 1 tablet every 8 hours as needed for pain with a total of 75 tabs to be budgeted over 30 days.  pending safety and compliance. At next office visit, the plan is to provide patient with oxycodone/acetaminophen 5/325 mg tablet take 1 tablet every 8 hours as needed for pain with a total of 70 tabs to be budgeted over 30 days. Their new MME will be approximately 22.5. If patient has difficulty with the wean or difficulty with cravings we will consider referral to mental health for ongoing assessment and treatment for opioid use disorder. Consider RFA, physical therapy, H wave, SCS trial.    Follow up ongoing assessment and ongoing development of integrative and comprehensive plan of care for chronic pain. .Goals: To establish complementary and integrative plan of care to address chronic pain issues while minimizing pharmaceuticals to maximize patient's function improve quality of life. Education:  Patient again educated on the importance of strict compliance with the opioid care agreement while on opioid therapy. Patient also again educated that they should avoid driving while on chronic opioid therapy. Also advised to avoid alcohol and to avoid benzodiazepines while on opioid therapy. Handouts given regarding opioid safety and sleep health. Patient Homework:  Continue to independently investigate yoga for persons with chronic pain and core strengthening exercises. Follow-up Disposition:  Return in about 3 months (around 1/11/2019). [address] was used for portions of this report. Unintended errors may occur.

## 2018-11-01 DIAGNOSIS — F51.01 PRIMARY INSOMNIA: Primary | ICD-10-CM

## 2018-11-01 RX ORDER — ZOLPIDEM TARTRATE 10 MG/1
10 TABLET ORAL
Qty: 30 TAB | Refills: 1 | OUTPATIENT
Start: 2018-11-01 | End: 2018-12-26 | Stop reason: SDUPTHER

## 2018-11-01 NOTE — TELEPHONE ENCOUNTER
PHONE IN [facility] reports the last fill date for Ambien as 10/03/2018. There appears to be no inconsistencies in regards to the prescribing of this medication. Last Visit: 08/29/2018 with MD Angelica Ozuna    Next Appointment: none   Previous Refill Encounters: 10/03/2018 per MD Angelica Ozuna #[id number]    Requested Prescriptions     Pending Prescriptions Disp Refills    zolpidem (AMBIEN) 10 mg tablet 30 Tab 0     Sig: Take 1 Tab by mouth nightly as needed for Sleep. Max Daily Amount: 10 mg.

## 2018-11-02 ENCOUNTER — OFFICE VISIT (OUTPATIENT)
Dept: INTERNAL MEDICINE CLINIC | Age: 33
End: 2018-11-02

## 2018-11-02 VITALS
HEIGHT: 74 IN | TEMPERATURE: 98.7 F | SYSTOLIC BLOOD PRESSURE: 138 MMHG | OXYGEN SATURATION: 98 % | DIASTOLIC BLOOD PRESSURE: 78 MMHG | WEIGHT: 294 LBS | RESPIRATION RATE: 12 BRPM | HEART RATE: 86 BPM | BODY MASS INDEX: 37.73 KG/M2

## 2018-11-02 DIAGNOSIS — R04.0 ANTERIOR EPISTAXIS: Primary | ICD-10-CM

## 2018-11-02 RX ORDER — MUPIROCIN 20 MG/G
OINTMENT TOPICAL
Qty: 22 G | Refills: 0 | Status: SHIPPED | OUTPATIENT
Start: 2018-11-02 | End: 2019-01-01

## 2018-11-02 NOTE — PROGRESS NOTES
Kasandra Whitehead 1985, is a 29 y.o. male, who is seen today for evaluation of epistaxis and soreness of his right nostril. This been going on for about a month, every morning there is some bleeding from the right side of his nose. With pressure it stops fairly quickly. There is a little tender. He says it feels like there is a scab in there when he puts his small finger in that side of his nose. He has not been treating that in any way. Once he used Neosporin. Past Medical History:   Diagnosis Date    ADD (attention deficit disorder)     Anxiety     Depression     Ill-defined condition     chronic neck and back pain    Migraine     Obesity     PTSD (post-traumatic stress disorder)     Seasonal allergic rhinitis     Sleep apnea      Current Outpatient Medications   Medication Sig Dispense Refill    mupirocin (BACTROBAN) 2 % ointment Apply to the right nasal passage twice a day 22 g 0    zolpidem (AMBIEN) 10 mg tablet Take 1 Tab by mouth nightly as needed for Sleep. Max Daily Amount: 10 mg. 30 Tab 1    QUEtiapine (SEROQUEL) 50 mg tablet TAKE 2 TABLETS BY MOUTH EVERY NIGHT 60 Tab 0    tiZANidine (ZANAFLEX) 4 mg tablet TAKE 1 BY MOUTH EVERY NIGHT AT BEDTIME FOR 14 DAYS THEN 2 BY MOUTH EVERY NIGHT AT BEDTIME AFTER 180 Tab 3    topiramate (TOPAMAX) 100 mg tablet Take 1 Tab by mouth two (2) times a day. 180 Tab 1    oxyCODONE-acetaminophen (PERCOCET) 5-325 mg per tablet Take 1 Tab by mouth every eight (8) hours as needed for Pain for up to 30 days. Max Daily Amount: 3 Tabs. 85 Tab 0    DULoxetine (CYMBALTA) 30 mg capsule Take 30 mg by mouth daily. Visit Vitals  /78   Pulse 86   Temp 98.7 °F (37.1 °C) (Oral)   Resp 12   Ht 6' 2\" (1.88 m)   Wt 294 lb (133.4 kg)   SpO2 98%   BMI 37.75 kg/m²     Upon inspection of the nasal passages I do not see any scabbing or blood currently but cannot see very high into the nasal passage with nasal speculum.   There is mild tenderness with external pressure over the right nostril. Pharynx reveals no redness exudate drainage or blood. Neck reveals no adenopathy. The outside of the nose all looks normal, no redness. Assessment: He seems to have infection of nasal air follicles with irritation causing the bleeding. It could be more tender within that but for now we will treat with mupirocin twice a day inside the right nostril and saline spray in the right nostril 5 times a day and he may try a little Vaseline in there as well to keep natural moisture in the tissues. If he is not significantly improved within 2 weeks I will have him see Dr. Latasha Brooks. Tomy De La Paz MD FACP    Please note: This document has been produced using voice recognition software. Unrecognized errors in transcription may be present. This visit lasted 25 minutes, greater than 50% of the time was spent discussing the pathophysiology of what seems to be the problem, also other possible etiologies which are quite rare but the need for ENT evaluation if the bleeding or soreness persist.  Also, I carefully explained the specifics of the proposed treatment as above.

## 2018-11-14 DIAGNOSIS — M54.42 CHRONIC BILATERAL LOW BACK PAIN WITH BILATERAL SCIATICA: ICD-10-CM

## 2018-11-14 DIAGNOSIS — M54.41 CHRONIC BILATERAL LOW BACK PAIN WITH BILATERAL SCIATICA: ICD-10-CM

## 2018-11-14 DIAGNOSIS — G89.29 CHRONIC BILATERAL LOW BACK PAIN WITH BILATERAL SCIATICA: ICD-10-CM

## 2018-11-14 RX ORDER — OXYCODONE AND ACETAMINOPHEN 5; 325 MG/1; MG/1
TABLET ORAL
Qty: 80 TAB | Refills: 0 | Status: SHIPPED | OUTPATIENT
Start: 2018-11-14 | End: 2018-12-12 | Stop reason: SDUPTHER

## 2018-11-14 NOTE — TELEPHONE ENCOUNTER
Ramona Crow has called requesting a refill of their controlled medication, Percocet 5/325 mg tab, for the management of chronic neck and back pain. Last office visit date: 10/11/18 with Susan and has a f/u appt on 1/3/19 with them as well. Last opioid care agreement 7/24/18    Last UDS was done 10/11/18    Date last  was pulled and reviewed : 11/14/18. Last fill date: 10/16/18    Was the patient compliant when the above report was pulled? Patient filled Ambien from another provider on 11/1/18. Analgesia: Patient reports only 20% pain relief since decrease in regimen. Aberrancies: Last FYI 10/11/18    ADL's: Patient states they are able to perform some of their ADL's. Adverse Reaction: Patient reports some constipation. Provider's last note and plan of care reviewed? yes  Request forwarded to provider for review.

## 2018-11-15 NOTE — TELEPHONE ENCOUNTER
Attempted to contact patient to inform them their prescription was ready for  but patient did not answer the phone and had to leave a message for the patient to call the office back. Clinic number provided.

## 2018-11-23 RX ORDER — TOPIRAMATE 100 MG/1
TABLET, FILM COATED ORAL
Qty: 180 TAB | Refills: 0 | Status: SHIPPED | OUTPATIENT
Start: 2018-11-23 | End: 2019-04-08 | Stop reason: SDUPTHER

## 2018-12-12 DIAGNOSIS — M54.41 CHRONIC BILATERAL LOW BACK PAIN WITH BILATERAL SCIATICA: ICD-10-CM

## 2018-12-12 DIAGNOSIS — G89.29 CHRONIC BILATERAL LOW BACK PAIN WITH BILATERAL SCIATICA: ICD-10-CM

## 2018-12-12 DIAGNOSIS — M54.42 CHRONIC BILATERAL LOW BACK PAIN WITH BILATERAL SCIATICA: ICD-10-CM

## 2018-12-12 RX ORDER — OXYCODONE AND ACETAMINOPHEN 5; 325 MG/1; MG/1
TABLET ORAL
Qty: 70 TAB | Refills: 0 | Status: SHIPPED | OUTPATIENT
Start: 2018-12-14 | End: 2019-01-01

## 2018-12-12 NOTE — TELEPHONE ENCOUNTER
01:43 pm The patient called and he stated that he takes Miralax and also taking Tums for indigestion.

## 2018-12-12 NOTE — TELEPHONE ENCOUNTER
Juaquin Renteria has called requesting a refill of their controlled medication, Percocet 5/325, for the management of chronic pain. Last office visit date: 10/11/18  Last opioid care agreement 07/24/18  Last UDS was done 10/11/18 negative for percocet    Date last  was pulled and reviewed : 12/12/18    Was the patient compliant when the above report was pulled? yes    Analgesia: Patient reports 40% pain relief on current regimen. Aberrancies: none in last 30 days    ADL's: Patient states they are able to perform ADL's on current regimen. Adverse Reaction: Patient reports constipation  Attempted to call patient back to see if he is taking anything for constipation and if it is working left voicemail to return call . Provider's last note and plan of care reviewed? yes  Request forwarded to provider for review.

## 2018-12-26 DIAGNOSIS — F51.01 PRIMARY INSOMNIA: ICD-10-CM

## 2018-12-26 RX ORDER — ZOLPIDEM TARTRATE 10 MG/1
TABLET ORAL
Qty: 30 TAB | Refills: 0 | OUTPATIENT
Start: 2018-12-26 | End: 2019-01-28 | Stop reason: SDUPTHER

## 2019-01-01 ENCOUNTER — HOSPITAL ENCOUNTER (EMERGENCY)
Age: 34
Discharge: HOME | End: 2019-01-01
Attending: EMERGENCY MEDICINE | Admitting: EMERGENCY MEDICINE
Payer: COMMERCIAL

## 2019-01-01 ENCOUNTER — APPOINTMENT (OUTPATIENT)
Dept: GENERAL RADIOLOGY | Age: 34
End: 2019-01-01
Attending: EMERGENCY MEDICINE
Payer: COMMERCIAL

## 2019-01-01 ENCOUNTER — APPOINTMENT (OUTPATIENT)
Dept: CT IMAGING | Age: 34
End: 2019-01-01
Attending: EMERGENCY MEDICINE
Payer: COMMERCIAL

## 2019-01-01 VITALS
SYSTOLIC BLOOD PRESSURE: 149 MMHG | HEART RATE: 68 BPM | TEMPERATURE: 98.8 F | RESPIRATION RATE: 16 BRPM | WEIGHT: 300 LBS | HEIGHT: 74 IN | BODY MASS INDEX: 38.5 KG/M2 | DIASTOLIC BLOOD PRESSURE: 83 MMHG | OXYGEN SATURATION: 99 %

## 2019-01-01 DIAGNOSIS — R31.9 HEMATURIA, UNSPECIFIED TYPE: ICD-10-CM

## 2019-01-01 DIAGNOSIS — R10.84 ABDOMINAL PAIN, GENERALIZED: ICD-10-CM

## 2019-01-01 DIAGNOSIS — N20.0 KIDNEY STONE: Primary | ICD-10-CM

## 2019-01-01 DIAGNOSIS — K21.9 GASTROESOPHAGEAL REFLUX DISEASE, ESOPHAGITIS PRESENCE NOT SPECIFIED: ICD-10-CM

## 2019-01-01 LAB
ALBUMIN SERPL-MCNC: 4.2 G/DL (ref 3.4–5)
ALBUMIN/GLOB SERPL: 1.4 {RATIO} (ref 0.8–1.7)
ALP SERPL-CCNC: 93 U/L (ref 45–117)
ALT SERPL-CCNC: 47 U/L (ref 16–61)
ANION GAP BLD CALC-SCNC: 18 MMOL/L (ref 10–20)
APPEARANCE UR: ABNORMAL
AST SERPL-CCNC: 23 U/L (ref 15–37)
BACTERIA URNS QL MICRO: ABNORMAL /HPF
BASOPHILS # BLD: 0 K/UL (ref 0–0.1)
BASOPHILS NFR BLD: 0 % (ref 0–2)
BILIRUB DIRECT SERPL-MCNC: 0.1 MG/DL (ref 0–0.2)
BILIRUB SERPL-MCNC: 0.4 MG/DL (ref 0.2–1)
BILIRUB UR QL: NEGATIVE
BUN BLD-MCNC: 9 MG/DL (ref 7–18)
CA-I BLD-MCNC: 1.22 MMOL/L (ref 1.12–1.32)
CHLORIDE BLD-SCNC: 104 MMOL/L (ref 100–108)
CO2 BLD-SCNC: 24 MMOL/L (ref 19–24)
COLOR UR: ABNORMAL
CREAT UR-MCNC: 1 MG/DL (ref 0.6–1.3)
DIFFERENTIAL METHOD BLD: ABNORMAL
EOSINOPHIL # BLD: 0.1 K/UL (ref 0–0.4)
EOSINOPHIL NFR BLD: 2 % (ref 0–5)
ERYTHROCYTE [DISTWIDTH] IN BLOOD BY AUTOMATED COUNT: 13.3 % (ref 11.6–14.5)
GLOBULIN SER CALC-MCNC: 3 G/DL (ref 2–4)
GLUCOSE BLD STRIP.AUTO-MCNC: 91 MG/DL (ref 74–106)
GLUCOSE UR STRIP.AUTO-MCNC: NEGATIVE MG/DL
HCT VFR BLD AUTO: 46.5 % (ref 36–48)
HCT VFR BLD CALC: 49 % (ref 36–49)
HGB BLD-MCNC: 15.5 G/DL (ref 13–16)
HGB BLD-MCNC: 16.7 G/DL (ref 12–16)
HGB UR QL STRIP: ABNORMAL
KETONES UR QL STRIP.AUTO: NEGATIVE MG/DL
LEUKOCYTE ESTERASE UR QL STRIP.AUTO: NEGATIVE
LIPASE SERPL-CCNC: 174 U/L (ref 73–393)
LYMPHOCYTES # BLD: 1.9 K/UL (ref 0.9–3.6)
LYMPHOCYTES NFR BLD: 22 % (ref 21–52)
MCH RBC QN AUTO: 30.1 PG (ref 24–34)
MCHC RBC AUTO-ENTMCNC: 33.3 G/DL (ref 31–37)
MCV RBC AUTO: 90.3 FL (ref 74–97)
MONOCYTES # BLD: 0.7 K/UL (ref 0.05–1.2)
MONOCYTES NFR BLD: 8 % (ref 3–10)
NEUTS SEG # BLD: 5.6 K/UL (ref 1.8–8)
NEUTS SEG NFR BLD: 68 % (ref 40–73)
NITRITE UR QL STRIP.AUTO: NEGATIVE
PH UR STRIP: 7 [PH] (ref 5–8)
PLATELET # BLD AUTO: 412 K/UL (ref 135–420)
PMV BLD AUTO: 8.3 FL (ref 9.2–11.8)
POTASSIUM BLD-SCNC: 3.5 MMOL/L (ref 3.5–5.5)
PROT SERPL-MCNC: 7.2 G/DL (ref 6.4–8.2)
PROT UR STRIP-MCNC: 100 MG/DL
RBC # BLD AUTO: 5.15 M/UL (ref 4.7–5.5)
RBC #/AREA URNS HPF: ABNORMAL /HPF (ref 0–5)
SODIUM BLD-SCNC: 142 MMOL/L (ref 136–145)
SP GR UR REFRACTOMETRY: 1.01 (ref 1–1.03)
UROBILINOGEN UR QL STRIP.AUTO: 0.2 EU/DL (ref 0.2–1)
WBC # BLD AUTO: 8.3 K/UL (ref 4.6–13.2)
WBC URNS QL MICRO: ABNORMAL /HPF (ref 0–4)

## 2019-01-01 PROCEDURE — 80047 BASIC METABLC PNL IONIZED CA: CPT

## 2019-01-01 PROCEDURE — 74018 RADEX ABDOMEN 1 VIEW: CPT

## 2019-01-01 PROCEDURE — 85025 COMPLETE CBC W/AUTO DIFF WBC: CPT

## 2019-01-01 PROCEDURE — 83690 ASSAY OF LIPASE: CPT

## 2019-01-01 PROCEDURE — 74011000250 HC RX REV CODE- 250: Performed by: EMERGENCY MEDICINE

## 2019-01-01 PROCEDURE — 74011250636 HC RX REV CODE- 250/636: Performed by: EMERGENCY MEDICINE

## 2019-01-01 PROCEDURE — 96374 THER/PROPH/DIAG INJ IV PUSH: CPT

## 2019-01-01 PROCEDURE — 81001 URINALYSIS AUTO W/SCOPE: CPT

## 2019-01-01 PROCEDURE — 99285 EMERGENCY DEPT VISIT HI MDM: CPT

## 2019-01-01 PROCEDURE — 74176 CT ABD & PELVIS W/O CONTRAST: CPT

## 2019-01-01 PROCEDURE — 96361 HYDRATE IV INFUSION ADD-ON: CPT

## 2019-01-01 PROCEDURE — 74011250637 HC RX REV CODE- 250/637: Performed by: EMERGENCY MEDICINE

## 2019-01-01 PROCEDURE — 96376 TX/PRO/DX INJ SAME DRUG ADON: CPT

## 2019-01-01 PROCEDURE — 80076 HEPATIC FUNCTION PANEL: CPT

## 2019-01-01 RX ORDER — KETOROLAC TROMETHAMINE 30 MG/ML
30 INJECTION, SOLUTION INTRAMUSCULAR; INTRAVENOUS
Status: COMPLETED | OUTPATIENT
Start: 2019-01-01 | End: 2019-01-01

## 2019-01-01 RX ORDER — OMEPRAZOLE 20 MG/1
20 CAPSULE, DELAYED RELEASE ORAL DAILY
Qty: 15 CAP | Refills: 0 | Status: SHIPPED | OUTPATIENT
Start: 2019-01-01 | End: 2019-03-07

## 2019-01-01 RX ORDER — LIDOCAINE HYDROCHLORIDE 20 MG/ML
15 SOLUTION OROPHARYNGEAL
Status: COMPLETED | OUTPATIENT
Start: 2019-01-01 | End: 2019-01-01

## 2019-01-01 RX ORDER — TRAMADOL HYDROCHLORIDE 50 MG/1
50 TABLET ORAL
Status: COMPLETED | OUTPATIENT
Start: 2019-01-01 | End: 2019-01-01

## 2019-01-01 RX ORDER — HYDROCODONE BITARTRATE AND ACETAMINOPHEN 5; 325 MG/1; MG/1
1 TABLET ORAL
Qty: 10 TAB | Refills: 0 | Status: SHIPPED | OUTPATIENT
Start: 2019-01-01 | End: 2019-02-21 | Stop reason: ALTCHOICE

## 2019-01-01 RX ADMIN — KETOROLAC TROMETHAMINE 30 MG: 30 INJECTION, SOLUTION INTRAMUSCULAR at 14:45

## 2019-01-01 RX ADMIN — TRAMADOL HYDROCHLORIDE 50 MG: 50 TABLET, FILM COATED ORAL at 18:35

## 2019-01-01 RX ADMIN — SODIUM CHLORIDE 1000 ML: 900 INJECTION, SOLUTION INTRAVENOUS at 14:45

## 2019-01-01 RX ADMIN — LIDOCAINE HYDROCHLORIDE 15 ML: 20 SOLUTION ORAL; TOPICAL at 16:10

## 2019-01-01 RX ADMIN — ALUMINUM HYDROXIDE AND MAGNESIUM HYDROXIDE 30 ML: 200; 200 SUSPENSION ORAL at 16:10

## 2019-01-01 RX ADMIN — KETOROLAC TROMETHAMINE 30 MG: 30 INJECTION, SOLUTION INTRAMUSCULAR at 18:35

## 2019-01-01 NOTE — ED NOTES
I performed a brief history of the patient here in triage and I have determined that pt will need further treatment and evaluation from the main side ER physician. I have placed initial orders based on the history to help in expediting patients care. Visit Vitals BP (!) 164/99 (BP 1 Location: Left arm, BP Patient Position: At rest) Pulse 64 Temp 98.8 °F (37.1 °C) Resp 16 Ht 6' 2\" (1.88 m) Wt 136.1 kg (300 lb) SpO2 98% BMI 38.52 kg/m² DIDI Becerra 2:31 PM

## 2019-01-01 NOTE — ED PROVIDER NOTES
EMERGENCY DEPARTMENT HISTORY AND PHYSICAL EXAM 
 
2:31 PM 
 
 
Date: 1/1/2019 Patient Name: Aly Diallo History of Presenting Illness Chief Complaint Patient presents with  Blood in Urine  Back Pain History Provided By: Patient Chief Complaint: hematuria Duration:  5 days Timing:  Constant Location:  Severity: 4 out of 10 Modifying Factors: Pt has a hx of kidney stones and says this feels different than prior stones. Associated Symptoms: back pain Additional History (Context): Aly Diallo is a 34 y.o. male with Obesity, PTSD, ADD, and Kidney stones who presents with constant 4/10 hematuria with associated back pain for the past 5 days. Pt has a hx of kidney stones and says this feels different than prior stones. Denies dysuria and blood in stool. He has had abd pain for months but he has had more gas build up. No improvement with eating or drinking. Non radiating pain. He takes Zantac/Tums for his indigestion (last taken 10:30 AM). It sometimes burns. . Reports that he had loose stool this morning. No allergies to meds. Pt is a former smoker and does not drink. PCP: Sarita Diez MD 
 
 
Past History Past Medical History: 
Past Medical History:  
Diagnosis Date  ADD (attention deficit disorder)  Anxiety  Depression  Ill-defined condition   
 chronic neck and back pain  Migraine  Obesity  PTSD (post-traumatic stress disorder)  Seasonal allergic rhinitis  Sleep apnea Past Surgical History: 
Past Surgical History:  
Procedure Laterality Date  HX BACK SURGERY  7/7/2011  
 lamenectomy & diskectomy.  HX TONSIL AND ADENOIDECTOMY Family History: 
Family History Problem Relation Age of Onset  Asthma Mother Social History: 
Social History Tobacco Use  Smoking status: Former Smoker  Smokeless tobacco: Never Used Substance Use Topics  Alcohol use: No  
  Alcohol/week: 0.0 oz  
  Drug use: No  
 
 
Allergies: 
No Known Allergies Review of Systems Review of Systems Constitutional: Negative for chills and fever. HENT: Negative for congestion, rhinorrhea, sore throat and trouble swallowing. Eyes: Negative for visual disturbance. Respiratory: Negative for cough, shortness of breath and wheezing. Cardiovascular: Negative for chest pain and leg swelling. Gastrointestinal: Positive for abdominal pain. Negative for blood in stool, nausea and vomiting. Endocrine: Negative for polyuria. Genitourinary: Positive for hematuria. Negative for difficulty urinating and dysuria. Musculoskeletal: Positive for back pain. Negative for arthralgias and neck stiffness. Skin: Negative for rash. Neurological: Negative for dizziness, weakness, numbness and headaches. Hematological: Does not bruise/bleed easily. Psychiatric/Behavioral: Negative for confusion and dysphoric mood. All other systems reviewed and are negative. Physical Exam  
 
Visit Vitals BP (!) 162/94 (BP 1 Location: Left arm, BP Patient Position: At rest) Pulse 65 Temp 98.8 °F (37.1 °C) Resp 17 Ht 6' 2\" (1.88 m) Wt 136.1 kg (300 lb) SpO2 97% BMI 38.52 kg/m² Physical Exam  
Constitutional: He is oriented to person, place, and time. He appears well-developed and well-nourished. No distress. HENT:  
Head: Normocephalic and atraumatic. Mouth/Throat: Oropharynx is clear and moist.  
Eyes: Conjunctivae are normal. Pupils are equal, round, and reactive to light. No scleral icterus. Neck: Normal range of motion. Neck supple. Cardiovascular: Normal rate and intact distal pulses. Capillary refill < 3 seconds Pulmonary/Chest: Effort normal and breath sounds normal. No respiratory distress. He has no wheezes. Abdominal: Soft. Bowel sounds are normal. He exhibits no distension. There is tenderness (diffuse). Musculoskeletal: Normal range of motion. He exhibits no edema. Lymphadenopathy:  
  He has no cervical adenopathy. Neurological: He is alert and oriented to person, place, and time. No cranial nerve deficit. Skin: Skin is warm and dry. He is not diaphoretic. Nursing note and vitals reviewed. Diagnostic Study Results Labs - Recent Results (from the past 12 hour(s)) URINALYSIS W/ RFLX MICROSCOPIC Collection Time: 01/01/19  2:23 PM  
Result Value Ref Range Color RED Appearance CLOUDY Specific gravity 1.010 1.003 - 1.030    
 pH (UA) 7.0 5.0 - 8.0 Protein 100 (A) NEG mg/dL Glucose NEGATIVE  NEG mg/dL Ketone NEGATIVE  NEG mg/dL Bilirubin NEGATIVE  NEG Blood LARGE (A) NEG Urobilinogen 0.2 0.2 - 1.0 EU/dL Nitrites NEGATIVE  NEG Leukocyte Esterase NEGATIVE  NEG    
URINE MICROSCOPIC ONLY Collection Time: 01/01/19  2:23 PM  
Result Value Ref Range WBC 0 to 3 0 - 4 /hpf  
 RBC TOO NUMEROUS TO COUNT 0 - 5 /hpf Bacteria 1+ (A) NEG /hpf  
CBC WITH AUTOMATED DIFF Collection Time: 01/01/19  2:41 PM  
Result Value Ref Range WBC 8.3 4.6 - 13.2 K/uL  
 RBC 5.15 4.70 - 5.50 M/uL  
 HGB 15.5 13.0 - 16.0 g/dL HCT 46.5 36.0 - 48.0 % MCV 90.3 74.0 - 97.0 FL  
 MCH 30.1 24.0 - 34.0 PG  
 MCHC 33.3 31.0 - 37.0 g/dL  
 RDW 13.3 11.6 - 14.5 % PLATELET 310 675 - 997 K/uL MPV 8.3 (L) 9.2 - 11.8 FL  
 NEUTROPHILS 68 40 - 73 % LYMPHOCYTES 22 21 - 52 % MONOCYTES 8 3 - 10 % EOSINOPHILS 2 0 - 5 % BASOPHILS 0 0 - 2 %  
 ABS. NEUTROPHILS 5.6 1.8 - 8.0 K/UL  
 ABS. LYMPHOCYTES 1.9 0.9 - 3.6 K/UL  
 ABS. MONOCYTES 0.7 0.05 - 1.2 K/UL  
 ABS. EOSINOPHILS 0.1 0.0 - 0.4 K/UL  
 ABS. BASOPHILS 0.0 0.0 - 0.1 K/UL  
 DF AUTOMATED    
LIPASE Collection Time: 01/01/19  2:41 PM  
Result Value Ref Range Lipase 174 73 - 393 U/L  
HEPATIC FUNCTION PANEL Collection Time: 01/01/19  2:41 PM  
Result Value Ref Range Protein, total 7.2 6.4 - 8.2 g/dL Albumin 4.2 3.4 - 5.0 g/dL Globulin 3.0 2.0 - 4.0 g/dL A-G Ratio 1.4 0.8 - 1.7 Bilirubin, total 0.4 0.2 - 1.0 MG/DL Bilirubin, direct 0.1 0.0 - 0.2 MG/DL Alk. phosphatase 93 45 - 117 U/L  
 AST (SGOT) 23 15 - 37 U/L  
 ALT (SGPT) 47 16 - 61 U/L  
POC CHEM8 Collection Time: 01/01/19  3:10 PM  
Result Value Ref Range CO2, POC 24 19 - 24 MMOL/L Glucose, POC 91 74 - 106 MG/DL  
 BUN, POC 9 7 - 18 MG/DL Creatinine, POC 1.0 0.6 - 1.3 MG/DL  
 GFRAA, POC >60 >60 ml/min/1.73m2 GFRNA, POC >60 >60 ml/min/1.73m2 Sodium,  136 - 145 MMOL/L Potassium, POC 3.5 3.5 - 5.5 MMOL/L Calcium, ionized (POC) 1.22 1.12 - 1.32 mmol/L Chloride,  100 - 108 MMOL/L Anion gap, POC 18 10 - 20 Hematocrit, POC 49 36 - 49 % Hemoglobin, POC 16.7 (H) 12 - 16 G/DL Radiologic Studies -  
XR ABD (KUB) Final Result IMPRESSION:  
  
Right-sided nephrolith. Nonobstructive bowel gas pattern. CT ABD PELV WO CONT Final Result Impression:   
  
There is a 6 mm stone within the right renal pelvis. There appears to be minimal  
stranding around the ureter in this location suggesting there may be  
intermittent or partial obstruction. No krzysztof hydronephrosis. No other acute findings or explanation for the patient's clinical symptoms. Dr. Ronnie donahue KUB: 
No free air. no obstruction. no acute process. Medical Decision Making I am the first provider for this patient. I reviewed the vital signs, available nursing notes, past medical history, past surgical history, family history and social history. Vital Signs-Reviewed the patient's vital signs. Pulse Oximetry Analysis -  98% on room air Records Reviewed: Nursing Notes (Time of Review: 2:31 PM) Provider Notes (Medical Decision Making): MDM Number of Diagnoses or Management Options Diagnosis management comments: GERD, Dyspepsia, Biliary, Pancreatitis, kidney stones, and hernia. Will check labs, urine, give GI cocktail. Give KUB. Medications  
sodium chloride 0.9 % bolus infusion 1,000 mL (0 mL IntraVENous IV Completed 1/1/19 1716)  
ketorolac (TORADOL) injection 30 mg (30 mg IntraVENous Given 1/1/19 1445) aluminum-magnesium hydroxide (MAALOX) oral suspension 30 mL (30 mL Oral Given 1/1/19 1610)  
lidocaine (XYLOCAINE) 2 % viscous solution 15 mL (15 mL Mouth/Throat Given 1/1/19 1610)  
ketorolac (TORADOL) injection 30 mg (30 mg IntraVENous Given 1/1/19 1835)  
traMADol (ULTRAM) tablet 50 mg (50 mg Oral Given 1/1/19 1835) ED Course: Progress Notes, Reevaluation, and Consults: WBC wnl Cr wnl 
+hematuria Get CT abd pelvis eval for kidney stone Reassessed and pain improved significantly Consult:  Discussed care with Dr Dannielle Manzano, Specialty: urologist  Standard discussion; including history of patients chief complaint, available diagnostic results, and treatment course. Agrees with plan to dc and f/u in office. 7:43 PM, 1/1/2019 I have reassessed the patient. I have discussed the workup, results and plan with the patient and patient is in agreement. Patient is feeling better. Patient will be prescribed prilosec, norco.  Patient was discharge in stable condition. Patient was given outpatient follow up. Patient is to return to emergency department if any new or worsening condition. Diagnosis Clinical Impression: 1. Kidney stone 2. Hematuria, unspecified type 3. Abdominal pain, generalized 4. Gastroesophageal reflux disease, esophagitis presence not specified Disposition: discharged Follow-up Information Follow up With Specialties Details Why Contact Info Cass Espinoza MD Urology Schedule an appointment as soon as possible for a visit in 2 days  [address] [facility] 
[phone] Tory Hutson MD Gastroenterology Schedule an appointment as soon as possible for a visit  [address] [place] [address] [address] [address] [place] [address] [phone] Stormy Leavitt MD Internal Medicine Schedule an appointment as soon as possible for a visit in 1 week  [address] [facility] [address] [facility] 
[phone] [address] [facility] EMERGENCY DEPT Emergency Medicine  As needed, If symptoms worsen Leonora Yousif [id number] [phone] Medication List  
  
START taking these medications HYDROcodone-acetaminophen 5-325 mg per tablet Commonly known as:  Jennifer Lundy Take 1 Tab by mouth every six (6) hours as needed for Pain. Max Daily Amount: 4 Tabs. omeprazole 20 mg capsule Commonly known as:  PriLOSEC Take 1 Cap by mouth daily. ASK your doctor about these medications CYMBALTA 30 mg capsule Generic drug:  DULoxetine 
  
topiramate 100 mg tablet Commonly known as:  TOPAMAX TAKE 1 TABLET BY MOUTH TWICE DAILY 
  
zolpidem 10 mg tablet Commonly known as:  AMBIEN 
TAKE 1 TABLET BY MOUTH EVERY NIGHT AT BEDTIME AS NEEDED FOR SLEEP Where to Get Your Medications Information about where to get these medications is not yet available Ask your nurse or doctor about these medications · HYDROcodone-acetaminophen 5-325 mg per tablet · omeprazole 20 mg capsule 
  
 
_______________________________ Attestations: 
Scribe Attestation Mario Bourgeois acting as a scribe for and in the presence of Fredi Warren DO     
January 01, 2019 at 2:32 PM 
    
Provider Attestation:     
I personally performed the services described in the documentation, reviewed the documentation, as recorded by the scribe in my presence, and it accurately and completely records my words and actions. January 01, 2019 at 2:32 PM - Fredi Warren DO   
 
 
_______________________________

## 2019-01-02 NOTE — ED NOTES
I have reviewed discharge instructions with the patient. The patient verbalized understanding. Medication teaching given, to include name, dose, action, and side effects. Patient verbalized understanding of medications. Encouraged patient to voice any concerns with reassurance provided. Instructed not to drive or use heavy machinery while taking this medication. Patient states they have someone to drive them home. Patient armband removed and shredded Patient Discharged in stable condition. Patient is awake, alert and oriented x 4.

## 2019-01-02 NOTE — DISCHARGE INSTRUCTIONS
If you were prescribed any medication take as directed. Do not drive or use heavy equipment if prescribed narcotics. Follow up with your primary care physician or with specialist as directed. Return to the emergency room with any new or worsening conditions. Abdominal Pain: Care Instructions  Your Care Instructions    Abdominal pain has many possible causes. Some aren't serious and get better on their own in a few days. Others need more testing and treatment. If your pain continues or gets worse, you need to be rechecked and may need more tests to find out what is wrong. You may need surgery to correct the problem. Don't ignore new symptoms, such as fever, nausea and vomiting, urination problems, pain that gets worse, and dizziness. These may be signs of a more serious problem. Your doctor may have recommended a follow-up visit in the next 8 to 12 hours. If you are not getting better, you may need more tests or treatment. The doctor has checked you carefully, but problems can develop later. If you notice any problems or new symptoms, get medical treatment right away. Follow-up care is a key part of your treatment and safety. Be sure to make and go to all appointments, and call your doctor if you are having problems. It's also a good idea to know your test results and keep a list of the medicines you take. How can you care for yourself at home? · Rest until you feel better. · To prevent dehydration, drink plenty of fluids, enough so that your urine is light yellow or clear like water. Choose water and other caffeine-free clear liquids until you feel better. If you have kidney, heart, or liver disease and have to limit fluids, talk with your doctor before you increase the amount of fluids you drink. · If your stomach is upset, eat mild foods, such as rice, dry toast or crackers, bananas, and applesauce. Try eating several small meals instead of two or three large ones.   · Wait until 48 hours after all symptoms have gone away before you have spicy foods, alcohol, and drinks that contain caffeine. · Do not eat foods that are high in fat. · Avoid anti-inflammatory medicines such as aspirin, ibuprofen (Advil, Motrin), and naproxen (Aleve). These can cause stomach upset. Talk to your doctor if you take daily aspirin for another health problem. When should you call for help? Call 911 anytime you think you may need emergency care. For example, call if:    · You passed out (lost consciousness).     · You pass maroon or very bloody stools.     · You vomit blood or what looks like coffee grounds.     · You have new, severe belly pain.    Call your doctor now or seek immediate medical care if:    · Your pain gets worse, especially if it becomes focused in one area of your belly.     · You have a new or higher fever.     · Your stools are black and look like tar, or they have streaks of blood.     · You have unexpected vaginal bleeding.     · You have symptoms of a urinary tract infection. These may include:  ? Pain when you urinate. ? Urinating more often than usual.  ? Blood in your urine.     · You are dizzy or lightheaded, or you feel like you may faint.    Watch closely for changes in your health, and be sure to contact your doctor if:    · You are not getting better after 1 day (24 hours). Where can you learn more? Go to http://natalia-pardeep.info/. Enter W129 in the search box to learn more about \"Abdominal Pain: Care Instructions. \"  Current as of: November 20, 2017  Content Version: 11.8  © 6752-9969 [employer]. Care instructions adapted under license by [employer] (which disclaims liability or warranty for this information). If you have questions about a medical condition or this instruction, always ask your healthcare professional. [place] [address] any warranty or liability for your use of this information.            Gastroesophageal Reflux Disease (GERD): Care Instructions  Your Care Instructions    Gastroesophageal reflux disease (GERD) is the backward flow of stomach acid into the esophagus. The esophagus is the tube that leads from your throat to your stomach. A one-way valve prevents the stomach acid from moving up into this tube. When you have GERD, this valve does not close tightly enough. If you have mild GERD symptoms including heartburn, you may be able to control the problem with antacids or over-the-counter medicine. Changing your diet, losing weight, and making other lifestyle changes can also help reduce symptoms. Follow-up care is a key part of your treatment and safety. Be sure to make and go to all appointments, and call your doctor if you are having problems. It's also a good idea to know your test results and keep a list of the medicines you take. How can you care for yourself at home? · Take your medicines exactly as prescribed. Call your doctor if you think you are having a problem with your medicine. · Your doctor may recommend over-the-counter medicine. For mild or occasional indigestion, antacids, such as Tums, Gaviscon, Mylanta, or Maalox, may help. Your doctor also may recommend over-the-counter acid reducers, such as Pepcid AC, Tagamet HB, Zantac 75, or Prilosec. Read and follow all instructions on the label. If you use these medicines often, talk with your doctor. · Change your eating habits. ? It's best to eat several small meals instead of two or three large meals. ? After you eat, wait 2 to 3 hours before you lie down. ? Chocolate, mint, and alcohol can make GERD worse. ? Spicy foods, foods that have a lot of acid (like tomatoes and oranges), and coffee can make GERD symptoms worse in some people. If your symptoms are worse after you eat a certain food, you may want to stop eating that food to see if your symptoms get better. · Do not smoke or chew tobacco. Smoking can make GERD worse.  If you need help quitting, talk to your doctor about stop-smoking programs and medicines. These can increase your chances of quitting for good. · If you have GERD symptoms at night, raise the head of your bed 6 to 8 inches by putting the frame on blocks or placing a foam wedge under the head of your mattress. (Adding extra pillows does not work.)  · Do not wear tight clothing around your middle. · Lose weight if you need to. Losing just 5 to 10 pounds can help. When should you call for help? Call your doctor now or seek immediate medical care if:    · You have new or different belly pain.     · Your stools are black and tarlike or have streaks of blood.    Watch closely for changes in your health, and be sure to contact your doctor if:    · Your symptoms have not improved after 2 days.     · Food seems to catch in your throat or chest.   Where can you learn more? Go to http://natalia-pardeep.info/. Enter W857 in the search box to learn more about \"Gastroesophageal Reflux Disease (GERD): Care Instructions. \"  Current as of: March 28, 2018  Content Version: 11.8  © 0471-9673 [employer]. Care instructions adapted under license by [employer] (which disclaims liability or warranty for this information). If you have questions about a medical condition or this instruction, always ask your healthcare professional. [address] any warranty or liability for your use of this information. Blood in the Urine: Care Instructions  Your Care Instructions    Blood in the urine, or hematuria, may make the urine look red, brown, or pink. There may be blood every time you urinate or just from time to time. You cannot always see blood in the urine, but it will show up in a urine test.  Blood in the urine may be serious. It should always be checked by a doctor.  Your doctor may recommend more tests, including an X-ray, a CT scan, or a cystoscopy (which lets a doctor look inside the urethra and bladder). Blood in the urine can be a sign of another problem. Common causes are bladder infections and kidney stones. An injury to your groin or your genital area can also cause bleeding in the urinary tract. Very hard exercise--such as running a marathon--can cause blood in the urine. Blood in the urine can also be a sign of kidney disease or cancer in the bladder or kidney. Many cases of blood in the urine are caused by a harmless condition that runs in families. This is called benign familial hematuria. It does not need any treatment. Sometimes your urine may look red or brown even though it does not contain blood. For example, not getting enough fluids (dehydration), taking certain medicines, or having a liver problem can change the color of your urine. Eating foods such as beets, rhubarb, or blackberries or foods with red food coloring can make your urine look red or pink. Follow-up care is a key part of your treatment and safety. Be sure to make and go to all appointments, and call your doctor if you are having problems. It's also a good idea to know your test results and keep a list of the medicines you take. When should you call for help? Call your doctor now or seek immediate medical care if:    · You have symptoms of a urinary infection. For example:  ? You have pus in your urine. ? You have pain in your back just below your rib cage. This is called flank pain. ? You have a fever, chills, or body aches. ? It hurts to urinate. ? You have groin or belly pain.     · You have more blood in your urine.    Watch closely for changes in your health, and be sure to contact your doctor if:    · You have new urination problems.     · You do not get better as expected. Where can you learn more? Go to http://natalia-pardeep.info/. Enter S429 in the search box to learn more about \"Blood in the Urine: Care Instructions. \"  Current as of: March 21, 2018  Content Version: 11.8  © 4856-0288 [employer]. Care instructions adapted under license by [employer] (which disclaims liability or warranty for this information). If you have questions about a medical condition or this instruction, always ask your healthcare professional. [address] any warranty or liability for your use of this information. Kidney Stone: Care Instructions  Your Care Instructions    Kidney stones are formed when salts, minerals, and other substances normally found in the urine clump together. They can be as small as grains of sand or, rarely, as large as golf balls. While the stone is traveling through the ureter, which is the tube that carries urine from the kidney to the bladder, you will probably feel pain. The pain may be mild or very severe. You may also have some blood in your urine. As soon as the stone reaches the bladder, any intense pain should go away. If a stone is too large to pass on its own, you may need a medical procedure to help you pass the stone. The doctor has checked you carefully, but problems can develop later. If you notice any problems or new symptoms, get medical treatment right away. Follow-up care is a key part of your treatment and safety. Be sure to make and go to all appointments, and call your doctor if you are having problems. It's also a good idea to know your test results and keep a list of the medicines you take. How can you care for yourself at home? · Drink plenty of fluids, enough so that your urine is light yellow or clear like water. If you have kidney, heart, or liver disease and have to limit fluids, talk with your doctor before you increase the amount of fluids you drink. · Take pain medicines exactly as directed. Call your doctor if you think you are having a problem with your medicine. ? If the doctor gave you a prescription medicine for pain, take it as prescribed.   ? If you are not taking a prescription pain medicine, ask your doctor if you can take an over-the-counter medicine. Read and follow all instructions on the label. · Your doctor may ask you to strain your urine so that you can collect your kidney stone when it passes. You can use a kitchen strainer or a tea strainer to catch the stone. Store it in a plastic bag until you see your doctor again. Preventing future kidney stones  Some changes in your diet may help prevent kidney stones. Depending on the cause of your stones, your doctor may recommend that you:  · Drink plenty of fluids, enough so that your urine is light yellow or clear like water. If you have kidney, heart, or liver disease and have to limit fluids, talk with your doctor before you increase the amount of fluids you drink. · Limit coffee, tea, and alcohol. Also avoid grapefruit juice. · Do not take more than the recommended daily dose of vitamins C and D.  · Avoid antacids such as Gaviscon, Maalox, Mylanta, or Tums. · Limit the amount of salt (sodium) in your diet. · Eat a balanced diet that is not too high in protein. · Limit foods that are high in a substance called oxalate, which can cause kidney stones. These foods include dark green vegetables, rhubarb, chocolate, wheat bran, nuts, cranberries, and beans. When should you call for help? Call your doctor now or seek immediate medical care if:    · You cannot keep down fluids.     · Your pain gets worse.     · You have a fever or chills.     · You have new or worse pain in your back just below your rib cage (the flank area).     · You have new or more blood in your urine.    Watch closely for changes in your health, and be sure to contact your doctor if:    · You do not get better as expected. Where can you learn more? Go to http://natalia-pardeep.info/. Enter I829 in the search box to learn more about \"Kidney Stone: Care Instructions. \"  Current as of: March 15, 2018  Content Version: 11.8  © 7336-8587 [employer]. Care instructions adapted under license by [employer] (which disclaims liability or warranty for this information). If you have questions about a medical condition or this instruction, always ask your healthcare professional. [place] [address] any warranty or liability for your use of this information.

## 2019-01-09 ENCOUNTER — OFFICE VISIT (OUTPATIENT)
Dept: PAIN MANAGEMENT | Age: 34
End: 2019-01-09

## 2019-01-09 VITALS
TEMPERATURE: 97.4 F | DIASTOLIC BLOOD PRESSURE: 94 MMHG | RESPIRATION RATE: 14 BRPM | SYSTOLIC BLOOD PRESSURE: 151 MMHG | HEART RATE: 68 BPM | BODY MASS INDEX: 39.01 KG/M2 | OXYGEN SATURATION: 97 % | HEIGHT: 74 IN | WEIGHT: 304 LBS

## 2019-01-09 DIAGNOSIS — M54.2 CERVICALGIA: ICD-10-CM

## 2019-01-09 DIAGNOSIS — G89.29 CHRONIC BILATERAL LOW BACK PAIN WITH BILATERAL SCIATICA: Primary | ICD-10-CM

## 2019-01-09 DIAGNOSIS — Z79.899 ENCOUNTER FOR LONG-TERM (CURRENT) USE OF HIGH-RISK MEDICATION: ICD-10-CM

## 2019-01-09 DIAGNOSIS — M54.41 CHRONIC BILATERAL LOW BACK PAIN WITH BILATERAL SCIATICA: Primary | ICD-10-CM

## 2019-01-09 DIAGNOSIS — G89.4 CHRONIC PAIN SYNDROME: ICD-10-CM

## 2019-01-09 DIAGNOSIS — M54.42 CHRONIC BILATERAL LOW BACK PAIN WITH BILATERAL SCIATICA: Primary | ICD-10-CM

## 2019-01-09 RX ORDER — OXYCODONE AND ACETAMINOPHEN 5; 325 MG/1; MG/1
TABLET ORAL
Qty: 50 TAB | Refills: 0 | Status: SHIPPED | OUTPATIENT
Start: 2019-01-12 | End: 2019-02-05 | Stop reason: SDUPTHER

## 2019-01-09 NOTE — PATIENT INSTRUCTIONS
Learning About Sleeping Well  What does sleeping well mean? Sleeping well means getting enough sleep. How much sleep is enough varies among people. The number of hours you sleep is not as important as how you feel when you wake up. If you do not feel refreshed, you probably need more sleep. Another sign of not getting enough sleep is feeling tired during the day. The average total nightly sleep time is 7½ to 8 hours. Healthy adults may need a little more or a little less than this. Why is getting enough sleep important? Getting enough quality sleep is a basic part of good health. When your sleep suffers, your mood and your thoughts can suffer too. You may find yourself feeling more grumpy or stressed. Not getting enough sleep also can lead to serious problems, including injury, accidents, anxiety, and depression. What might cause poor sleeping? Many things can cause sleep problems, including:  · Stress. Stress can be caused by fear about a single event, such as giving a speech. Or you may have ongoing stress, such as worry about work or school. · Depression, anxiety, and other mental or emotional conditions. · Changes in your sleep habits or surroundings. This includes changes that happen where you sleep, such as noise, light, or sleeping in a different bed. It also includes changes in your sleep pattern, such as having jet lag or working a late shift. · Health problems, such as pain, breathing problems, and restless legs syndrome. · Lack of regular exercise. How can you help yourself? Here are some tips that may help you sleep more soundly and wake up feeling more refreshed. Your sleeping area  · Use your bedroom only for sleeping and sex. A bit of light reading may help you fall asleep. But if it doesn't, do your reading elsewhere in the house. Don't watch TV in bed. · Be sure your bed is big enough to stretch out comfortably, especially if you have a sleep partner.   · Keep your bedroom quiet, dark, and cool. Use curtains, blinds, or a sleep mask to block out light. To block out noise, use earplugs, soothing music, or a \"white noise\" machine. Your evening and bedtime routine  · Create a relaxing bedtime routine. You might want to take a warm shower or bath, listen to soothing music, or drink a cup of noncaffeinated tea. · Go to bed at the same time every night. And get up at the same time every morning, even if you feel tired. What to avoid  · Limit caffeine (coffee, tea, caffeinated sodas) during the day, and don't have any for at least 4 to 6 hours before bedtime. · Don't drink alcohol before bedtime. Alcohol can cause you to wake up more often during the night. · Don't smoke or use tobacco, especially in the evening. Nicotine can keep you awake. · Don't take naps during the day, especially close to bedtime. · Don't lie in bed awake for too long. If you can't fall asleep, or if you wake up in the middle of the night and can't get back to sleep within 15 minutes or so, get out of bed and go to another room until you feel sleepy. · Don't take medicine right before bed that may keep you awake or make you feel hyper or energized. Your doctor can tell you if your medicine may do this and if you can take it earlier in the day. If you can't sleep  · Imagine yourself in a peaceful, pleasant scene. Focus on the details and feelings of being in a place that is relaxing. · Get up and do a quiet or boring activity until you feel sleepy. · Don't drink any liquids after 6 p.m. if you wake up often because you have to go to the bathroom. Where can you learn more? Go to http://natalia-pardeep.info/. Enter W825 in the search box to learn more about \"Learning About Sleeping Well. \"  Current as of: December 7, 2017  Content Version: 11.8  © 5310-8430 [employer], [facility].  Care instructions adapted under license by [employer] (which disclaims liability or warranty for this information). If you have questions about a medical condition or this instruction, always ask your healthcare professional. [address] any warranty or liability for your use of this information. Safe Use of Opioid Pain Medicine: Care Instructions  Your Care Instructions  Pain is your body's way of warning you that something is wrong. Pain feels different for everybody. Only you can describe your pain. A doctor can suggest or prescribe many types of medicines for pain. These range from over-the-counter medicines like acetaminophen (Tylenol) to powerful medicines called opioids. Examples of opioids are fentanyl, hydrocodone, morphine, and oxycodone. Heroin is an illegal opioid  Opioids are strong medicines. They can help you manage pain when you use them the right way. But if you misuse them, they can cause serious harm and even death. For these reasons, doctors are very careful about how they prescribe opioids. If you decide to take opioids, here are some things to remember. · Keep your doctor informed. You can get addicted to opioids. The risk is higher if you have a history of substance use. Your doctor will monitor you closely for signs of misuse and addiction and to figure out when you no longer need to take opioids. · Make a treatment plan. The goal of your plan is to be able to function and do the things you need to do, even if you still have some pain. You might be able to manage your pain with other non-opioid options like physical therapy, relaxation, or over-the-counter pain medicines. · Be aware of the side effects. Opioids can cause serious side effects, such as constipation, dry mouth, and nausea. And over time, you may need a higher dose to get pain relief. This is called tolerance. Your body also gets used to opioids. This is called physical dependence. If you suddenly stop taking them, you may have withdrawal symptoms.   The doctor carefully considered what pain medicine is right for you. You may not have received opioids if your doctor was concerned about drug interactions or your safety, or if he or she had other concerns. It is best to have one doctor or clinic treat your pain. This way you will get the pain medicine that will help you the most. And a doctor will be able to watch for any problems that the medicine might cause. The doctor has checked you carefully, but problems can develop later. If you notice any problems or new symptoms,  get medical treatment right away. Follow-up care is a key part of your treatment and safety. Be sure to make and go to all appointments, and call your doctor if you are having problems. It's also a good idea to know your test results and keep a list of the medicines you take. How can you care for yourself at home? · If you need to take opioids to manage your pain, remember these safety tips. ? Follow directions carefully. It's easy to misuse opioids if you take a dose other than what's prescribed by your doctor. This can lead to overdose and even death. Even sharing them with someone they weren't meant for is misuse. ? Be cautious. Opioids may affect your judgment and decision making. Do not drive or operate machinery until you can think clearly. Talk with your doctor about when it is safe to drive. ? Reduce the risk of drug interactions. Opioids can be dangerous if you take them with alcohol or with certain drugs like sleeping pills and muscle relaxers. Make sure your doctor knows about all the other medicines you take, including over-the-counter medicines. Don't start any new medicines before you talk to your doctor or pharmacist.  ? Keep others safe. Store opioids in a safe and secure place. Make sure that pets, children, friends, and family can't get to them. When you're done using opioids, make sure to properly dispose of them.  You can either use a community drug take-back program or your drugstore's mail-back program. If one of these programs isn't available, you can flush opioid skin patches and unused opioid pills down the toilet. ? Reduce the risk of overdose. Misuse of opioids can be very dangerous. Protect yourself by asking your doctor about a naloxone rescue kit. It can help you--and even save your life--if you take too much of an opioid. · Try other ways to reduce pain. ? Relax, and reduce stress. Relaxation techniques such as deep breathing or meditation can help. ? Keep moving. Gentle, daily exercise can help reduce pain over the long run. Try low- or no-impact exercises such as walking, swimming, and stationary biking. Do stretches to stay flexible. ? Try heat, cold packs, and massage. ? Get enough sleep. Pain can make you tired and drain your energy. Talk with your doctor if you have trouble sleeping because of pain. ? Think positive. Your thoughts can affect your pain level. Do things that you enjoy to distract yourself when you have pain instead of focusing on the pain. See a movie, read a book, listen to music, or spend time with a friend. · If you are not taking a prescription pain medicine, ask your doctor if you can take an over-the-counter medicine. When should you call for help? Call your doctor now or seek immediate medical care if:    · You have a new kind of pain.     · You have new symptoms, such as a fever or rash, along with the pain.    Watch closely for changes in your health, and be sure to contact your doctor if:    · You think you might be using too much pain medicine, and you need help to use less or stop.     · Your pain gets worse.     · You would like a referral to a doctor or clinic that specializes in pain management. Where can you learn more? Go to http://natalia-pardeep.info/. Enter R108 in the search box to learn more about \"Safe Use of Opioid Pain Medicine: Care Instructions. \"  Current as of: September 10, 2017  Content Version: 11.8  © 9839-1565 [employer]. Care instructions adapted under license by [employer] (which disclaims liability or warranty for this information). If you have questions about a medical condition or this instruction, always ask your healthcare professional. [address] any warranty or liability for your use of this information.

## 2019-01-09 NOTE — PROGRESS NOTES
Nursing Notes    Patient presents to the office today in follow-up. Patient rates his pain at 6/10 on the numerical pain scale. Reviewed medications with counts as follows:    Rx Date filled Qty Dispensed Pill Count Last Dose Short   Oxycodone 5-325 mg 12/13/18 70 0 3 days ago Yes 6 days                                        Last opioid agreement 8/17/18  Last urine drug screen 10/11/18  PHQ over the last two weeks 7/24/2018   PHQ Not Done Active Diagnosis of Depression or Bipolar Disorder   Little interest or pleasure in doing things -   Feeling down, depressed, irritable, or hopeless -   Total Score PHQ 2 -   Trouble falling or staying asleep, or sleeping too much -   Feeling tired or having little energy -   Poor appetite, weight loss, or overeating -   Feeling bad about yourself - or that you are a failure or have let yourself or your family down -   Trouble concentrating on things such as school, work, reading, or watching TV -   Moving or speaking so slowly that other people could have noticed; or the opposite being so fidgety that others notice -   Thoughts of being better off dead, or hurting yourself in some way -   PHQ 9 Score -   How difficult have these problems made it for you to do your work, take care of your home and get along with others -       Comments:     POC UDS was not performed in office today    Any new labs or imaging since last appointment? YES, lab, xray and CT of abdomen    Have you been to an emergency room (ER) or urgent care clinic since your [facility]           Have you been hospitalized since your last visit? NO     If yes, where, when, and reason for visit? Have you seen or consulted any other health care providers outside of the [address] [place] [address]  since your last visit? YES     If yes, where, when, and reason for visit? Mr. Omkar Lopez has a reminder for a \"due or due soon\" health maintenance.  I have asked that he contact his primary care provider for follow-up on this health maintenance.

## 2019-01-09 NOTE — PROGRESS NOTES
Referral date 05/25/17, source Ortho and for cervical radiculopathy and low back pain. HPI:  Lila Verde is a 34 y.o. male here for f/u visit for ongoing evaluation of neck and back pain. Pt was last seen here on 10/11/18. Pt denies interval changes on the character or distribution of pain. Pain is located lower back radiating down left leg stopping at knee and neck pain radiating down left shoudler. The pain is described as lower back numbness and pins and needles. Pain at its best is 3/10. Pain at its worse is 9/10. The pain is worsened by standing and sitting prolonged period of time for back, sleeping the wrong for the neck Symptoms are improved by stretching, physical therapy, [employer], cold packs,,laying down, decreasing noise,muscle relaxers. Pt has tried Lidocaine patches, compound cream with no perceived benefit. Pt has not used a TENS unit, RFA, SCS trial. Pt S/P laminectomy, discectomy. Patient is optimistic to try new modalities to help with this pain. Since last visit, patient has been diagnosed with kidney stones and had one ER visit received outside hydrocodone/acetaminophen failed to call and report to clinic.     Social History     Socioeconomic History    Marital status: SINGLE     Spouse name: Not on file    Number of children: Not on file    Years of education: Not on file    Highest education level: Not on file   Social Needs    Financial resource strain: Not on file    Food insecurity - worry: Not on file    Food insecurity - inability: Not on file    Transportation needs - medical: Not on file   [email or web address] needs - non-medical: Not on file   Occupational History    Not on file   Tobacco Use    Smoking status: Former Smoker    Smokeless tobacco: Never Used   Substance and Sexual Activity    Alcohol use: No     Alcohol/week: 0.0 oz    Drug use: No    Sexual activity: Not on file   Other Topics Concern    Not on file   Social History Narrative    Not on file     Family History   Problem Relation Age of Onset    Asthma Mother      No Known Allergies  Past Medical History:   Diagnosis Date    ADD (attention deficit disorder)     Anxiety     Depression     Ill-defined condition     chronic neck and back pain    Kidney stone     Migraine     Obesity     PTSD (post-traumatic stress disorder)     Seasonal allergic rhinitis     Sleep apnea      Past Surgical History:   Procedure Laterality Date    HX BACK SURGERY  7/7/2011    lamenectomy & diskectomy.  HX TONSIL AND ADENOIDECTOMY       Current Outpatient Medications on File Prior to Visit   Medication Sig    oxyCODONE-acetaminophen (PERCOCET) 5-325 mg per tablet Take 1 Tab by Mouth Every 4 Hours As Needed.  tiZANidine (ZANAFLEX) 4 mg tablet TAKE 1 TABLET BY MOUTH AT BEDTIME FOR 14 DAYS, THEN INCREASE TO 2 TABLETS AT BEDTIME    tamsulosin (FLOMAX) 0.4 mg capsule Take 1 Cap by mouth daily (after dinner).  HYDROcodone-acetaminophen (NORCO) 5-325 mg per tablet Take 1 Tab by mouth every six (6) hours as needed for Pain. Max Daily Amount: 4 Tabs.  omeprazole (PRILOSEC) 20 mg capsule Take 1 Cap by mouth daily.  zolpidem (AMBIEN) 10 mg tablet TAKE 1 TABLET BY MOUTH EVERY NIGHT AT BEDTIME AS NEEDED FOR SLEEP    topiramate (TOPAMAX) 100 mg tablet TAKE 1 TABLET BY MOUTH TWICE DAILY (Patient taking differently: once daily)    DULoxetine (CYMBALTA) 30 mg capsule Take 60 mg by mouth daily. No current facility-administered medications on file prior to visit. ROS:  Denies fever,vomiting,  abdominal pain, chest pain, shortness or breath/trouble breathing, weakness, trouble swallowing, changes in vision, changes in hearing, falls, dizziness, bladder incontinence, bowel incontinence, suicidal ideations, homicidal ideations or alcohol use.   Positive findings include chills, nausea, diarrhea, constipation, depression ongoing and anxiety ongoing       Opioid specific risk: sleep apnea, anxiety, depression, PTSD, obesity, ADD, apparent sees from opioid care agreement as follows: Receiving outside medications without informing clinic within 72 hours, not taking medications as prescribed. Opioid specific history: concurrent use of opioids for approximately three years, with no opioid holiday. Vitals:    01/09/19 0952   BP: (!) 151/94   Pulse: 68   Resp: 14   Temp: 97.4 °F (36.3 °C)   TempSrc: Oral   SpO2: 97%   Weight: 137.9 kg (304 lb)   Height: 6' 2\" (1.88 m)   PainSc:   6   PainLoc: Back        Imaging:  \"\"\"\"\" 7/14/15 MRI lumbar spine W WO CONT IMPRESSION:   1. New L4/L5 right central disc extrusion with impingement on the right crossing  L5 nerve root as discussed. 2. Slightly increased L3/L4 central disc protrusion with right greater than left  narrowing of the superior lateral recesses and likely impingement on the right  crossing L4 nerve root. 3. Otherwise similar appearing postsurgical changes at L3/L4 and L4/L5. 4. No high-grade central canal stenosis or foraminal stenosis\"\"\"\"\"\"    Physical exam:  AFVS elevated BP, no acute distress, normal body habitus. A&OXs 3. Normocephalic, atraumatic. Conjugate gaze, clear sclerae. Speech is clear and appropriate. Mood is appropriate for situation  Gait and balance are within functional limits. Non-labored breathing. No acute distress noted. UE:   Strength for right upper extremity is 5/5 for shoulder abduction, elbow flexion, wrist extension, elbow extension, finger abduction, flexor digitorum profundus to digits 2 through 5. Strength for left upper extremity is 5/5 for shoulder abduction, elbow flexion, wrist extension, elbow extension, finger abduction, flexor digitorum profundus to digits 2 through 5. Sensation to light touch is intact for left C4, 5, 6, T1. Decreased sensation C7, C8  Sensation to light touch is intact for right C4-T1. Muscle stretch reflexes for right upper extremity are absent for C5, C6, C7.   Muscle stretch reflexes for left upper extremity are absent  for C5, C6, C7. Full active range of motion cervical flexion did not cause  reproduce primary pain  Full active range of motion cervical extension decreased by 75% to endrange reproduced primary pain    LE:   Strength for right lower extremity is 5/5 for hip flexion, knee extension, dorsiflexion, extensor hallucis longus, plantar flexion. Strength for left lower extremity is 5/5 for hip flexion, knee extension, dorsiflexion, extensor hallucis longus, plantar flexion. Sensation to light touch is intact for right L2-S2. Sensation to light touch is intact for left L2-S2. Muscle Stretch reflexes for right lower extremity are absent for L4, absent S1. Muscle Stretch reflexes for left lower extremity are absent for L4, absent S1. Full AROM lumbar flexion decreased by 75% to endrange reproduction of primary pain. Full AROM lumbar extension decreased by 75% to endrange reproduction of primary pain. Negative seated straight leg raise bilaterally. Negative supine straight leg raise right. Positive supine straight leg raise left   negative Stinchfield's on the right and a left. Negative FABERs on the right and the left. Negative FADIRS on the right and the left. Cervical,thoracic, lumbar, samuel sij, samuel gt NOT TTP      Calculated MEQ - 22.5  Naloxone rescue - Yes  Prophylactic bowel program - no  Date of last OCA 07/24/18  Last UDS 10/11/18, inconsistent negative for oxycodone last fill date 9/18/18   date checked today, findings consistent    Primary Care Physician  Lisy Galindo  [address]  [address] [facility]  [phone]    Today   last visit  prior visit  ORT -     PGIC -3 and 6  2 and 4  4 and 6   ROLO -36%  46%   40%    COMM -24  15  23      PHQ -- .   PHQ over the last two weeks 1/9/2019   PHQ Not Done -   Little interest or pleasure in doing things Not at all   Feeling down, depressed, irritable, or hopeless Not at all   Total Score PHQ 2 0 Trouble falling or staying asleep, or sleeping too much -   Feeling tired or having little energy -   Poor appetite, weight loss, or overeating -   Feeling bad about yourself - or that you are a failure or have let yourself or your family down -   Trouble concentrating on things such as school, work, reading, or watching TV -   Moving or speaking so slowly that other people could have noticed; or the opposite being so fidgety that others notice -   Thoughts of being better off dead, or hurting yourself in some way -   PHQ 9 Score -   How difficult have these problems made it for you to do your work, take care of your home and get along with others -       DSM V-OUD Screen - deferred     Assessment/Plan:     ICD-10-CM ICD-9-CM    1. Chronic bilateral low back pain with bilateral sciatica M54.42 724.2 oxyCODONE-acetaminophen (PERCOCET) 5-325 mg per tablet    M54.41 724.3     G89.29 338.29    2. Encounter for long-term (current) use of high-risk medication Z79.899 V58.69    3. Cervicalgia M54.2 723.1 oxyCODONE-acetaminophen (PERCOCET) 5-325 mg per tablet   4. Chronic pain syndrome G89.4 338.4 oxyCODONE-acetaminophen (PERCOCET) 5-325 mg per tablet        Patient to recheck blood pressure in 2-3 weeks if still elevated follow-up with PCP    Patient to take OTC tylenol 500 mg 1-2 tabs up to BID PRN pain. 2000 mg max daily dose. Patient to take OTC Mortin 800 mg every 8 hours as needed for pain.      Ordered compound cream to be used 3-4 times daily as needed ([place])    Last visit, patient has groupon for a neck massage he will seek out and obtain, patient felt was beneficial      Last visit, patient to follow-up and obtain chiropractor care and [facility], patient has not followed up    Pt to continue HEP as provided by physical therapy previously     Last visit, ordered Nexwave to be used as needed for pain, patient feels it has been beneficial     Patient to continue yoga a laties class and has been going to the gym to build strengthening     Patient to continue handouts on piriformis stretching exercises to be performed at home    Do not recommend long term opioid therapy for this patient at this time. Pt currently taking oxycodone/acetaminophen 5/325 mg tablet 1 tablet up to 3 times daily with a total of 70 tablets he budgeted over 30 days. Their MME is 22.5. Today, we will continue the weaning of patients opioid medication with a goal of being opioid free, pending safety and compliance. Pt instructed to call if they experience any signs of withdrawal (diarrhea, nausea, vomiting, sweating or chills, agitation, itching). Today, pt given prescription for oxycodone/acetaminophen 5/325 mg tablet 1 tablet up to 3 times daily with a total of 50 tablets he budgeted over 30 days. Their new MME is 22.5. Pt instructed to call 5-7 days before they run out of their medications for refill. At this time pt will be provided with oxycodone/acetaminophen 5/325 mg tablet 1 tablet up to 3 times daily with a total of 30 tablets he budgeted over 30 days. pending safety and compliance. At following refill, pt will be provided with oxycodone/acetaminophen 5/325 mg tablet 1 tablet up to 3 times daily with a total of 10 tablets he budgeted over 30 days. pending safety and compliance. At next office visit, the plan is to provide patient with D/C oxycodone/acetaminophen. Their new MME will be 0. If patient has difficulty with the wean or difficulty with cravings we will consider referral to mental health for ongoing assessment and treatment for opioid use disorder. Pt declines injections at this time     Consider RFA, trial physical therapy, H wave, SCS trial, implantable pain pump. Follow up ongoing assessment and ongoing development of integrative and comprehensive plan of care for chronic pain. Goals:   To establish complementary and integrative plan of care to address chronic pain issues while minimizing pharmaceuticals to maximize patient's function improve quality of life. Education:  Patient again educated on the importance of strict compliance with the opioid care agreement while on opioid therapy. Patient also again educated that they should avoid driving while on chronic opioid therapy. Also advised to avoid alcohol and to avoid benzodiazepines while on opioid therapy. Handouts given regarding opioid safety and sleep health. Patient given handout information on anti-inflammatory diet, medical risk of long-term opioid use, spinal cord stimulator, and minimal pain pump, treat your back by Alejandro Palacios. Patient Homework:  Continue to independently investigate yoga for persons with chronic pain and core strengthening exercises. Follow-up Disposition:  Return in about 3 months (around 4/9/2019). [address] was used for portions of this report. Unintended errors may occur.

## 2019-01-10 ENCOUNTER — TELEPHONE (OUTPATIENT)
Dept: PAIN MANAGEMENT | Age: 34
End: 2019-01-10

## 2019-01-10 NOTE — TELEPHONE ENCOUNTER
Patient returned call per request . Patient was seen in office yesterday. He is out of medication and will refill his medication in 2 days. Patient states he has had diarrhea and stomach pain, no nausea noted and runny nose.  Please advise

## 2019-01-10 NOTE — TELEPHONE ENCOUNTER
Patient called on triage line stating he was having GI problems. Attempted to contact patient to gather more information about, but patient did not answer at the number provided. Left v/m for the patient to call the office back. Clinic number provided.

## 2019-01-10 NOTE — TELEPHONE ENCOUNTER
Patient identified using two patient identifiers (name and ) returned patient's call. Patient to follow up with PCP for GI symptoms. Patient verbalizes an understanding.

## 2019-01-28 DIAGNOSIS — F51.01 PRIMARY INSOMNIA: ICD-10-CM

## 2019-01-28 RX ORDER — ZOLPIDEM TARTRATE 10 MG/1
10 TABLET ORAL
Qty: 30 TAB | Refills: 0 | OUTPATIENT
Start: 2019-01-28 | End: 2019-02-21 | Stop reason: DRUGHIGH

## 2019-01-28 NOTE — TELEPHONE ENCOUNTER
PHONE IN [facility] reports the last fill date for Ambien as 12/26/2018 for a 30 d/s. There appears to be no inconsistencies in regards to the prescribing of this medication. Last Visit: 11/02/2018 with MD Sindi Peres  Next Appointment: none  Previous Refill Encounter(s): 12/26/2018 per MD Sindi Peres #[id number]    Requested Prescriptions     Pending Prescriptions Disp Refills    zolpidem (AMBIEN) 10 mg tablet 30 Tab 0     Sig: Take 1 Tab by mouth nightly as needed for Sleep. Max Daily Amount: 10 mg.

## 2019-02-11 DIAGNOSIS — N20.0 KIDNEY STONE: ICD-10-CM

## 2019-02-11 RX ORDER — OXYCODONE AND ACETAMINOPHEN 5; 325 MG/1; MG/1
1 TABLET ORAL
Qty: 30 TAB | Refills: 0 | Status: SHIPPED | OUTPATIENT
Start: 2019-02-11 | End: 2019-02-21 | Stop reason: SDUPTHER

## 2019-02-11 NOTE — TELEPHONE ENCOUNTER
Patient came in to  prescriptions. Spoke with him about outside medications. Patient states he asked for a refill on Toradol from urologists and they wrote opioids that's why he had not filled it. He stated only Toradol was helping with kidney stones. Informed patient he has 72 hours to report outside medications. Patient verbalized an understanding.

## 2019-02-11 NOTE — TELEPHONE ENCOUNTER
03:56 pm The patient was notified that his Rx for Oxycodone is ready for . I asked the patient that he has received a Rx for Oxycodone and it wasn't reported. He stated \" I am fighting a kidney stone and the Urologist was supposed to prescribe Toradol but instead I got one for Oxycodone.

## 2019-02-11 NOTE — TELEPHONE ENCOUNTER
Freddy Rosales has called requesting a refill of their controlled medication, Oxycodone, for the management of chronic pain. Last office visit date: 1/9/19  Last opioid care agreement 7/24/18  Last UDS was done 10/11/18    Date last  was pulled and reviewed : 2/11/19  Last fill date for medication was 1/13/19    Was the patient compliant when the above report was pulled? yes    Analgesia: Patient reports 40% pain relief on current regimen. Aberrancies: No aberrancies noted in the last 30 days. ADL's:Patient states they are able to perform some  ADL's on current regimen. Adverse Reaction: Patient reports no adverse reactions at this time. Provider's last note and plan of care reviewed? yes  Request forwarded to provider for review.

## 2019-02-11 NOTE — TELEPHONE ENCOUNTER
Reviewed, please make sure patient is continent and advising clinic within 3 days of receiving outside opioids look like he filled 2 prescriptions in February.

## 2019-02-21 ENCOUNTER — OFFICE VISIT (OUTPATIENT)
Dept: INTERNAL MEDICINE CLINIC | Age: 34
End: 2019-02-21

## 2019-02-21 VITALS
HEART RATE: 69 BPM | BODY MASS INDEX: 38.22 KG/M2 | RESPIRATION RATE: 17 BRPM | TEMPERATURE: 96.3 F | OXYGEN SATURATION: 98 % | SYSTOLIC BLOOD PRESSURE: 124 MMHG | HEIGHT: 74 IN | WEIGHT: 297.8 LBS | DIASTOLIC BLOOD PRESSURE: 88 MMHG

## 2019-02-21 DIAGNOSIS — Z99.89 OBSTRUCTIVE SLEEP APNEA ON CPAP: ICD-10-CM

## 2019-02-21 DIAGNOSIS — G47.33 OBSTRUCTIVE SLEEP APNEA ON CPAP: ICD-10-CM

## 2019-02-21 DIAGNOSIS — E66.01 SEVERE OBESITY WITH BODY MASS INDEX (BMI) OF 35.0 TO 39.9 WITH SERIOUS COMORBIDITY (HCC): ICD-10-CM

## 2019-02-21 DIAGNOSIS — F51.04 CHRONIC INSOMNIA: Primary | ICD-10-CM

## 2019-02-21 RX ORDER — ZOLPIDEM TARTRATE 12.5 MG/1
12.5 TABLET, FILM COATED, EXTENDED RELEASE ORAL
Qty: 30 TAB | Refills: 2 | Status: SHIPPED | OUTPATIENT
Start: 2019-02-21 | End: 2019-03-27 | Stop reason: DRUGHIGH

## 2019-02-21 NOTE — PROGRESS NOTES
Emmanuel Henry 1985, is a 34 y.o. male, who is seen today for chronic insomnia. He has had insomnia for several years and has been using Ambien but even with this he has a hard time getting to sleep and if he wakes up as a hard time getting back to sleep. He has tried trazodone in the past and did not like the way he felt on that medicine. He is on Cymbalta for another reason but even with that he does not sleep well. Is been on CPAP for several years and says he uses that all night. Past Medical History:  
Diagnosis Date  ADD (attention deficit disorder)  Anxiety  Depression  Ill-defined condition   
 chronic neck and back pain  Kidney stone  Migraine  Obesity  PTSD (post-traumatic stress disorder)  Seasonal allergic rhinitis  Sleep apnea Current Outpatient Medications Medication Sig Dispense Refill  oxyCODONE-acetaminophen (PERCOCET) 5-325 mg per tablet 1 tab up to 3 times daily as needed with a total of 50 tabs to be budgeted over 30 days 30 Tab 0  
 zolpidem (AMBIEN) 10 mg tablet Take 1 Tab by mouth nightly as needed for Sleep. Max Daily Amount: 10 mg. 30 Tab 0  
 topiramate (TOPAMAX) 100 mg tablet TAKE 1 TABLET BY MOUTH TWICE DAILY (Patient taking differently: once daily) 180 Tab 0  
 DULoxetine (CYMBALTA) 30 mg capsule Take 60 mg by mouth daily.  tiZANidine (ZANAFLEX) 4 mg tablet TAKE 1 TABLET BY MOUTH AT BEDTIME FOR 14 DAYS, THEN INCREASE TO 2 TABLETS AT BEDTIME  3  
 tamsulosin (FLOMAX) 0.4 mg capsule Take 1 Cap by mouth daily (after dinner). 90 Cap 3  
 omeprazole (PRILOSEC) 20 mg capsule Take 1 Cap by mouth daily. 15 Cap 0 Visit Vitals /88 (BP 1 Location: Left arm, BP Patient Position: Sitting) Pulse 69 Temp 96.3 °F (35.7 °C) (Oral) Resp 17 Ht 6' 2\" (1.88 m) Wt 297 lb 12.8 oz (135.1 kg) SpO2 98% BMI 38.24 kg/m² Nasal passages are clear. Pharynx reveals no redness exudate or drainage. Neck reveals no adenopathy or thyromegaly. Lungs are clear to percussion. Good breath sounds with no wheezing or crackles. Heart reveals a regular rhythm with no gallop click or rub. Extremities reveal no clubbing cyanosis or edema. Assessment: #1. Chronic insomnia in a patient with obstructive sleep apnea. I have explained to him that higher dose of Ambien is unlikely to be helpful but worth trying. I will prescribe zolpidem 12.5 mg at bedtime and he understands that some of this is rapid acting and some is delayed release. #2.  Obstructive sleep apnea, he is going to make an appointment with his sleep specialist since he has not had a sleep study in many years. #3.  Severe obesity little changed, encouraged him to work on weight loss anyways. #4.  Blood pressure was elevated when he first came in and when he was checked elsewhere his blood pressure was high but when I saw him a few months ago it was normal.  Blood pressure was upper normal when I rechecked it just now. He will continue no added salt diet and work on weight loss. This visit lasted 25 minutes, greater than 50% of the time was spent counseling on problem, on the importance of seeing his sleep specialist again in the importance of substantial weight loss as it relates to his other issues above. Tomy Arzola, [address] Please note: This document has been produced using voice recognition software. Unrecognized errors in transcription may be present.

## 2019-02-21 NOTE — PROGRESS NOTES
1. Have you been to the ER, urgent care clinic or hospitalized since your last visit? YES 
      
 
2. Have you seen or consulted any other health care providers outside of the [address] [place] [address] since your last visit (Include any pap smears or colon screening)? NO Do you have an Advanced Directive? NO Would you like information on Advanced Directives?  NO

## 2019-03-05 DIAGNOSIS — M54.2 CERVICALGIA: ICD-10-CM

## 2019-03-05 DIAGNOSIS — G89.29 CHRONIC BILATERAL LOW BACK PAIN WITH BILATERAL SCIATICA: ICD-10-CM

## 2019-03-05 DIAGNOSIS — M54.42 CHRONIC BILATERAL LOW BACK PAIN WITH BILATERAL SCIATICA: ICD-10-CM

## 2019-03-05 DIAGNOSIS — M54.41 CHRONIC BILATERAL LOW BACK PAIN WITH BILATERAL SCIATICA: ICD-10-CM

## 2019-03-05 RX ORDER — OXYCODONE AND ACETAMINOPHEN 5; 325 MG/1; MG/1
1 TABLET ORAL
Qty: 10 TAB | Refills: 0 | Status: SHIPPED | OUTPATIENT
Start: 2019-03-12 | End: 2019-03-07

## 2019-03-05 NOTE — TELEPHONE ENCOUNTER
Ryleyjermaine Abbot has called requesting a refill of their controlled medication, Percocet 5/325 mg tab, for the management of chronic neck and back pain. Last office visit date: 1/9/19 with Susan and has a f/u appt on 4/8/19 with them. Last opioid care agreement 7/24/18  Last UDS was done 10/11/18    Date last  was pulled and reviewed : 3/5/19  Last fill date for medication was 2/11/19    Was the patient compliant when the above report was pulled? Patient filled Ambien from another provider on 2/21/19. (Patient also filled the non-disclosed Percocet 5/325 on 2/20/19 from Urology that was written on 2/5/19; patient was counseled by CHAVEZ Connors RN at previous refill though). Analgesia: Patient reports 40% pain relief on current regimen. Aberrancies: (See above notation). ADL's: Patient states they are able to perform most of their ADL's on current regimen. Adverse Reaction:Patient reports no adverse reactions at this time. Provider's last note and plan of care reviewed? yes  Request forwarded to provider for review.

## 2019-03-05 NOTE — TELEPHONE ENCOUNTER
Reviewed, okay to distribute prescription, please remind patient of opioid care agreement and strict compliance

## 2019-03-07 ENCOUNTER — HOSPITAL ENCOUNTER (OUTPATIENT)
Age: 34
Setting detail: OUTPATIENT SURGERY
Discharge: HOME | End: 2019-03-07
Attending: EMERGENCY MEDICINE | Admitting: EMERGENCY MEDICINE
Payer: COMMERCIAL

## 2019-03-07 ENCOUNTER — ANESTHESIA EVENT (OUTPATIENT)
Dept: SURGERY | Age: 34
End: 2019-03-07
Payer: COMMERCIAL

## 2019-03-07 ENCOUNTER — ANESTHESIA (OUTPATIENT)
Dept: SURGERY | Age: 34
End: 2019-03-07
Payer: COMMERCIAL

## 2019-03-07 ENCOUNTER — APPOINTMENT (OUTPATIENT)
Dept: CT IMAGING | Age: 34
End: 2019-03-07
Attending: EMERGENCY MEDICINE
Payer: COMMERCIAL

## 2019-03-07 ENCOUNTER — APPOINTMENT (OUTPATIENT)
Dept: GENERAL RADIOLOGY | Age: 34
End: 2019-03-07
Attending: UROLOGY
Payer: COMMERCIAL

## 2019-03-07 VITALS
TEMPERATURE: 98.2 F | HEART RATE: 84 BPM | BODY MASS INDEX: 37.09 KG/M2 | RESPIRATION RATE: 24 BRPM | DIASTOLIC BLOOD PRESSURE: 83 MMHG | WEIGHT: 289 LBS | HEIGHT: 74 IN | SYSTOLIC BLOOD PRESSURE: 144 MMHG | OXYGEN SATURATION: 100 %

## 2019-03-07 DIAGNOSIS — R10.9 ACUTE RIGHT FLANK PAIN: ICD-10-CM

## 2019-03-07 DIAGNOSIS — N13.30 HYDRONEPHROSIS OF RIGHT KIDNEY: ICD-10-CM

## 2019-03-07 DIAGNOSIS — N20.0 RIGHT KIDNEY STONE: Primary | ICD-10-CM

## 2019-03-07 DIAGNOSIS — D72.829 LEUKOCYTOSIS, UNSPECIFIED TYPE: ICD-10-CM

## 2019-03-07 LAB
ANION GAP SERPL CALC-SCNC: 8 MMOL/L (ref 3–18)
APPEARANCE UR: NORMAL
BASOPHILS # BLD: 0 K/UL (ref 0–0.1)
BASOPHILS NFR BLD: 0 % (ref 0–2)
BILIRUB UR QL: NEGATIVE
BUN SERPL-MCNC: 9 MG/DL (ref 7–18)
BUN/CREAT SERPL: 8 (ref 12–20)
CALCIUM SERPL-MCNC: 9.1 MG/DL (ref 8.5–10.1)
CHLORIDE SERPL-SCNC: 111 MMOL/L (ref 100–108)
CO2 SERPL-SCNC: 24 MMOL/L (ref 21–32)
COLOR UR: YELLOW
CREAT SERPL-MCNC: 1.09 MG/DL (ref 0.6–1.3)
DIFFERENTIAL METHOD BLD: ABNORMAL
EOSINOPHIL # BLD: 0.1 K/UL (ref 0–0.4)
EOSINOPHIL NFR BLD: 0 % (ref 0–5)
ERYTHROCYTE [DISTWIDTH] IN BLOOD BY AUTOMATED COUNT: 13.2 % (ref 11.6–14.5)
GLUCOSE SERPL-MCNC: 116 MG/DL (ref 74–99)
GLUCOSE UR STRIP.AUTO-MCNC: NEGATIVE MG/DL
HCT VFR BLD AUTO: 46.5 % (ref 36–48)
HEMOCCULT STL QL: NEGATIVE
HGB BLD-MCNC: 15.7 G/DL (ref 13–16)
HGB UR QL STRIP: NEGATIVE
KETONES UR QL STRIP.AUTO: NEGATIVE MG/DL
LEUKOCYTE ESTERASE UR QL STRIP.AUTO: NEGATIVE
LYMPHOCYTES # BLD: 1.4 K/UL (ref 0.9–3.6)
LYMPHOCYTES NFR BLD: 9 % (ref 21–52)
MCH RBC QN AUTO: 30 PG (ref 24–34)
MCHC RBC AUTO-ENTMCNC: 33.8 G/DL (ref 31–37)
MCV RBC AUTO: 88.7 FL (ref 74–97)
MONOCYTES # BLD: 1.2 K/UL (ref 0.05–1.2)
MONOCYTES NFR BLD: 7 % (ref 3–10)
NEUTS SEG # BLD: 13.2 K/UL (ref 1.8–8)
NEUTS SEG NFR BLD: 84 % (ref 40–73)
NITRITE UR QL STRIP.AUTO: NEGATIVE
PH UR STRIP: 7.5 [PH] (ref 5–8)
PLATELET # BLD AUTO: 414 K/UL (ref 135–420)
PMV BLD AUTO: 8.3 FL (ref 9.2–11.8)
POTASSIUM SERPL-SCNC: 3.9 MMOL/L (ref 3.5–5.5)
PROT UR STRIP-MCNC: NEGATIVE MG/DL
RBC # BLD AUTO: 5.24 M/UL (ref 4.7–5.5)
SODIUM SERPL-SCNC: 143 MMOL/L (ref 136–145)
SP GR UR REFRACTOMETRY: 1.02 (ref 1–1.03)
UROBILINOGEN UR QL STRIP.AUTO: 0.2 EU/DL (ref 0.2–1)
WBC # BLD AUTO: 15.9 K/UL (ref 4.6–13.2)

## 2019-03-07 PROCEDURE — 76210000006 HC OR PH I REC 0.5 TO 1 HR: Performed by: UROLOGY

## 2019-03-07 PROCEDURE — 74011000258 HC RX REV CODE- 258: Performed by: EMERGENCY MEDICINE

## 2019-03-07 PROCEDURE — 74420 UROGRAPHY RTRGR +-KUB: CPT

## 2019-03-07 PROCEDURE — 74011000250 HC RX REV CODE- 250: Performed by: EMERGENCY MEDICINE

## 2019-03-07 PROCEDURE — 74011250636 HC RX REV CODE- 250/636: Performed by: ANESTHESIOLOGY

## 2019-03-07 PROCEDURE — 74011250636 HC RX REV CODE- 250/636

## 2019-03-07 PROCEDURE — 74011000250 HC RX REV CODE- 250

## 2019-03-07 PROCEDURE — 96375 TX/PRO/DX INJ NEW DRUG ADDON: CPT

## 2019-03-07 PROCEDURE — 76210000026 HC REC RM PH II 1 TO 1.5 HR: Performed by: UROLOGY

## 2019-03-07 PROCEDURE — 74011250636 HC RX REV CODE- 250/636: Performed by: EMERGENCY MEDICINE

## 2019-03-07 PROCEDURE — 74011250637 HC RX REV CODE- 250/637: Performed by: UROLOGY

## 2019-03-07 PROCEDURE — 99284 EMERGENCY DEPT VISIT MOD MDM: CPT

## 2019-03-07 PROCEDURE — 77030020268 HC MISC GENERAL SUPPLY: Performed by: UROLOGY

## 2019-03-07 PROCEDURE — 96376 TX/PRO/DX INJ SAME DRUG ADON: CPT

## 2019-03-07 PROCEDURE — 77030012961 HC IRR KT CYSTO/TUR ICUM -A: Performed by: UROLOGY

## 2019-03-07 PROCEDURE — 74011000250 HC RX REV CODE- 250: Performed by: UROLOGY

## 2019-03-07 PROCEDURE — 77030018832 HC SOL IRR H20 ICUM -A: Performed by: UROLOGY

## 2019-03-07 PROCEDURE — 76060000032 HC ANESTHESIA 0.5 TO 1 HR: Performed by: UROLOGY

## 2019-03-07 PROCEDURE — 74011250636 HC RX REV CODE- 250/636: Performed by: UROLOGY

## 2019-03-07 PROCEDURE — 74176 CT ABD & PELVIS W/O CONTRAST: CPT

## 2019-03-07 PROCEDURE — 77030020782 HC GWN BAIR PAWS FLX 3M -B: Performed by: UROLOGY

## 2019-03-07 PROCEDURE — 96361 HYDRATE IV INFUSION ADD-ON: CPT

## 2019-03-07 PROCEDURE — 87086 URINE CULTURE/COLONY COUNT: CPT

## 2019-03-07 PROCEDURE — 82270 OCCULT BLOOD FECES: CPT

## 2019-03-07 PROCEDURE — 96374 THER/PROPH/DIAG INJ IV PUSH: CPT

## 2019-03-07 PROCEDURE — 74011636320 HC RX REV CODE- 636/320: Performed by: UROLOGY

## 2019-03-07 PROCEDURE — C1769 GUIDE WIRE: HCPCS | Performed by: UROLOGY

## 2019-03-07 PROCEDURE — 76010000138 HC OR TIME 0.5 TO 1 HR: Performed by: UROLOGY

## 2019-03-07 PROCEDURE — 74011000250 HC RX REV CODE- 250: Performed by: ANESTHESIOLOGY

## 2019-03-07 PROCEDURE — 80048 BASIC METABOLIC PNL TOTAL CA: CPT

## 2019-03-07 PROCEDURE — 85025 COMPLETE CBC W/AUTO DIFF WBC: CPT

## 2019-03-07 PROCEDURE — 81003 URINALYSIS AUTO W/O SCOPE: CPT

## 2019-03-07 PROCEDURE — C2617 STENT, NON-COR, TEM W/O DEL: HCPCS | Performed by: UROLOGY

## 2019-03-07 DEVICE — URETERAL STENT
Type: IMPLANTABLE DEVICE | Site: URETER | Status: FUNCTIONAL
Brand: POLARIS™ ULTRA

## 2019-03-07 RX ORDER — FENTANYL CITRATE 50 UG/ML
INJECTION, SOLUTION INTRAMUSCULAR; INTRAVENOUS AS NEEDED
Status: DISCONTINUED | OUTPATIENT
Start: 2019-03-07 | End: 2019-03-07 | Stop reason: HOSPADM

## 2019-03-07 RX ORDER — MORPHINE SULFATE 4 MG/ML
4 INJECTION INTRAVENOUS
Status: COMPLETED | OUTPATIENT
Start: 2019-03-07 | End: 2019-03-07

## 2019-03-07 RX ORDER — HYDROMORPHONE HYDROCHLORIDE 2 MG/ML
INJECTION, SOLUTION INTRAMUSCULAR; INTRAVENOUS; SUBCUTANEOUS
Status: COMPLETED
Start: 2019-03-07 | End: 2019-03-07

## 2019-03-07 RX ORDER — ONDANSETRON 2 MG/ML
INJECTION INTRAMUSCULAR; INTRAVENOUS AS NEEDED
Status: DISCONTINUED | OUTPATIENT
Start: 2019-03-07 | End: 2019-03-07 | Stop reason: HOSPADM

## 2019-03-07 RX ORDER — NITROFURANTOIN 25; 75 MG/1; MG/1
100 CAPSULE ORAL 2 TIMES DAILY
Qty: 14 CAP | Refills: 0 | Status: SHIPPED | OUTPATIENT
Start: 2019-03-21 | End: 2019-03-28

## 2019-03-07 RX ORDER — ONDANSETRON 2 MG/ML
4 INJECTION INTRAMUSCULAR; INTRAVENOUS
Status: COMPLETED | OUTPATIENT
Start: 2019-03-07 | End: 2019-03-07

## 2019-03-07 RX ORDER — KETOROLAC TROMETHAMINE 30 MG/ML
30 INJECTION, SOLUTION INTRAMUSCULAR; INTRAVENOUS
Status: COMPLETED | OUTPATIENT
Start: 2019-03-07 | End: 2019-03-07

## 2019-03-07 RX ORDER — SODIUM CHLORIDE, SODIUM LACTATE, POTASSIUM CHLORIDE, CALCIUM CHLORIDE 600; 310; 30; 20 MG/100ML; MG/100ML; MG/100ML; MG/100ML
75 INJECTION, SOLUTION INTRAVENOUS CONTINUOUS
Status: DISCONTINUED | OUTPATIENT
Start: 2019-03-07 | End: 2019-03-07

## 2019-03-07 RX ORDER — LIDOCAINE HYDROCHLORIDE 20 MG/ML
INJECTION, SOLUTION EPIDURAL; INFILTRATION; INTRACAUDAL; PERINEURAL AS NEEDED
Status: DISCONTINUED | OUTPATIENT
Start: 2019-03-07 | End: 2019-03-07 | Stop reason: HOSPADM

## 2019-03-07 RX ORDER — MIDAZOLAM HYDROCHLORIDE 1 MG/ML
2 INJECTION, SOLUTION INTRAMUSCULAR; INTRAVENOUS
Status: DISCONTINUED | OUTPATIENT
Start: 2019-03-07 | End: 2019-03-07 | Stop reason: HOSPADM

## 2019-03-07 RX ORDER — KETOROLAC TROMETHAMINE 30 MG/ML
30 INJECTION, SOLUTION INTRAMUSCULAR; INTRAVENOUS
Status: DISCONTINUED | OUTPATIENT
Start: 2019-03-07 | End: 2019-03-07

## 2019-03-07 RX ORDER — SODIUM CHLORIDE 9 MG/ML
125 INJECTION, SOLUTION INTRAVENOUS CONTINUOUS
Status: DISCONTINUED | OUTPATIENT
Start: 2019-03-07 | End: 2019-03-07 | Stop reason: HOSPADM

## 2019-03-07 RX ORDER — PROPOFOL 10 MG/ML
INJECTION, EMULSION INTRAVENOUS AS NEEDED
Status: DISCONTINUED | OUTPATIENT
Start: 2019-03-07 | End: 2019-03-07 | Stop reason: HOSPADM

## 2019-03-07 RX ORDER — GLYCOPYRROLATE 0.2 MG/ML
INJECTION INTRAMUSCULAR; INTRAVENOUS AS NEEDED
Status: DISCONTINUED | OUTPATIENT
Start: 2019-03-07 | End: 2019-03-07 | Stop reason: HOSPADM

## 2019-03-07 RX ORDER — CALCIUM CARBONATE 200(500)MG
1 TABLET,CHEWABLE ORAL DAILY
COMMUNITY

## 2019-03-07 RX ORDER — FENTANYL CITRATE 50 UG/ML
50 INJECTION, SOLUTION INTRAMUSCULAR; INTRAVENOUS AS NEEDED
Status: DISCONTINUED | OUTPATIENT
Start: 2019-03-07 | End: 2019-03-07 | Stop reason: HOSPADM

## 2019-03-07 RX ORDER — ONDANSETRON 2 MG/ML
4 INJECTION INTRAMUSCULAR; INTRAVENOUS ONCE
Status: DISCONTINUED | OUTPATIENT
Start: 2019-03-07 | End: 2019-03-07 | Stop reason: HOSPADM

## 2019-03-07 RX ORDER — KETOROLAC TROMETHAMINE 15 MG/ML
INJECTION, SOLUTION INTRAMUSCULAR; INTRAVENOUS
Status: COMPLETED
Start: 2019-03-07 | End: 2019-03-07

## 2019-03-07 RX ORDER — KETOROLAC TROMETHAMINE 30 MG/ML
15 INJECTION, SOLUTION INTRAMUSCULAR; INTRAVENOUS
Status: DISCONTINUED | OUTPATIENT
Start: 2019-03-07 | End: 2019-03-07 | Stop reason: HOSPADM

## 2019-03-07 RX ORDER — SODIUM CHLORIDE 0.9 % (FLUSH) 0.9 %
5-40 SYRINGE (ML) INJECTION EVERY 8 HOURS
Status: DISCONTINUED | OUTPATIENT
Start: 2019-03-07 | End: 2019-03-07 | Stop reason: HOSPADM

## 2019-03-07 RX ORDER — MIDAZOLAM HYDROCHLORIDE 1 MG/ML
INJECTION, SOLUTION INTRAMUSCULAR; INTRAVENOUS AS NEEDED
Status: DISCONTINUED | OUTPATIENT
Start: 2019-03-07 | End: 2019-03-07 | Stop reason: HOSPADM

## 2019-03-07 RX ORDER — OXYBUTYNIN CHLORIDE 5 MG/1
5 TABLET ORAL
Status: COMPLETED | OUTPATIENT
Start: 2019-03-07 | End: 2019-03-07

## 2019-03-07 RX ORDER — DEXAMETHASONE SODIUM PHOSPHATE 4 MG/ML
INJECTION, SOLUTION INTRA-ARTICULAR; INTRALESIONAL; INTRAMUSCULAR; INTRAVENOUS; SOFT TISSUE AS NEEDED
Status: DISCONTINUED | OUTPATIENT
Start: 2019-03-07 | End: 2019-03-07 | Stop reason: HOSPADM

## 2019-03-07 RX ORDER — RANITIDINE 150 MG/1
150 TABLET, FILM COATED ORAL 2 TIMES DAILY
Status: ON HOLD | COMMUNITY
End: 2020-04-30

## 2019-03-07 RX ORDER — HYDROMORPHONE HYDROCHLORIDE 2 MG/ML
0.5 INJECTION, SOLUTION INTRAMUSCULAR; INTRAVENOUS; SUBCUTANEOUS
Status: DISCONTINUED | OUTPATIENT
Start: 2019-03-07 | End: 2019-03-07 | Stop reason: HOSPADM

## 2019-03-07 RX ORDER — OXYCODONE AND ACETAMINOPHEN 5; 325 MG/1; MG/1
1-2 TABLET ORAL
Qty: 10 TAB | Refills: 0 | Status: SHIPPED | OUTPATIENT
Start: 2019-03-07 | End: 2019-03-10

## 2019-03-07 RX ORDER — SODIUM CHLORIDE 0.9 % (FLUSH) 0.9 %
5-40 SYRINGE (ML) INJECTION AS NEEDED
Status: DISCONTINUED | OUTPATIENT
Start: 2019-03-07 | End: 2019-03-07 | Stop reason: HOSPADM

## 2019-03-07 RX ADMIN — MORPHINE SULFATE 4 MG: 4 INJECTION INTRAVENOUS at 14:34

## 2019-03-07 RX ADMIN — GLYCOPYRROLATE 0.4 MG: 0.2 INJECTION INTRAMUSCULAR; INTRAVENOUS at 16:20

## 2019-03-07 RX ADMIN — DEXAMETHASONE SODIUM PHOSPHATE 4 MG: 4 INJECTION, SOLUTION INTRA-ARTICULAR; INTRALESIONAL; INTRAMUSCULAR; INTRAVENOUS; SOFT TISSUE at 16:20

## 2019-03-07 RX ADMIN — HYDROMORPHONE HYDROCHLORIDE 0.5 MG: 2 INJECTION, SOLUTION INTRAMUSCULAR; INTRAVENOUS; SUBCUTANEOUS at 17:20

## 2019-03-07 RX ADMIN — FAMOTIDINE 20 MG: 10 INJECTION INTRAVENOUS at 16:04

## 2019-03-07 RX ADMIN — MIDAZOLAM HYDROCHLORIDE 2 MG: 1 INJECTION, SOLUTION INTRAMUSCULAR; INTRAVENOUS at 16:12

## 2019-03-07 RX ADMIN — HYDROMORPHONE HYDROCHLORIDE 0.5 MG: 2 INJECTION, SOLUTION INTRAMUSCULAR; INTRAVENOUS; SUBCUTANEOUS at 17:30

## 2019-03-07 RX ADMIN — WATER 2 G: 1 INJECTION INTRAMUSCULAR; INTRAVENOUS; SUBCUTANEOUS at 16:14

## 2019-03-07 RX ADMIN — ONDANSETRON 4 MG: 2 INJECTION INTRAMUSCULAR; INTRAVENOUS at 13:12

## 2019-03-07 RX ADMIN — MORPHINE SULFATE 4 MG: 4 INJECTION INTRAVENOUS at 12:18

## 2019-03-07 RX ADMIN — OXYBUTYNIN CHLORIDE 5 MG: 5 TABLET ORAL at 17:53

## 2019-03-07 RX ADMIN — FENTANYL CITRATE 50 MCG: 50 INJECTION, SOLUTION INTRAMUSCULAR; INTRAVENOUS at 16:15

## 2019-03-07 RX ADMIN — PROPOFOL 200 MG: 10 INJECTION, EMULSION INTRAVENOUS at 16:15

## 2019-03-07 RX ADMIN — FENTANYL CITRATE 50 MCG: 50 INJECTION, SOLUTION INTRAMUSCULAR; INTRAVENOUS at 16:34

## 2019-03-07 RX ADMIN — CEFTRIAXONE SODIUM 1 G: 1 INJECTION, POWDER, FOR SOLUTION INTRAMUSCULAR; INTRAVENOUS at 13:56

## 2019-03-07 RX ADMIN — KETOROLAC TROMETHAMINE 15 MG: 15 INJECTION, SOLUTION INTRAMUSCULAR; INTRAVENOUS at 17:49

## 2019-03-07 RX ADMIN — LIDOCAINE HYDROCHLORIDE 100 MG: 20 INJECTION, SOLUTION EPIDURAL; INFILTRATION; INTRACAUDAL; PERINEURAL at 16:15

## 2019-03-07 RX ADMIN — KETOROLAC TROMETHAMINE 30 MG: 30 INJECTION, SOLUTION INTRAMUSCULAR; INTRAVENOUS at 13:06

## 2019-03-07 RX ADMIN — ONDANSETRON 4 MG: 2 INJECTION INTRAMUSCULAR; INTRAVENOUS at 16:20

## 2019-03-07 RX ADMIN — SODIUM CHLORIDE 1000 ML: 900 INJECTION, SOLUTION INTRAVENOUS at 12:22

## 2019-03-07 RX ADMIN — ONDANSETRON 4 MG: 2 INJECTION INTRAMUSCULAR; INTRAVENOUS at 12:18

## 2019-03-07 RX ADMIN — SODIUM CHLORIDE 125 ML/HR: 900 INJECTION, SOLUTION INTRAVENOUS at 13:52

## 2019-03-07 RX ADMIN — PROMETHAZINE HYDROCHLORIDE 25 MG: 25 INJECTION INTRAMUSCULAR; INTRAVENOUS at 14:34

## 2019-03-07 NOTE — LETTER
UROLOGY OF [facility] - SURGERY REQUEST LETTER Request Type: New  Facility:   [facility] Patient:  Roberto Turcios SSN: [id number] OCO:[id number] Gender: male Height:   Weight:     
Latex ALLERGY: NO   
 allergies: No Known Allergies Surgeon: Dr. Marifer Parmar Surgical Procedure: 
 
 
 
 
 
 
When: right Cystoscopy Ureteroscopy Laser lithotripsy, basket extraction JJ stent CPT:  94950, modifier 50 if bilateral  
 
3-4 weeks IMAGING DONE AT [facility]: NO Length of Surgery: 1.5 hour Admit Status: Outpatient Special Equipment: 100 W Holmium Laser and Flex Ureteroscope Diagnosis:  1cm right proximal ureteral stone s/p stent 3/7/2019   
S.A./CO- Surgeon: Joon Bourgeois Anesthesia Type:  General   
Patient Special Request: none Medical Clearance Medical Clearance Not Required Cardiac Clearance Not Required Preadmission Testing Orders: Urine Culture 2 weeks before Preoperative Orders Orders: NPO, Consent, Rocephin 2g Other: none Blood Thinners Coumadin - d/c 4 days prior Plavix - d/c 7 days prior ASA - d/c 7 days prior Effient - stop per PCP Lovenox - stop per PCP Xarelto - stop per PCP NO 
NO 
NO Stop Meds: See above Confirmed Surgery Information Date: **  
Time: **@[facility]@ Check in time:   
 
Labs Due: 
Pt notified: 
Letter sent: 
Deposit due: $ 
Deposit paid: Antibiotics: 
Pharmacy:

## 2019-03-07 NOTE — DISCHARGE INSTRUCTIONS
Discharge Instructions      Patient: Dejan Casillas               Sex: male          DOA: 3/7/2019         YOB: 1985      Age:  34 y.o. ACUTE DIAGNOSES:  Nephrolithiasis     DIET: General     ACTIVITY: No restrictions     ADDITIONAL INFORMATION:   You have indwelling ureteral stents which frequently causes slight discomfort in the flank region and bladder spasms. If you are bothered by these symptoms, please contact our office and we can presribe you flomax as needed for flank discomfort or Ditropan for bladder spasms. The indwelling stent will need to be removed/exchanged as directed below. If the stent is left in place without appropriate follow-up, it may become encrusted with stone and/or infected. If that occurs, you will require multiple additional surgeries to fix this. It is very important you follow up for appropriate removal or exchange of ureteral stents. If you experience any fevers > 100.4, significant nausea/vomiting, or significant worsening of pain, please contact us at the number below. It is common to experience mild, recurrent blood in your urine and this is likely due to stent irritation. If the bleeding continues to worsen with passage of clots, you are unable to urinate, or you experience significant light-headedness, please contact us at the number below. FOLLOW UP CARE:  You will have a surgery scheduled with Dr. Klaus Gracia to have the stone removed. You will be contacted by our surgery schedulers to arrange this. DISCHARGE SUMMARY from Nurse    PATIENT INSTRUCTIONS:    After general anesthesia or intravenous sedation, for 24 hours or while taking prescription Narcotics:  · Limit your activities  · Do not drive and operate hazardous machinery  · Do not make important personal or business decisions  · Do  not drink alcoholic beverages  · If you have not urinated within 8 hours after discharge, please contact your surgeon on call.     Report the following to your surgeon:  · Excessive pain, swelling, redness or odor of or around the surgical area  · Temperature over 100.5  · Nausea and vomiting lasting longer than 4 hours or if unable to take medications  · Any signs of decreased circulation or nerve impairment to extremity: change in color, persistent  numbness, tingling, coldness or increase pain  · Any questions    What to do at Home:  Recommended activity: activity as tolerated and no driving for today. *  Please give a list of your current medications to your Primary Care Provider. *  Please update this list whenever your medications are discontinued, doses are      changed, or new medications (including over-the-counter products) are added. *  Please carry medication information at all times in case of emergency situations. These are general instructions for a healthy lifestyle:    No smoking/ No tobacco products/ Avoid exposure to second hand smoke  Surgeon General's Warning:  Quitting smoking now greatly reduces serious risk to your health. Obesity, smoking, and sedentary lifestyle greatly increases your risk for illness    A healthy diet, regular physical exercise & weight monitoring are important for maintaining a healthy lifestyle    You may be retaining fluid if you have a history of heart failure or if you experience any of the following symptoms:  Weight gain of 3 pounds or more overnight or 5 pounds in a week, increased swelling in our hands or feet or shortness of breath while lying flat in bed. Please call your doctor as soon as you notice any of these symptoms; do not wait until your next office visit. Recognize signs and symptoms of STROKE:    F-face looks uneven    A-arms unable to move or move unevenly    S-speech slurred or non-existent    T-time-call 911 as soon as signs and symptoms begin-DO NOT go       Back to bed or wait to see if you get better-TIME IS BRAIN.     Warning Signs of HEART ATTACK     Call 911 if you have these symptoms:   Chest discomfort. Most heart attacks involve discomfort in the center of the chest that lasts more than a few minutes, or that goes away and comes back. It can feel like uncomfortable pressure, squeezing, fullness, or pain.  Discomfort in other areas of the upper body. Symptoms can include pain or discomfort in one or both arms, the back, neck, jaw, or stomach.  Shortness of breath with or without chest discomfort.  Other signs may include breaking out in a cold sweat, nausea, or lightheadedness. Don't wait more than five minutes to call 911 - MINUTES MATTER! Fast action can save your life. Calling 911 is almost always the fastest way to get lifesaving treatment. Emergency Medical Services staff can begin treatment when they arrive -- up to an hour sooner than if someone gets to the hospital by car. The discharge information has been reviewed with the patient and parent. The patient and parent verbalized understanding. Discharge medications reviewed with the patient and parent and appropriate educational materials and side effects teaching were provided.   ___________________________________________________________________________________________________________________________________

## 2019-03-07 NOTE — ANESTHESIA PREPROCEDURE EVALUATION
Anesthetic History   No history of anesthetic complications            Review of Systems / Medical History  Patient summary reviewed and pertinent labs reviewed    Pulmonary        Sleep apnea: CPAP           Neuro/Psych         Psychiatric history (Depression)     Cardiovascular                  Exercise tolerance: >4 METS     GI/Hepatic/Renal         Renal disease: stones       Endo/Other        Morbid obesity and arthritis     Other Findings              Physical Exam    Airway  Mallampati: II  TM Distance: 4 - 6 cm  Neck ROM: normal range of motion   Mouth opening: Diminished (comment)     Cardiovascular  Regular rate and rhythm,  S1 and S2 normal,  no murmur, click, rub, or gallop             Dental  No notable dental hx       Pulmonary  Breath sounds clear to auscultation               Abdominal         Other Findings            Anesthetic Plan    ASA: 2  Anesthesia type: general          Induction: Intravenous  Anesthetic plan and risks discussed with: Patient

## 2019-03-07 NOTE — ED TRIAGE NOTES
Patient voices concerns for possible kidney stone. States right lower back/ flank pain. States having bright red blood in stool last night. C/o lower abdominal pain. States vomiting. Denies diarrhea. Advises of massive diarrhea four days ago.

## 2019-03-07 NOTE — OP NOTES
Operative Note  Patient: Contreras Jones               Sex: male             MRN: [id number]  YOB: 1985      Age:  34 y.o. Preoperative Diagnosis: Obstructing 1cm right UPJ stone  Postoperative Diagnosis:  Same   Surgeon: Loki Mercedes     Indication: This is a 35 y/o man with 1cm right UPJ stone with intractable pain transferred from [facility] ED for ureteral stent placement. Procedure:    1) Cystoscopy  2) Right Retrograde pyelogram with interpretation  3) Right Ureteral stent placement     Findings:    1). Bladder was normal. Urethra showed well healed false passage and soft 18fr stricture in the bulb easily traversed by scope. 2). Retrograde pyelogram revealed hydronephrosis and right UPJ stone   3). 7x28 Ureteral stent placed without complication     Narrative of Events: The patient was brought to the operative suite. Anesthesia was induced and preoperative antibiotics were administered. They were then placed in the dorsal lithotomy position and their external genitalia was prepped and draped in the usual fashion. A surgical timeout was performed confirming the patient's name, date of birth, laterality, and antibiotics. All were in agreement. A 22 [place] cystourethroscope was then inserted into the patients urethra. The urethra revealed what appeared as a well healed false passage in the pendulous urethra and a soft easily traversed 18fr stricture in the bulb. Prostate was normal. Thorough cystoscopy was performed with both the 30 degree and 70 degree lens which revealed no abnormalities. The affected side was identified and ureteral orifice was cannulated with a  0.035 sensor tip wire. The wire was negotiated past the stone and confirmed in the renal pelvis. A ureteral catheter was advanced over the wire and retrograde pyelogram was performed confirming appropriate location. Retrograde showed a malrotated abnormal collecting system.   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 A ureteral stent was then advanced over the wire and deployed in the standard fashion with an excellent curl in the bladder and renal pelvis. The bladder was drained and the patient was then awoken and transferred to the recovery room in stable condition. Estimated Blood Loss: <5CC     Anesthesia:  General                  Implants:   Implant Name Type Inv.  Item Serial No.  Lot No. LRB No. Used Action   Blanche Kalistick [id number] -- [place] - [id number]  STENT URET [id number] -- PERCUFLEX  [facility] [id number] Right 1 Implanted       Specimens: * No specimens in log *     Drains: 7x28 JJ stent            Complications:  None           Plan:  Return for URS  Start Macrobid 1 week before     Dionne Spicer MD  3/7/2019

## 2019-03-07 NOTE — ED PROVIDER NOTES
EMERGENCY DEPARTMENT HISTORY AND PHYSICAL EXAM    11:59 AM      Date: 3/7/2019  Patient Name: Rosa Chappell    History of Presenting Illness     Chief Complaint   Patient presents with    Flank Pain    Abdominal Pain    Vomiting         History Provided By: Patient    Chief Complaint: Flank pain  Duration: since 1/1/19  Timing:  Worsening  Location: right flank  Quality:NA  Severity: 8/10  Modifying Factors: No modifying or aggravating factors were reported. Associated Symptoms: hematuria, vomiting, testicular pain, blood in stool, chills, diaphoresis, and diarrhea. Denies fever      Additional History (Context): 11:59 AM Rosa Chappell is a 34 y.o. male with h/o kidney stones, obesity, and chronic back pain who presents to ED complaining of worsening 8/10 right flank pain onset since 1/1/19. No modifying or aggravating factors were reported. Associated Sx include  hematuria, vomiting, possible blood in stool verses urine, chills, diaphoresis, and diarrhea. He states right flank pain radiates into inguinal and right testical region but otherwise no scrotal pain or swelling or discoloration. Denies fever. Was seen for less severe Sx on 1/1/19 in the ED, and it was discovered that pt had a kidney stone. Has a lithotripsy scheduled for April. Had his diarrhea 4 days ago now resolved. Denies any further complaints or symptoms at the moment. PCP: Tana Fritz MD      Past History     Past Medical History:  Past Medical History:   Diagnosis Date    ADD (attention deficit disorder)     Anxiety     Depression     Ill-defined condition     chronic neck and back pain    Kidney stone     Migraine     Obesity     PTSD (post-traumatic stress disorder)     Seasonal allergic rhinitis     Sleep apnea        Past Surgical History:  Past Surgical History:   Procedure Laterality Date    HX BACK SURGERY  7/7/2011    lamenectomy & diskectomy.     HX TONSIL AND ADENOIDECTOMY         Family History:  Family History   Problem Relation Age of Onset    Asthma Mother        Social History:  Social History     Tobacco Use    Smoking status: Former Smoker    Smokeless tobacco: Never Used   Substance Use Topics    Alcohol use: No     Alcohol/week: 0.0 oz    Drug use: No       Allergies:  No Known Allergies      Review of Systems     Review of Systems   Constitutional: Positive for chills and diaphoresis. Negative for fever. HENT: Negative for congestion, rhinorrhea, sore throat and trouble swallowing. Eyes: Negative for visual disturbance. Respiratory: Negative for cough, shortness of breath and wheezing. Cardiovascular: Negative for chest pain and leg swelling. Gastrointestinal: Positive for diarrhea, nausea and vomiting. Negative for abdominal pain. Endocrine: Negative for polyuria. Genitourinary: Positive for flank pain and hematuria. Negative for difficulty urinating, discharge, dysuria, penile pain, penile swelling and scrotal swelling. Musculoskeletal: Negative for arthralgias, myalgias and neck stiffness. Skin: Negative for rash. Neurological: Negative for dizziness, weakness, numbness and headaches. Hematological: Does not bruise/bleed easily. Psychiatric/Behavioral: Negative for confusion and dysphoric mood. All other systems reviewed and are negative. Physical Exam     Visit Vitals  BP (!) 186/112 (BP 1 Location: Left arm, BP Patient Position: At rest)   Pulse (!) 57   Temp 98.4 °F (36.9 °C)   Resp 16   Ht 6' 2\" (1.88 m)   Wt 131.1 kg (289 lb)   SpO2 100%   BMI 37.11 kg/m²       Physical Exam   Constitutional: He is oriented to person, place, and time. He appears well-developed and well-nourished. No distress. HENT:   Head: Normocephalic and atraumatic. Right Ear: External ear normal.   Left Ear: External ear normal.   Nose: Nose normal.   Mouth/Throat: Oropharynx is clear and moist. Mucous membranes are dry.    Eyes: Conjunctivae and EOM are normal. Pupils are equal, round, and reactive to light. No scleral icterus. Neck: Normal range of motion. Neck supple. No JVD present. No tracheal deviation present. No thyromegaly present. Cardiovascular: Normal rate, regular rhythm, normal heart sounds and intact distal pulses. Exam reveals no gallop and no friction rub. No murmur heard. Capillary refill < 3 seconds   Pulmonary/Chest: Effort normal and breath sounds normal. No respiratory distress. He has no wheezes. He exhibits no tenderness. Abdominal: Soft. Bowel sounds are normal. He exhibits no distension. There is tenderness in the right lower quadrant and suprapubic area. There is guarding. There is no rigidity, no rebound and no tenderness at McBurney's point. Genitourinary: Rectal exam shows anal tone normal and guaiac negative stool. Genitourinary Comments: No hernia palpated   Musculoskeletal: Normal range of motion. He exhibits no edema or tenderness. Lymphadenopathy:     He has no cervical adenopathy. Neurological: He is alert and oriented to person, place, and time. No cranial nerve deficit. Coordination normal.   No sensory loss, Gait normal, Motor 5/5   Skin: Skin is warm and dry. He is not diaphoretic. Psychiatric: He has a normal mood and affect. His behavior is normal. Judgment and thought content normal.   Nursing note and vitals reviewed.         Diagnostic Study Results     Labs -  Recent Results (from the past 12 hour(s))   URINALYSIS W/ RFLX MICROSCOPIC    Collection Time: 03/07/19 11:53 AM   Result Value Ref Range    Color YELLOW      Appearance TURBID      Specific gravity 1.016 1.005 - 1.030      pH (UA) 7.5 5.0 - 8.0      Protein NEGATIVE  NEG mg/dL    Glucose NEGATIVE  NEG mg/dL    Ketone NEGATIVE  NEG mg/dL    Bilirubin NEGATIVE  NEG      Blood NEGATIVE  NEG      Urobilinogen 0.2 0.2 - 1.0 EU/dL    Nitrites NEGATIVE  NEG      Leukocyte Esterase NEGATIVE  NEG     CBC WITH AUTOMATED DIFF    Collection Time: 03/07/19 12:10 PM   Result Value Ref Range    WBC 15.9 (H) 4.6 - 13.2 K/uL    RBC 5.24 4.70 - 5.50 M/uL    HGB 15.7 13.0 - 16.0 g/dL    HCT 46.5 36.0 - 48.0 %    MCV 88.7 74.0 - 97.0 FL    MCH 30.0 24.0 - 34.0 PG    MCHC 33.8 31.0 - 37.0 g/dL    RDW 13.2 11.6 - 14.5 %    PLATELET 508 829 - 385 K/uL    MPV 8.3 (L) 9.2 - 11.8 FL    NEUTROPHILS 84 (H) 40 - 73 %    LYMPHOCYTES 9 (L) 21 - 52 %    MONOCYTES 7 3 - 10 %    EOSINOPHILS 0 0 - 5 %    BASOPHILS 0 0 - 2 %    ABS. NEUTROPHILS 13.2 (H) 1.8 - 8.0 K/UL    ABS. LYMPHOCYTES 1.4 0.9 - 3.6 K/UL    ABS. MONOCYTES 1.2 0.05 - 1.2 K/UL    ABS. EOSINOPHILS 0.1 0.0 - 0.4 K/UL    ABS. BASOPHILS 0.0 0.0 - 0.1 K/UL    DF AUTOMATED     METABOLIC PANEL, BASIC    Collection Time: 03/07/19 12:37 PM   Result Value Ref Range    Sodium 143 136 - 145 mmol/L    Potassium 3.9 3.5 - 5.5 mmol/L    Chloride 111 (H) 100 - 108 mmol/L    CO2 24 21 - 32 mmol/L    Anion gap 8 3.0 - 18 mmol/L    Glucose 116 (H) 74 - 99 mg/dL    BUN 9 7.0 - 18 MG/DL    Creatinine 1.09 0.6 - 1.3 MG/DL    BUN/Creatinine ratio 8 (L) 12 - 20      GFR est AA >60 >60 ml/min/1.73m2    GFR est non-AA >60 >60 ml/min/1.73m2    Calcium 9.1 8.5 - 10.1 MG/DL   POC FECAL OCCULT BLOOD    Collection Time: 03/07/19  1:00 PM   Result Value Ref Range    Occult blood, stool (POC) NEGATIVE  NEG         Radiologic Studies -   Ct Abd Pelv Wo Cont    Result Date: 3/7/2019  Impression: 1. There is an 11 mm stone at the right ureteropelvic junction similar to 1/1/2019. However, there is worsening mild hydronephrosis with new perinephric stranding indicating worsening obstruction. No additional renal calculi. The left kidney is normal. 2. No additional significant abnormalities. Medical Decision Making   I am the first provider for this patient. I reviewed the vital signs, available nursing notes, past medical history, past surgical history, family history and social history. Vital Signs-Reviewed the patient's vital signs.     Pulse Oximetry Analysis - Patient is 100% O2 on RA, indicating adequate oxygenation. Cardiac Monitor:  Rate: 57 bpm    Records Reviewed: Nursing Notes and Past Medical Records (Time of Review: 11:59 AM)    Provider Notes (Medical Decision Making):  MDM  Number of Diagnoses or Management Options  Diagnosis management comments: DDx: kidney stone, pyelonephritis, UTI, appendicitis, dehydration, metabolic, rectal bleeding, IBD. Will check labs, CT Abd/Pelvis. Will give IV fluids, nausea medicine, and pain medicine      Medications   ondansetron (ZOFRAN) injection 4 mg (4 mg IntraVENous Given 3/7/19 1218)   morphine injection 4 mg (4 mg IntraVENous Given 3/7/19 1218)   sodium chloride 0.9 % bolus infusion 1,000 mL (1,000 mL IntraVENous New Bag 3/7/19 1222)   ketorolac (TORADOL) injection 30 mg (30 mg IntraVENous Given 3/7/19 1306)   ondansetron (ZOFRAN) injection 4 mg (4 mg IntraVENous Given 3/7/19 1312)         ED Course: Progress Notes, Reevaluation, and Consults:  1:00 PM: Pt states pain persists. Will order more pain medication. Awaiting CT results    WBC 16  No fever  HR wnl  RR wnl  Pt not septic    Elevated BP likely from pain extemis    After pain meds, BP significantly improved. 2:14 PM Consult: I discussed care with Dr. Noni Mcconnell, Urology. It was a standard discussion including patient history, chief complaint, available diagnostic results, and predicted treatment course. Accepts patient to his service in the OR. Wants patient to go right to OR to be stented. Agrees BP is likely to extreme pain. Patient denies any h/o HTN. BP upon seeing PCP a few months ago was 132/86    I discussed dx's and plan with pt and he agrees. Mother at bedside. Diagnosis     Clinical Impression:   1. Right kidney stone    2. Acute right flank pain    3. Leukocytosis, unspecified type    4.  Hydronephrosis of right kidney        Disposition: going to OR    Follow-up Information    None              Attestations:  Scribe Attestation     Rema Marinelli Dulce Maria Chan acting as a scribe for and in the presence of Cindy Li DO      March 07, 2019 at 11:59 AM       Provider Attestation:      I personally performed the services described in the documentation, reviewed the documentation, as recorded by the scribe in my presence, and it accurately and completely records my words and actions.  March 07, 2019 at 11:59 AM - Cindy Li DO    _______________________________

## 2019-03-07 NOTE — ANESTHESIA POSTPROCEDURE EVALUATION
Procedure(s):  RIGHT CYSTOSCOPY URETERAL STENT INSERTION/ RETROGRADES/PYELOGRAM/ C-ARM.     Anesthesia Post Evaluation      Multimodal analgesia: multimodal analgesia used between 6 hours prior to anesthesia start to PACU discharge  Patient location during evaluation: bedside  Patient participation: complete - patient participated  Level of consciousness: awake  Pain score: 5  Pain management: adequate  Airway patency: patent  Anesthetic complications: no  Cardiovascular status: stable  Respiratory status: acceptable  Hydration status: acceptable  Post anesthesia nausea and vomiting:  controlled      Visit Vitals  /83 (BP 1 Location: Left arm, BP Patient Position: At rest)   Pulse 84   Temp 36.8 °C (98.2 °F)   Resp 24   Ht 6' 2\" (1.88 m)   Wt 131.1 kg (289 lb)   SpO2 100%   BMI 37.11 kg/m²

## 2019-03-07 NOTE — H&P
Consult Note    Patient: Morena Simmons               Sex: male          DOA: 3/7/2019       YOB: 1985      Age:  34 y.o. Referring Provider: [address] [place] [address] ED     ASSESSMENT:   1. 1cm right obstructing UPJ stone     Pain is not controlled and WBC > 15K which is indication for intervention. He has failed trial of passage and stone has actually grown in the interim. Recommended stent placement now with delayed URS. PLAN:    1. To OR for cystoscopy, retrograde, right ureteral stent placement     Krishna Duarte MD  [phone] Office  [phone]  Pager    Chief Complaint   Patient presents with    Flank Pain    Abdominal Pain    Vomiting       HISTORY OF PRESENT ILLNESS:  Morena Simmons is a 34 y.o. male who is seen in consultation as referred by [address] [place] [address] ED for a right obstructing 1cm ureteral stone. He was previously on trial of passage for a 6mm right proximal stone and has had intermittent flank pain on and off for last month. T his morning he noted extreme pain with nausea and vomiting. No fevers or chills. Denies hematuria or irritative LUTS. Denies previous stone passage. Admits to one UTI in the distant past.      Location: Flank  Duration: Since this morning  Severity: Pain 9/10  Characteristic: Intermittent colic  Associated Symptoms: Nausea and vomiting      AUA Symptom Score 1/3/2019   Over the past month how often have you had the sensation that your bladder was not completely empty after you finished urinating? 2   Over the past month, how often have had to urinate again less than 2 hours after you last finished urinating? 1   Over the past month, how often have you found you stopped and started again several times when you urinated? 0   Over the past month, how often have you found it difficult to postpone urination?  0   Over the past month, how often have you had a weak urinary stream? 1   Over the past month, how often have you had to push or strain to begin urinating? 1   Over the past month, how many times did you most typically get up to urinate from the time you went to bed at night until the time you got up in the morning? 5   AUA Score 10   If you were to spend the rest of your life with your urinary condition the way it is now, how would you feel about that? Mostly satisfied       Past Medical History:   Diagnosis Date    ADD (attention deficit disorder)     Anxiety     Depression     Ill-defined condition     chronic neck and back pain    Kidney stone     Migraine     Obesity     PTSD (post-traumatic stress disorder)     Seasonal allergic rhinitis     Sleep apnea        Past Surgical History:   Procedure Laterality Date    HX BACK SURGERY  7/7/2011    lamenectomy & diskectomy.  HX TONSIL AND ADENOIDECTOMY         Social History     Tobacco Use    Smoking status: Former Smoker    Smokeless tobacco: Never Used   Substance Use Topics    Alcohol use: No     Alcohol/week: 0.0 oz    Drug use: No       No Known Allergies    Family History   Problem Relation Age of Onset    Asthma Mother        Current Facility-Administered Medications   Medication Dose Route Frequency Provider Last Rate Last Dose    0.9% sodium chloride infusion  125 mL/hr IntraVENous CONTINUOUS Claudia Reading,  mL/hr at 03/07/19 1352 125 mL/hr at 03/07/19 1352    lactated Ringers infusion  75 mL/hr IntraVENous CONTINUOUS Donita Hickey MD        famotidine (PF) (PEPCID) 20 mg in sodium chloride 0.9% 10 mL injection  20 mg IntraVENous Sonny Donovan MD           Review of Systems  Constitutional: No fever, chills, or weight loss  Respiratory: No dyspnea  Cardiovascular: No chest pain  Gastrointestinal: + Vomiting   Genitourinary: Denies frequency, urgency, dysuria, hematuria. Neurological: No focal motor changes.      PHYSICAL EXAMINATION:   Visit Vitals  /82 (BP 1 Location: Left arm, BP Patient Position: At rest)   Pulse (!) 58   Temp 98.1 °F (36.7 °C)   Resp 16   Ht 6' 2\" (1.88 m)   Wt 289 lb (131.1 kg)   SpO2 100%   BMI 37.11 kg/m²     Constitutional: Well developed, well nourished male. No acute distress. HEENT: Normocephalic, Atraumatic, EOM's intact   CV:  Normal radial pulse. Respiratory: No respiratory distress or difficulties breathing   Abdomen:  Nontender, nondistended.  Male:  + Right flank tenderness   SCROTUM:  No scrotal rash or lesions noticed. Normal bilateral testes without masses or tenderness. PENIS: Urethral meatus normal in location and size. No urethral discharge. Skin: No evidence of jaundice. Normal color  Neuro/Psych:  Alert and oriented. Affect appropriate. Lymphatic:   No enlarged inguinal lymph nodes. REVIEW OF LABS AND IMAGING:      Labs: Results:   Chemistry    Recent Labs     03/07/19  1237   *      K 3.9   *   CO2 24   BUN 9   CREA 1.09   CA 9.1   AGAP 8   BUCR 8*      CBC w/Diff Recent Labs     03/07/19  1210   WBC 15.9*   RBC 5.24   HGB 15.7   HCT 46.5      GRANS 84*   LYMPH 9*   EOS 0      Cultures No results for input(s): CULT in the last 72 hours.   All Micro Results     Procedure Component Value Units Date/Time    CULTURE, URINE [[id number]]     Order Status:  Sent Specimen:  Cath Urine             Urinalysis Color   Date Value Ref Range Status   03/07/2019 YELLOW   Final     Appearance   Date Value Ref Range Status   03/07/2019 TURBID   Final     Specific gravity   Date Value Ref Range Status   03/07/2019 1.016 1.005 - 1.030   Final     pH (UA)   Date Value Ref Range Status   03/07/2019 7.5 5.0 - 8.0   Final     Protein   Date Value Ref Range Status   03/07/2019 NEGATIVE  NEG mg/dL Final     Ketone   Date Value Ref Range Status   03/07/2019 NEGATIVE  NEG mg/dL Final     Bilirubin   Date Value Ref Range Status   03/07/2019 NEGATIVE  NEG   Final     Blood   Date Value Ref Range Status   03/07/2019 NEGATIVE  NEG   Final     Urobilinogen   Date Value Ref Range Status 2019 0.2 0.2 - 1.0 EU/dL Final     Nitrites   Date Value Ref Range Status   2019 NEGATIVE  NEG   Final     Leukocyte Esterase   Date Value Ref Range Status   2019 NEGATIVE  NEG   Final     Potassium   Date Value Ref Range Status   2019 3.9 3.5 - 5.5 mmol/L Final     Creatinine   Date Value Ref Range Status   2019 1.09 0.6 - 1.3 MG/DL Final     BUN   Date Value Ref Range Status   2019 9 7.0 - 18 MG/DL Final      PSA No results for input(s): PSA in the last 72 hours. Coagulation Lab Results   Component Value Date/Time    Prothrombin time 12.5 2016 11:21 AM    Prothrombin time 13.1 2015 03:43 PM    INR 1.0 2016 11:21 AM    INR 1.0 2015 03:43 PM    aPTT 34.5 2016 11:21 AM    aPTT 30.1 2015 03:43 PM           US Results (most recent):  Results from Abstract encounter on 18   US ABD LTD    Impression Auth Prov: Sharri You  CC Prov:           [address] [place] [address]  [phone]      Imaging Result  Name:    Carlos Farfan ([id number])  Sex: Male :  1985  29year old   Patient Class: Outpatient Private Diagnosis:  Nausea with vomiting [R11.2 (ICD-10-CM)]            Procedures Performed: Exam Date/Time: Reason for Exam:  US ABDOMEN LIMITED  [id number]  2018  9:22 AM   None Specified           LIMITED RIGHT UPPER QUADRANT ABDOMINAL ULTRASOUND     INDICATION: Nausea and vomiting.     COMPARISON: CT abdomen and pelvis 9/27/15.     TECHNIQUE: Right upper quadrant abdominal ultrasound was performed.        FINDINGS:     LIVER: Hepatic echogenicity is increased consistent with fatty infiltration of the liver. The liver is enlarged measuring 18.4 cm in craniocaudal dimension. No focal mass. Normal direction of blood flow in the portal vein.     BILIARY SYSTEM: No intrahepatic biliary dilatation.   The common bile duct measures 2 mm in maximum diameter.     GALLBLADDER: There is no evidence of cholelithiasis, gallbladder wall thickening, pericholecystic fluid, or sonographic Gloria's sign.     RIGHT KIDNEY: 16.3 cm in length. No hydronephrosis or renal mass. No visible calculi. Normal renal cortical thickness and echogenicity.     PANCREAS: The visualized portions of the head and body of the pancreas are within normal limits.     IVC: Visualized portions are unremarkable in appearance.     OTHER: None. IMPRESSION  IMPRESSION:     1. Hepatic steatosis. Hepatomegaly.     2.  Otherwise unremarkable study. Dictated by: Ruffus Boast on Wed Feb 28, 2018 10:00:59 AM EST      Signed By:Kacey Hull MD   2/28/2018 10:02 AM                    [facility] [[id number]]    ~~This report was copied and pasted from the servicing EHR system. ~~            chest    CT Results (most recent):   Results from Hospital Encounter encounter on 03/07/19   CT ABD PELV WO CONT    Narrative CT ABDOMEN AND PELVIS WITHOUT CONTRAST      COMPARISON: 1/1/2019    INDICATIONS: Flank pain. Renal stones. TECHNIQUE: Axially acquired sections through the abdomen and pelvis with 5 mm  sections are obtained. All CT scans at this facility are performed using dose optimization technique as  appropriate to a performed exam, to include automated exposure control,  adjustment of the mA and/or KV according to patient's size (including  appropriate matching for site-specific examinations), or use of iterative  reconstruction technique. FINDINGS:    CT abdomen:Visualized portions of the lung bases, heart and pericardium are  normal. The liver is normal. No gallbladder abnormalities are seen. The spleen  is normal. The pancreas is normal. Adrenal glands are normal.      There is an 11 mm stone at the right ureteropelvic junction similar to  1/1/2019. There is increasing perinephric stranding on the right side with  worsening mild hydronephrosis.  The left kidney is normal.     There is no intra-abdominal or retroperitoneal adenopathy. No vascular  abnormalities are seen. CT pelvis: The prostate is normal in size. The bladder is normal. There is no  free fluid. There is no pelvic adenopathy. No specific bowel abnormalities are  seen. No significant osseous abnormalities are seen. There are mild degenerative disc  disease and osteoarthritic changes involving the spine greatest at the L4-L5  level. Impression Impression:       1. There is an 11 mm stone at the right ureteropelvic junction similar to  1/1/2019. However, there is worsening mild hydronephrosis with new perinephric  stranding indicating worsening obstruction. No additional renal calculi. The  left kidney is normal.   2. No additional significant abnormalities. ABD      MRI Results (most recent): No procedure found. 1. Right kidney stone    2. Acute right flank pain    3. Leukocytosis, unspecified type    4.  Hydronephrosis of right kidney

## 2019-03-10 LAB
BACTERIA SPEC CULT: NORMAL
SERVICE CMNT-IMP: NORMAL

## 2019-03-15 NOTE — H&P (VIEW-ONLY)
Jia Claire 1985 ASSESSMENT: 
1. 11mm right UPJ stone s/p right JJ stent placement 3/7/19 
 
2. Bilateral groin rash Doing well. Just having stent pain. Intranasal toradol. Return for definitive URS. PLAN: 
1. Advised OTC fungal cream for the rash 2. Advised daily bathing 3. Samples for Myrbetriq 50mg provided 4. Continue Flomax 0.4mg 
5. Continue Miralax 6. Rx for Percocet 5mg #20 provided, risks, benefits, and SE discussed 7. Rx for Sprix provided, 1 spray Q8H. Risks, benefits, and SE discussed 8. Stop Uribel 9. Advised to stop Ibuprofen when he starts Sprix. 10. Continue with URS with laser lithotripsy 4/2/19, RTC post-op Follow-up Disposition: 
Return for Post-Op. DISCUSSION: 
Patient's BMI is out of the normal parameters. Information about BMI was given and patient was advised to follow-up with their PCP for further management. Chief Complaint Patient presents with  
 Other Rash on groin  Kidney Stone HISTORY OF PRESENT ILLNESS: Jia Claire is a 34 y.o. male who presents today for follow up. Prev followed by Dr. Dagoberto Woodard for kidney stones. S/p right JJ stent placement 3/7/19 for an 11mm right UPJ stone that was noted on CT. Currently scheduled for URS and laser lithotripsy 4/2/19. Today he reports a rash on his groin that he first noted the day after his stent placement, the rash is improving. Also with Constant lower abdominal pain, gross hematuria with clots, and urinary frequency since stent placement. Taking Uribel with no relief. Also currently taking Miralax to prevent constipation. No f/c/n/v. Taking percocet and Ibuprofen with some improvement of the pain. No dysuria. No urinary incontinence. Good FOS with sensation of complete bladder emptying. REVIEW OF LABS & IMAGING: 
CT Abd/Pelv 3/7/19: Impression:  
  
  
1.  There is an 11 mm stone at the right ureteropelvic junction similar to 
 1/1/2019. However, there is worsening mild hydronephrosis with new perinephric 
stranding indicating worsening obstruction. No additional renal calculi. The 
left kidney is normal.  
2. No additional significant abnormalities. No results found for: PSA, Bette Nine, PSAR3, [id number], [id number], PSALT  
 
AUA Symptom Score 3/15/2019 Over the past month how often have you had the sensation that your bladder was not completely empty after you finished urinating? 5 Over the past month, how often have had to urinate again less than 2 hours after you last finished urinating? 5 Over the past month, how often have you found you stopped and started again several times when you urinated? 5 Over the past month, how often have you found it difficult to postpone urination? 5 Over the past month, how often have you had a weak urinary stream? 5 Over the past month, how often have you had to push or strain to begin urinating? 5 Over the past month, how many times did you most typically get up to urinate from the time you went to bed at night until the time you got up in the morning? 5  
AUA Score 35 If you were to spend the rest of your life with your urinary condition the way it is now, how would you feel about that? Terrible Review of Systems Constitutional: Fever: No 
Skin: Rash: Yes HEENT: Hearing difficulty: No 
Eyes: Blurred vision: No 
Cardiovascular: Chest pain: No 
Respiratory: Shortness of breath: No 
Gastrointestinal: Nausea/vomiting: Yes Musculoskeletal: Back pain: Yes Neurological: Weakness: Yes Psychological: Memory loss: No 
Comments/additional findings:  
 
 
 
Past Medical History:  
Diagnosis Date  ADD (attention deficit disorder)  Anxiety  Depression  Ill-defined condition   
 chronic neck and back pain  Kidney stone  Migraine  Obesity  PTSD (post-traumatic stress disorder)  Seasonal allergic rhinitis  Sleep apnea  Ulcer of the stomach and intestine Past Surgical History:  
Procedure Laterality Date  HX BACK SURGERY  7/7/2011  
 lamenectomy & diskectomy.  HX TONSIL AND ADENOIDECTOMY No Known Allergies Current Outpatient Medications Medication Sig  
 oxyCODONE-acetaminophen (PERCOCET) 5-325 mg per tablet Take 1 Tab by mouth every four (4) hours as needed for Pain for up to 3 days. Max Daily Amount: 6 Tabs.  ketorolac (SPRIX) 15.75 mg/spray spry 1 Spray by Nasal route every eight (8) hours. As needed for pain  tamsulosin (FLOMAX) 0.4 mg capsule Take 1 Cap by mouth daily (after dinner).  oxybutynin (DITROPAN) 5 mg tablet Take 1 Tab by mouth three (3) times daily.  calcium carbonate (TUMS) 200 mg calcium (500 mg) chew Take 1 Tab by mouth daily.  raNITIdine (ZANTAC) 150 mg tablet Take 150 mg by mouth two (2) times a day.  ibuprofen (MOTRIN PO) Take 600 mg by mouth daily.  [facility] blue-sod phos-phsal-hyo (URIBEL) 118-10-40.8-36 mg cap capsule Take 1 Cap by mouth four (4) times daily as needed (Urinary pain/frequency/urgency/stent pain).  [START ON 3/21/2019] nitrofurantoin, macrocrystal-monohydrate, (MACROBID) 100 mg capsule Take 1 Cap by mouth two (2) times a day for 7 days. Start 1 week prior to surgery  zolpidem CR (AMBIEN CR) 12.5 mg tablet Take 1 Tab by mouth nightly as needed for Sleep. Max Daily Amount: 12.5 mg.  
 topiramate (TOPAMAX) 100 mg tablet TAKE 1 TABLET BY MOUTH TWICE DAILY (Patient taking differently: once daily)  DULoxetine (CYMBALTA) 30 mg capsule Take 60 mg by mouth daily. No current facility-administered medications for this visit. Family History Problem Relation Age of Onset  Asthma Mother Social History Socioeconomic History  Marital status: SINGLE Spouse name: Not on file  Number of children: Not on file  Years of education: Not on file  Highest education level: Not on file Tobacco Use  
  Smoking status: Former Smoker  Smokeless tobacco: Never Used Substance and Sexual Activity  Alcohol use: No  
  Alcohol/week: 0.0 oz  Drug use: No  
 
 
 
PHYSICAL EXAMINATION:  
Visit Vitals /90 Ht 6' 2\" (1.88 m) Wt 289 lb (131.1 kg) BMI 37.11 kg/m² Constitutional: WDWN, Pleasant and appropriate affect, No acute distress. CV:  No peripheral swelling noted Respiratory: No respiratory distress or difficulties Abdomen:  No abdominal masses or tenderness. No CVA tenderness. No inguinal hernias noted.  Male:   
SCROTUM:  Inguinal crease rash. Normal bilateral testes and epididymis. PENIS: Urethral meatus normal in location and size. No urethral discharge. Skin: No jaundice. Neuro/Psych:  Alert and oriented x 3, affect appropriate. Lymphatic:   No enlarged inguinal lymph nodes. LABS TODAY: 
No results found for this or any previous visit (from the past 12 hour(s)). Hi Liu MD 
[facility] [phone] Encounter Diagnoses ICD-10-CM ICD-9-CM 1. Kidney stones N20.0 592.0 Medical documentation provided with the assistance of Sarwat Gonzales. River Aggarwal medical scribe for Hi Liu MD on 3/15/2019.

## 2019-03-25 ENCOUNTER — TELEPHONE (OUTPATIENT)
Dept: INTERNAL MEDICINE CLINIC | Age: 34
End: 2019-03-25

## 2019-03-25 NOTE — TELEPHONE ENCOUNTER
Pt calling said he was seen 03/07/ at [facility] had a stent put in , and that he also is having kidney stones removed on 04/02/ pt called for an appointment, he is under the impression he doesn't need a med clerarance,  Please advise

## 2019-03-27 ENCOUNTER — OFFICE VISIT (OUTPATIENT)
Dept: INTERNAL MEDICINE CLINIC | Age: 34
End: 2019-03-27

## 2019-03-27 VITALS
BODY MASS INDEX: 37.6 KG/M2 | WEIGHT: 293 LBS | TEMPERATURE: 98.5 F | RESPIRATION RATE: 12 BRPM | OXYGEN SATURATION: 98 % | DIASTOLIC BLOOD PRESSURE: 100 MMHG | HEART RATE: 73 BPM | HEIGHT: 74 IN | SYSTOLIC BLOOD PRESSURE: 152 MMHG

## 2019-03-27 DIAGNOSIS — F51.04 CHRONIC INSOMNIA: Primary | ICD-10-CM

## 2019-03-27 RX ORDER — ZOLPIDEM TARTRATE 10 MG/1
10 TABLET ORAL
Qty: 10 TAB | Refills: 0
Start: 2019-03-27 | End: 2019-05-13 | Stop reason: DRUGHIGH

## 2019-03-27 RX ORDER — BUSPIRONE HYDROCHLORIDE 10 MG/1
10 TABLET ORAL 3 TIMES DAILY
Qty: 90 TAB | Refills: 0 | Status: SHIPPED | OUTPATIENT
Start: 2019-03-27 | End: 2019-06-18

## 2019-03-27 NOTE — TELEPHONE ENCOUNTER
Patient called back wanting to come in for an appointment. Patient wants to discuss high anxiety and get medication. Due to all the surgeries, uncomfortable healing and coping issues. Patient was given an appointment for 4 pm today.

## 2019-03-27 NOTE — PROGRESS NOTES
Martin Medina 1985, is a 34 y.o. male, who is seen today for evaluation of increased anxiety. He has been under some increased stress lately, very painful kidney stone with hydronephrosis and a stent was placed. He is having ongoing abdominal and back discomfort related to that his anxiety is gotten worse the last several days especially. He continues to use Cymbalta and that helps him to some extent. He is sleeping reasonably well with zolpidem but finds the 12-1/2 mg dose is not working any better than the 10 mg dose and he would like to go back to 10 mg when he runs out of his current supply. Chest pain or dyspnea. He is eating quite well. He has been on Seroquel in the past, prescribed elsewhere. Past Medical History:   Diagnosis Date    ADD (attention deficit disorder)     Anxiety     Depression     Ill-defined condition     chronic neck and back pain    Kidney stone     Migraine     Obesity     PTSD (post-traumatic stress disorder)     Seasonal allergic rhinitis     Sleep apnea     Ulcer of the stomach and intestine      Current Outpatient Medications   Medication Sig Dispense Refill    mirabegron ER (MYRBETRIQ) 50 mg ER tablet Take 50 mg by mouth daily.  tamsulosin (FLOMAX) 0.4 mg capsule Take 1 Cap by mouth daily (after dinner). 30 Cap 3    calcium carbonate (TUMS) 200 mg calcium (500 mg) chew Take 1 Tab by mouth daily.  raNITIdine (ZANTAC) 150 mg tablet Take 150 mg by mouth two (2) times a day.  nitrofurantoin, macrocrystal-monohydrate, (MACROBID) 100 mg capsule Take 1 Cap by mouth two (2) times a day for 7 days. Start 1 week prior to surgery 14 Cap 0    zolpidem CR (AMBIEN CR) 12.5 mg tablet Take 1 Tab by mouth nightly as needed for Sleep.  Max Daily Amount: 12.5 mg. 30 Tab 2    topiramate (TOPAMAX) 100 mg tablet TAKE 1 TABLET BY MOUTH TWICE DAILY (Patient taking differently: once daily) 180 Tab 0    DULoxetine (CYMBALTA) 30 mg capsule Take 60 mg by mouth daily. Visit Vitals  BP (!) 152/100 (BP 1 Location: Left arm, BP Patient Position: Sitting)   Pulse 73   Temp 98.5 °F (36.9 °C) (Oral)   Resp 12   Ht 6' 2\" (1.88 m)   Wt 293 lb (132.9 kg)   SpO2 98%   BMI 37.62 kg/m²     It was not further examined today. I did recheck his blood pressure and it is the same as the nurse got initially. Assessment: #1. Problems with kidney stone and stent with pain etc. contributing to his increase in anxiety. He will continue Cymbalta 60 mg daily and we will add buspirone 10 mg 3 times a day. #2.  Chronic insomnia, will change back to 10 mg of zolpidem at bedtime but she needs every night to sleep. #3.  Obesity little changed, of encouraged him to work on weight loss. #4. Nephrolithiasis now with stent, he will follow-up with urology and hopefully discomfort will improve and he will stop bleeding. It bothers him to see so much blood in his urine. #5.  Elevated blood pressure, blood pressure usually is pretty good, he will continue no added salt diet work on weight loss and blood pressure will be monitored closely. Follow-up as previously planned. Tomy Ireland MD FACP    Please note: This document has been produced using voice recognition software. Unrecognized errors in transcription may be present. This visit lasted 25 minutes, greater than 50% of the time spent counseling on the above issues.

## 2019-03-29 ENCOUNTER — TELEPHONE (OUTPATIENT)
Dept: INTERNAL MEDICINE CLINIC | Age: 34
End: 2019-03-29

## 2019-03-29 NOTE — TELEPHONE ENCOUNTER
Pt calling, says he saw RM last week and got rx for anxiety. Says the medication is causing increased depression and feelling suicidal. He can't take meds in this class. [place] He cannot take Paxil or Wellbutrin. They make him jittery and temperamental.     Please call him and advise if there is another med he can try?

## 2019-04-01 ENCOUNTER — TELEPHONE (OUTPATIENT)
Dept: INTERNAL MEDICINE CLINIC | Age: 34
End: 2019-04-01

## 2019-04-01 ENCOUNTER — ANESTHESIA EVENT (OUTPATIENT)
Dept: SURGERY | Age: 34
End: 2019-04-01
Payer: COMMERCIAL

## 2019-04-01 NOTE — TELEPHONE ENCOUNTER
Chief Complaint   Patient presents with    Medication Reaction     per patient states Buspar 10 mg given to him last office visit for anxiety back on 3-27-19 causing increased depression and feeling suicidal      Lorrene Goodpasture, MD  [facility] 3 days ago      Since he does not tolerate most of the drugs we usually use, I would recommend seeing a psychiatrist to see what else might be available. Routing comment    04-01-19 Patient reached and 2 identifiers were used: Full Name, and Date of Birth the patient was given several Psychiatry contact numbers, and understands to be in touch with his insurance for further Psychiatry offices he is insured to see. The patient will let us know who he ends up going to see for Psychiatry. All understood. The patient did stop taking Buspar.

## 2019-04-02 ENCOUNTER — ANESTHESIA (OUTPATIENT)
Dept: SURGERY | Age: 34
End: 2019-04-02
Payer: COMMERCIAL

## 2019-04-02 ENCOUNTER — HOSPITAL ENCOUNTER (OUTPATIENT)
Age: 34
Discharge: HOME | End: 2019-04-02
Attending: UROLOGY | Admitting: UROLOGY
Payer: COMMERCIAL

## 2019-04-02 ENCOUNTER — APPOINTMENT (OUTPATIENT)
Dept: GENERAL RADIOLOGY | Age: 34
End: 2019-04-02
Attending: UROLOGY
Payer: COMMERCIAL

## 2019-04-02 VITALS
HEART RATE: 88 BPM | TEMPERATURE: 97.1 F | WEIGHT: 280 LBS | BODY MASS INDEX: 35.94 KG/M2 | HEIGHT: 74 IN | RESPIRATION RATE: 14 BRPM | OXYGEN SATURATION: 100 % | SYSTOLIC BLOOD PRESSURE: 148 MMHG | DIASTOLIC BLOOD PRESSURE: 91 MMHG

## 2019-04-02 DIAGNOSIS — N20.0 KIDNEY STONE: Primary | ICD-10-CM

## 2019-04-02 PROCEDURE — 77030012961 HC IRR KT CYSTO/TUR ICUM -A: Performed by: UROLOGY

## 2019-04-02 PROCEDURE — 77030020782 HC GWN BAIR PAWS FLX 3M -B: Performed by: UROLOGY

## 2019-04-02 PROCEDURE — 74011250636 HC RX REV CODE- 250/636: Performed by: NURSE ANESTHETIST, CERTIFIED REGISTERED

## 2019-04-02 PROCEDURE — 74011250636 HC RX REV CODE- 250/636: Performed by: UROLOGY

## 2019-04-02 PROCEDURE — 74420 UROGRAPHY RTRGR +-KUB: CPT

## 2019-04-02 PROCEDURE — 74011000250 HC RX REV CODE- 250

## 2019-04-02 PROCEDURE — 76060000033 HC ANESTHESIA 1 TO 1.5 HR: Performed by: UROLOGY

## 2019-04-02 PROCEDURE — 76210000006 HC OR PH I REC 0.5 TO 1 HR: Performed by: UROLOGY

## 2019-04-02 PROCEDURE — 77030020268 HC MISC GENERAL SUPPLY: Performed by: UROLOGY

## 2019-04-02 PROCEDURE — 77030018836 HC SOL IRR NACL ICUM -A: Performed by: UROLOGY

## 2019-04-02 PROCEDURE — 82360 CALCULUS ASSAY QUANT: CPT

## 2019-04-02 PROCEDURE — C1758 CATHETER, URETERAL: HCPCS | Performed by: UROLOGY

## 2019-04-02 PROCEDURE — 74011000250 HC RX REV CODE- 250: Performed by: UROLOGY

## 2019-04-02 PROCEDURE — C1769 GUIDE WIRE: HCPCS | Performed by: UROLOGY

## 2019-04-02 PROCEDURE — 76010000161 HC OR TIME 1 TO 1.5 HR INTENSV-TIER 1: Performed by: UROLOGY

## 2019-04-02 PROCEDURE — 74011250637 HC RX REV CODE- 250/637: Performed by: NURSE ANESTHETIST, CERTIFIED REGISTERED

## 2019-04-02 PROCEDURE — 74011250636 HC RX REV CODE- 250/636

## 2019-04-02 PROCEDURE — 77030006974 HC BSKT URET RTVR BSC -C: Performed by: UROLOGY

## 2019-04-02 PROCEDURE — 74011250637 HC RX REV CODE- 250/637: Performed by: UROLOGY

## 2019-04-02 PROCEDURE — 77030010509 HC AIRWY LMA MSK TELE -A: Performed by: ANESTHESIOLOGY

## 2019-04-02 PROCEDURE — 77030032490 HC SLV COMPR SCD KNE COVD -B: Performed by: UROLOGY

## 2019-04-02 PROCEDURE — C1894 INTRO/SHEATH, NON-LASER: HCPCS | Performed by: UROLOGY

## 2019-04-02 PROCEDURE — 74011636320 HC RX REV CODE- 636/320: Performed by: UROLOGY

## 2019-04-02 PROCEDURE — C2617 STENT, NON-COR, TEM W/O DEL: HCPCS | Performed by: UROLOGY

## 2019-04-02 PROCEDURE — 76210000026 HC REC RM PH II 1 TO 1.5 HR: Performed by: UROLOGY

## 2019-04-02 RX ORDER — FENTANYL CITRATE 50 UG/ML
INJECTION, SOLUTION INTRAMUSCULAR; INTRAVENOUS AS NEEDED
Status: DISCONTINUED | OUTPATIENT
Start: 2019-04-02 | End: 2019-04-02 | Stop reason: HOSPADM

## 2019-04-02 RX ORDER — SODIUM CHLORIDE 9 MG/ML
75 INJECTION, SOLUTION INTRAVENOUS CONTINUOUS
Status: DISCONTINUED | OUTPATIENT
Start: 2019-04-02 | End: 2019-04-02 | Stop reason: HOSPADM

## 2019-04-02 RX ORDER — NITROFURANTOIN 25; 75 MG/1; MG/1
100 CAPSULE ORAL 2 TIMES DAILY
Qty: 6 CAP | Refills: 0 | Status: SHIPPED | OUTPATIENT
Start: 2019-04-02 | End: 2019-06-18

## 2019-04-02 RX ORDER — MAGNESIUM SULFATE 100 %
4 CRYSTALS MISCELLANEOUS AS NEEDED
Status: DISCONTINUED | OUTPATIENT
Start: 2019-04-02 | End: 2019-04-02 | Stop reason: HOSPADM

## 2019-04-02 RX ORDER — SODIUM CHLORIDE 0.9 % (FLUSH) 0.9 %
5-40 SYRINGE (ML) INJECTION EVERY 8 HOURS
Status: DISCONTINUED | OUTPATIENT
Start: 2019-04-02 | End: 2019-04-02 | Stop reason: HOSPADM

## 2019-04-02 RX ORDER — LIDOCAINE HYDROCHLORIDE 10 MG/ML
0.1 INJECTION, SOLUTION EPIDURAL; INFILTRATION; INTRACAUDAL; PERINEURAL AS NEEDED
Status: DISCONTINUED | OUTPATIENT
Start: 2019-04-02 | End: 2019-04-02 | Stop reason: HOSPADM

## 2019-04-02 RX ORDER — SODIUM CHLORIDE 0.9 % (FLUSH) 0.9 %
5-40 SYRINGE (ML) INJECTION AS NEEDED
Status: DISCONTINUED | OUTPATIENT
Start: 2019-04-02 | End: 2019-04-02 | Stop reason: HOSPADM

## 2019-04-02 RX ORDER — OXYCODONE AND ACETAMINOPHEN 5; 325 MG/1; MG/1
1 TABLET ORAL
Status: COMPLETED | OUTPATIENT
Start: 2019-04-02 | End: 2019-04-02

## 2019-04-02 RX ORDER — SODIUM CHLORIDE 9 MG/ML
50 INJECTION, SOLUTION INTRAVENOUS CONTINUOUS
Status: DISCONTINUED | OUTPATIENT
Start: 2019-04-02 | End: 2019-04-02 | Stop reason: HOSPADM

## 2019-04-02 RX ORDER — PROPOFOL 10 MG/ML
INJECTION, EMULSION INTRAVENOUS AS NEEDED
Status: DISCONTINUED | OUTPATIENT
Start: 2019-04-02 | End: 2019-04-02 | Stop reason: HOSPADM

## 2019-04-02 RX ORDER — DEXAMETHASONE SODIUM PHOSPHATE 4 MG/ML
INJECTION, SOLUTION INTRA-ARTICULAR; INTRALESIONAL; INTRAMUSCULAR; INTRAVENOUS; SOFT TISSUE AS NEEDED
Status: DISCONTINUED | OUTPATIENT
Start: 2019-04-02 | End: 2019-04-02 | Stop reason: HOSPADM

## 2019-04-02 RX ORDER — ONDANSETRON 2 MG/ML
4 INJECTION INTRAMUSCULAR; INTRAVENOUS ONCE
Status: DISCONTINUED | OUTPATIENT
Start: 2019-04-02 | End: 2019-04-02 | Stop reason: HOSPADM

## 2019-04-02 RX ORDER — OXYCODONE AND ACETAMINOPHEN 5; 325 MG/1; MG/1
1 TABLET ORAL
Qty: 15 TAB | Refills: 0 | Status: SHIPPED | OUTPATIENT
Start: 2019-04-02 | End: 2019-04-05

## 2019-04-02 RX ORDER — HYDROMORPHONE HYDROCHLORIDE 2 MG/ML
0.5 INJECTION, SOLUTION INTRAMUSCULAR; INTRAVENOUS; SUBCUTANEOUS AS NEEDED
Status: COMPLETED | OUTPATIENT
Start: 2019-04-02 | End: 2019-04-02

## 2019-04-02 RX ORDER — ONDANSETRON 2 MG/ML
INJECTION INTRAMUSCULAR; INTRAVENOUS AS NEEDED
Status: DISCONTINUED | OUTPATIENT
Start: 2019-04-02 | End: 2019-04-02 | Stop reason: HOSPADM

## 2019-04-02 RX ORDER — DEXTROSE 50 % IN WATER (D50W) INTRAVENOUS SYRINGE
25-50 AS NEEDED
Status: DISCONTINUED | OUTPATIENT
Start: 2019-04-02 | End: 2019-04-02 | Stop reason: HOSPADM

## 2019-04-02 RX ORDER — GLYCOPYRROLATE 0.2 MG/ML
INJECTION INTRAMUSCULAR; INTRAVENOUS AS NEEDED
Status: DISCONTINUED | OUTPATIENT
Start: 2019-04-02 | End: 2019-04-02 | Stop reason: HOSPADM

## 2019-04-02 RX ORDER — FAMOTIDINE 20 MG/1
20 TABLET, FILM COATED ORAL ONCE
Status: COMPLETED | OUTPATIENT
Start: 2019-04-02 | End: 2019-04-02

## 2019-04-02 RX ORDER — LIDOCAINE HYDROCHLORIDE 20 MG/ML
INJECTION, SOLUTION EPIDURAL; INFILTRATION; INTRACAUDAL; PERINEURAL AS NEEDED
Status: DISCONTINUED | OUTPATIENT
Start: 2019-04-02 | End: 2019-04-02 | Stop reason: HOSPADM

## 2019-04-02 RX ORDER — MIDAZOLAM HYDROCHLORIDE 1 MG/ML
INJECTION, SOLUTION INTRAMUSCULAR; INTRAVENOUS AS NEEDED
Status: DISCONTINUED | OUTPATIENT
Start: 2019-04-02 | End: 2019-04-02 | Stop reason: HOSPADM

## 2019-04-02 RX ADMIN — HYDROMORPHONE HYDROCHLORIDE 0.5 MG: 2 INJECTION, SOLUTION INTRAMUSCULAR; INTRAVENOUS; SUBCUTANEOUS at 11:36

## 2019-04-02 RX ADMIN — FAMOTIDINE 20 MG: 20 TABLET ORAL at 08:34

## 2019-04-02 RX ADMIN — SODIUM CHLORIDE 75 ML/HR: 900 INJECTION, SOLUTION INTRAVENOUS at 11:22

## 2019-04-02 RX ADMIN — ONDANSETRON 4 MG: 2 INJECTION INTRAMUSCULAR; INTRAVENOUS at 10:01

## 2019-04-02 RX ADMIN — PROPOFOL 100 MG: 10 INJECTION, EMULSION INTRAVENOUS at 10:04

## 2019-04-02 RX ADMIN — HYDROMORPHONE HYDROCHLORIDE 0.5 MG: 2 INJECTION, SOLUTION INTRAMUSCULAR; INTRAVENOUS; SUBCUTANEOUS at 11:26

## 2019-04-02 RX ADMIN — OXYCODONE AND ACETAMINOPHEN 1 TABLET: 5; 325 TABLET ORAL at 12:06

## 2019-04-02 RX ADMIN — FENTANYL CITRATE 50 MCG: 50 INJECTION, SOLUTION INTRAMUSCULAR; INTRAVENOUS at 10:35

## 2019-04-02 RX ADMIN — FENTANYL CITRATE 50 MCG: 50 INJECTION, SOLUTION INTRAMUSCULAR; INTRAVENOUS at 09:58

## 2019-04-02 RX ADMIN — FENTANYL CITRATE 100 MCG: 50 INJECTION, SOLUTION INTRAMUSCULAR; INTRAVENOUS at 09:38

## 2019-04-02 RX ADMIN — GLYCOPYRROLATE 0.2 MG: 0.2 INJECTION INTRAMUSCULAR; INTRAVENOUS at 10:02

## 2019-04-02 RX ADMIN — LIDOCAINE HYDROCHLORIDE 100 MG: 20 INJECTION, SOLUTION EPIDURAL; INFILTRATION; INTRACAUDAL; PERINEURAL at 09:48

## 2019-04-02 RX ADMIN — HYDROMORPHONE HYDROCHLORIDE 0.5 MG: 2 INJECTION, SOLUTION INTRAMUSCULAR; INTRAVENOUS; SUBCUTANEOUS at 11:16

## 2019-04-02 RX ADMIN — WATER 1 G: 1 INJECTION INTRAMUSCULAR; INTRAVENOUS; SUBCUTANEOUS at 09:46

## 2019-04-02 RX ADMIN — DEXAMETHASONE SODIUM PHOSPHATE 8 MG: 4 INJECTION, SOLUTION INTRA-ARTICULAR; INTRALESIONAL; INTRAMUSCULAR; INTRAVENOUS; SOFT TISSUE at 10:01

## 2019-04-02 RX ADMIN — SODIUM CHLORIDE 50 ML/HR: 900 INJECTION, SOLUTION INTRAVENOUS at 08:34

## 2019-04-02 RX ADMIN — HYDROMORPHONE HYDROCHLORIDE 0.5 MG: 2 INJECTION, SOLUTION INTRAMUSCULAR; INTRAVENOUS; SUBCUTANEOUS at 11:46

## 2019-04-02 RX ADMIN — MIDAZOLAM HYDROCHLORIDE 2 MG: 1 INJECTION, SOLUTION INTRAMUSCULAR; INTRAVENOUS at 09:38

## 2019-04-02 RX ADMIN — FENTANYL CITRATE 50 MCG: 50 INJECTION, SOLUTION INTRAMUSCULAR; INTRAVENOUS at 10:04

## 2019-04-02 RX ADMIN — PROPOFOL 200 MG: 10 INJECTION, EMULSION INTRAVENOUS at 09:48

## 2019-04-02 NOTE — INTERVAL H&P NOTE
H&P Update: 
Barbara Paez was seen and examined. History and physical has been reviewed. The patient has been examined.  There have been no significant clinical changes since the completion of the originally dated History and Physical. 
 
Signed By: Ishan Tapia MD   
 April 2, 2019 8:53 AM

## 2019-04-02 NOTE — PERIOP NOTES
Dr. Corina Mcdonald notified of pt current BP, med rec reviewed and pain level. Will reassess post admin of percocet. .Patient confirmed by two identifiers with discharge instructions prior too being provided to patient and mother. Plan of care reviewed and pt/family in agreement.

## 2019-04-02 NOTE — OP NOTES
Operative Note  Patient: Ashia Caicedo               Sex: male             MRN: [id number]  YOB: 1985      Age:  34 y.o. Preoperative Diagnosis: Kidney stones [N20.0]  Postoperative Diagnosis:  Kidney stones [N20.0]  Surgeon: Yoandy Gurrola     Indication: This is a 33 y/o man with proximal right 1cm stone s/p stenting here today for ureteroscopic stone extraction. Preop urine culture was negative. Procedure:    1) Cystoscopy  2) Retrograde pyelogram with interpretation  3) < 1 hour physician fluoroscopy imaging interpretation time  4) Right side Ureteroscopy, laser lithotripsy and stone extraction   5) JJ ureteral stent placement     Findings:    1). Normal cystoscopy   2). Retrograde pyelograms without evidence of hydronephrosis or filling defects  3). Total stone burden 1cm   4). 7x26 JJ stent placed     Narrative of events: The patient was brought to the operative suite. Anesthesia was induced and preoperative antibiotics were administered. They were then placed in the dorsal lithotomy position and their external genitalia was prepped and draped in the usual fashion. A surgical timeout was performed confirming the patient's name, date of birth, laterality, and antibiotics. All were in agreement. A 22 [place] cystourethroscope was then inserted into the patients bladder. The urethra revealed soft bands as previously noted on last cystoscopy that easily accepted the scope. Prostate was non obstructing. Thorough cystoscopy was performed with both the 30 degree and 70 degree lens. The right ureteral orifice was cannulated with a 0.035 sensor tip wire and confirmed in the renal pelvis. Stent was then grasped and removed. A duel lumen was advanced over this and retrograde pyelogram was performed no evidence of hydronephrosis or filling defects. Second wire was advanced and duel lumen was removed.   13/15 Ureteral access sheath was advanced to the UPJ under direct fluoroscopic guidance and flexible ureteroscopy was performed. The stone was identified and laser lithotripsy was performed. Basket extraction was then performed with removal of all visible stone. Thorough pyeloscopy was again performed to ensure no residual fragments. The sheath was removed slowly and the entire ureter was well visualized without evidence of residual stones or injury. A stent was advanced over the wire and deployed using fluoroscopic technique with the string left in place. The bladder was drained and patient was awoken from anesthesia. Estimated Blood Loss: <5CC     Anesthesia:  General                  Implants:   Implant Name Type Inv. Item Serial No.  Lot No. LRB No. Used Action   Bard inlay optima 6 fr x 26cm    BARD [id number] 1 Implanted       Specimens:   ID Type Source Tests Collected by Time Destination   1 : Kidney stone analysis Fresh [facility]  Cielo Laird MD 4/2/2019 10:10 AM Pathology        Drains: [id number] JJ stent            Complications:  None           Plan:  1. Start macrobid day before stent removal   2. Remove stent on Saturday   3.  Return in 6 weeks with KUB, Ultrasound, Regulo Batres MD  4/2/2019

## 2019-04-02 NOTE — ANESTHESIA PREPROCEDURE EVALUATION
Anesthetic History No history of anesthetic complications Review of Systems / Medical History Patient summary reviewed and pertinent labs reviewed Pulmonary Sleep apnea: CPAP Neuro/Psych Psychiatric history (Depression) Cardiovascular Exercise tolerance: >4 METS 
  
GI/Hepatic/Renal 
  
 
 
Renal disease: stones Endo/Other Morbid obesity and arthritis Other Findings Physical Exam 
 
Airway Mallampati: II 
TM Distance: 4 - 6 cm Neck ROM: normal range of motion Mouth opening: Diminished (comment) Comments: Full beard Cardiovascular Regular rate and rhythm,  S1 and S2 normal,  no murmur, click, rub, or gallop Rhythm: regular Dental 
No notable dental hx Pulmonary Breath sounds clear to auscultation Abdominal 
 
 
 
 Other Findings Anesthetic Plan ASA: 2 Anesthesia type: general 
 
 
 
 
Induction: Intravenous Anesthetic plan and risks discussed with: Patient

## 2019-04-02 NOTE — DISCHARGE INSTRUCTIONS
Discharge Instructions      Patient: Sergo Patterson               Sex: male          DOA: 4/2/2019         YOB: 1985      Age:  34 y.o. ACUTE DIAGNOSES:  Nephrolithiasis     DIET: General     ACTIVITY: No restrictions     ADDITIONAL INFORMATION:   You have indwelling ureteral stents which frequently causes slight discomfort in the flank region and bladder spasms. If these occur beyond 24 hours after surgery, please contact our office to prescribe you flomax as needed for flank discomfort or Ditropan for bladder spasms. The indwelling stent will need to be removed/exchanged as directed below. If the stent is left in place without appropriate follow-up, it may become encrusted with stone and/or infected. If that occurs, you will require multiple additional surgeries to fix this. It is very important you follow up for appropriate removal or exchange of ureteral stents. If you experience any fevers > 100.4, significant nausea/vomiting, or significant worsening of pain, please contact us at the number below. It is common to experience mild, recurrent blood in your urine and this is likely due to stent irritation. If the bleeding continues to worsen with passage of clots, you are unable to urinate, or you experience significant light-headedness, please contact us at the number below. STENT: Your stents are attached to small strings coming out of your urethra. Please remove the stent on SATURDAY, by pulling firmly on the string until the stent is completely recovered. Some patients prefer to do this in a warm tub or shower. After the stent is removed it is common to experience some flank pain. This flank pain should resolve in 24-48 hours and should be manageable with pain medications. If pain is not controlled with pain medications, you experience nausea, vomiting, or fevers please notify us at the number below.       FOLLOW UP CARE:  You have a return appointment with Dr. Rhys Rivas in 6 weeks with a 24 hour urine test, blood work, X ray and kidney ultrasound. Narcotic-Analgesic/Acetaminophen (Percocet, Norco, Lorcet HD, Lortab 10/325) - (By mouth)   Why this medicine is used:   Relieves pain. Contact a nurse or doctor right away if you have:  · Extreme weakness, shallow breathing, slow heartbeat  · Severe confusion, lightheadedness, dizziness, fainting  · Yellow skin or eyes, dark urine or pale stools  · Severe constipation, severe stomach pain, nausea, vomiting, loss of appetite  · Sweating or cold, clammy skin     Common side effects:  · Mild constipation, nausea, vomiting  · Sleepiness, tiredness  · Itching, rash  © 2017 [facility] Information is for End User's use only and may not be sold, redistributed or otherwise used for commercial purposes. Nitrofurantoin Combination (Macrobid) - (By mouth)   Why this medicine is used:   Treats urinary tract infections caused by bacteria. Contact a nurse or doctor right away if you have:  · Shortness of breath, chest pain, cough, fever, chills, weakness  · Severe or bloody diarrhea  · Yellow skin or eyes  · Dark urine or pale stools, loss of appetite, stomach pain  · Numbness, tingling, or burning pain in your hands, arms, legs, or feet     Common side effects:  · Dizziness, headache, blurred vision  · Nausea, vomiting, brown or yellow urine discoloration  · Hair loss  © 2017 [address] is for End User's use only and may not be sold, redistributed or otherwise used for commercial purposes.   DISCHARGE SUMMARY from Nurse    PATIENT INSTRUCTIONS:    After general anesthesia or intravenous sedation, for 24 hours or while taking prescription Narcotics:  · Limit your activities  · Do not drive and operate hazardous machinery  · Do not make important personal or business decisions  · Do  not drink alcoholic beverages  · If you have not urinated within 8 hours after discharge, please contact your surgeon on call. Report the following to your surgeon:  · Excessive pain, swelling, redness or odor of or around the surgical area  · Temperature over 100.5  · Nausea and vomiting lasting longer than 4 hours or if unable to take medications  · Any signs of decreased circulation or nerve impairment to extremity: change in color, persistent  numbness, tingling, coldness or increase pain  · Any questions    *  Please give a list of your current medications to your Primary Care Provider. *  Please update this list whenever your medications are discontinued, doses are      changed, or new medications (including over-the-counter products) are added. *  Please carry medication information at all times in case of emergency situations. These are general instructions for a healthy lifestyle:    No smoking/ No tobacco products/ Avoid exposure to second hand smoke  Surgeon General's Warning:  Quitting smoking now greatly reduces serious risk to your health. Obesity, smoking, and sedentary lifestyle greatly increases your risk for illness    A healthy diet, regular physical exercise & weight monitoring are important for maintaining a healthy lifestyle    You may be retaining fluid if you have a history of heart failure or if you experience any of the following symptoms:  Weight gain of 3 pounds or more overnight or 5 pounds in a week, increased swelling in our hands or feet or shortness of breath while lying flat in bed. Please call your doctor as soon as you notice any of these symptoms; do not wait until your next office visit. Recognize signs and symptoms of STROKE:    F-face looks uneven    A-arms unable to move or move unevenly    S-speech slurred or non-existent    T-time-call 911 as soon as signs and symptoms begin-DO NOT go       Back to bed or wait to see if you get better-TIME IS BRAIN. Warning Signs of HEART ATTACK     Call 911 if you have these symptoms:   Chest discomfort.  Most heart attacks involve discomfort in the center of the chest that lasts more than a few minutes, or that goes away and comes back. It can feel like uncomfortable pressure, squeezing, fullness, or pain.  Discomfort in other areas of the upper body. Symptoms can include pain or discomfort in one or both arms, the back, neck, jaw, or stomach.  Shortness of breath with or without chest discomfort.  Other signs may include breaking out in a cold sweat, nausea, or lightheadedness. Don't wait more than five minutes to call 911 - MINUTES MATTER! Fast action can save your life. Calling 911 is almost always the fastest way to get lifesaving treatment. Emergency Medical Services staff can begin treatment when they arrive -- up to an hour sooner than if someone gets to the hospital by car. The discharge information has been reviewed with the patient/mother. The patient/mother verbalized understanding. Discharge medications reviewed with the patient/mother and appropriate educational materials and side effects teaching were provided.   ___________________________________________________________________________________________________________________________________

## 2019-04-02 NOTE — ANESTHESIA POSTPROCEDURE EVALUATION
Procedure(s): RIGHT URETEROSCOPY/100W HOLMIUM LASER LITHOTRIPSY/URETERAL STENT/C-ARM. general 
 
Anesthesia Post Evaluation Multimodal analgesia: multimodal analgesia used between 6 hours prior to anesthesia start to PACU discharge Patient location during evaluation: PACU Patient participation: complete - patient participated Level of consciousness: awake Pain management: adequate Airway patency: patent Anesthetic complications: no 
Cardiovascular status: acceptable Respiratory status: acceptable Hydration status: acceptable Post anesthesia nausea and vomiting:  controlled Vitals Value Taken Time /108 4/2/2019 11:38 AM  
Temp 36.4 °C (97.6 °F) 4/2/2019 10:49 AM  
Pulse 95 4/2/2019 11:39 AM  
Resp 22 4/2/2019 11:39 AM  
SpO2 100 % 4/2/2019 11:39 AM  
Vitals shown include unvalidated device data.

## 2019-04-05 LAB
CA PHOS MFR STONE: 55 %
CALCIUM OXALATE DIHYDRATE MFR STONE IR: 10 %
COLOR STONE: NORMAL
COM MFR STONE: 35 %
COMMENT, 120677: NORMAL
COMMENT, 519994: NORMAL
COMPOSITION, 120440: NORMAL
DISCLAIMER, STO32L: NORMAL
NIDUS STONE QL: NORMAL
SIZE STONE: NORMAL MM
WT STONE: 86 MG

## 2019-04-08 RX ORDER — TOPIRAMATE 100 MG/1
TABLET, FILM COATED ORAL
Qty: 180 TAB | Refills: 3 | Status: SHIPPED | OUTPATIENT
Start: 2019-04-08 | End: 2020-05-05

## 2019-04-08 NOTE — TELEPHONE ENCOUNTER
Last Visit: 3-27-19 with MD Dallas Kang  Next Appointment: none  Previous Refill Encounter(s): 11-23-18 #180    Requested Prescriptions     Pending Prescriptions Disp Refills    topiramate (TOPAMAX) 100 mg tablet 180 Tab 3     Sig: TAKE 1 TABLET BY MOUTH TWICE DAILY

## 2019-05-13 ENCOUNTER — TELEPHONE (OUTPATIENT)
Dept: INTERNAL MEDICINE CLINIC | Age: 34
End: 2019-05-13

## 2019-05-13 DIAGNOSIS — F51.04 CHRONIC INSOMNIA: Primary | ICD-10-CM

## 2019-05-13 RX ORDER — ZOLPIDEM TARTRATE 12.5 MG/1
12.5 TABLET, FILM COATED, EXTENDED RELEASE ORAL
Qty: 30 TAB | Refills: 1 | Status: SHIPPED | OUTPATIENT
Start: 2019-05-13 | End: 2019-07-05 | Stop reason: SDUPTHER

## 2019-05-13 NOTE — TELEPHONE ENCOUNTER
Patient left message requesting a refill on his Ambien CR 12.5mg tabs. I see this as discontinued per last OV (see below). Please advise and pend new med if appropriate.     Last visit: 3/27/19 with MD Shirin Rivera    He is asking Rx be sent to [facility] on [phone] [address]

## 2019-05-21 ENCOUNTER — TELEPHONE (OUTPATIENT)
Dept: INTERNAL MEDICINE CLINIC | Age: 34
End: 2019-05-21

## 2019-05-21 NOTE — TELEPHONE ENCOUNTER
Patient left message requesting a refill on his Duloxetine 30mg - he states he takes 3/day. I do not see this dose listed in his chart. Please advise and pend new Rx if appropriate.     Last visit:  3/27/19 with MD Burton Fabry    Notes from last OV:      Patient phone # [phone]

## 2019-05-22 RX ORDER — DULOXETIN HYDROCHLORIDE 30 MG/1
CAPSULE, DELAYED RELEASE ORAL
Qty: 90 CAP | Refills: 5 | Status: SHIPPED | OUTPATIENT
Start: 2019-05-22 | End: 2020-01-13

## 2019-06-18 ENCOUNTER — HOSPITAL ENCOUNTER (EMERGENCY)
Age: 34
Discharge: HOME | End: 2019-06-18
Attending: EMERGENCY MEDICINE
Payer: COMMERCIAL

## 2019-06-18 ENCOUNTER — APPOINTMENT (OUTPATIENT)
Dept: GENERAL RADIOLOGY | Age: 34
End: 2019-06-18
Attending: EMERGENCY MEDICINE
Payer: COMMERCIAL

## 2019-06-18 VITALS
DIASTOLIC BLOOD PRESSURE: 79 MMHG | TEMPERATURE: 98.2 F | WEIGHT: 280 LBS | HEART RATE: 74 BPM | SYSTOLIC BLOOD PRESSURE: 140 MMHG | BODY MASS INDEX: 35.95 KG/M2 | RESPIRATION RATE: 20 BRPM | OXYGEN SATURATION: 98 %

## 2019-06-18 DIAGNOSIS — J01.00 ACUTE MAXILLARY SINUSITIS, RECURRENCE NOT SPECIFIED: Primary | ICD-10-CM

## 2019-06-18 LAB
ALBUMIN SERPL-MCNC: 4.1 G/DL (ref 3.4–5)
ALBUMIN/GLOB SERPL: 1.6 {RATIO} (ref 0.8–1.7)
ALP SERPL-CCNC: 111 U/L (ref 45–117)
ALT SERPL-CCNC: 48 U/L (ref 16–61)
ANION GAP SERPL CALC-SCNC: 10 MMOL/L (ref 3–18)
AST SERPL-CCNC: 26 U/L (ref 15–37)
BASOPHILS # BLD: 0 K/UL (ref 0–0.1)
BASOPHILS NFR BLD: 0 % (ref 0–2)
BILIRUB DIRECT SERPL-MCNC: <0.1 MG/DL (ref 0–0.2)
BILIRUB SERPL-MCNC: 0.4 MG/DL (ref 0.2–1)
BUN SERPL-MCNC: 14 MG/DL (ref 7–18)
BUN/CREAT SERPL: 15 (ref 12–20)
CALCIUM SERPL-MCNC: 9.2 MG/DL (ref 8.5–10.1)
CHLORIDE SERPL-SCNC: 109 MMOL/L (ref 100–108)
CO2 SERPL-SCNC: 27 MMOL/L (ref 21–32)
CREAT SERPL-MCNC: 0.94 MG/DL (ref 0.6–1.3)
DIFFERENTIAL METHOD BLD: ABNORMAL
EOSINOPHIL # BLD: 0.2 K/UL (ref 0–0.4)
EOSINOPHIL NFR BLD: 2 % (ref 0–5)
ERYTHROCYTE [DISTWIDTH] IN BLOOD BY AUTOMATED COUNT: 13.8 % (ref 11.6–14.5)
GLOBULIN SER CALC-MCNC: 2.6 G/DL (ref 2–4)
GLUCOSE SERPL-MCNC: 82 MG/DL (ref 74–99)
HCT VFR BLD AUTO: 44.7 % (ref 36–48)
HGB BLD-MCNC: 14.7 G/DL (ref 13–16)
LIPASE SERPL-CCNC: 148 U/L (ref 73–393)
LYMPHOCYTES # BLD: 2.4 K/UL (ref 0.9–3.6)
LYMPHOCYTES NFR BLD: 29 % (ref 21–52)
MCH RBC QN AUTO: 29.9 PG (ref 24–34)
MCHC RBC AUTO-ENTMCNC: 32.9 G/DL (ref 31–37)
MCV RBC AUTO: 90.9 FL (ref 74–97)
MONOCYTES # BLD: 0.8 K/UL (ref 0.05–1.2)
MONOCYTES NFR BLD: 10 % (ref 3–10)
NEUTS SEG # BLD: 5 K/UL (ref 1.8–8)
NEUTS SEG NFR BLD: 59 % (ref 40–73)
PLATELET # BLD AUTO: 373 K/UL (ref 135–420)
PMV BLD AUTO: 8.4 FL (ref 9.2–11.8)
POTASSIUM SERPL-SCNC: 4.1 MMOL/L (ref 3.5–5.5)
PROT SERPL-MCNC: 6.7 G/DL (ref 6.4–8.2)
RBC # BLD AUTO: 4.92 M/UL (ref 4.7–5.5)
SODIUM SERPL-SCNC: 146 MMOL/L (ref 136–145)
WBC # BLD AUTO: 8.5 K/UL (ref 4.6–13.2)

## 2019-06-18 PROCEDURE — 80048 BASIC METABOLIC PNL TOTAL CA: CPT

## 2019-06-18 PROCEDURE — 96374 THER/PROPH/DIAG INJ IV PUSH: CPT

## 2019-06-18 PROCEDURE — 83690 ASSAY OF LIPASE: CPT

## 2019-06-18 PROCEDURE — 74011250636 HC RX REV CODE- 250/636: Performed by: EMERGENCY MEDICINE

## 2019-06-18 PROCEDURE — 71046 X-RAY EXAM CHEST 2 VIEWS: CPT

## 2019-06-18 PROCEDURE — 96361 HYDRATE IV INFUSION ADD-ON: CPT

## 2019-06-18 PROCEDURE — 85025 COMPLETE CBC W/AUTO DIFF WBC: CPT

## 2019-06-18 PROCEDURE — 80076 HEPATIC FUNCTION PANEL: CPT

## 2019-06-18 PROCEDURE — 74011000250 HC RX REV CODE- 250: Performed by: EMERGENCY MEDICINE

## 2019-06-18 PROCEDURE — 99284 EMERGENCY DEPT VISIT MOD MDM: CPT

## 2019-06-18 RX ORDER — AMOXICILLIN AND CLAVULANATE POTASSIUM 875; 125 MG/1; MG/1
1 TABLET, FILM COATED ORAL 2 TIMES DAILY
Qty: 20 TAB | Refills: 0 | Status: SHIPPED | OUTPATIENT
Start: 2019-06-18 | End: 2019-06-28

## 2019-06-18 RX ORDER — FLUTICASONE PROPIONATE 50 MCG
2 SPRAY, SUSPENSION (ML) NASAL DAILY
Qty: 1 BOTTLE | Refills: 0 | Status: ON HOLD | OUTPATIENT
Start: 2019-06-18 | End: 2020-04-30

## 2019-06-18 RX ORDER — CEFTRIAXONE 1 G/1
1 INJECTION, POWDER, FOR SOLUTION INTRAMUSCULAR; INTRAVENOUS
Status: DISCONTINUED | OUTPATIENT
Start: 2019-06-18 | End: 2019-06-18 | Stop reason: SDUPTHER

## 2019-06-18 RX ADMIN — WATER 1 G: 1 INJECTION INTRAMUSCULAR; INTRAVENOUS; SUBCUTANEOUS at 03:43

## 2019-06-18 RX ADMIN — SODIUM CHLORIDE 1000 ML: 900 INJECTION, SOLUTION INTRAVENOUS at 03:36

## 2019-06-18 NOTE — DISCHARGE INSTRUCTIONS
Patient Education        Sinusitis: Care Instructions  Your Care Instructions    Sinusitis is an infection of the lining of the sinus cavities in your head. Sinusitis often follows a cold. It causes pain and pressure in your head and face. In most cases, sinusitis gets better on its own in 1 to 2 weeks. But some mild symptoms may last for several weeks. Sometimes antibiotics are needed. Follow-up care is a key part of your treatment and safety. Be sure to make and go to all appointments, and call your doctor if you are having problems. It's also a good idea to know your test results and keep a list of the medicines you take. How can you care for yourself at home? · Take an over-the-counter pain medicine, such as acetaminophen (Tylenol), ibuprofen (Advil, Motrin), or naproxen (Aleve). Read and follow all instructions on the label. · If the doctor prescribed antibiotics, take them as directed. Do not stop taking them just because you feel better. You need to take the full course of antibiotics. · Be careful when taking over-the-counter cold or flu medicines and Tylenol at the same time. Many of these medicines have acetaminophen, which is Tylenol. Read the labels to make sure that you are not taking more than the recommended dose. Too much acetaminophen (Tylenol) can be harmful. · Breathe warm, moist air from a steamy shower, a hot bath, or a sink filled with hot water. Avoid cold, dry air. Using a humidifier in your home may help. Follow the directions for cleaning the machine. · Use saline (saltwater) nasal washes to help keep your nasal passages open and wash out mucus and bacteria. You can buy saline nose drops at a grocery store or drugstore. Or you can make your own at home by adding 1 teaspoon of salt and 1 teaspoon of baking soda to 2 cups of distilled water. If you make your own, fill a bulb syringe with the solution, insert the tip into your nostril, and squeeze gently. Alden Cornea your nose.   · Put a hot, wet towel or a warm gel pack on your face 3 or 4 times a day for 5 to 10 minutes each time. · Try a decongestant nasal spray like oxymetazoline (Afrin). Do not use it for more than 3 days in a row. Using it for more than 3 days can make your congestion worse. When should you call for help? Call your doctor now or seek immediate medical care if:    · You have new or worse swelling or redness in your face or around your eyes.     · You have a new or higher fever.    Watch closely for changes in your health, and be sure to contact your doctor if:    · You have new or worse facial pain.     · The mucus from your nose becomes thicker (like pus) or has new blood in it.     · You are not getting better as expected. Where can you learn more? Go to http://natalia-pardeep.info/. Enter C974 in the search box to learn more about \"Sinusitis: Care Instructions. \"  Current as of: March 27, 2018  Content Version: 11.9  © 5519-5000 [employer]. Care instructions adapted under license by [employer] (which disclaims liability or warranty for this information). If you have questions about a medical condition or this instruction, always ask your healthcare professional. [address] any warranty or liability for your use of this information.

## 2019-06-18 NOTE — ED TRIAGE NOTES
Pt reports sinus congestion/neck pressure; reports episode of dizziness while lying down. Reports still experiencing some dizziness. Reports reports tremors in hands in feet; denies hyperventilation.

## 2019-06-18 NOTE — ED NOTES
Pt resting comfortably in bed; affect calm/conversant without difficulty. Plan of care to treat currently with iv antibiotics and fluids and to discharge with instructions to follow up with ENT and his PCP discussed.

## 2019-06-18 NOTE — ED PROVIDER NOTES
HPI patient is a 42-year-old male who presents to the ER with complaint of having sinus congestion for the last 24 hours. Tonight he set up and his nose was very runny. No sore, throat, fever, chills    Past Medical History:   Diagnosis Date    ADD (attention deficit disorder)     Anxiety     Depression     Ill-defined condition     chronic neck and back pain    Kidney stone     Migraine     Obesity     PTSD (post-traumatic stress disorder)     Seasonal allergic rhinitis     Sleep apnea     Ulcer of the stomach and intestine        Past Surgical History:   Procedure Laterality Date    HX BACK SURGERY  7/7/2011    lamenectomy & diskectomy.     HX TONSIL AND ADENOIDECTOMY           Family History:   Problem Relation Age of Onset    Asthma Mother        Social History     Socioeconomic History    Marital status: SINGLE     Spouse name: Not on file    Number of children: Not on file    Years of education: Not on file    Highest education level: Not on file   Occupational History    Not on file   Social Needs    Financial resource strain: Not on file    Food insecurity:     Worry: Not on file     Inability: Not on file    Transportation needs:     Medical: Not on file     Non-medical: Not on file   Tobacco Use    Smoking status: Former Smoker    Smokeless tobacco: Never Used   Substance and Sexual Activity    Alcohol use: No     Alcohol/week: 0.0 oz    Drug use: No    Sexual activity: Not on file   Lifestyle    Physical activity:     Days per week: Not on file     Minutes per session: Not on file    Stress: Not on file   Relationships    Social connections:     Talks on phone: Not on file     Gets together: Not on file     Attends Latter-day service: Not on file     Active member of club or organization: Not on file     Attends meetings of clubs or organizations: Not on file     Relationship status: Not on file    Intimate partner violence:     Fear of current or ex partner: Not on file Emotionally abused: Not on file     Physically abused: Not on file     Forced sexual activity: Not on file   Other Topics Concern    Not on file   Social History Narrative    Not on file         ALLERGIES: Patient has no known allergies. Review of Systems   Constitutional: Negative. HENT: Negative. Eyes: Negative. Respiratory: Negative. Cardiovascular: Negative. Gastrointestinal: Negative. Endocrine: Negative. Genitourinary: Negative. Musculoskeletal: Negative. Skin: Negative. Allergic/Immunologic: Negative. Neurological: Negative. Hematological: Negative. Psychiatric/Behavioral: Negative. All other systems reviewed and are negative. Vitals:    06/18/19 0106   BP: (!) 149/102   Pulse: 92   Resp: 20   Temp: 99 °F (37.2 °C)   SpO2: 99%   Weight: 127 kg (280 lb)            Physical Exam   Constitutional: He is oriented to person, place, and time. He appears well-developed and well-nourished. No distress. HENT:   Head: Normocephalic. Mouth/Throat: Oropharynx is clear and moist.   Eyes: Pupils are equal, round, and reactive to light. Conjunctivae and EOM are normal.   Neck: Normal range of motion. Neck supple. Cardiovascular: Normal rate, regular rhythm, normal heart sounds and intact distal pulses. No murmur heard. Pulmonary/Chest: Effort normal and breath sounds normal. No respiratory distress. He has no wheezes. He has no rales. He exhibits no tenderness. Abdominal: Soft. Bowel sounds are normal. He exhibits no distension. There is no tenderness. There is no rebound. Musculoskeletal: Normal range of motion. He exhibits no edema or tenderness. Neurological: He is alert and oriented to person, place, and time. No cranial nerve deficit. He exhibits normal muscle tone. Coordination normal.   Skin: Skin is warm and dry. No rash noted. Psychiatric: He has a normal mood and affect.  His behavior is normal. Judgment and thought content normal.   Nursing note and vitals reviewed. MDM: Dif Dx: sinusitis, URI, anxiety. Dx: sinusitis    Dictation disclaimer:  Please note that this dictation was completed with [employer], the computer voice recognition software. Quite often unanticipated grammatical, syntax, homophones, and other interpretive errors are inadvertently transcribed by the computer software. Please disregard these errors. Please excuse any errors that have escaped final proofreading.

## 2019-06-18 NOTE — ED NOTES
Pt discharged to home. Affect is calm/conversant, respirations regular/non labored/skin warm and dry. Instructed to rest, hydrate, follow up with referred ENT (Dr Halie Guzman), and to return for worsening pain/fever/other concerns. Instructed to take medications as prescribed.

## 2019-07-05 DIAGNOSIS — F51.04 CHRONIC INSOMNIA: ICD-10-CM

## 2019-07-05 RX ORDER — ZOLPIDEM TARTRATE 12.5 MG/1
12.5 TABLET, FILM COATED, EXTENDED RELEASE ORAL
Qty: 30 TAB | Refills: 1 | OUTPATIENT
Start: 2019-07-05 | End: 2019-08-26 | Stop reason: SDUPTHER

## 2019-07-05 NOTE — TELEPHONE ENCOUNTER
Last Visit: 3/27/19 with MD Mary Alford  Next Appointment: 9/27/19 with MD Mary Alford  Previous Refill Encounter(s): 5/13/19 #30 with 1 refill

## 2019-07-18 ENCOUNTER — HOSPITAL ENCOUNTER (OUTPATIENT)
Dept: GENERAL RADIOLOGY | Age: 34
Discharge: HOME | End: 2019-07-18
Attending: OTOLARYNGOLOGY
Payer: COMMERCIAL

## 2019-07-18 ENCOUNTER — HOSPITAL ENCOUNTER (OUTPATIENT)
Dept: CT IMAGING | Age: 34
Discharge: HOME | End: 2019-07-18
Attending: OTOLARYNGOLOGY
Payer: COMMERCIAL

## 2019-07-18 DIAGNOSIS — J32.9 CHRONIC SINUSITIS: ICD-10-CM

## 2019-07-18 DIAGNOSIS — R13.10 DYSPHAGIA: ICD-10-CM

## 2019-07-18 PROCEDURE — 74011000250 HC RX REV CODE- 250: Performed by: OTOLARYNGOLOGY

## 2019-07-18 PROCEDURE — 74220 X-RAY XM ESOPHAGUS 1CNTRST: CPT

## 2019-07-18 PROCEDURE — 74011000255 HC RX REV CODE- 255: Performed by: OTOLARYNGOLOGY

## 2019-07-18 PROCEDURE — 70486 CT MAXILLOFACIAL W/O DYE: CPT

## 2019-07-18 RX ADMIN — BARIUM SULFATE 700 MG: 700 TABLET ORAL at 09:30

## 2019-07-18 RX ADMIN — BARIUM SULFATE 176 G: 960 POWDER, FOR SUSPENSION ORAL at 09:30

## 2019-07-18 RX ADMIN — ANTACID/ANTIFLATULENT 4 G: 380; 550; 10; 10 GRANULE, EFFERVESCENT ORAL at 09:30

## 2019-07-18 RX ADMIN — BARIUM SULFATE 135 ML: 980 POWDER, FOR SUSPENSION ORAL at 09:30

## 2019-07-22 RX ORDER — TOPIRAMATE 100 MG/1
TABLET, FILM COATED ORAL
Qty: 180 TAB | Refills: 3 | Status: CANCELLED | OUTPATIENT
Start: 2019-07-22

## 2019-07-22 NOTE — TELEPHONE ENCOUNTER
Patient still has refills at the pharmacy. I called [facility] to confirm and requested they refill it for the patient. No further action needed.

## 2019-08-26 DIAGNOSIS — F51.04 CHRONIC INSOMNIA: ICD-10-CM

## 2019-08-26 RX ORDER — ZOLPIDEM TARTRATE 12.5 MG/1
12.5 TABLET, FILM COATED, EXTENDED RELEASE ORAL
Qty: 30 TAB | Refills: 1 | OUTPATIENT
Start: 2019-08-26 | End: 2019-10-21 | Stop reason: SDUPTHER

## 2019-08-26 NOTE — TELEPHONE ENCOUNTER
Last Visit: 3/27/19 with MD Isreal Fagan  Next Appointment: none  Previous Refill Encounter(s): 7/5/19 #30 with 1 refill    Requested Prescriptions     Pending Prescriptions Disp Refills    zolpidem CR (AMBIEN CR) 12.5 mg tablet 30 Tab 1     Sig: Take 1 Tab by mouth nightly as needed for Sleep.  Max Daily Amount: 12.5 mg.

## 2019-10-15 ENCOUNTER — OFFICE VISIT (OUTPATIENT)
Dept: INTERNAL MEDICINE CLINIC | Age: 34
End: 2019-10-15

## 2019-10-15 VITALS
OXYGEN SATURATION: 98 % | SYSTOLIC BLOOD PRESSURE: 148 MMHG | WEIGHT: 289.8 LBS | DIASTOLIC BLOOD PRESSURE: 100 MMHG | HEART RATE: 80 BPM | BODY MASS INDEX: 37.19 KG/M2 | TEMPERATURE: 98.6 F | HEIGHT: 74 IN | RESPIRATION RATE: 12 BRPM

## 2019-10-15 DIAGNOSIS — M54.12 CERVICAL RADICULOPATHY: Primary | ICD-10-CM

## 2019-10-15 NOTE — PROGRESS NOTES
Marco A Nataliia 1985, is a 34 y.o. male, who is seen today for facial and arm symptoms. For the last few months he has had symptoms which started as pressure in the ethmoid regions bilaterally. It persisted so he went to see an ear nose and throat doctor and was given antibiotics which did not help. CT was noncontributory. Soon after that he started noticing that after feeling this maxillary and ethmoid region discomfort he was spreading to the back of his neck and down both arms to the hands but that was only happening during the nighttime. It has progressively worsened. When it occurs he gets up and sits at bedside and it improves but never happens in the daytime. Past Medical History:   Diagnosis Date    ADD (attention deficit disorder)     Anxiety     Depression     Ill-defined condition     chronic neck and back pain    Kidney stone     Migraine     Obesity     PTSD (post-traumatic stress disorder)     Seasonal allergic rhinitis     Sleep apnea     Ulcer of the stomach and intestine      Current Outpatient Medications   Medication Sig Dispense Refill    zolpidem CR (AMBIEN CR) 12.5 mg tablet Take 1 Tab by mouth nightly as needed for Sleep. Max Daily Amount: 12.5 mg. 30 Tab 1    diphenhydramine HCl (BENADRYL PO) Take  by mouth.  fluticasone propionate (FLONASE) 50 mcg/actuation nasal spray 2 Sprays by Both Nostrils route daily. 1 Bottle 0    DULoxetine (CYMBALTA) 30 mg capsule 3 capsules by mouth daily 90 Cap 5    topiramate (TOPAMAX) 100 mg tablet TAKE 1 TABLET BY MOUTH TWICE DAILY (Patient taking differently: 100 mg daily. TAKE 1 TABLET BY MOUTH TWICE DAILY) 180 Tab 3    calcium carbonate (TUMS) 200 mg calcium (500 mg) chew Take 1 Tab by mouth daily.  raNITIdine (ZANTAC) 150 mg tablet Take 150 mg by mouth two (2) times a day.        Visit Vitals  BP (!) 148/100 (BP 1 Location: Left arm, BP Patient Position: Sitting)   Pulse 80   Temp 98.6 °F (37 °C) (Oral)   Resp 12 Ht 6' 2\" (1.88 m)   Wt 289 lb 12.8 oz (131.5 kg)   SpO2 98%   BMI 37.21 kg/m²     Sinuses are nontender. Neck is very supple and with extension of the neck and downward pressure there is no discomfort in the neck or shoulders or arms.  strength is slightly less than 5+ bilaterally and equal.  No muscle wasting of the hands. Tinel sign is absent. Assessment: Facial symptoms seem to be unrelated to the radicular symptoms, however he notes that they come together at night. Will refer to neurology for further evaluation. I recommended Dr. Jeremy Park because she has a good listener and I think he needs a good listener before testing is done. #2.  Obesity little changed, of encouraged him to keep working on weight loss. #4.  Elevated blood pressure, this is intermittent but blood pressure is high today, he will keep working on weight loss and hopefully he can get some better sleep going forward and that might help also. We will recheck blood pressure in a few months. Tomy Dodd MD FACP    Please note: This document has been produced using voice recognition software. Unrecognized errors in transcription may be present. Tomy Dodd MD FACP    Please note: This document has been produced using voice recognition software. Unrecognized errors in transcription may be present. This visit lasted 25 minutes, greater than 50% of the time was spent counseling on the types of neurologic issues that could cause his symptoms, my rationale for him seen Dr. Jeremy Park or associate, and also the importance of weight loss.

## 2019-10-21 DIAGNOSIS — F51.04 CHRONIC INSOMNIA: ICD-10-CM

## 2019-10-21 RX ORDER — ZOLPIDEM TARTRATE 12.5 MG/1
12.5 TABLET, FILM COATED, EXTENDED RELEASE ORAL
Qty: 30 TAB | Refills: 1 | OUTPATIENT
Start: 2019-10-21 | End: 2019-12-11 | Stop reason: SDUPTHER

## 2019-10-21 NOTE — TELEPHONE ENCOUNTER
Last Visit: 10/15/19 with MD Shivani Alvarenga  Next Appointment: none  Previous Refill Encounter(s): 8/26/19 #30 with 1 refill    Requested Prescriptions     Pending Prescriptions Disp Refills    zolpidem CR (AMBIEN CR) 12.5 mg tablet 30 Tab 1     Sig: Take 1 Tab by mouth nightly as needed for Sleep.  Max Daily Amount: 12.5 mg.

## 2019-11-07 RX ORDER — PANTOPRAZOLE SODIUM 20 MG/1
20 TABLET, DELAYED RELEASE ORAL 2 TIMES DAILY
Qty: 60 TAB | Refills: 1 | Status: SHIPPED | OUTPATIENT
Start: 2019-11-07 | End: 2020-01-06

## 2019-11-07 NOTE — TELEPHONE ENCOUNTER
This med was last prescribed by DIDI Hugo and has no refills remaining per [employer]. Please sign if appropriate. Last Visit: 10/15/19 with MD Heath Warren  Next Appointment: none    Requested Prescriptions     Pending Prescriptions Disp Refills    pantoprazole (PROTONIX) 20 mg tablet 60 Tab 1     Sig: Take 1 Tab by mouth two (2) times a day.  Take 30 minutes before breakfast and dinner

## 2019-12-11 DIAGNOSIS — F51.04 CHRONIC INSOMNIA: ICD-10-CM

## 2019-12-11 RX ORDER — ZOLPIDEM TARTRATE 12.5 MG/1
12.5 TABLET, FILM COATED, EXTENDED RELEASE ORAL
Qty: 30 TAB | Refills: 1 | OUTPATIENT
Start: 2019-12-11 | End: 2020-04-21 | Stop reason: SDUPTHER

## 2019-12-11 NOTE — TELEPHONE ENCOUNTER
Last Visit: 10/15/19 with MD Cresencio Robert  Next Appointment: none  Previous Refill Encounter(s): 10/21/19 #30 with 1 refill    Requested Prescriptions     Pending Prescriptions Disp Refills    zolpidem CR (AMBIEN CR) 12.5 mg tablet 30 Tab 1     Sig: Take 1 Tab by mouth nightly as needed for Sleep.  Max Daily Amount: 12.5 mg.

## 2020-01-06 RX ORDER — PANTOPRAZOLE SODIUM 20 MG/1
TABLET, DELAYED RELEASE ORAL
Qty: 60 TAB | Refills: 1 | Status: SHIPPED | OUTPATIENT
Start: 2020-01-06 | End: 2020-06-10 | Stop reason: SDUPTHER

## 2020-01-13 RX ORDER — DULOXETIN HYDROCHLORIDE 30 MG/1
CAPSULE, DELAYED RELEASE ORAL
Qty: 90 CAP | Refills: 5 | Status: SHIPPED | OUTPATIENT
Start: 2020-01-13 | End: 2020-09-24 | Stop reason: SDUPTHER

## 2020-01-14 RX ORDER — QUETIAPINE FUMARATE 100 MG/1
TABLET, FILM COATED ORAL
Status: CANCELLED | OUTPATIENT
Start: 2020-01-14

## 2020-01-14 NOTE — TELEPHONE ENCOUNTER
Per Flores - patient last had Seroquel 100mg filled 12/17/19 from Dr. Michael Luz. Patient is now requesting a refill from our office. Please input missing sig and qty info and sign if appropriate.     Last visit:  10/15/19 with MD Maribell Mclaughlin  Next appt:  None  Requested Prescriptions     Pending Prescriptions Disp Refills    QUEtiapine (SEROQUEL) 100 mg tablet

## 2020-04-21 DIAGNOSIS — F51.04 CHRONIC INSOMNIA: ICD-10-CM

## 2020-04-21 RX ORDER — ZOLPIDEM TARTRATE 12.5 MG/1
12.5 TABLET, FILM COATED, EXTENDED RELEASE ORAL
Qty: 30 TAB | Refills: 5 | Status: ON HOLD | OUTPATIENT
Start: 2020-04-21 | End: 2020-04-30

## 2020-04-21 NOTE — TELEPHONE ENCOUNTER
Last Visit: 10/15/19 with MD Aminta Ramirez  Next Appointment: none  Previous Refill Encounter(s): 12/11/19 #30 with 1 refill    Requested Prescriptions     Pending Prescriptions Disp Refills    zolpidem CR (AMBIEN CR) 12.5 mg tablet 30 Tab 1     Sig: Take 1 Tab by mouth nightly as needed for Sleep.  Max Daily Amount: 12.5 mg.

## 2020-05-05 RX ORDER — TOPIRAMATE 100 MG/1
TABLET, FILM COATED ORAL
Qty: 180 TAB | Refills: 3 | Status: SHIPPED | OUTPATIENT
Start: 2020-05-05 | End: 2020-05-06 | Stop reason: ALTCHOICE

## 2020-05-06 ENCOUNTER — VIRTUAL VISIT (OUTPATIENT)
Dept: INTERNAL MEDICINE CLINIC | Age: 35
End: 2020-05-06

## 2020-05-06 DIAGNOSIS — N20.0 NEPHROLITHIASIS: Primary | ICD-10-CM

## 2020-05-06 NOTE — PROGRESS NOTES
Leonela Abarca is a 30 y.o. male who was seen by synchronous (real-time) audio-video technology on 5/6/2020. Consent: Leonela Abarca, who was seen by synchronous (real-time) audio-video technology, and/or his healthcare decision maker, is aware that this patient-initiated, Telehealth encounter on 5/6/2020 is a billable service, with coverage as determined by his insurance carrier. He is aware that he may receive a bill and has provided verbal consent to proceed: Yes. Adrian Adhikari 1985, is a 30 y.o. male, who was evaluated today via video conference, he requested the conference and I accepted the call. He is in his home and I am in my office. He recently developed ureteral colic on the left, he underwent lithotripsy and stent placement April 30 by Dr. Mary Jane Gonzales and stent was removed under the supervision of Dr. Jayde Farooq yesterday. He had some blood in his urine yesterday after the stent was removed but none today. Did not have much pain yesterday but is having some intermittent flank pain today. He was treated with Bactrim for 5 days starting April 30 and with Percocet 5 mg, 12 tablets to be used as needed April 30. He has had no chills or fever. He has a follow-up appointment in about 2 weeks with Dr. Teodoro Baxter. This is the fourth kidney stone he has had, this point far was the worst pain he has had. His record indicates that the pathologist looked at the stony material, hopefully that was sent for stone analysis but it is not clear from the record. His knowledge no stone is been analyzed in the past.  He is having no other new complaints. He has chronic anxiety and insomnia and takes his medicine regularly. He is sleeping well lately.     Past Medical History:   Diagnosis Date    ADD (attention deficit disorder)     Anxiety     Depression     Ill-defined condition     chronic neck and back pain    Kidney stone     Migraine     Obesity     PTSD (post-traumatic stress disorder)  Seasonal allergic rhinitis     Sleep apnea     Ulcer of the stomach and intestine      Current Outpatient Medications   Medication Sig Dispense Refill    clonazePAM (KlonoPIN) 0.5 mg tablet Take 1 Tab by mouth.  zolpidem (AMBIEN) 10 mg tablet Take 1 Tab by mouth nightly as needed for Sleep. Max Daily Amount: 10 mg. 30 Tab 3    DULoxetine (CYMBALTA) 30 mg capsule TAKE 3 CAPSULES BY MOUTH DAILY 90 Cap 5    pantoprazole (PROTONIX) 20 mg tablet TAKE 1 TABLET BY MOUTH TWICE DAILY 30 MINUTES BEFORE BREAKFAST AND DINNER 60 Tab 1    diphenhydramine HCl (BENADRYL PO) Take  by mouth.  calcium carbonate (TUMS) 200 mg calcium (500 mg) chew Take 1 Tab by mouth daily. Assessment: #1. Recent ureteral colic on the left status post lithotripsy and stent  placement followed by removal yesterday. Some discomfort in the left flank today but not severe. It comes and goes. If it gets worse or he develops fever I recommended he call Dr. Martinez Iglesias. I told him that hopefully at follow-up in 2 weeks stone analysis will be available. I have explained the possible treatments since he has had 4 stones, whether he has calcium oxalate stone, mixed stone or uric acid stone or much less likely other type of stone. Some treatment is likely going to be advised if this information is available since he has had 4 stones. #2.  Chronic anxiety and insomnia doing well, he will continue current medication. This visit lasted 25 minutes, greater than 50% of the time was spent counseling, going over the physiology of stones and possible treatment depending on stone analysis results. We also went over his other issues more briefly including insomnia and anxiety. Tomy Najera MD FACP    Please note: This document has been produced using voice recognition software. Unrecognized errors in transcription may be present.       Nano Krishnamurthy is a 30 y.o. male who was evaluated by a video visit encounter for concerns as above. Patient identification was verified prior to start of the visit. A caregiver was present when appropriate. Due to this being a TeleHealth encounter (During HXYKI-73 public health emergency), evaluation of the following organ systems was limited: Vitals/Constitutional/EENT/Resp/CV/GI//MS/Neuro/Skin/Heme-Lymph-Imm. Pursuant to the emergency declaration under the [address] [place] [address], [facility] waiver authority and the [employer] Act, this Virtual  Visit was conducted, with patient's (and/or legal guardian's) consent, to reduce the patient's risk of exposure to COVID-19 and provide necessary medical care. Services were provided through a video synchronous discussion virtually to substitute for in-person clinic visit. Patient and provider were located at their individual homes.       Ruth Vazquez MD

## 2020-05-07 ENCOUNTER — TELEPHONE (OUTPATIENT)
Dept: INTERNAL MEDICINE CLINIC | Age: 35
End: 2020-05-07

## 2020-05-07 RX ORDER — TAMSULOSIN HYDROCHLORIDE 0.4 MG/1
0.4 CAPSULE ORAL DAILY
Qty: 30 CAP | Refills: 0 | Status: SHIPPED | OUTPATIENT
Start: 2020-05-07 | End: 2020-10-19

## 2020-05-07 NOTE — TELEPHONE ENCOUNTER
Pt calling says he has been trying to reach urology but hasn't gotten a call back. He is having bladder spasms and doesn't know what to do. Wants to know what  would advise. Alberto Giordano

## 2020-06-10 RX ORDER — PANTOPRAZOLE SODIUM 20 MG/1
20 TABLET, DELAYED RELEASE ORAL 2 TIMES DAILY
Qty: 60 TAB | Refills: 5 | Status: SHIPPED | OUTPATIENT
Start: 2020-06-10 | End: 2020-10-19 | Stop reason: SDUPTHER

## 2020-07-14 ENCOUNTER — TELEPHONE (OUTPATIENT)
Dept: INTERNAL MEDICINE CLINIC | Age: 35
End: 2020-07-14

## 2020-07-14 NOTE — TELEPHONE ENCOUNTER
Pt calling asking for ov for bug bites on his legs. Says he thinks he is having a reaction to the bites. Please advise.

## 2020-07-14 NOTE — TELEPHONE ENCOUNTER
Patient states he has tried hydrocortisone cream with no relief. Reports 3 days ago noticing 2 small bumps on back of ankle that were itching. Reports area has become more inflamed and that the bumps appear to look like a pimple. Reports noticing some puss like drainage. States the area just wont stop itching. He said he also has a similar mild area on the back of his leg. Wanting to know if Dr. Paige Nagel has any other recommendations.

## 2020-07-14 NOTE — TELEPHONE ENCOUNTER
Pricila Benson MD  [facility] 41 minutes ago (12:22 PM)       Recommend urgent care center      Routing comment

## 2020-07-14 NOTE — TELEPHONE ENCOUNTER
Kiarra La MD  [facility] 57 minutes ago (11:00 AM)        I would recommend trying over-the-counter hydrocortisone.

## 2020-08-21 ENCOUNTER — HOSPITAL ENCOUNTER (OUTPATIENT)
Dept: ULTRASOUND IMAGING | Age: 35
Discharge: HOME | End: 2020-08-21
Attending: INTERNAL MEDICINE
Payer: COMMERCIAL

## 2020-08-21 DIAGNOSIS — R10.13 ABDOMINAL PAIN, EPIGASTRIC: ICD-10-CM

## 2020-08-21 PROCEDURE — 76705 ECHO EXAM OF ABDOMEN: CPT

## 2020-09-24 DIAGNOSIS — F51.04 CHRONIC INSOMNIA: ICD-10-CM

## 2020-09-24 RX ORDER — DULOXETIN HYDROCHLORIDE 30 MG/1
90 CAPSULE, DELAYED RELEASE ORAL DAILY
Qty: 90 CAP | Refills: 0 | Status: SHIPPED | OUTPATIENT
Start: 2020-09-24 | End: 2020-10-19 | Stop reason: SDUPTHER

## 2020-09-24 RX ORDER — ZOLPIDEM TARTRATE 10 MG/1
10 TABLET ORAL
Qty: 30 TAB | Refills: 0 | Status: SHIPPED | OUTPATIENT
Start: 2020-09-24 | End: 2020-10-19 | Stop reason: SDUPTHER

## 2020-09-24 NOTE — TELEPHONE ENCOUNTER
Sending to provider on call the current refill request.  He also made an appt with Wale Bello for Monday 9-28-20 to establish care.

## 2020-09-24 NOTE — TELEPHONE ENCOUNTER
[facility] date for Ambien as 8/26/20 for a 30 d/s. Last Visit: 5/6/20 with MD Kavya Henry  Next Appointment: 9/28/20 with AGUILAR Blunt  Previous Refill Encounter(s): 1/13/20 Cymbalta #90 with 5 refills, 2/5/20 Ambien #30 with 3 refills    Requested Prescriptions     Pending Prescriptions Disp Refills    DULoxetine (CYMBALTA) 30 mg capsule 90 Cap 0     Sig: Take 3 Caps by mouth daily.  zolpidem (AMBIEN) 10 mg tablet 30 Tab 0     Sig: Take 1 Tab by mouth nightly as needed for Sleep. Max Daily Amount: 10 mg.

## 2020-10-16 ENCOUNTER — TELEPHONE (OUTPATIENT)
Dept: INTERNAL MEDICINE CLINIC | Age: 35
End: 2020-10-16

## 2020-10-16 NOTE — TELEPHONE ENCOUNTER
Former pt of Dr. Pablo Taylor. Wants to establish care and address some elevated bp issues. Will you accept him?

## 2020-10-19 ENCOUNTER — OFFICE VISIT (OUTPATIENT)
Dept: INTERNAL MEDICINE CLINIC | Age: 35
End: 2020-10-19
Payer: COMMERCIAL

## 2020-10-19 ENCOUNTER — TELEPHONE (OUTPATIENT)
Dept: INTERNAL MEDICINE CLINIC | Age: 35
End: 2020-10-19

## 2020-10-19 VITALS
DIASTOLIC BLOOD PRESSURE: 99 MMHG | WEIGHT: 313.6 LBS | BODY MASS INDEX: 40.25 KG/M2 | OXYGEN SATURATION: 97 % | TEMPERATURE: 97.7 F | HEIGHT: 74 IN | SYSTOLIC BLOOD PRESSURE: 150 MMHG | RESPIRATION RATE: 14 BRPM | HEART RATE: 83 BPM

## 2020-10-19 DIAGNOSIS — Z79.899 MEDICATION MANAGEMENT: ICD-10-CM

## 2020-10-19 DIAGNOSIS — F41.9 ANXIETY AND DEPRESSION: ICD-10-CM

## 2020-10-19 DIAGNOSIS — F32.A ANXIETY AND DEPRESSION: ICD-10-CM

## 2020-10-19 DIAGNOSIS — Z76.89 ENCOUNTER TO ESTABLISH CARE WITH NEW DOCTOR: Primary | ICD-10-CM

## 2020-10-19 DIAGNOSIS — I10 ESSENTIAL HYPERTENSION: ICD-10-CM

## 2020-10-19 DIAGNOSIS — K21.9 GASTROESOPHAGEAL REFLUX DISEASE, UNSPECIFIED WHETHER ESOPHAGITIS PRESENT: ICD-10-CM

## 2020-10-19 DIAGNOSIS — F51.04 CHRONIC INSOMNIA: ICD-10-CM

## 2020-10-19 DIAGNOSIS — E66.01 SEVERE OBESITY WITH BODY MASS INDEX (BMI) OF 35.0 TO 39.9 WITH SERIOUS COMORBIDITY (HCC): ICD-10-CM

## 2020-10-19 PROBLEM — M48.02 STENOSIS, CERVICAL SPINE: Status: RESOLVED | Noted: 2017-07-11 | Resolved: 2020-10-19

## 2020-10-19 PROBLEM — Z86.69 HISTORY OF MIGRAINE: Status: RESOLVED | Noted: 2017-02-01 | Resolved: 2020-10-19

## 2020-10-19 PROBLEM — M54.2 CERVICALGIA: Status: RESOLVED | Noted: 2017-07-11 | Resolved: 2020-10-19

## 2020-10-19 PROBLEM — G47.33 OBSTRUCTIVE SLEEP APNEA ON CPAP: Status: RESOLVED | Noted: 2019-02-21 | Resolved: 2020-10-19

## 2020-10-19 PROBLEM — Z99.89 OBSTRUCTIVE SLEEP APNEA ON CPAP: Status: RESOLVED | Noted: 2019-02-21 | Resolved: 2020-10-19

## 2020-10-19 PROBLEM — M50.30 DDD (DEGENERATIVE DISC DISEASE), CERVICAL: Status: RESOLVED | Noted: 2017-07-11 | Resolved: 2020-10-19

## 2020-10-19 PROBLEM — G03.9 ADHESIVE ARACHNOIDITIS: Status: RESOLVED | Noted: 2017-07-11 | Resolved: 2020-10-19

## 2020-10-19 PROBLEM — E55.9 HYPOVITAMINOSIS D: Status: RESOLVED | Noted: 2018-04-04 | Resolved: 2020-10-19

## 2020-10-19 PROBLEM — N20.0 KIDNEY STONE: Status: RESOLVED | Noted: 2019-04-02 | Resolved: 2020-10-19

## 2020-10-19 PROCEDURE — 99215 OFFICE O/P EST HI 40 MIN: CPT | Performed by: NURSE PRACTITIONER

## 2020-10-19 RX ORDER — DULOXETIN HYDROCHLORIDE 30 MG/1
90 CAPSULE, DELAYED RELEASE ORAL DAILY
Qty: 90 CAP | Refills: 5 | Status: SHIPPED | OUTPATIENT
Start: 2020-10-19

## 2020-10-19 RX ORDER — ZOLPIDEM TARTRATE 10 MG/1
10 TABLET ORAL
Qty: 30 TAB | Refills: 3 | Status: SHIPPED | OUTPATIENT
Start: 2020-10-19 | End: 2022-08-11

## 2020-10-19 RX ORDER — PANTOPRAZOLE SODIUM 20 MG/1
20 TABLET, DELAYED RELEASE ORAL 2 TIMES DAILY
Qty: 60 TAB | Refills: 5 | Status: SHIPPED | OUTPATIENT
Start: 2020-10-19

## 2020-10-19 RX ORDER — HYDROCHLOROTHIAZIDE 12.5 MG/1
12.5 TABLET ORAL DAILY
Qty: 30 TAB | Refills: 0 | Status: SHIPPED | OUTPATIENT
Start: 2020-10-19 | End: 2020-11-18 | Stop reason: ALTCHOICE

## 2020-10-19 NOTE — PROGRESS NOTES
[facility] [address] [facility] [place], [address]  [phone] [id number]/[phone] fax      10/19/2020    HPI: Patient is a pleasant 45-year-old white male who is morbidly obese. He resides with his mother and is unemployed at this time. He has a medical history of spinal stenosis, degenerative joint disease lumbar and cervical, history of kidney stones, history of migraines, anxiety with depression along with insomnia, morbid obese, history of elevated blood pressures. Anxiety, depression, insomnia. [facility] but severed ties 1.5 weeks ago due to personality conflict. Pt is seeking to return back to [facility] where he used to see Dr Suly Huynh until he retired. He is currently not taking Klonipin but is taking cymbalta (Anxiety and depression) and ambien for sleep. Pt suffers from degenerative disc disease and spinal stenosis in his lumbar region. Cymbalta helps with the neuropathy he experiences. Weight gain-He states pre-Covid pandemic he was 400 pounds and lost weight to equal 270 pounds but since the pandemic he has not been able to go to the gym and he has been very labile at home. He has gained 40+ pounds. Had kidney stones back in May. Took flomax for short period. Had stent placed and removed. No further complications with stones. Patient states he has noted an elevated BP the last few times he has had his blood pressure checked. His mother is a nurse and will check patients BP. Pt states he was tried on propranolol in the past to help with anxiety and BP but experienced side effects of dizziness. Medications: Ambien 10 mg nightly, Cymbalta 30 mg 3 caps daily, Protonix 20 mg twice daily, Tums as needed. Patient is not taking Klonopin.     Past Medical History:   Diagnosis Date    ADD (attention deficit disorder)     Adhesive arachnoiditis 7/11/2017    Anxiety     Cervical radiculopathy 12/21/2016  Cervicalgia 7/11/2017    DDD (degenerative disc disease), cervical 7/11/2017    Depression     History of migraine 2/1/2017    Hypovitaminosis D 4/4/2018    Intervertebral disc disorder of lumbar region with myelopathy 2/22/2016    Kidney stone     Obesity     Obstructive sleep apnea on CPAP 2/21/2019    PTSD (post-traumatic stress disorder)     Seasonal allergic rhinitis     Stenosis, cervical spine 7/11/2017    Ulcer of the stomach and intestine      Past Surgical History:   Procedure Laterality Date    HX BACK SURGERY  7/7/2011    lamenectomy & diskectomy.  HX TONSIL AND ADENOIDECTOMY       Current Outpatient Medications   Medication Sig    zolpidem (AMBIEN) 10 mg tablet Take 1 Tab by mouth nightly as needed for Sleep. Max Daily Amount: 10 mg.    DULoxetine (CYMBALTA) 30 mg capsule Take 3 Caps by mouth daily. Indications: anxiousness associated with depression, neuropathic pain    hydroCHLOROthiazide (HYDRODIURIL) 12.5 mg tablet Take 1 Tab by mouth daily. Indications: high blood pressure    pantoprazole (PROTONIX) 20 mg tablet Take 1 Tab by mouth two (2) times a day. 30 minutes before breakfast and dinner    calcium carbonate (TUMS) 200 mg calcium (500 mg) chew Take 1 Tab by mouth daily. No current facility-administered medications for this visit. Allergies and Intolerances:   No Known Allergies  Family History:   Family History   Problem Relation Age of Onset    Asthma Mother      Social History:   He  reports that he has quit smoking. He has never used smokeless tobacco.   Social History     Substance and Sexual Activity   Alcohol Use No    Alcohol/week: 0.0 standard drinks     Immunization History: There is no immunization history on file for this patient. Review of Systems:   As above included in HPI.   Otherwise 11 point review of systems negative including constitutional, skin, HENT, eyes, respiratory, cardiovascular, gastrointestinal, genitourinary, musculoskeletal, endocrine, hematologic, allergy, and neurologic. Physical:   Visit Vitals  BP (!) 150/99 (BP 1 Location: Left arm, BP Patient Position: Sitting)   Pulse 83   Temp 97.7 °F (36.5 °C) (Oral)   Resp 14   Ht 6' 2\" (1.88 m)   Wt 313 lb 9.6 oz (142.2 kg)   SpO2 97%   BMI 40.26 kg/m²      Wt Readings from Last 3 Encounters:   10/19/20 313 lb 9.6 oz (142.2 kg)   06/19/20 300 lb (136.1 kg)   05/18/20 300 lb (136.1 kg)     BMI:  40    Exam:   Physical Exam  Constitutional:       Appearance: Normal appearance. He is obese. HENT:      Head: Normocephalic and atraumatic. Right Ear: Tympanic membrane, ear canal and external ear normal.      Left Ear: Tympanic membrane, ear canal and external ear normal.      Nose: Nose normal.      Mouth/Throat:      Mouth: Mucous membranes are moist.   Neck:      Musculoskeletal: Normal range of motion. Cardiovascular:      Rate and Rhythm: Normal rate and regular rhythm. Pulses: Normal pulses. Heart sounds: Normal heart sounds. Pulmonary:      Effort: Pulmonary effort is normal.      Breath sounds: Normal breath sounds. Abdominal:      General: Abdomen is flat. Palpations: Abdomen is soft. Musculoskeletal: Normal range of motion. Comments: Gait stable. No limitations noted. Skin:     General: Skin is warm and dry. Neurological:      Mental Status: He is alert and oriented to person, place, and time. Psychiatric:         Mood and Affect: Mood normal.         Behavior: Behavior normal.         Thought Content:  Thought content normal.          Review of Data:  Lab review: N/A      Health Maintenance:  Screenings completed:   Colorectal: Not applicable   Depression: Yes   DM (HJR7P/GSL): Not applicable   Hepatitis C: Not applicable   Falls: Yes   DEXA: Not applicable   PSA:Not applicable   Smoking: Never   Vitamin D: Not applicable   Medicare Wellness: Not applicable    Impression:  Patient Active Problem List   Diagnosis Code    Anxiety F41.9    Spinal stenosis, lumbar M48.061    Chronic insomnia F51.04    Osteoarthritis of spine with radiculopathy, lumbar region M47.26    Severe obesity with body mass index (BMI) of 35.0 to 39.9 with serious comorbidity ([facility] [address] E66.01       Plan:  1. Establish care. Patient is transferring to me from their previous provider who retired, Dr. Zach Pires. I spent no less than 60 minutes reviewing and updating the chart to include previous labs, diagnostics, and specialty notes. 2.  Hypertension. Reviewed past blood pressure readings and noted diastolic to be mid 69O to low 100s on multiple occasions. Patient was tried on propranolol which he could not tolerate due to dizziness. Instructed patient starting any blood pressure medication may come with some side effects for short time to include dizziness and or feeling bad with fatigue. Explained to the patient this is because his body is used to his elevated blood pressure and with the blood pressure medication dropping his numbers, his body must adjust.     Will trial low-dose HCTZ x30 days. I informed patient this low dose may not be the final dose as I may need to increase if his blood pressure remains elevated. Informed patient we will going to start low and go slow to try to avoid any side effects of medications. Instructed patient on the importance of water consumption and when standing try to avoid standing and just walking off. He needs to stand and wait a minute to catch his bearings and then start to walk. This will help reduce any symptoms of dizziness. Patient will check his blood pressure twice a day and place the results in his my chart for me to review. Patient to return in 1 month for follow-up to include obtaining a CMP same day. 3.  GERD. Protonix twice daily very effective for patient. No change in therapy  4. Anxiety and depression. Patient was seeing Dr. Kevyn Gomez at [facility] until he retired.   Patient then started seeing a provider at [facility] where he was receiving Klonopin. He is no longer seeing this provider as they have personality conflicts. Patient will follow up with [facility] and get reestablished with a new provider. 5.  Chronic insomnia. Patient has had issues with insomnia for many years. He is currently taking Ambien which is very effective. I informed patient once he has established care at [facility], they will need to prescribe the Ambien for his insomnia. 6.  Obesity. Weight management: BMI is out of normal parameters and plan is as follows: Discussed in great detail on diet, portion control, exercise, avoiding foods high in sugar, carbs and starches, fatty/greasy foods and to eat until satisfied not til full. I have counseled this patient on diet and exercise regimens as well. Form patient weight contributes to many different health issues to include diabetes and hypertension. Patient verbalized understanding. 7.  Medication management. Reviewed medication and completed the medication reconciliation with the patient. Reviewed side effects of medications with the patient. Questions were answered and patient verb understanding. Follow up 1 month  Labs needed 1 week prior to appt: Yes  CMP    Dr. Debbie Hector, AGNP-C, DNP  [facility]      Level 5:   40 minutes with the patient on counseling, answering questions, and/or coordination of care. Complex medical review of medical history, lab results, and testing. Complicated management plan formulated.

## 2020-10-19 NOTE — TELEPHONE ENCOUNTER
Per ROBBIE, called pt and changed appt to 11/18/2020. She needs to see him to do the med refill and she will do labs at the visit.

## 2020-10-19 NOTE — PATIENT INSTRUCTIONS
Learning About High Blood Pressure  What is high blood pressure? Blood pressure is a measure of how hard the blood pushes against the walls of your arteries. It's normal for blood pressure to go up and down throughout the day. But if it stays up, you have high blood pressure. Another name for high blood pressure is hypertension. Two numbers tell you your blood pressure. The first number is the systolic pressure (top number). It shows how hard the blood pushes when your heart is pumping. The second number is the diastolic pressure (bottom number). It shows how hard the blood pushes between heartbeats, when your heart is relaxed and filling with blood. Your doctor will give you a goal for your blood pressure based on your health and your age. High blood pressure (hypertension) means that the top number stays high, or the bottom number stays high, or both. High blood pressure increases the risk of stroke, heart attack, and other problems. What happens when you have high blood pressure? · Blood flows through your arteries with too much force. Over time, this can damage the heart and the walls of your arteries. But you can't feel it. High blood pressure usually doesn't cause symptoms. · High blood pressure makes your heart work harder. And that can lead to heart failure, which means your heart doesn't pump as much blood as your body needs. · Fat and calcium start to build up in your arteries. This buildup is called hardening of the arteries. It can cause many problems including a heart attack and stroke. · Arteries also carry blood and oxygen to organs like your eyes, kidneys, and brain. If high blood pressure damages those arteries, it can lead to vision loss, kidney disease, stroke, and a higher risk of dementia. How can you prevent high blood pressure? · Stay at a healthy weight. · Try to limit how much sodium you eat to less than 2,300 milligrams (mg) a day.  If you limit your sodium to 1,500 mg a day, you can lower your blood pressure even more. ? Buy foods that are labeled \"unsalted,\" \"sodium-free,\" or \"low-sodium. \" Foods labeled \"reduced-sodium\" and \"light sodium\" may still have too much sodium. ? Flavor your food with garlic, lemon juice, onion, vinegar, herbs, and spices instead of salt. Do not use soy sauce, steak sauce, onion salt, garlic salt, mustard, or ketchup on your food. ? Use less salt (or none) when recipes call for it. You can often use half the salt a recipe calls for without losing flavor. · Be physically active. Get at least 30 minutes of exercise on most days of the week. Walking is a good choice. You also may want to do other activities, such as running, swimming, cycling, or playing tennis or team sports. · Limit alcohol to 2 drinks a day for men and 1 drink a day for women. · Eat plenty of fruits, vegetables, and low-fat dairy products. Eat less saturated and total fats. How is high blood pressure treated? · Your doctor will suggest making lifestyle changes to help your heart. For example, your doctor may ask you to eat healthy foods, quit smoking, lose extra weight, and be more active. · If lifestyle changes don't help enough, your doctor may recommend that you take medicine. · When blood pressure is very high, medicines are needed to lower it. Follow-up care is a key part of your treatment and safety. Be sure to make and go to all appointments, and call your doctor if you are having problems. It's also a good idea to know your test results and keep a list of the medicines you take. Where can you learn more? Go to http://www.[employer].com/  Enter P501 in the search box to learn more about \"Learning About High Blood Pressure. \"  Current as of: December 16, 2019               Content Version: 12.6  © 1354-4462 [employer].    Care instructions adapted under license by [employer] (which disclaims liability or warranty for this information). If you have questions about a medical condition or this instruction, always ask your healthcare professional. [address] any warranty or liability for your use of this information. DASH Diet: Care Instructions  Your Care Instructions     The DASH diet is an eating plan that can help lower your blood pressure. DASH stands for Dietary Approaches to Stop Hypertension. Hypertension is high blood pressure. The DASH diet focuses on eating foods that are high in calcium, potassium, and magnesium. These nutrients can lower blood pressure. The foods that are highest in these nutrients are fruits, vegetables, low-fat dairy products, nuts, seeds, and legumes. But taking calcium, potassium, and magnesium supplements instead of eating foods that are high in those nutrients does not have the same effect. The DASH diet also includes whole grains, fish, and poultry. The DASH diet is one of several lifestyle changes your doctor may recommend to lower your high blood pressure. Your doctor may also want you to decrease the amount of sodium in your diet. Lowering sodium while following the DASH diet can lower blood pressure even further than just the DASH diet alone. Follow-up care is a key part of your treatment and safety. Be sure to make and go to all appointments, and call your doctor if you are having problems. It's also a good idea to know your test results and keep a list of the medicines you take. How can you care for yourself at home? Following the DASH diet  · Eat 4 to 5 servings of fruit each day. A serving is 1 medium-sized piece of fruit, ½ cup chopped or canned fruit, 1/4 cup dried fruit, or 4 ounces (½ cup) of fruit juice. Choose fruit more often than fruit juice. · Eat 4 to 5 servings of vegetables each day. A serving is 1 cup of lettuce or raw leafy vegetables, ½ cup of chopped or cooked vegetables, or 4 ounces (½ cup) of vegetable juice.  Choose vegetables more often than vegetable juice. · Get 2 to 3 servings of low-fat and fat-free dairy each day. A serving is 8 ounces of milk, 1 cup of yogurt, or 1 ½ ounces of cheese. · Eat 6 to 8 servings of grains each day. A serving is 1 slice of bread, 1 ounce of dry cereal, or ½ cup of cooked rice, pasta, or cooked cereal. Try to choose whole-grain products as much as possible. · Limit lean meat, poultry, and fish to 2 servings each day. A serving is 3 ounces, about the size of a deck of cards. · Eat 4 to 5 servings of nuts, seeds, and legumes (cooked dried beans, lentils, and split peas) each week. A serving is 1/3 cup of nuts, 2 tablespoons of seeds, or ½ cup of cooked beans or peas. · Limit fats and oils to 2 to 3 servings each day. A serving is 1 teaspoon of vegetable oil or 2 tablespoons of salad dressing. · Limit sweets and added sugars to 5 servings or less a week. A serving is 1 tablespoon jelly or jam, ½ cup sorbet, or 1 cup of lemonade. · Eat less than 2,300 milligrams (mg) of sodium a day. If you limit your sodium to 1,500 mg a day, you can lower your blood pressure even more. Tips for success  · Start small. Do not try to make dramatic changes to your diet all at once. You might feel that you are missing out on your favorite foods and then be more likely to not follow the plan. Make small changes, and stick with them. Once those changes become habit, add a few more changes. · Try some of the following:  ? Make it a goal to eat a fruit or vegetable at every meal and at snacks. This will make it easy to get the recommended amount of fruits and vegetables each day. ? Try yogurt topped with fruit and nuts for a snack or healthy dessert. ? Add lettuce, tomato, cucumber, and onion to sandwiches. ? Combine a ready-made pizza crust with low-fat mozzarella cheese and lots of vegetable toppings. Try using tomatoes, squash, spinach, broccoli, carrots, cauliflower, and onions. ?  Have a variety of cut-up vegetables with a low-fat dip as an appetizer instead of chips and dip. ? Sprinkle sunflower seeds or chopped almonds over salads. Or try adding chopped walnuts or almonds to cooked vegetables. ? Try some vegetarian meals using beans and peas. Add garbanzo or kidney beans to salads. Make burritos and tacos with mashed walter beans or black beans. Where can you learn more? Go to http://www.[employer].com/  Enter H967 in the search box to learn more about \"DASH Diet: Care Instructions. \"  Current as of: December 16, 2019               Content Version: 12.6  © 3292-5901 [employer]. Care instructions adapted under license by [employer] (which disclaims liability or warranty for this information). If you have questions about a medical condition or this instruction, always ask your healthcare professional. [address] any warranty or liability for your use of this information.

## 2020-11-08 DIAGNOSIS — I10 ESSENTIAL HYPERTENSION: ICD-10-CM

## 2020-11-18 ENCOUNTER — OFFICE VISIT (OUTPATIENT)
Dept: INTERNAL MEDICINE CLINIC | Age: 35
End: 2020-11-18
Payer: COMMERCIAL

## 2020-11-18 VITALS
HEART RATE: 82 BPM | HEIGHT: 74 IN | SYSTOLIC BLOOD PRESSURE: 150 MMHG | BODY MASS INDEX: 40.43 KG/M2 | WEIGHT: 315 LBS | DIASTOLIC BLOOD PRESSURE: 100 MMHG | OXYGEN SATURATION: 98 % | TEMPERATURE: 98.1 F | RESPIRATION RATE: 16 BRPM

## 2020-11-18 DIAGNOSIS — E66.01 SEVERE OBESITY WITH BODY MASS INDEX (BMI) OF 35.0 TO 39.9 WITH SERIOUS COMORBIDITY (HCC): ICD-10-CM

## 2020-11-18 DIAGNOSIS — I10 ESSENTIAL HYPERTENSION: Primary | ICD-10-CM

## 2020-11-18 DIAGNOSIS — Z79.899 MEDICATION MANAGEMENT: ICD-10-CM

## 2020-11-18 PROCEDURE — 99214 OFFICE O/P EST MOD 30 MIN: CPT | Performed by: NURSE PRACTITIONER

## 2020-11-18 RX ORDER — CHLORTHALIDONE 50 MG/1
50 TABLET ORAL DAILY
Qty: 30 TAB | Refills: 2 | Status: SHIPPED | OUTPATIENT
Start: 2020-11-18 | End: 2022-08-11

## 2020-11-18 RX ORDER — TOPIRAMATE 100 MG/1
100 TABLET, FILM COATED ORAL DAILY
COMMUNITY
Start: 2020-11-01

## 2020-11-18 NOTE — PROGRESS NOTES
Subjective:     Kit Ha is a 30 y.o. male who presents for follow up of hypertension. Diet and Lifestyle: generally follows a low fat low cholesterol diet, sedentary, nonsmoker  Home BP Monitoring: is borderline controlled at home, ranging 144-140's/'s    Cardiovascular ROS: taking medications as instructed, no medication side effects noted, no TIA's, no chest pain on exertion, no dyspnea on exertion, no swelling of ankles, no orthostatic dizziness or lightheadedness, no palpitations. New concerns: n/a. Patient Active Problem List   Diagnosis Code    Anxiety F41.9    Chronic insomnia F51.04    Osteoarthritis of spine with radiculopathy, lumbar region M47.26    Severe obesity with body mass index (BMI) of 35.0 to 39.9 with serious comorbidity (HCC) E66.01     Current Outpatient Medications   Medication Sig Dispense Refill    topiramate (TOPAMAX) 100 mg tablet Take 1 Tab by mouth two (2) times a day.  chlorthalidone (HYGROTEN) 50 mg tablet Take 1 Tab by mouth daily. 30 Tab 2    zolpidem (AMBIEN) 10 mg tablet Take 1 Tab by mouth nightly as needed for Sleep. Max Daily Amount: 10 mg. 30 Tab 3    DULoxetine (CYMBALTA) 30 mg capsule Take 3 Caps by mouth daily. Indications: anxiousness associated with depression, neuropathic pain 90 Cap 5    pantoprazole (PROTONIX) 20 mg tablet Take 1 Tab by mouth two (2) times a day. 30 minutes before breakfast and dinner 60 Tab 5    calcium carbonate (TUMS) 200 mg calcium (500 mg) chew Take 1 Tab by mouth daily.        No Known Allergies  Past Medical History:   Diagnosis Date    ADD (attention deficit disorder)     Adhesive arachnoiditis 7/11/2017    Anxiety     Cervical radiculopathy 12/21/2016    Cervicalgia 7/11/2017    DDD (degenerative disc disease), cervical 7/11/2017    Depression     History of migraine 2/1/2017    Hypovitaminosis D 4/4/2018    Intervertebral disc disorder of lumbar region with myelopathy 2/22/2016    Kidney stone  Obesity     Obstructive sleep apnea on CPAP 2/21/2019    PTSD (post-traumatic stress disorder)     Seasonal allergic rhinitis     Spinal stenosis, lumbar 7/27/2011    S/p decompression and fusion Dr. Llanos Grandchild 7/11     Stenosis, cervical spine 7/11/2017    Ulcer of the stomach and intestine           Review of Systems, additional:  Pertinent items are noted in HPI. Objective:     Visit Vitals  BP (!) 150/100 (BP 1 Location: Right arm, BP Patient Position: Sitting)   Pulse 82   Temp 98.1 °F (36.7 °C) (Temporal)   Resp 16   Ht 6' 2\" (1.88 m)   Wt 320 lb 9.6 oz (145.4 kg)   SpO2 98%   BMI 41.16 kg/m²     Appearance: alert, well appearing, and in no distress and morbidly obese. General exam: CVS exam BP noted to be moderately elevated today in office, S1, S2 normal, no gallop, no murmur, chest clear, no JVD, no HSM, no edema. Lab review: no lab studies available for review at time of visit. Will obtain CMP today. Assessment/Plan:     hypertension asymptomatic, no significant medication side effects noted, poorly controlled, needs further observation, needs improvement, discussed obesity status and the need to lose weight, discussed limiting sodium in diet. repeat labs ordered prior to next appointment  orders and follow up as documented in patient record  reviewed diet, exercise and weight control  recommended sodium restriction  reviewed medications and side effects in detail  reviewed potential future medication changes and side effects  the following changes are made - DC HCTZ and initiate Hygroten 50mg. Decided not to add another agent as systolic not out of range, kept antidiuretic due to diastolic remaining elevated. Pt verb understanding. will obtain CMP 1 week prior to Feb 2021 f/u. Pt to check BP 3-4x/w 1-2 hours after taking Hygroten. pt to call office with diastolic readings above 90. ICD-10-CM ICD-9-CM    1. Essential hypertension  I10 401.9 chlorthalidone (HYGROTEN) 50 mg tablet   2. Severe obesity with body mass index (BMI) of 35.0 to 39.9 with serious comorbidity (HCC)  E66.01 278.01    3. Medication management  Z79.899 V58.69        Face to Face time: 20 minutes with 50% being consultation, planning.

## 2020-11-18 NOTE — Clinical Note
ANTICOAGULATION FOLLOW-UP CLINIC VISIT    Patient Name:  Ambrose Ni  Date:  9/24/2018  Contact Type:  Face to Face    SUBJECTIVE:     Patient Findings     Positives No Problem Findings           OBJECTIVE    INR Protime   Date Value Ref Range Status   09/24/2018 2.9 (A) 0.86 - 1.14 Final       ASSESSMENT / PLAN  INR assessment THER    Recheck INR In: 6 WEEKS    INR Location Clinic      Anticoagulation Summary as of 9/24/2018     INR goal 2.0-3.0   Today's INR 2.9   Warfarin maintenance plan 2 mg (2 mg x 1) on Sun, Wed, Fri; 4 mg (2 mg x 2) all other days   Full warfarin instructions 2 mg on Sun, Wed, Fri; 4 mg all other days   Weekly warfarin total 22 mg   No change documented Lyubov Marcelo RN   Plan last modified Elba Pereira RN (2/23/2018)   Next INR check 11/5/2018   Priority INR   Target end date Indefinite    Indications   Unspecified atrial flutter [I48.92]  Long-term (current) use of anticoagulants [Z79.01] [Z79.01]         Anticoagulation Episode Summary     INR check location     Preferred lab     Send INR reminders to  INR    Comments       Anticoagulation Care Providers     Provider Role Specialty Phone number    Derick Brock MD Responsible Pediatrics [phone]            See the Encounter Report to view Anticoagulation Flowsheet and Dosing Calendar (Go to Encounters tab in chart review, and find the Anticoagulation Therapy Visit)      Lyubov Marcelo RN                Labs needed

## 2020-11-18 NOTE — PATIENT INSTRUCTIONS
Learning About High Blood Pressure  What is high blood pressure? Blood pressure is a measure of how hard the blood pushes against the walls of your arteries. It's normal for blood pressure to go up and down throughout the day. But if it stays up, you have high blood pressure. Another name for high blood pressure is hypertension. Two numbers tell you your blood pressure. The first number is the systolic pressure (top number). It shows how hard the blood pushes when your heart is pumping. The second number is the diastolic pressure (bottom number). It shows how hard the blood pushes between heartbeats, when your heart is relaxed and filling with blood. Your doctor will give you a goal for your blood pressure based on your health and your age. High blood pressure (hypertension) means that the top number stays high, or the bottom number stays high, or both. High blood pressure increases the risk of stroke, heart attack, and other problems. What happens when you have high blood pressure? · Blood flows through your arteries with too much force. Over time, this can damage the heart and the walls of your arteries. But you can't feel it. High blood pressure usually doesn't cause symptoms. · High blood pressure makes your heart work harder. And that can lead to heart failure, which means your heart doesn't pump as much blood as your body needs. · Fat and calcium start to build up in your arteries. This buildup is called hardening of the arteries. It can cause many problems including a heart attack and stroke. · Arteries also carry blood and oxygen to organs like your eyes, kidneys, and brain. If high blood pressure damages those arteries, it can lead to vision loss, kidney disease, stroke, and a higher risk of dementia. How can you prevent high blood pressure? · Stay at a healthy weight. · Try to limit how much sodium you eat to less than 2,300 milligrams (mg) a day.  If you limit your sodium to 1,500 mg a day, you can lower your blood pressure even more. ? Buy foods that are labeled \"unsalted,\" \"sodium-free,\" or \"low-sodium. \" Foods labeled \"reduced-sodium\" and \"light sodium\" may still have too much sodium. ? Flavor your food with garlic, lemon juice, onion, vinegar, herbs, and spices instead of salt. Do not use soy sauce, steak sauce, onion salt, garlic salt, mustard, or ketchup on your food. ? Use less salt (or none) when recipes call for it. You can often use half the salt a recipe calls for without losing flavor. · Be physically active. Get at least 30 minutes of exercise on most days of the week. Walking is a good choice. You also may want to do other activities, such as running, swimming, cycling, or playing tennis or team sports. · Limit alcohol to 2 drinks a day for men and 1 drink a day for women. · Eat plenty of fruits, vegetables, and low-fat dairy products. Eat less saturated and total fats. How is high blood pressure treated? · Your doctor will suggest making lifestyle changes to help your heart. For example, your doctor may ask you to eat healthy foods, quit smoking, lose extra weight, and be more active. · If lifestyle changes don't help enough, your doctor may recommend that you take medicine. · When blood pressure is very high, medicines are needed to lower it. Follow-up care is a key part of your treatment and safety. Be sure to make and go to all appointments, and call your doctor if you are having problems. It's also a good idea to know your test results and keep a list of the medicines you take. Where can you learn more? Go to http://www.[employer].com/  Enter P501 in the search box to learn more about \"Learning About High Blood Pressure. \"  Current as of: December 16, 2019               Content Version: 12.6  © 9359-4978 [employer].    Care instructions adapted under license by [employer] (which disclaims liability or warranty for this information). If you have questions about a medical condition or this instruction, always ask your healthcare professional. [place] [address] any warranty or liability for your use of this information.

## 2020-11-18 NOTE — PROGRESS NOTES
1. Have you been to the ER, urgent care clinic since your last visit? Hospitalized since your last visit? No    2. Have you seen or consulted any other health care providers outside of the [address] [place] [address] since your last visit? Include any pap smears or colon screening. No    Chief Complaint   Patient presents with    Hypertension     Patient declined flu vaccine.

## 2020-11-19 LAB
A-G RATIO,AGRAT: 2.2 RATIO (ref 1.1–2.6)
ALBUMIN SERPL-MCNC: 4.6 G/DL (ref 3.5–5)
ALP SERPL-CCNC: 96 U/L (ref 25–115)
ALT SERPL-CCNC: 29 U/L (ref 5–40)
ANION GAP SERPL CALC-SCNC: 11.3 MMOL/L (ref 3–15)
AST SERPL W P-5'-P-CCNC: 22 U/L (ref 10–37)
BILIRUB SERPL-MCNC: 0.3 MG/DL (ref 0.2–1.2)
BUN SERPL-MCNC: 13 MG/DL (ref 6–22)
CALCIUM SERPL-MCNC: 9.1 MG/DL (ref 8.4–10.5)
CHLORIDE SERPL-SCNC: 107 MMOL/L (ref 98–110)
CO2 SERPL-SCNC: 23 MMOL/L (ref 20–32)
CREAT SERPL-MCNC: 0.9 MG/DL (ref 0.5–1.2)
GFRAA, 66117: >60
GFRNA, 66118: >60
GLOBULIN,GLOB: 2.1 G/DL (ref 2–4)
GLUCOSE SERPL-MCNC: 96 MG/DL (ref 70–99)
POTASSIUM SERPL-SCNC: 4.1 MMOL/L (ref 3.5–5.5)
PROT SERPL-MCNC: 6.7 G/DL (ref 6.4–8.3)
SODIUM SERPL-SCNC: 141 MMOL/L (ref 133–145)

## 2020-11-25 ENCOUNTER — PATIENT MESSAGE (OUTPATIENT)
Dept: INTERNAL MEDICINE CLINIC | Age: 35
End: 2020-11-25

## 2020-12-01 ENCOUNTER — DOCUMENTATION ONLY (OUTPATIENT)
Dept: INTERNAL MEDICINE CLINIC | Age: 35
End: 2020-12-01

## 2020-12-01 NOTE — PROGRESS NOTES
Pt has transferred care to [address] [place] [address], records release received which has been forwarded to Ci for processing

## 2021-02-01 DIAGNOSIS — I10 ESSENTIAL HYPERTENSION: ICD-10-CM

## 2021-08-13 NOTE — TELEPHONE ENCOUNTER
This patient contacted office for the following prescriptions to be filled:    Medication requested :   Requested Prescriptions     Pending Prescriptions Disp Refills    zolpidem CR (AMBIEN CR) 12.5 mg tablet 30 Tab 1     Sig: Take 1 Tab by mouth nightly as needed for Sleep. Max Daily Amount: 12.5 mg.      PCP: Monica Burns [phone] or Print: [facility]  Mail order or Local pharmacy [phone]    Scheduled appointment if not seen by current providers in office: What Type Of Note Output Would You Prefer (Optional)?: Bullet Format Hpi Title: Evaluation of Skin Lesions How Severe Are Your Spot(S)?: mild Have Your Spot(S) Been Treated In The Past?: has not been treated

## 2021-10-14 NOTE — PROGRESS NOTES
Mattie Cline 1985, is a 29 y.o. male, who is seen today for evaluation of progressive pressure in the left maxillary sinuses and nasal congestion. He does have a history of allergies and when this first started nearly a month ago he thought it was probably allergies and is been using some antihistamines Sudafed and tried Flonase as well but not really getting much controlled symptoms. He is periodically blowing out thick clear to white secretions and sometimes yellowish to brownish secretions. No chills sweats fever. Only occasional cough in the mornings, certainly not every day. He has had progressive gradual increasing pressure in the left maxillary region and rarely into the frontal area as well. Everything else seems to be quite stable, he is using zolpidem to help him sleep as usual.  Has a history of obesity still obese but continues to work on weight loss. He is improving. He has a history of GERD but with low-dose pantoprazole that is asymptomatic. He continues to use Seroquel 100 mg at bedtime and that helps him rest better at night and function better in the daytime. Past Medical History:   Diagnosis Date    ADD (attention deficit disorder)     Anxiety     Depression     Ill-defined condition     chronic neck and back pain    Migraine     Obesity     PTSD (post-traumatic stress disorder)     Seasonal allergic rhinitis     Sleep apnea      Current Outpatient Prescriptions   Medication Sig Dispense Refill    amoxicillin-clavulanate (AUGMENTIN) 875-125 mg per tablet Take 1 Tab by mouth two (2) times a day. 20 Tab 0    oxyCODONE-acetaminophen (PERCOCET) 5-325 mg per tablet Take 1 Tab by mouth every six (6) hours as needed for Pain for up to 30 days. Max Daily Amount: 4 Tabs. 120 Tab 0    QUEtiapine (SEROQUEL) 50 mg tablet TAKE 2 TABLETS BY MOUTH EVERY NIGHT 60 Tab 0    zolpidem (AMBIEN) 10 mg tablet Take 1 Tab by mouth nightly as needed.  30 Tab 0    TENS Units (TENS 504) madai Please provide patient tens unit device and pads to be applied to affected area as needed for pain 1 Device 0    oxyCODONE-acetaminophen (PERCOCET)  mg per tablet Take 1 Tab by mouth every six (6) hours as needed for Pain. Max Daily Amount: 4 Tabs. 120 Tab 0    pantoprazole (PROTONIX) 20 mg tablet Take 20 mg by mouth daily.  tamsulosin (FLOMAX) 0.4 mg capsule Take 1 Cap by mouth daily. 30 Cap 0    triamcinolone (ARISTOCORT) 0.5 % topical cream Apply thin layer to rash twice a day 15 g 11    tiZANidine (ZANAFLEX) 4 mg tablet TAKE 1 BY MOUTH EVERY NIGHT AT BEDTIME FOR 14 DAYS THEN 2 BY MOUTH EVERY NIGHT AT BEDTIME AFTER 180 Tab 3    topiramate (TOPAMAX) 100 mg tablet Take 1 Tab by mouth two (2) times a day. 180 Tab 1    DULoxetine (CYMBALTA) 30 mg capsule Take 30 mg by mouth daily. Visit Vitals    /86    Pulse 74    Temp 98.8 °F (37.1 °C) (Oral)    Resp 14    Ht 6' 2\" (1.88 m)    Wt 292 lb (132.5 kg)    SpO2 98%    BMI 37.49 kg/m2     Left maxillary sinuses mildly tender where there is no tenderness anywhere else in the face. Nasal passages are clear, I see no secretions currently. Pharynx reveals no redness exudate or drainage. Neck reveals no adenopathy. Lungs are clear to percussion. Good breath sounds with no wheezing or crackles. Assessment: #1.  Subacute maxillary sinusitis. Will treat with Augmentin twice a day to be taken with food. Warned him this medicine could cause nausea and is less likely to cause nausea if he takes it with food. Also recommended he use Sudafed or similar for the acute symptoms and that if he does have allergy symptoms in the fall typically that he restart Flonase and use that at least through the next few months. #2.  Obesity better than he used to be years ago, he will continue working on weight loss by cutting back on sweets and eating more vegetables and salads.   #3.  Chronic insomnia doing well, he will continue zolpidem when needed and he will continue Seroquel 100 mg every night. #4.  GERD now asymptomatic, weight loss has helped and he will keep working on weight loss and he will continue pantoprazole 20 mg daily. Tomy Salvador MD FACP    Please note: This document has been produced using voice recognition software. Unrecognized errors in transcription may be present. Orbicularis Oris Muscle Flap Text: The defect edges were debeveled with a #15 scalpel blade.  Given that the defect affected the competency of the oral sphincter an orbicularis oris muscle flap was deemed most appropriate to restore this competency and normal muscle function.  Using a sterile surgical marker, an appropriate flap was drawn incorporating the defect. The area thus outlined was incised with a #15 scalpel blade.

## 2022-03-19 PROBLEM — M47.26 OSTEOARTHRITIS OF SPINE WITH RADICULOPATHY, LUMBAR REGION: Status: ACTIVE | Noted: 2017-07-11

## 2022-03-20 PROBLEM — E66.01 SEVERE OBESITY WITH BODY MASS INDEX (BMI) OF 35.0 TO 39.9 WITH SERIOUS COMORBIDITY (HCC): Status: ACTIVE | Noted: 2018-08-29

## 2022-08-11 ENCOUNTER — OFFICE VISIT (OUTPATIENT)
Dept: CARDIOLOGY CLINIC | Age: 37
End: 2022-08-11
Payer: COMMERCIAL

## 2022-08-11 VITALS
OXYGEN SATURATION: 97 % | DIASTOLIC BLOOD PRESSURE: 118 MMHG | WEIGHT: 315 LBS | HEART RATE: 73 BPM | SYSTOLIC BLOOD PRESSURE: 168 MMHG | HEIGHT: 74 IN | BODY MASS INDEX: 40.43 KG/M2

## 2022-08-11 DIAGNOSIS — R94.31 ABNORMAL EKG: ICD-10-CM

## 2022-08-11 DIAGNOSIS — I10 MALIGNANT HYPERTENSION: ICD-10-CM

## 2022-08-11 DIAGNOSIS — E66.01 SEVERE OBESITY WITH BODY MASS INDEX (BMI) OF 35.0 TO 39.9 WITH SERIOUS COMORBIDITY (HCC): ICD-10-CM

## 2022-08-11 DIAGNOSIS — E03.9 HYPOTHYROIDISM, UNSPECIFIED TYPE: ICD-10-CM

## 2022-08-11 DIAGNOSIS — I10 HYPERTENSION, UNSPECIFIED TYPE: Primary | ICD-10-CM

## 2022-08-11 PROCEDURE — 99204 OFFICE O/P NEW MOD 45 MIN: CPT | Performed by: INTERNAL MEDICINE

## 2022-08-11 PROCEDURE — 93000 ELECTROCARDIOGRAM COMPLETE: CPT | Performed by: INTERNAL MEDICINE

## 2022-08-11 RX ORDER — AMLODIPINE BESYLATE 10 MG/1
10 TABLET ORAL DAILY
COMMUNITY
Start: 2022-07-28

## 2022-08-11 RX ORDER — OLMESARTAN MEDOXOMIL 40 MG/1
40 TABLET ORAL DAILY
COMMUNITY

## 2022-08-11 RX ORDER — CHLORTHALIDONE 25 MG/1
25 TABLET ORAL DAILY
COMMUNITY

## 2022-08-11 RX ORDER — CLONIDINE 0.2 MG/24H
1 PATCH, EXTENDED RELEASE TRANSDERMAL
COMMUNITY
Start: 2022-07-28 | End: 2022-08-11 | Stop reason: DRUGHIGH

## 2022-08-11 RX ORDER — ROSUVASTATIN CALCIUM 5 MG/1
5 TABLET, COATED ORAL
COMMUNITY
Start: 2022-06-07

## 2022-08-11 RX ORDER — LEVOTHYROXINE SODIUM 25 UG/1
25 TABLET ORAL DAILY
COMMUNITY
Start: 2022-06-07

## 2022-08-11 RX ORDER — CLONIDINE 0.3 MG/24H
1 PATCH, EXTENDED RELEASE TRANSDERMAL
Qty: 4 PATCH | Refills: 6 | Status: SHIPPED | OUTPATIENT
Start: 2022-08-11

## 2022-08-11 NOTE — PROGRESS NOTES
Roberto Ackerman presents today for   Chief Complaint   Patient presents with    New Patient     Referred by pcp for HTN    Dizziness       Roberto Ackerman preferred language for health care discussion is english/other. Is someone accompanying this pt? no    Is the patient using any DME equipment during [address]? no    Depression Screening:  3 most recent PHQ Screens 8/11/2022   PHQ Not Done -   Little interest or pleasure in doing things Not at all   Feeling down, depressed, irritable, or hopeless Not at all   Total Score PHQ 2 0   Trouble falling or staying asleep, or sleeping too much -   Feeling tired or having little energy -   Poor appetite, weight loss, or overeating -   Feeling bad about yourself - or that you are a failure or have let yourself or your family down -   Trouble concentrating on things such as school, work, reading, or watching TV -   Moving or speaking so slowly that other people could have noticed; or the opposite being so fidgety that others notice -   Thoughts of being better off dead, or hurting yourself in some way -   PHQ 9 Score -   How difficult have these problems made it for you to do your work, take care of your home and get along with others -       Learning Assessment:  Learning Assessment 8/11/2022   PRIMARY LEARNER Patient   HIGHEST LEVEL OF EDUCATION - PRIMARY LEARNER  -   BARRIERS PRIMARY LEARNER -   CO-LEARNER CAREGIVER -   PRIMARY LANGUAGE ENGLISH   LEARNER PREFERENCE PRIMARY DEMONSTRATION     -     -   ANSWERED BY patient     -   RELATIONSHIP SELF       Abuse Screening:  Abuse Screening Questionnaire 8/11/2022   Do you ever feel afraid of your partner? N   Are you in a relationship with someone who physically or mentally threatens you? N   Is it safe for you to go home? Y       Fall Risk  Fall Risk Assessment, last 12 mths 10/19/2020   Able to walk? Yes   Fall in past 12 months? Yes   Number of falls in past 12 months 1   Fall with injury?  1           Pt currently taking Anticoagulant therapy? no    Pt currently taking Antiplatelet therapy ? no      Coordination of Care:  1. Have you been to the ER, urgent care clinic since your last visit? Hospitalized since your last visit? no    2. Have you seen or consulted any other health care providers outside of the [address] [place] [address] since your last visit? Include any pap smears or colon screening.  no

## 2022-08-11 NOTE — PATIENT INSTRUCTIONS
Follow up with Dr Biggs Has with EKG in 2-3 months  Increase Clonidine patch to 0.3mg weekly  ECHO  Renal artery duplex study - If you have not heard from the central scheduler to schedule your testing in 48 hours, please call [phone].

## 2022-08-11 NOTE — PROGRESS NOTES
HISTORY OF PRESENT ILLNESS  Jorge Paez is a 31 y.o. male. New Patient  Pertinent negatives include no chest pain, no abdominal pain, no headaches and no shortness of breath. Dizziness  Pertinent negatives include no chest pain, no abdominal pain, no headaches and no shortness of breath. Patient presents for a new office visit. He was referred here by his PCP for evaluation of difficult to control hypertension. Patient reports that he was diagnosed with high blood pressure approximately a year ago and has been on medications for a little less than a year. He states he is also gained at least 50 pounds in weight over the past 12 months. His primary care provider has been gradually adjusting his medications with the most recent addition of olmesartan 40 mg daily. He just darted taking this medication to weeks ago. With the addition of this medication, he states his blood pressure at home is averaging 150/100 mmHg. He denies any chest pain, shortness of breath, orthopnea, PND or leg swelling. No prolonged heart palpitations, dizziness or syncope. He states his father had history of high blood pressure but did not have it at a young age. He states he has been compliant with all of his medications.     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 Ayana Camarillo 7/11     Stenosis, cervical spine 07/11/2017 Ulcer of the stomach and intestine      Current Outpatient Medications   Medication Sig Dispense Refill    amLODIPine (NORVASC) 10 mg tablet Take 10 mg by mouth in the morning. rosuvastatin (CRESTOR) 5 mg tablet Take 5 mg by mouth nightly. levothyroxine (SYNTHROID) 25 mcg tablet Take 25 mcg by mouth in the morning. chlorthalidone (HYGROTON) 25 mg tablet Take 25 mg by mouth in the morning. olmesartan (BENICAR) 40 mg tablet Take 40 mg by mouth in the morning. cloNIDine (CATAPRES) 0.3 mg/24 hr 1 Patch by TransDERmal route every seven (7) days. 4 Patch 6    topiramate (TOPAMAX) 100 mg tablet Take 1 Tab by mouth two (2) times a day. DULoxetine (CYMBALTA) 30 mg capsule Take 3 Caps by mouth daily. Indications: anxiousness associated with depression, neuropathic pain 90 Cap 5    pantoprazole (PROTONIX) 20 mg tablet Take 1 Tab by mouth two (2) times a day. 30 minutes before breakfast and dinner 60 Tab 5    calcium carbonate (TUMS) 200 mg calcium (500 mg) chew Take 1 Tab by mouth daily. No Known Allergies     Social History     Tobacco Use    Smoking status: Former    Smokeless tobacco: Never   Vaping Use    Vaping Use: Never used   Substance Use Topics    Alcohol use: No     Alcohol/week: 0.0 standard drinks    Drug use: No     Family History   Problem Relation Age of Onset    Asthma Mother          Review of Systems   Constitutional:  Negative for chills, fever and weight loss. HENT:  Negative for nosebleeds. Eyes:  Negative for blurred vision and double vision. Respiratory:  Negative for cough, shortness of breath and wheezing. Cardiovascular:  Negative for chest pain, palpitations, orthopnea, claudication, leg swelling and PND. Gastrointestinal:  Negative for abdominal pain, heartburn, nausea and vomiting. Genitourinary:  Negative for dysuria and hematuria. Musculoskeletal:  Positive for back pain. Negative for falls and myalgias. Skin:  Negative for rash. Neurological:  Positive for dizziness. Negative for focal weakness and headaches. Endo/Heme/Allergies:  Does not bruise/bleed easily. Psychiatric/Behavioral:  Negative for substance abuse. Physical Exam  Constitutional:       Appearance: He is well-developed. HENT:      Head: Normocephalic and atraumatic. Eyes:      Conjunctiva/sclera: Conjunctivae normal.   Neck:      Vascular: No carotid bruit or JVD. Cardiovascular:      Rate and Rhythm: Normal rate and regular rhythm. Pulses: Normal pulses. Heart sounds: S1 normal and S2 normal. Heart sounds are distant. No murmur heard. No gallop. No S3 sounds. Pulmonary:      Breath sounds: Normal breath sounds. No wheezing or rales. Abdominal:      General: Bowel sounds are normal.      Palpations: Abdomen is soft. Tenderness: There is no abdominal tenderness. Musculoskeletal:         General: No swelling or deformity. Cervical back: Neck supple. Skin:     General: Skin is warm and dry. Neurological:      General: No focal deficit present. Mental Status: He is alert and oriented to person, place, and time. Psychiatric:         Mood and Affect: Mood normal.     EKG: Normal sinus rhythm, normal axis, normal QTc interval, nonspecific T wave abnormality. Compared to a previous EKG from 2015, nonspecific T wave abnormality is now present. ASSESSMENT and PLAN  Encounter Diagnoses   Name Primary? Hypertension, unspecified type Yes    Malignant hypertension     Abnormal EKG     Severe obesity with body mass index (BMI) of 35.0 to 39.9 with serious comorbidity (HCC)     Hypothyroidism, unspecified type      Hypertension. Likely primary in nature, but difficult to control. I have recommended increasing his Catapres patch to the 0.3 mg per 24 hour/week. He can continue the remainder of his medications. If his blood pressure remains elevated, I would consider adding a beta-blocker next.   Lastly, I would like to evaluate for any renal artery stenosis with a renal duplex scan. Abnormal EKG. This may be due to his hypertension, but I have recommended pursuing an echocardiogram to evaluate for any structural heart disease. Morbid obesity. Patient reports a 50 pound weight gain over the past 12 months. He was strongly encouraged to try and lose much weight as possible lifestyle modification. Hypothyroidism. Patient is on a low dose of Synthroid. This has been followed by his PCP. Dyslipidemia. Patient is managed with Crestor 5 mg daily. This is also followed by his PCP. Follow-up in 2 to 3 months, sooner if needed.

## 2022-09-27 ENCOUNTER — HOSPITAL ENCOUNTER (OUTPATIENT)
Dept: VASCULAR SURGERY | Age: 37
Discharge: HOME | End: 2022-09-27
Attending: INTERNAL MEDICINE
Payer: COMMERCIAL

## 2022-09-27 DIAGNOSIS — I10 MALIGNANT HYPERTENSION: ICD-10-CM

## 2022-09-27 LAB
ABDOMINAL PROX AORTA VEL: 85.1 CM/S
LEFT INTERLOBAR EDV: 17.2 CM/S
LEFT INTERLOBAR PSV: 31.4 CM/S
LEFT INTERLOBAR RI: 0.5
LEFT KIDNEY LENGTH: 12.28 CM
LEFT KIDNEY WIDTH: 5.94 CM
LEFT RENAL DIST DIAS: 16.1 CM/S
LEFT RENAL DIST RAR: 0.73
LEFT RENAL DIST RI: 0.74
LEFT RENAL DIST SYS: 62.2 CM/S
LEFT RENAL LOWER PARENCHYMA MAX: 14.1 CM/S
LEFT RENAL LOWER PARENCHYMA MIN: 5.8 CM/S
LEFT RENAL LOWER PARENCHYMA RI: 0.59
LEFT RENAL MID DIAS: 26.5 CM/S
LEFT RENAL MID RAR: 0.8
LEFT RENAL MID RI: 0.61
LEFT RENAL MID SYS: 68.3 CM/S
LEFT RENAL MIDDLE PARENCHYMA MAX: 13 CM/S
LEFT RENAL MIDDLE PARENCHYMA MIN: 6.4 CM/S
LEFT RENAL MIDDLE PARENCHYMA RI: 0.51
LEFT RENAL ORIGIN DIAS: 30.5 CM/S
LEFT RENAL ORIGIN RAR: 0.83
LEFT RENAL ORIGIN RI: 0.57
LEFT RENAL ORIGIN SYS: 70.5 CM/S
LEFT RENAL PROX DIAS: 26.6 CM/S
LEFT RENAL PROX RAR: 0.92
LEFT RENAL PROX RI: 0.66
LEFT RENAL PROX SYS: 78.2 CM/S
LEFT RENAL UPPER PARENCHYMA MAX: 14.6 CM/S
LEFT RENAL UPPER PARENCHYMA MIN: 5.8 CM/S
LEFT RENAL UPPER PARENCHYMA RI: 0.6
PROX AORTIC AP: 1.96 CM
PROX AORTIC TRANS: 1.95 CM
RIGHT INTERLOBAR EDV: 11.9 CM/S
RIGHT INTERLOBAR PSV: 30.2 CM/S
RIGHT INTERLOBAR RI: 0.6
RIGHT KIDNEY LENGTH: 12.71 CM
RIGHT KIDNEY WIDTH: 5.67 CM
RIGHT RENAL DIST DIAS: 18.3 CM/S
RIGHT RENAL DIST RAR: 0.49
RIGHT RENAL DIST RI: 0.56
RIGHT RENAL DIST SYS: 41.6 CM/S
RIGHT RENAL LOWER PARENCHYMA MAX: 14.2 CM/S
RIGHT RENAL LOWER PARENCHYMA MIN: 5.9 CM/S
RIGHT RENAL LOWER PARENCHYMA RI: 0.58
RIGHT RENAL MID DIAS: 19.2 CM/S
RIGHT RENAL MID RAR: 0.69
RIGHT RENAL MID RI: 0.67
RIGHT RENAL MID SYS: 58.8 CM/S
RIGHT RENAL MIDDLE PARENCHYMA MAX: 13.8 CM/S
RIGHT RENAL MIDDLE PARENCHYMA MIN: 5.5 CM/S
RIGHT RENAL MIDDLE PARENCHYMA RI: 0.6
RIGHT RENAL ORIGIN DIAS: 16 CM/S
RIGHT RENAL ORIGIN RAR: 0.73
RIGHT RENAL ORIGIN RI: 0.74
RIGHT RENAL ORIGIN SYS: 62 CM/S
RIGHT RENAL PROX DIAS: 20.2 CM/S
RIGHT RENAL PROX RAR: 0.74
RIGHT RENAL PROX RI: 0.68
RIGHT RENAL PROX SYS: 63.1 CM/S
RIGHT RENAL UPPER PARENCHYMA MAX: 17.3 CM/S
RIGHT RENAL UPPER PARENCHYMA MIN: 6.4 CM/S
RIGHT RENAL UPPER PARENCHYMA RI: 0.63

## 2022-09-27 PROCEDURE — 93975 VASCULAR STUDY: CPT

## 2022-09-28 ENCOUNTER — TELEPHONE (OUTPATIENT)
Dept: CARDIOLOGY CLINIC | Age: 37
End: 2022-09-28

## 2022-09-28 NOTE — TELEPHONE ENCOUNTER
----- Message from Dane Trejo MD sent at 9/28/2022 11:02 AM EDT -----  Please let the patient know that his renal arteries were normal.  ----- Message -----  From: Zack Kaur LPN  Sent: 8/55/5109  10:04 AM EDT  To: Dane Trejo MD    Per your note - Hypertension. Likely primary in nature, but difficult to control. I have recommended increasing his Catapres patch to the 0.3 mg per 24 hour/week. He can continue the remainder of his medications. If his blood pressure remains elevated, I would consider adding a beta-blocker next. Lastly, I would like to evaluate for any renal artery stenosis with a renal duplex scan.

## 2022-09-28 NOTE — PROGRESS NOTES
Per your note - Hypertension. Likely primary in nature, but difficult to control. I have recommended increasing his Catapres patch to the 0.3 mg per 24 hour/week. He can continue the remainder of his medications. If his blood pressure remains elevated, I would consider adding a beta-blocker next. Lastly, I would like to evaluate for any renal artery stenosis with a renal duplex scan.

## 2022-10-14 ENCOUNTER — TELEPHONE (OUTPATIENT)
Dept: CARDIOLOGY CLINIC | Age: 37
End: 2022-10-14

## 2022-10-14 NOTE — TELEPHONE ENCOUNTER
Patient made aware of echo results and Dr. Ludmila Crain remarks. No questions or concerns at present.

## 2022-10-14 NOTE — TELEPHONE ENCOUNTER
----- Message from Susie Myers MD sent at 10/11/2022  5:30 PM EDT -----  Please let the patient know that his echocardiogram was normal.  ----- Message -----  From: Jennifer Sam LPN  Sent: 24/90/1964  12:02 PM EDT  To: Susie Myers MD    Per your note - Abnormal EKG. This may be due to his hypertension, but I have recommended pursuing an echocardiogram to evaluate for any structural heart disease.

## 2022-11-03 ENCOUNTER — OFFICE VISIT (OUTPATIENT)
Dept: CARDIOLOGY CLINIC | Age: 37
End: 2022-11-03
Payer: COMMERCIAL

## 2022-11-03 VITALS
DIASTOLIC BLOOD PRESSURE: 94 MMHG | BODY MASS INDEX: 40.43 KG/M2 | WEIGHT: 315 LBS | HEIGHT: 74 IN | HEART RATE: 78 BPM | SYSTOLIC BLOOD PRESSURE: 156 MMHG | OXYGEN SATURATION: 96 %

## 2022-11-03 DIAGNOSIS — I10 MALIGNANT HYPERTENSION: Primary | ICD-10-CM

## 2022-11-03 DIAGNOSIS — I10 HYPERTENSION, UNSPECIFIED TYPE: ICD-10-CM

## 2022-11-03 DIAGNOSIS — E03.9 HYPOTHYROIDISM, UNSPECIFIED TYPE: ICD-10-CM

## 2022-11-03 DIAGNOSIS — E66.01 SEVERE OBESITY WITH BODY MASS INDEX (BMI) OF 35.0 TO 39.9 WITH SERIOUS COMORBIDITY (HCC): ICD-10-CM

## 2022-11-03 DIAGNOSIS — R94.31 ABNORMAL EKG: ICD-10-CM

## 2022-11-03 PROCEDURE — 99214 OFFICE O/P EST MOD 30 MIN: CPT | Performed by: INTERNAL MEDICINE

## 2022-11-03 PROCEDURE — 3078F DIAST BP <80 MM HG: CPT | Performed by: INTERNAL MEDICINE

## 2022-11-03 PROCEDURE — 3074F SYST BP LT 130 MM HG: CPT | Performed by: INTERNAL MEDICINE

## 2022-11-03 NOTE — PROGRESS NOTES
Mara Kuhn presents today for   Chief Complaint   Patient presents with    Follow-up     3 month    Leg Swelling     feet       Mara Kuhn preferred language for health care discussion is english/other. Is someone accompanying this pt? no    Is the patient using any DME equipment during [address]? no    Depression Screening:  3 most recent PHQ Screens 11/3/2022   PHQ Not Done -   Little interest or pleasure in doing things Not at all   Feeling down, depressed, irritable, or hopeless Not at all   Total Score PHQ 2 0   Trouble falling or staying asleep, or sleeping too much -   Feeling tired or having little energy -   Poor appetite, weight loss, or overeating -   Feeling bad about yourself - or that you are a failure or have let yourself or your family down -   Trouble concentrating on things such as school, work, reading, or watching TV -   Moving or speaking so slowly that other people could have noticed; or the opposite being so fidgety that others notice -   Thoughts of being better off dead, or hurting yourself in some way -   PHQ 9 Score -   How difficult have these problems made it for you to do your work, take care of your home and get along with others -       Learning Assessment:  Learning Assessment 11/3/2022   PRIMARY LEARNER Patient   HIGHEST LEVEL OF EDUCATION - PRIMARY LEARNER  -   BARRIERS PRIMARY LEARNER -   CO-LEARNER CAREGIVER -   PRIMARY LANGUAGE ENGLISH   LEARNER PREFERENCE PRIMARY DEMONSTRATION     -     -   ANSWERED BY patient     -   RELATIONSHIP SELF       Abuse Screening:  Abuse Screening Questionnaire 11/3/2022   Do you ever feel afraid of your partner? N   Are you in a relationship with someone who physically or mentally threatens you? N   Is it safe for you to go home? Y       Fall Risk  Fall Risk Assessment, last 12 mths 10/19/2020   Able to walk? Yes   Fall in past 12 months? Yes   Number of falls in past 12 months 1   Fall with injury?  1           Pt currently taking Anticoagulant therapy? no    Pt currently taking Antiplatelet therapy ? no      Coordination of Care:  1. Have you been to the ER, urgent care clinic since your last visit? Hospitalized since your last visit? no    2. Have you seen or consulted any other health care providers outside of the [address] [place] [address] since your last visit? Include any pap smears or colon screening.  no

## 2022-11-03 NOTE — PROGRESS NOTES
HISTORY OF PRESENT ILLNESS  Avelino Velez is a 31 y.o. male. Follow-up  Pertinent negatives include no chest pain, no abdominal pain, no headaches and no shortness of breath. Leg Swelling  Pertinent negatives include no chest pain, no abdominal pain, no headaches and no shortness of breath. Patient presents for a follow-up office visit. He was initially referred here by his PCP for evaluation of difficult to control hypertension. Patient reports that he was diagnosed with high blood pressure approximately a year ago and has been on medications for a little less than a year. He states he is also gained at least 50 pounds in weight over the past 12 months. His primary care provider has been gradually adjusting his medications with the most recent addition of olmesartan 40 mg daily. At last visit, his clonidine patch was increased to the 0.3 mg per 24 hours. He underwent a renal arterial duplex scan at the end of September 2022 which was negative for renal artery stenosis. He then underwent an echocardiogram in October 2022 which demonstrated preserved LV function, EF 60 to 65%, moderate concentric LVH but no valvular heart disease. He was last seen in our office approximately 3 months ago. Since last visit he has been compliant with all of his medications. He is also trying to adhere to a low-sodium diet. Occasionally he will notice some swelling in his feet and ankles but this typically resolves after a day or so. He states that his blood pressure is typically elevated earlier in the morning but then it appears to be well controlled in the evening and nighttime hours.     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 Jolynn Nunez 7/11     Stenosis, cervical spine 07/11/2017    Ulcer of the stomach and intestine      Current Outpatient Medications   Medication Sig Dispense Refill    amLODIPine (NORVASC) 10 mg tablet Take 10 mg by mouth in the morning. rosuvastatin (CRESTOR) 5 mg tablet Take 5 mg by mouth nightly. levothyroxine (SYNTHROID) 25 mcg tablet Take 25 mcg by mouth in the morning. chlorthalidone (HYGROTON) 25 mg tablet Take 25 mg by mouth in the morning. olmesartan (BENICAR) 40 mg tablet Take 40 mg by mouth in the morning. cloNIDine (CATAPRES) 0.3 mg/24 hr 1 Patch by TransDERmal route every seven (7) days. 4 Patch 6    topiramate (TOPAMAX) 100 mg tablet Take 100 mg by mouth daily. DULoxetine (CYMBALTA) 30 mg capsule Take 3 Caps by mouth daily. Indications: anxiousness associated with depression, neuropathic pain 90 Cap 5    pantoprazole (PROTONIX) 20 mg tablet Take 1 Tab by mouth two (2) times a day. 30 minutes before breakfast and dinner 60 Tab 5    calcium carbonate (TUMS) 200 mg calcium (500 mg) chew Take 1 Tab by mouth daily. No Known Allergies     Social History     Tobacco Use    Smoking status: Former    Smokeless tobacco: Never   Vaping Use    Vaping Use: Never used   Substance Use Topics    Alcohol use: No     Alcohol/week: 0.0 standard drinks    Drug use: No     Family History   Problem Relation Age of Onset    Asthma Mother          Review of Systems   Constitutional:  Negative for chills, fever and weight loss. HENT:  Negative for nosebleeds. Eyes:  Negative for blurred vision and double vision.    Respiratory:  Negative for cough, shortness of breath and wheezing. Cardiovascular:  Positive for leg swelling. Negative for chest pain, palpitations, orthopnea, claudication and PND. Gastrointestinal:  Negative for abdominal pain, heartburn, nausea and vomiting. Genitourinary:  Negative for dysuria and hematuria. Musculoskeletal:  Negative for falls and myalgias. Skin:  Negative for rash. Neurological:  Negative for dizziness, focal weakness and headaches. Endo/Heme/Allergies:  Does not bruise/bleed easily. Psychiatric/Behavioral:  Negative for substance abuse. Visit Vitals  BP (!) 156/94 (BP 1 Location: Left upper arm, BP Patient Position: Sitting, BP Cuff Size: Large adult)   Pulse 78   Ht 6' 2\" (1.88 m)   Wt 147.4 kg (325 lb)   SpO2 96%   BMI 41.73 kg/m²       Physical Exam  Constitutional:       Appearance: He is well-developed. HENT:      Head: Normocephalic and atraumatic. Eyes:      Conjunctiva/sclera: Conjunctivae normal.   Neck:      Vascular: No carotid bruit or JVD. Cardiovascular:      Rate and Rhythm: Regular rhythm. Pulses: Normal pulses. Heart sounds: S1 normal and S2 normal. Heart sounds are distant. No murmur heard. No gallop. No S3 sounds. Pulmonary:      Breath sounds: Normal breath sounds. No wheezing or rales. Abdominal:      General: Bowel sounds are normal.      Palpations: Abdomen is soft. Tenderness: There is no abdominal tenderness. Musculoskeletal:         General: No swelling or deformity. Cervical back: Neck supple. Skin:     General: Skin is warm and dry. Neurological:      General: No focal deficit present. Mental Status: He is alert and oriented to person, place, and time. Psychiatric:         Mood and Affect: Mood normal.     ASSESSMENT and PLAN    Hypertension. Likely primary in nature, but difficult to control. There is no evidence of renal artery stenosis on a recent arterial duplex scan.   For now I will continue his current antihypertensive regimen but would recommend that he start taking his amlodipine and olmesartan at bedtime. If his blood pressure remains elevated, I would recommend adding a beta-blocker such as carvedilol next. Hypertensive cardiovascular disease. Patient underwent an echocardiogram in October 2022 which showed preserved LV function, EF 60 to 65% with moderate concentric LVH. I suspect this is due to longstanding hypertension. He currently has no signs or symptoms of heart failure. Morbid obesity. Patient lost almost 15 pounds in weight since last visit. He is congratulated encouraged to lose more weight. Hopefully with increased weight loss his blood pressure will be easier to control. Hypothyroidism. Patient is on a low dose of Synthroid. This has been followed by his PCP. Dyslipidemia. Patient is managed with Crestor 5 mg daily. This is also followed by his PCP. Follow-up in 6 months, sooner if needed.

## 2022-11-03 NOTE — PATIENT INSTRUCTIONS
Follow up with Dr. Florian Carlisle in 6 months  Start taking your Amlodipine and Olmesartan at bedtime  Start checking your blood pressure 3-4 times a week and call our office in 4-6 weeks with blood pressure readings

## 2023-04-21 RX ORDER — CLONIDINE 0.3 MG/24H
1 PATCH, EXTENDED RELEASE TRANSDERMAL
Qty: 4 PATCH | Refills: 6 | Status: SHIPPED | OUTPATIENT
Start: 2023-04-21

## 2023-07-13 ENCOUNTER — OFFICE VISIT (OUTPATIENT)
Age: 38
End: 2023-07-13
Payer: COMMERCIAL

## 2023-07-13 VITALS
WEIGHT: 306 LBS | BODY MASS INDEX: 39.27 KG/M2 | SYSTOLIC BLOOD PRESSURE: 138 MMHG | DIASTOLIC BLOOD PRESSURE: 88 MMHG | HEIGHT: 74 IN | OXYGEN SATURATION: 97 % | HEART RATE: 97 BPM

## 2023-07-13 DIAGNOSIS — E66.01 SEVERE OBESITY WITH BODY MASS INDEX (BMI) OF 35.0 TO 39.9 WITH SERIOUS COMORBIDITY (HCC): ICD-10-CM

## 2023-07-13 DIAGNOSIS — E03.9 HYPOTHYROIDISM, UNSPECIFIED TYPE: ICD-10-CM

## 2023-07-13 DIAGNOSIS — I10 MALIGNANT HYPERTENSION: Primary | ICD-10-CM

## 2023-07-13 DIAGNOSIS — E78.5 DYSLIPIDEMIA: ICD-10-CM

## 2023-07-13 PROBLEM — G47.30 SLEEP APNEA: Status: ACTIVE | Noted: 2022-10-18

## 2023-07-13 PROBLEM — F41.8 MIXED ANXIETY AND DEPRESSIVE DISORDER: Status: ACTIVE | Noted: 2022-10-18

## 2023-07-13 PROBLEM — N13.2 HYDRONEPHROSIS WITH URINARY OBSTRUCTION DUE TO URETERAL CALCULUS: Status: ACTIVE | Noted: 2020-04-28

## 2023-07-13 PROBLEM — K83.8 CHOLANGIECTASIS: Status: ACTIVE | Noted: 2023-07-13

## 2023-07-13 PROBLEM — K58.1 IRRITABLE BOWEL SYNDROME WITH CONSTIPATION: Status: ACTIVE | Noted: 2023-07-13

## 2023-07-13 PROBLEM — R19.7 DIARRHEA: Status: ACTIVE | Noted: 2023-07-13

## 2023-07-13 PROBLEM — N20.0 RENAL STONE: Status: ACTIVE | Noted: 2020-04-28

## 2023-07-13 PROBLEM — K21.9 GASTROESOPHAGEAL REFLUX DISEASE: Status: ACTIVE | Noted: 2022-10-18

## 2023-07-13 PROBLEM — K58.0 IRRITABLE BOWEL SYNDROME WITH DIARRHEA: Status: ACTIVE | Noted: 2023-07-13

## 2023-07-13 PROBLEM — R19.8 IRREGULAR BOWEL HABITS: Status: ACTIVE | Noted: 2023-07-13

## 2023-07-13 PROBLEM — R10.13 EPIGASTRIC PAIN: Status: ACTIVE | Noted: 2023-07-13

## 2023-07-13 PROCEDURE — 99214 OFFICE O/P EST MOD 30 MIN: CPT | Performed by: INTERNAL MEDICINE

## 2023-07-13 PROCEDURE — 3079F DIAST BP 80-89 MM HG: CPT | Performed by: INTERNAL MEDICINE

## 2023-07-13 PROCEDURE — 93000 ELECTROCARDIOGRAM COMPLETE: CPT | Performed by: INTERNAL MEDICINE

## 2023-07-13 PROCEDURE — 3075F SYST BP GE 130 - 139MM HG: CPT | Performed by: INTERNAL MEDICINE

## 2023-07-13 ASSESSMENT — ENCOUNTER SYMPTOMS
COUGH: 0
SHORTNESS OF BREATH: 0
NAUSEA: 0
VOMITING: 0
SORE THROAT: 0
ABDOMINAL DISTENTION: 0
ABDOMINAL PAIN: 0

## 2023-07-13 ASSESSMENT — PATIENT HEALTH QUESTIONNAIRE - PHQ9
SUM OF ALL RESPONSES TO PHQ QUESTIONS 1-9: 0
SUM OF ALL RESPONSES TO PHQ9 QUESTIONS 1 & 2: 0
SUM OF ALL RESPONSES TO PHQ QUESTIONS 1-9: 0
2. FEELING DOWN, DEPRESSED OR HOPELESS: 0
1. LITTLE INTEREST OR PLEASURE IN DOING THINGS: 0

## 2023-07-13 ASSESSMENT — ANXIETY QUESTIONNAIRES
2. NOT BEING ABLE TO STOP OR CONTROL WORRYING: 0
GAD7 TOTAL SCORE: 0
5. BEING SO RESTLESS THAT IT IS HARD TO SIT STILL: 0
6. BECOMING EASILY ANNOYED OR IRRITABLE: 0
4. TROUBLE RELAXING: 0
7. FEELING AFRAID AS IF SOMETHING AWFUL MIGHT HAPPEN: 0
3. WORRYING TOO MUCH ABOUT DIFFERENT THINGS: 0
1. FEELING NERVOUS, ANXIOUS, OR ON EDGE: 0

## 2023-07-13 NOTE — PROGRESS NOTES
07/13/23     Marleni Griffith  is a 30 y.o. male     Chief Complaint   Patient presents with    Follow-up     6 month       HPI  Patient presents for a follow-up office visit. He was initially referred here by his PCP for evaluation of difficult to control hypertension. Patient reports that he was diagnosed with high blood pressure approximately a year ago and has been on medications for a little less than a year. He states he is also gained at least 50 pounds in weight over the past 12 months. His primary care provider has been gradually adjusting his medications with the most recent addition of olmesartan 40 mg daily. At last visit, his clonidine patch was increased to the 0.3 mg per 24 hours. He underwent a renal arterial duplex scan at the end of September 2022 which was negative for renal artery stenosis. He then underwent an echocardiogram in October 2022 which demonstrated preserved LV function, EF 60 to 65%, moderate concentric LVH but no valvular heart disease. He was last seen in our office approximately 8 months ago. Since last visit, he still states that his blood pressure has been labile at times. He does check it regularly at home and he states it averages about 140/90. He is trying to lose weight with lifestyle changes including dietary modification and a little more exercise. He has lost almost 20 pounds.     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

## 2023-07-13 NOTE — PROGRESS NOTES
Enoc Almeida presents today for   Chief Complaint   Patient presents with    Follow-up     6 month       Enoc Monicazaheer preferred language for health care discussion is english/other. Is someone accompanying this pt? no    Is the patient using any DME equipment during OV? no    Depression Screening:  Depression: Not at risk    PHQ-2 Score: 0        Learning Assessment:  Who is the primary learner? Patient    What is the preferred language for health care of the primary learner? ENGLISH    How does the primary learner prefer to learn new concepts? DEMONSTRATION    Answered By patient    Relationship to Learner SELF           Pt currently taking Anticoagulant therapy? no    Pt currently taking Antiplatelet therapy ? no      Coordination of Care:  1. Have you been to the ER, urgent care clinic since your last visit? Hospitalized since your last visit? no    2. Have you seen or consulted any other health care providers outside of the [address] [place] [address] since your last visit? Include any pap smears or colon screening.  no

## 2023-08-20 ENCOUNTER — HOSPITAL ENCOUNTER (OUTPATIENT)
Facility: HOSPITAL | Age: 38
Discharge: HOME | End: 2023-08-23
Payer: COMMERCIAL

## 2023-08-20 DIAGNOSIS — M54.16 LUMBAR RADICULOPATHY: ICD-10-CM

## 2023-08-20 DIAGNOSIS — M50.30 DEGENERATION OF CERVICAL INTERVERTEBRAL DISC: ICD-10-CM

## 2023-08-20 LAB — CREAT UR-MCNC: 1 MG/DL (ref 0.6–1.3)

## 2023-08-20 PROCEDURE — 72141 MRI NECK SPINE W/O DYE: CPT

## 2023-08-20 PROCEDURE — A9577 INJ MULTIHANCE: HCPCS | Performed by: FAMILY MEDICINE

## 2023-08-20 PROCEDURE — 6360000004 HC RX CONTRAST MEDICATION: Performed by: FAMILY MEDICINE

## 2023-08-20 PROCEDURE — 72158 MRI LUMBAR SPINE W/O & W/DYE: CPT

## 2023-08-20 PROCEDURE — 82565 ASSAY OF CREATININE: CPT

## 2023-08-20 RX ADMIN — GADOBENATE DIMEGLUMINE 20 ML: 529 INJECTION, SOLUTION INTRAVENOUS at 14:19

## 2023-11-08 RX ORDER — CLONIDINE 0.3 MG/24H
1 PATCH, EXTENDED RELEASE TRANSDERMAL
Qty: 4 PATCH | Refills: 6 | Status: SHIPPED | OUTPATIENT
Start: 2023-11-08

## 2024-01-15 ENCOUNTER — OFFICE VISIT (OUTPATIENT)
Age: 39
End: 2024-01-15
Payer: COMMERCIAL

## 2024-01-15 VITALS
DIASTOLIC BLOOD PRESSURE: 88 MMHG | HEART RATE: 70 BPM | WEIGHT: 307 LBS | HEIGHT: 74 IN | BODY MASS INDEX: 39.4 KG/M2 | OXYGEN SATURATION: 95 % | SYSTOLIC BLOOD PRESSURE: 130 MMHG

## 2024-01-15 DIAGNOSIS — R94.31 ABNORMAL ELECTROCARDIOGRAM (ECG) (EKG): ICD-10-CM

## 2024-01-15 DIAGNOSIS — I10 ESSENTIAL (PRIMARY) HYPERTENSION: ICD-10-CM

## 2024-01-15 DIAGNOSIS — I10 MALIGNANT HYPERTENSION: Primary | ICD-10-CM

## 2024-01-15 DIAGNOSIS — E66.01 SEVERE OBESITY WITH BODY MASS INDEX (BMI) OF 35.0 TO 39.9 WITH SERIOUS COMORBIDITY (HCC): ICD-10-CM

## 2024-01-15 DIAGNOSIS — E03.9 HYPOTHYROIDISM, UNSPECIFIED TYPE: ICD-10-CM

## 2024-01-15 DIAGNOSIS — E78.5 DYSLIPIDEMIA: ICD-10-CM

## 2024-01-15 PROCEDURE — 3079F DIAST BP 80-89 MM HG: CPT | Performed by: INTERNAL MEDICINE

## 2024-01-15 PROCEDURE — 99214 OFFICE O/P EST MOD 30 MIN: CPT | Performed by: INTERNAL MEDICINE

## 2024-01-15 PROCEDURE — 3075F SYST BP GE 130 - 139MM HG: CPT | Performed by: INTERNAL MEDICINE

## 2024-01-15 PROCEDURE — 93000 ELECTROCARDIOGRAM COMPLETE: CPT | Performed by: INTERNAL MEDICINE

## 2024-01-15 ASSESSMENT — PATIENT HEALTH QUESTIONNAIRE - PHQ9
8. MOVING OR SPEAKING SO SLOWLY THAT OTHER PEOPLE COULD HAVE NOTICED. OR THE OPPOSITE, BEING SO FIGETY OR RESTLESS THAT YOU HAVE BEEN MOVING AROUND A LOT MORE THAN USUAL: 0
1. LITTLE INTEREST OR PLEASURE IN DOING THINGS: 0
4. FEELING TIRED OR HAVING LITTLE ENERGY: 0
10. IF YOU CHECKED OFF ANY PROBLEMS, HOW DIFFICULT HAVE THESE PROBLEMS MADE IT FOR YOU TO DO YOUR WORK, TAKE CARE OF THINGS AT HOME, OR GET ALONG WITH OTHER PEOPLE: 0
SUM OF ALL RESPONSES TO PHQ QUESTIONS 1-9: 0
SUM OF ALL RESPONSES TO PHQ QUESTIONS 1-9: 0
7. TROUBLE CONCENTRATING ON THINGS, SUCH AS READING THE NEWSPAPER OR WATCHING TELEVISION: 0
6. FEELING BAD ABOUT YOURSELF - OR THAT YOU ARE A FAILURE OR HAVE LET YOURSELF OR YOUR FAMILY DOWN: 0
9. THOUGHTS THAT YOU WOULD BE BETTER OFF DEAD, OR OF HURTING YOURSELF: 0
SUM OF ALL RESPONSES TO PHQ QUESTIONS 1-9: 0
2. FEELING DOWN, DEPRESSED OR HOPELESS: 0
3. TROUBLE FALLING OR STAYING ASLEEP: 0
5. POOR APPETITE OR OVEREATING: 0
SUM OF ALL RESPONSES TO PHQ QUESTIONS 1-9: 0
SUM OF ALL RESPONSES TO PHQ9 QUESTIONS 1 & 2: 0

## 2024-01-15 ASSESSMENT — ENCOUNTER SYMPTOMS
NAUSEA: 0
VOMITING: 0
ABDOMINAL PAIN: 0
ABDOMINAL DISTENTION: 0
SHORTNESS OF BREATH: 0
SORE THROAT: 0
COUGH: 0

## 2024-01-15 NOTE — PROGRESS NOTES
01/15/24     Harry Lema  is a 38 y.o. male     Chief Complaint   Patient presents with    6 Month Follow-Up    Dizziness     Comes and goes throughout the week        HPI  Patient presents for a follow-up office visit.  He was initially referred here by his PCP for evaluation of difficult to control hypertension.  Patient reports that he was diagnosed with high blood pressure approximately a year ago and has been on medications for a little less than a year.  He states he is also gained at least 50 pounds in weight over the past 12 months.  His primary care provider has been gradually adjusting his medications with the most recent addition of olmesartan 40 mg daily.  At last visit, his clonidine patch was increased to the 0.3 mg per 24 hours.    He underwent a renal arterial duplex scan at the end of September 2022 which was negative for renal artery stenosis.  He then underwent an echocardiogram in October 2022 which demonstrated preserved LV function, EF 60-65%, moderate concentric LVH but no valvular heart disease.    He was last seen in our office 6 months ago.  Since last visit, he reports that he had got his weight as low as 290 pounds but has gained some of the back since he developed low back pain and has been not as active as he was previously.  He denies any chest pain or shortness of breath.  No leg swelling, no orthopnea, no PND.  He does get dizziness at times especially if he stands up too quickly but no associated syncope or near syncope.    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

## 2024-01-15 NOTE — PROGRESS NOTES
Harry Lema presents today for   Chief Complaint   Patient presents with    6 Month Follow-Up    Dizziness     Comes and goes throughout the week        Harry Lema preferred language for health care discussion is english/other.    Is someone accompanying this pt? no    Is the patient using any DME equipment during OV? no    Depression Screening:  Depression: Not at risk (1/15/2024)    PHQ-2     PHQ-2 Score: 0        Learning Assessment:  Who is the primary learner? Patient    What is the preferred language for health care of the primary learner? ENGLISH    How does the primary learner prefer to learn new concepts? DEMONSTRATION    Answered By patient    Relationship to Learner SELF           Pt currently taking Anticoagulant therapy? no    Pt currently taking Antiplatelet therapy ? no      Coordination of Care:  1. Have you been to the ER, urgent care clinic since your last visit? Hospitalized since your last visit? no    2. Have you seen or consulted any other health care providers outside of the [facility] since your last visit? Include any pap smears or colon screening. no

## 2024-02-06 ENCOUNTER — HOSPITAL ENCOUNTER (EMERGENCY)
Facility: HOSPITAL | Age: 39
Discharge: HOME | End: 2024-02-06
Attending: EMERGENCY MEDICINE
Payer: COMMERCIAL

## 2024-02-06 ENCOUNTER — APPOINTMENT (OUTPATIENT)
Facility: HOSPITAL | Age: 39
End: 2024-02-06
Payer: COMMERCIAL

## 2024-02-06 VITALS
OXYGEN SATURATION: 99 % | HEIGHT: 74 IN | SYSTOLIC BLOOD PRESSURE: 173 MMHG | RESPIRATION RATE: 22 BRPM | BODY MASS INDEX: 38.5 KG/M2 | TEMPERATURE: 97.4 F | WEIGHT: 300 LBS | HEART RATE: 48 BPM | DIASTOLIC BLOOD PRESSURE: 94 MMHG

## 2024-02-06 DIAGNOSIS — R10.9 FLANK PAIN: ICD-10-CM

## 2024-02-06 DIAGNOSIS — R11.2 NAUSEA AND VOMITING, UNSPECIFIED VOMITING TYPE: ICD-10-CM

## 2024-02-06 DIAGNOSIS — N20.1 URETERIC STONE: Primary | ICD-10-CM

## 2024-02-06 LAB
ALBUMIN SERPL-MCNC: 4.1 G/DL (ref 3.4–5)
ALBUMIN/GLOB SERPL: 1.2 (ref 0.8–1.7)
ALP SERPL-CCNC: 108 U/L (ref 45–117)
ALT SERPL-CCNC: 42 U/L (ref 16–61)
ANION GAP SERPL CALC-SCNC: 4 MMOL/L (ref 3–18)
APPEARANCE UR: CLEAR
AST SERPL-CCNC: 20 U/L (ref 10–38)
BASOPHILS # BLD: 0 K/UL (ref 0–0.1)
BASOPHILS NFR BLD: 0 % (ref 0–2)
BILIRUB SERPL-MCNC: 0.4 MG/DL (ref 0.2–1)
BILIRUB UR QL: NEGATIVE
BUN SERPL-MCNC: 20 MG/DL (ref 7–18)
BUN/CREAT SERPL: 15 (ref 12–20)
CALCIUM SERPL-MCNC: 9.1 MG/DL (ref 8.5–10.1)
CHLORIDE SERPL-SCNC: 110 MMOL/L (ref 100–111)
CO2 SERPL-SCNC: 28 MMOL/L (ref 21–32)
COLOR UR: YELLOW
CREAT SERPL-MCNC: 1.3 MG/DL (ref 0.6–1.3)
CRP SERPL-MCNC: 1.4 MG/DL (ref 0–0.3)
DIFFERENTIAL METHOD BLD: ABNORMAL
EKG ATRIAL RATE: 45 BPM
EKG DIAGNOSIS: NORMAL
EKG P AXIS: 22 DEGREES
EKG P-R INTERVAL: 168 MS
EKG Q-T INTERVAL: 500 MS
EKG QRS DURATION: 86 MS
EKG QTC CALCULATION (BAZETT): 432 MS
EKG R AXIS: 9 DEGREES
EKG T AXIS: 9 DEGREES
EKG VENTRICULAR RATE: 45 BPM
EOSINOPHIL # BLD: 0 K/UL (ref 0–0.4)
EOSINOPHIL NFR BLD: 0 % (ref 0–5)
ERYTHROCYTE [DISTWIDTH] IN BLOOD BY AUTOMATED COUNT: 13.7 % (ref 11.6–14.5)
ERYTHROCYTE [SEDIMENTATION RATE] IN BLOOD: 7 MM/HR (ref 0–20)
GLOBULIN SER CALC-MCNC: 3.3 G/DL (ref 2–4)
GLUCOSE BLD STRIP.AUTO-MCNC: 154 MG/DL (ref 70–110)
GLUCOSE SERPL-MCNC: 168 MG/DL (ref 74–99)
GLUCOSE UR STRIP.AUTO-MCNC: 100 MG/DL
HCT VFR BLD AUTO: 44 % (ref 36–48)
HGB BLD-MCNC: 14.8 G/DL (ref 13–16)
HGB UR QL STRIP: ABNORMAL
IMM GRANULOCYTES # BLD AUTO: 0.1 K/UL (ref 0–0.04)
IMM GRANULOCYTES NFR BLD AUTO: 1 % (ref 0–0.5)
KETONES UR QL STRIP.AUTO: NEGATIVE MG/DL
LEUKOCYTE ESTERASE UR QL STRIP.AUTO: NEGATIVE
LYMPHOCYTES # BLD: 1.4 K/UL (ref 0.9–3.6)
LYMPHOCYTES NFR BLD: 8 % (ref 21–52)
MCH RBC QN AUTO: 30.5 PG (ref 24–34)
MCHC RBC AUTO-ENTMCNC: 33.6 G/DL (ref 31–37)
MCV RBC AUTO: 90.5 FL (ref 78–100)
MONOCYTES # BLD: 1 K/UL (ref 0.05–1.2)
MONOCYTES NFR BLD: 6 % (ref 3–10)
NEUTS SEG # BLD: 14.1 K/UL (ref 1.8–8)
NEUTS SEG NFR BLD: 85 % (ref 40–73)
NITRITE UR QL STRIP.AUTO: NEGATIVE
NRBC # BLD: 0 K/UL (ref 0–0.01)
NRBC BLD-RTO: 0 PER 100 WBC
PH UR STRIP: 8 (ref 5–8)
PLATELET # BLD AUTO: 446 K/UL (ref 135–420)
PMV BLD AUTO: 8.5 FL (ref 9.2–11.8)
POTASSIUM SERPL-SCNC: 3.4 MMOL/L (ref 3.5–5.5)
PROT SERPL-MCNC: 7.4 G/DL (ref 6.4–8.2)
PROT UR STRIP-MCNC: ABNORMAL MG/DL
RBC # BLD AUTO: 4.86 M/UL (ref 4.35–5.65)
RBC #/AREA URNS HPF: NORMAL /HPF (ref 0–5)
SODIUM SERPL-SCNC: 142 MMOL/L (ref 136–145)
SP GR UR REFRACTOMETRY: 1.02 (ref 1–1.03)
TROPONIN I SERPL HS-MCNC: 5 NG/L (ref 0–78)
UROBILINOGEN UR QL STRIP.AUTO: 0.2 EU/DL (ref 0.2–1)
WBC # BLD AUTO: 16.6 K/UL (ref 4.6–13.2)

## 2024-02-06 PROCEDURE — 85025 COMPLETE CBC W/AUTO DIFF WBC: CPT

## 2024-02-06 PROCEDURE — 96375 TX/PRO/DX INJ NEW DRUG ADDON: CPT

## 2024-02-06 PROCEDURE — 85652 RBC SED RATE AUTOMATED: CPT

## 2024-02-06 PROCEDURE — 99284 EMERGENCY DEPT VISIT MOD MDM: CPT

## 2024-02-06 PROCEDURE — 2580000003 HC RX 258: Performed by: EMERGENCY MEDICINE

## 2024-02-06 PROCEDURE — 74176 CT ABD & PELVIS W/O CONTRAST: CPT

## 2024-02-06 PROCEDURE — 80053 COMPREHEN METABOLIC PANEL: CPT

## 2024-02-06 PROCEDURE — 82962 GLUCOSE BLOOD TEST: CPT

## 2024-02-06 PROCEDURE — 86140 C-REACTIVE PROTEIN: CPT

## 2024-02-06 PROCEDURE — 96361 HYDRATE IV INFUSION ADD-ON: CPT

## 2024-02-06 PROCEDURE — 96374 THER/PROPH/DIAG INJ IV PUSH: CPT

## 2024-02-06 PROCEDURE — 96376 TX/PRO/DX INJ SAME DRUG ADON: CPT

## 2024-02-06 PROCEDURE — 81001 URINALYSIS AUTO W/SCOPE: CPT

## 2024-02-06 PROCEDURE — 93005 ELECTROCARDIOGRAM TRACING: CPT | Performed by: EMERGENCY MEDICINE

## 2024-02-06 PROCEDURE — 93010 ELECTROCARDIOGRAM REPORT: CPT | Performed by: INTERNAL MEDICINE

## 2024-02-06 PROCEDURE — 84484 ASSAY OF TROPONIN QUANT: CPT

## 2024-02-06 PROCEDURE — 6360000002 HC RX W HCPCS: Performed by: EMERGENCY MEDICINE

## 2024-02-06 PROCEDURE — 87040 BLOOD CULTURE FOR BACTERIA: CPT

## 2024-02-06 PROCEDURE — 87086 URINE CULTURE/COLONY COUNT: CPT

## 2024-02-06 RX ORDER — SODIUM CHLORIDE 9 MG/ML
INJECTION, SOLUTION INTRAVENOUS CONTINUOUS
Status: DISCONTINUED | OUTPATIENT
Start: 2024-02-06 | End: 2024-02-06 | Stop reason: HOSPADM

## 2024-02-06 RX ORDER — KETOROLAC TROMETHAMINE 15 MG/ML
15 INJECTION, SOLUTION INTRAMUSCULAR; INTRAVENOUS
Status: COMPLETED | OUTPATIENT
Start: 2024-02-06 | End: 2024-02-06

## 2024-02-06 RX ORDER — ONDANSETRON 4 MG/1
4 TABLET, ORALLY DISINTEGRATING ORAL 3 TIMES DAILY PRN
Qty: 10 TABLET | Refills: 0 | Status: SHIPPED | OUTPATIENT
Start: 2024-02-06

## 2024-02-06 RX ORDER — MORPHINE SULFATE 4 MG/ML
4 INJECTION, SOLUTION INTRAMUSCULAR; INTRAVENOUS
Status: COMPLETED | OUTPATIENT
Start: 2024-02-06 | End: 2024-02-06

## 2024-02-06 RX ORDER — NAPROXEN 500 MG/1
500 TABLET ORAL 2 TIMES DAILY
Qty: 20 TABLET | Refills: 0 | Status: SHIPPED | OUTPATIENT
Start: 2024-02-06 | End: 2024-02-09 | Stop reason: ALTCHOICE

## 2024-02-06 RX ORDER — ONDANSETRON 2 MG/ML
4 INJECTION INTRAMUSCULAR; INTRAVENOUS
Status: COMPLETED | OUTPATIENT
Start: 2024-02-06 | End: 2024-02-06

## 2024-02-06 RX ORDER — HYDROCODONE BITARTRATE AND ACETAMINOPHEN 5; 325 MG/1; MG/1
1 TABLET ORAL EVERY 6 HOURS PRN
Qty: 10 TABLET | Refills: 0 | Status: SHIPPED | OUTPATIENT
Start: 2024-02-06 | End: 2024-02-09 | Stop reason: ALTCHOICE

## 2024-02-06 RX ORDER — CEFDINIR 300 MG/1
300 CAPSULE ORAL 2 TIMES DAILY
Qty: 14 CAPSULE | Refills: 0 | Status: SHIPPED | OUTPATIENT
Start: 2024-02-06 | End: 2024-02-13

## 2024-02-06 RX ORDER — 0.9 % SODIUM CHLORIDE 0.9 %
1000 INTRAVENOUS SOLUTION INTRAVENOUS ONCE
Status: COMPLETED | OUTPATIENT
Start: 2024-02-06 | End: 2024-02-06

## 2024-02-06 RX ORDER — TAMSULOSIN HYDROCHLORIDE 0.4 MG/1
0.4 CAPSULE ORAL DAILY
Qty: 7 CAPSULE | Refills: 0 | Status: SHIPPED | OUTPATIENT
Start: 2024-02-06 | End: 2024-02-09 | Stop reason: ALTCHOICE

## 2024-02-06 RX ADMIN — SODIUM CHLORIDE 1000 ML: 9 INJECTION, SOLUTION INTRAVENOUS at 13:44

## 2024-02-06 RX ADMIN — KETOROLAC TROMETHAMINE 15 MG: 15 INJECTION, SOLUTION INTRAMUSCULAR; INTRAVENOUS at 14:55

## 2024-02-06 RX ADMIN — SODIUM CHLORIDE: 9 INJECTION, SOLUTION INTRAVENOUS at 14:55

## 2024-02-06 RX ADMIN — MORPHINE SULFATE 4 MG: 4 INJECTION, SOLUTION INTRAMUSCULAR; INTRAVENOUS at 16:24

## 2024-02-06 RX ADMIN — ONDANSETRON 4 MG: 2 INJECTION INTRAMUSCULAR; INTRAVENOUS at 13:41

## 2024-02-06 RX ADMIN — CEFTRIAXONE 1000 MG: 1 INJECTION, POWDER, FOR SOLUTION INTRAMUSCULAR; INTRAVENOUS at 16:24

## 2024-02-06 RX ADMIN — MORPHINE SULFATE 4 MG: 4 INJECTION, SOLUTION INTRAMUSCULAR; INTRAVENOUS at 13:43

## 2024-02-06 ASSESSMENT — LIFESTYLE VARIABLES
HOW MANY STANDARD DRINKS CONTAINING ALCOHOL DO YOU HAVE ON A TYPICAL DAY: PATIENT DOES NOT DRINK
HOW OFTEN DO YOU HAVE A DRINK CONTAINING ALCOHOL: NEVER

## 2024-02-06 ASSESSMENT — PAIN DESCRIPTION - LOCATION
LOCATION: ABDOMEN;BACK
LOCATION: ABDOMEN;BACK

## 2024-02-06 ASSESSMENT — ENCOUNTER SYMPTOMS
EYES NEGATIVE: 1
CHEST TIGHTNESS: 0
BACK PAIN: 1
ABDOMINAL PAIN: 1

## 2024-02-06 ASSESSMENT — PAIN - FUNCTIONAL ASSESSMENT: PAIN_FUNCTIONAL_ASSESSMENT: 0-10

## 2024-02-06 ASSESSMENT — PAIN SCALES - GENERAL
PAINLEVEL_OUTOF10: 8
PAINLEVEL_OUTOF10: 8

## 2024-02-06 NOTE — ED NOTES
Discharge instructions given to patient, follow up information provided, verbalized understanding

## 2024-02-06 NOTE — ED PROVIDER NOTES
Subjective:   Patient is a 38 y.o. male with PMH of cervical and lumbar spinal stenosis, cervical DDD, cervical radiculopathy, lumbar myelopathy, HTN, kidney stones, arthritis who presents with R-sided low back and groin pain. Patient reports waking up this morning with severe R sided low back pain that radiates to his R sided groin. He states that this is different from his chronic back pain, which is typically on the L lower back. No antecedent trauma, falls, or exertions. Patient describes pain as sharp in quality, 8-9 in severity, partially relieved by changing positions.     ROS  Positive for: chills, N/V, difficult urination, abd pain, incontinence (of bowel and bladder, slight per pt)  Negative for: numbness, weakness, paresthesias, dysuria, hematuria, flank pain, other joint or back pain    Objective:   BP (!) 221/98   Pulse (!) 45   Temp 97.4 °F (36.3 °C) (Oral)   Resp 23   Ht 1.88 m (6' 2\")   Wt 136.1 kg (300 lb)   SpO2 100%   BMI 38.52 kg/m²    Physical Exam  Constitutional:       Appearance: He is obese.      Comments: Visibly in discomfort   HENT:      Head: Normocephalic and atraumatic.      Nose: Nose normal.   Cardiovascular:      Rate and Rhythm: Regular rhythm. Bradycardia present.      Pulses: Normal pulses.      Heart sounds: Normal heart sounds. No murmur heard.     No friction rub. No gallop.   Pulmonary:      Effort: Pulmonary effort is normal. No respiratory distress.      Breath sounds: Normal breath sounds. No stridor. No wheezing, rhonchi or rales.   Chest:      Chest wall: No tenderness.   Abdominal:      General: Abdomen is flat. There is no distension.      Palpations: Abdomen is soft. There is no mass.      Tenderness: There is no abdominal tenderness. There is no right CVA tenderness, left CVA tenderness, guarding or rebound.      Hernia: No hernia is present.   Musculoskeletal:      Cervical back: Normal range of motion.      Comments: Tenderness to palpation of R lower 
Stopped 2/6/24 1503)   morphine sulfate (PF) injection 4 mg (4 mg IntraVENous Given 2/6/24 1343)   ondansetron (ZOFRAN) injection 4 mg (4 mg IntraVENous Given 2/6/24 1341)   morphine sulfate (PF) injection 4 mg (4 mg IntraVENous Given 2/6/24 1624)   ketorolac (TORADOL) injection 15 mg (15 mg IntraVENous Given 2/6/24 1455)       CONSULTS: (Who and What was discussed)  IP CONSULT TO UROLOGY    Chronic Conditions: Chronic back pain, kidney stones    Social Determinants affecting Dx or Tx:  None, lives with family    Records Reviewed (source and summary of external notes): Nursing Notes, Old Medical Records, Previous Radiology Studies, and Previous Laboratory Studies    Procedures          Diagnosis     Clinical Impression:   1. Ureteric stone    2. Flank pain    3. Nausea and vomiting, unspecified vomiting type        Disposition: MT    Urology [facility]  [address] [facility] [address] [facility] [address]  [phone]  In 2 days  Call to make and appointment    Jumana Lam, DO  [address]  [facility] [address] [phone]    In 1 week      [facility] EMERGENCY DEPT  [address] [place] [id number] [phone]    As needed, If symptoms worsen       Disclaimer: Sections of this note are dictated using utilizing voice recognition software.  Minor typographical errors may be present. If questions arise, please do not hesitate to contact me or call our department.       Doug Pascual MD  02/06/24 2571

## 2024-02-06 NOTE — DISCHARGE INSTRUCTIONS
You have a 5 mm kidney stone on the right and you will need to take some pain medications and stay well-hydrated until it passes.  Call the urology office as they will be expecting you to set up appointment and take the antibiotics that I have sent for you as well.  If you have any increased pain, fevers, chills, vomiting, or you are at all concerned, please return immediately.

## 2024-02-06 NOTE — ED TRIAGE NOTES
Ambulatory pt c/o severe lower back and groin pain (described as sharp) with emesis since 0700. Pt hypertensive and bradycardic during triage.

## 2024-02-07 LAB
BACTERIA SPEC CULT: NORMAL
SERVICE CMNT-IMP: NORMAL

## 2024-02-07 RX ORDER — KETOROLAC TROMETHAMINE 10 MG/1
10 TABLET, FILM COATED ORAL EVERY 6 HOURS PRN
Qty: 20 TABLET | Refills: 0 | Status: SHIPPED | OUTPATIENT
Start: 2024-02-07

## 2024-02-11 LAB
BACTERIA SPEC CULT: NORMAL
BACTERIA SPEC CULT: NORMAL
SERVICE CMNT-IMP: NORMAL
SERVICE CMNT-IMP: NORMAL

## 2024-03-11 ENCOUNTER — APPOINTMENT (OUTPATIENT)
Facility: HOSPITAL | Age: 39
DRG: 871 | End: 2024-03-11
Payer: COMMERCIAL

## 2024-03-11 ENCOUNTER — HOSPITAL ENCOUNTER (INPATIENT)
Facility: HOSPITAL | Age: 39
LOS: 15 days | Discharge: HOME | DRG: 871 | End: 2024-03-26
Attending: STUDENT IN AN ORGANIZED HEALTH CARE EDUCATION/TRAINING PROGRAM | Admitting: INTERNAL MEDICINE
Payer: COMMERCIAL

## 2024-03-11 DIAGNOSIS — A41.9 SEPSIS, DUE TO UNSPECIFIED ORGANISM, UNSPECIFIED WHETHER ACUTE ORGAN DYSFUNCTION PRESENT (HCC): Primary | ICD-10-CM

## 2024-03-11 DIAGNOSIS — N30.00 ACUTE CYSTITIS WITHOUT HEMATURIA: ICD-10-CM

## 2024-03-11 DIAGNOSIS — A41.9 SEPTICEMIA (HCC): ICD-10-CM

## 2024-03-11 DIAGNOSIS — R41.82 ALTERED MENTAL STATUS, UNSPECIFIED ALTERED MENTAL STATUS TYPE: ICD-10-CM

## 2024-03-11 DIAGNOSIS — M62.82 NON-TRAUMATIC RHABDOMYOLYSIS: ICD-10-CM

## 2024-03-11 DIAGNOSIS — N17.9 AKI (ACUTE KIDNEY INJURY) (HCC): ICD-10-CM

## 2024-03-11 LAB
ALBUMIN SERPL-MCNC: 2.1 G/DL (ref 3.4–5)
ALBUMIN/GLOB SERPL: 0.6 (ref 0.8–1.7)
ALP SERPL-CCNC: 115 U/L (ref 45–117)
ALT SERPL-CCNC: 98 U/L (ref 16–61)
AMMONIA PLAS-SCNC: <10 UMOL/L (ref 11–32)
AMPHET UR QL SCN: NEGATIVE
ANION GAP SERPL CALC-SCNC: 10 MMOL/L (ref 3–18)
ANION GAP SERPL CALC-SCNC: 10 MMOL/L (ref 3–18)
APAP SERPL-MCNC: <2 UG/ML (ref 10–30)
APAP SERPL-MCNC: <2 UG/ML (ref 10–30)
APPEARANCE UR: ABNORMAL
ARTERIAL PATENCY WRIST A: POSITIVE
AST SERPL-CCNC: 133 U/L (ref 10–38)
BACTERIA URNS QL MICRO: ABNORMAL /HPF
BARBITURATES UR QL SCN: NEGATIVE
BASE DEFICIT BLD-SCNC: 3.9 MMOL/L
BASOPHILS # BLD: 0 K/UL (ref 0–0.1)
BASOPHILS NFR BLD: 0 % (ref 0–2)
BDY SITE: ABNORMAL
BENZODIAZ UR QL: NEGATIVE
BILIRUB SERPL-MCNC: 0.4 MG/DL (ref 0.2–1)
BILIRUB UR QL: NEGATIVE
BUN SERPL-MCNC: 45 MG/DL (ref 7–18)
BUN SERPL-MCNC: 47 MG/DL (ref 7–18)
BUN/CREAT SERPL: 7 (ref 12–20)
BUN/CREAT SERPL: 9 (ref 12–20)
CALCIUM SERPL-MCNC: 7.2 MG/DL (ref 8.5–10.1)
CALCIUM SERPL-MCNC: 8 MG/DL (ref 8.5–10.1)
CANNABINOIDS UR QL SCN: POSITIVE
CHLORIDE SERPL-SCNC: 103 MMOL/L (ref 100–111)
CHLORIDE SERPL-SCNC: 108 MMOL/L (ref 100–111)
CK SERPL-CCNC: 5624 U/L (ref 39–308)
CO2 SERPL-SCNC: 20 MMOL/L (ref 21–32)
CO2 SERPL-SCNC: 24 MMOL/L (ref 21–32)
COCAINE UR QL SCN: NEGATIVE
COLOR UR: YELLOW
CREAT SERPL-MCNC: 5.09 MG/DL (ref 0.6–1.3)
CREAT SERPL-MCNC: 6.39 MG/DL (ref 0.6–1.3)
DIFFERENTIAL METHOD BLD: ABNORMAL
EOSINOPHIL # BLD: 0 K/UL (ref 0–0.4)
EOSINOPHIL NFR BLD: 0 % (ref 0–5)
EPITH CASTS URNS QL MICRO: ABNORMAL /LPF (ref 0–5)
ERYTHROCYTE [DISTWIDTH] IN BLOOD BY AUTOMATED COUNT: 13.4 % (ref 11.6–14.5)
ETHANOL SERPL-MCNC: <3 MG/DL (ref 0–3)
GAS FLOW.O2 O2 DELIVERY SYS: ABNORMAL
GAS FLOW.O2 SETTING OXYMISER: 18 BPM
GLOBULIN SER CALC-MCNC: 3.8 G/DL (ref 2–4)
GLUCOSE BLD STRIP.AUTO-MCNC: 124 MG/DL (ref 70–110)
GLUCOSE SERPL-MCNC: 105 MG/DL (ref 74–99)
GLUCOSE SERPL-MCNC: 132 MG/DL (ref 74–99)
GLUCOSE UR STRIP.AUTO-MCNC: NEGATIVE MG/DL
HCO3 BLD-SCNC: 22.6 MMOL/L (ref 22–26)
HCT VFR BLD AUTO: 34.6 % (ref 36–48)
HGB BLD-MCNC: 11.6 G/DL (ref 13–16)
HGB UR QL STRIP: ABNORMAL
IMM GRANULOCYTES # BLD AUTO: 0 K/UL (ref 0–0.04)
IMM GRANULOCYTES NFR BLD AUTO: 0 % (ref 0–0.5)
INR PPP: 1.1 (ref 0.9–1.1)
INR PPP: 1.2 (ref 0.9–1.1)
KETONES UR QL STRIP.AUTO: ABNORMAL MG/DL
LACTATE SERPL-SCNC: 1.1 MMOL/L (ref 0.4–2)
LACTATE SERPL-SCNC: 2.2 MMOL/L (ref 0.4–2)
LEUKOCYTE ESTERASE UR QL STRIP.AUTO: ABNORMAL
LIPASE SERPL-CCNC: 28 U/L (ref 13–75)
LYMPHOCYTES # BLD: 0.7 K/UL (ref 0.9–3.6)
LYMPHOCYTES NFR BLD: 3 % (ref 21–52)
Lab: ABNORMAL
MAGNESIUM SERPL-MCNC: 1.9 MG/DL (ref 1.6–2.6)
MAGNESIUM SERPL-MCNC: 2.1 MG/DL (ref 1.6–2.6)
MCH RBC QN AUTO: 30.1 PG (ref 24–34)
MCHC RBC AUTO-ENTMCNC: 33.5 G/DL (ref 31–37)
MCV RBC AUTO: 89.9 FL (ref 78–100)
METHADONE UR QL: NEGATIVE
MONOCYTES # BLD: 0.9 K/UL (ref 0.05–1.2)
MONOCYTES NFR BLD: 4 % (ref 3–10)
NEUTS BAND NFR BLD MANUAL: 1 %
NEUTS SEG # BLD: 20.1 K/UL (ref 1.8–8)
NEUTS SEG NFR BLD: 92 % (ref 40–73)
NITRITE UR QL STRIP.AUTO: NEGATIVE
NRBC # BLD: 0 K/UL (ref 0–0.01)
NRBC BLD-RTO: 0 PER 100 WBC
O2/TOTAL GAS SETTING VFR VENT: 100 %
OPIATES UR QL: POSITIVE
PCO2 BLD: 46.2 MMHG (ref 35–45)
PCP UR QL: NEGATIVE
PEEP RESPIRATORY: 5 CMH2O
PH BLD: 7.3 (ref 7.35–7.45)
PH UR STRIP: 5.5 (ref 5–8)
PHOSPHATE SERPL-MCNC: 3.6 MG/DL (ref 2.5–4.9)
PLATELET # BLD AUTO: 309 K/UL (ref 135–420)
PLATELET COMMENT: ABNORMAL
PMV BLD AUTO: 10.2 FL (ref 9.2–11.8)
PO2 BLD: 320 MMHG (ref 80–100)
POTASSIUM SERPL-SCNC: 2.9 MMOL/L (ref 3.5–5.5)
POTASSIUM SERPL-SCNC: 3.3 MMOL/L (ref 3.5–5.5)
PROCALCITONIN SERPL-MCNC: 2.34 NG/ML
PROT SERPL-MCNC: 5.9 G/DL (ref 6.4–8.2)
PROT UR STRIP-MCNC: 100 MG/DL
PROTHROMBIN TIME: 14.4 SEC (ref 11.9–14.7)
PROTHROMBIN TIME: 15 SEC (ref 11.9–14.7)
RBC # BLD AUTO: 3.85 M/UL (ref 4.35–5.65)
RBC #/AREA URNS HPF: ABNORMAL /HPF (ref 0–5)
RBC MORPH BLD: ABNORMAL
SALICYLATES SERPL-MCNC: <1.7 MG/DL (ref 2.8–20)
SALICYLATES SERPL-MCNC: <1.7 MG/DL (ref 2.8–20)
SAO2 % BLD: 99.9 % (ref 92–97)
SERVICE CMNT-IMP: ABNORMAL
SODIUM SERPL-SCNC: 137 MMOL/L (ref 136–145)
SODIUM SERPL-SCNC: 138 MMOL/L (ref 136–145)
SP GR UR REFRACTOMETRY: 1.01 (ref 1–1.03)
SPECIMEN TYPE: ABNORMAL
T4 FREE SERPL-MCNC: 1.1 NG/DL (ref 0.7–1.5)
TROPONIN I SERPL HS-MCNC: 34 NG/L (ref 0–78)
TROPONIN I SERPL HS-MCNC: 57 NG/L (ref 0–78)
TSH SERPL DL<=0.05 MIU/L-ACNC: 0.24 UIU/ML (ref 0.36–3.74)
UROBILINOGEN UR QL STRIP.AUTO: 0.2 EU/DL (ref 0.2–1)
VENTILATION MODE VENT: ABNORMAL
VT SETTING VENT: 500 ML
WBC # BLD AUTO: 21.7 K/UL (ref 4.6–13.2)
WBC URNS QL MICRO: ABNORMAL /HPF (ref 0–4)

## 2024-03-11 PROCEDURE — 80143 DRUG ASSAY ACETAMINOPHEN: CPT

## 2024-03-11 PROCEDURE — 82962 GLUCOSE BLOOD TEST: CPT

## 2024-03-11 PROCEDURE — 6370000000 HC RX 637 (ALT 250 FOR IP): Performed by: PHYSICIAN ASSISTANT

## 2024-03-11 PROCEDURE — 99291 CRITICAL CARE FIRST HOUR: CPT

## 2024-03-11 PROCEDURE — 85610 PROTHROMBIN TIME: CPT

## 2024-03-11 PROCEDURE — 51702 INSERT TEMP BLADDER CATH: CPT

## 2024-03-11 PROCEDURE — 71045 X-RAY EXAM CHEST 1 VIEW: CPT

## 2024-03-11 PROCEDURE — 84100 ASSAY OF PHOSPHORUS: CPT

## 2024-03-11 PROCEDURE — 96365 THER/PROPH/DIAG IV INF INIT: CPT

## 2024-03-11 PROCEDURE — 0BH17EZ INSERTION OF ENDOTRACHEAL AIRWAY INTO TRACHEA, VIA NATURAL OR ARTIFICIAL OPENING: ICD-10-PCS | Performed by: INTERNAL MEDICINE

## 2024-03-11 PROCEDURE — 2500000003 HC RX 250 WO HCPCS: Performed by: PHYSICIAN ASSISTANT

## 2024-03-11 PROCEDURE — 2580000003 HC RX 258: Performed by: INTERNAL MEDICINE

## 2024-03-11 PROCEDURE — 84484 ASSAY OF TROPONIN QUANT: CPT

## 2024-03-11 PROCEDURE — 6360000002 HC RX W HCPCS: Performed by: STUDENT IN AN ORGANIZED HEALTH CARE EDUCATION/TRAINING PROGRAM

## 2024-03-11 PROCEDURE — 87154 CUL TYP ID BLD PTHGN 6+ TRGT: CPT

## 2024-03-11 PROCEDURE — 87077 CULTURE AEROBIC IDENTIFY: CPT

## 2024-03-11 PROCEDURE — 31500 INSERT EMERGENCY AIRWAY: CPT

## 2024-03-11 PROCEDURE — 87086 URINE CULTURE/COLONY COUNT: CPT

## 2024-03-11 PROCEDURE — 2000000000 HC ICU R&B

## 2024-03-11 PROCEDURE — 2580000003 HC RX 258: Performed by: STUDENT IN AN ORGANIZED HEALTH CARE EDUCATION/TRAINING PROGRAM

## 2024-03-11 PROCEDURE — 93005 ELECTROCARDIOGRAM TRACING: CPT | Performed by: STUDENT IN AN ORGANIZED HEALTH CARE EDUCATION/TRAINING PROGRAM

## 2024-03-11 PROCEDURE — 82140 ASSAY OF AMMONIA: CPT

## 2024-03-11 PROCEDURE — 84443 ASSAY THYROID STIM HORMONE: CPT

## 2024-03-11 PROCEDURE — 70450 CT HEAD/BRAIN W/O DYE: CPT

## 2024-03-11 PROCEDURE — 6360000002 HC RX W HCPCS: Performed by: PHYSICIAN ASSISTANT

## 2024-03-11 PROCEDURE — 96375 TX/PRO/DX INJ NEW DRUG ADDON: CPT

## 2024-03-11 PROCEDURE — 80179 DRUG ASSAY SALICYLATE: CPT

## 2024-03-11 PROCEDURE — 80307 DRUG TEST PRSMV CHEM ANLYZR: CPT

## 2024-03-11 PROCEDURE — 85025 COMPLETE CBC W/AUTO DIFF WBC: CPT

## 2024-03-11 PROCEDURE — 82803 BLOOD GASES ANY COMBINATION: CPT

## 2024-03-11 PROCEDURE — 82077 ASSAY SPEC XCP UR&BREATH IA: CPT

## 2024-03-11 PROCEDURE — 83605 ASSAY OF LACTIC ACID: CPT

## 2024-03-11 PROCEDURE — 80053 COMPREHEN METABOLIC PANEL: CPT

## 2024-03-11 PROCEDURE — 83690 ASSAY OF LIPASE: CPT

## 2024-03-11 PROCEDURE — 2580000003 HC RX 258: Performed by: PHYSICIAN ASSISTANT

## 2024-03-11 PROCEDURE — 36600 WITHDRAWAL OF ARTERIAL BLOOD: CPT

## 2024-03-11 PROCEDURE — 2500000003 HC RX 250 WO HCPCS: Performed by: NURSE PRACTITIONER

## 2024-03-11 PROCEDURE — 81001 URINALYSIS AUTO W/SCOPE: CPT

## 2024-03-11 PROCEDURE — 99291 CRITICAL CARE FIRST HOUR: CPT | Performed by: INTERNAL MEDICINE

## 2024-03-11 PROCEDURE — 74176 CT ABD & PELVIS W/O CONTRAST: CPT

## 2024-03-11 PROCEDURE — 2500000003 HC RX 250 WO HCPCS: Performed by: STUDENT IN AN ORGANIZED HEALTH CARE EDUCATION/TRAINING PROGRAM

## 2024-03-11 PROCEDURE — 82550 ASSAY OF CK (CPK): CPT

## 2024-03-11 PROCEDURE — 87040 BLOOD CULTURE FOR BACTERIA: CPT

## 2024-03-11 PROCEDURE — 84145 PROCALCITONIN (PCT): CPT

## 2024-03-11 PROCEDURE — 94002 VENT MGMT INPAT INIT DAY: CPT

## 2024-03-11 PROCEDURE — 84439 ASSAY OF FREE THYROXINE: CPT

## 2024-03-11 PROCEDURE — 5A1945Z RESPIRATORY VENTILATION, 24-96 CONSECUTIVE HOURS: ICD-10-PCS | Performed by: INTERNAL MEDICINE

## 2024-03-11 PROCEDURE — 96367 TX/PROPH/DG ADDL SEQ IV INF: CPT

## 2024-03-11 PROCEDURE — 94761 N-INVAS EAR/PLS OXIMETRY MLT: CPT

## 2024-03-11 PROCEDURE — 36415 COLL VENOUS BLD VENIPUNCTURE: CPT

## 2024-03-11 PROCEDURE — 87186 SC STD MICRODIL/AGAR DIL: CPT

## 2024-03-11 PROCEDURE — A4216 STERILE WATER/SALINE, 10 ML: HCPCS | Performed by: PHYSICIAN ASSISTANT

## 2024-03-11 PROCEDURE — 83735 ASSAY OF MAGNESIUM: CPT

## 2024-03-11 RX ORDER — SODIUM CHLORIDE 9 MG/ML
INJECTION, SOLUTION INTRAVENOUS CONTINUOUS
Status: DISCONTINUED | OUTPATIENT
Start: 2024-03-11 | End: 2024-03-11

## 2024-03-11 RX ORDER — ACETAMINOPHEN 650 MG/1
650 SUPPOSITORY RECTAL EVERY 6 HOURS PRN
Status: DISCONTINUED | OUTPATIENT
Start: 2024-03-11 | End: 2024-03-26 | Stop reason: HOSPADM

## 2024-03-11 RX ORDER — SODIUM CHLORIDE, SODIUM LACTATE, POTASSIUM CHLORIDE, CALCIUM CHLORIDE 600; 310; 30; 20 MG/100ML; MG/100ML; MG/100ML; MG/100ML
INJECTION, SOLUTION INTRAVENOUS CONTINUOUS
Status: DISCONTINUED | OUTPATIENT
Start: 2024-03-11 | End: 2024-03-11

## 2024-03-11 RX ORDER — DEXMEDETOMIDINE HYDROCHLORIDE 4 UG/ML
.1-1.5 INJECTION, SOLUTION INTRAVENOUS CONTINUOUS
Status: DISCONTINUED | OUTPATIENT
Start: 2024-03-11 | End: 2024-03-13

## 2024-03-11 RX ORDER — ETOMIDATE 2 MG/ML
30 INJECTION INTRAVENOUS
Status: COMPLETED | OUTPATIENT
Start: 2024-03-11 | End: 2024-03-11

## 2024-03-11 RX ORDER — ACETAMINOPHEN 325 MG/1
650 TABLET ORAL EVERY 6 HOURS PRN
Status: DISCONTINUED | OUTPATIENT
Start: 2024-03-11 | End: 2024-03-26 | Stop reason: HOSPADM

## 2024-03-11 RX ORDER — HEPARIN SODIUM 5000 [USP'U]/ML
5000 INJECTION, SOLUTION INTRAVENOUS; SUBCUTANEOUS EVERY 8 HOURS SCHEDULED
Status: DISCONTINUED | OUTPATIENT
Start: 2024-03-11 | End: 2024-03-26 | Stop reason: HOSPADM

## 2024-03-11 RX ORDER — FENTANYL CITRATE-0.9 % NACL/PF 10 MCG/ML
25-200 PLASTIC BAG, INJECTION (ML) INTRAVENOUS CONTINUOUS
Status: DISCONTINUED | OUTPATIENT
Start: 2024-03-11 | End: 2024-03-11

## 2024-03-11 RX ORDER — CHLORHEXIDINE GLUCONATE ORAL RINSE 1.2 MG/ML
15 SOLUTION DENTAL 2 TIMES DAILY
Status: DISCONTINUED | OUTPATIENT
Start: 2024-03-11 | End: 2024-03-13

## 2024-03-11 RX ORDER — FENTANYL CITRATE 50 UG/ML
100 INJECTION, SOLUTION INTRAMUSCULAR; INTRAVENOUS
Status: DISCONTINUED | OUTPATIENT
Start: 2024-03-11 | End: 2024-03-13

## 2024-03-11 RX ORDER — SODIUM CHLORIDE 9 MG/ML
INJECTION, SOLUTION INTRAVENOUS PRN
Status: DISCONTINUED | OUTPATIENT
Start: 2024-03-11 | End: 2024-03-26 | Stop reason: HOSPADM

## 2024-03-11 RX ORDER — 0.9 % SODIUM CHLORIDE 0.9 %
1000 INTRAVENOUS SOLUTION INTRAVENOUS ONCE
Status: COMPLETED | OUTPATIENT
Start: 2024-03-11 | End: 2024-03-11

## 2024-03-11 RX ORDER — SODIUM CHLORIDE 0.9 % (FLUSH) 0.9 %
5-40 SYRINGE (ML) INJECTION PRN
Status: DISCONTINUED | OUTPATIENT
Start: 2024-03-11 | End: 2024-03-26 | Stop reason: HOSPADM

## 2024-03-11 RX ORDER — POTASSIUM CHLORIDE 7.45 MG/ML
10 INJECTION INTRAVENOUS
Status: COMPLETED | OUTPATIENT
Start: 2024-03-11 | End: 2024-03-11

## 2024-03-11 RX ORDER — SODIUM CHLORIDE 0.9 % (FLUSH) 0.9 %
5-40 SYRINGE (ML) INJECTION EVERY 12 HOURS SCHEDULED
Status: DISCONTINUED | OUTPATIENT
Start: 2024-03-11 | End: 2024-03-26 | Stop reason: HOSPADM

## 2024-03-11 RX ORDER — MIDAZOLAM HYDROCHLORIDE 2 MG/2ML
2 INJECTION, SOLUTION INTRAMUSCULAR; INTRAVENOUS
Status: DISCONTINUED | OUTPATIENT
Start: 2024-03-11 | End: 2024-03-13

## 2024-03-11 RX ORDER — SODIUM CHLORIDE, SODIUM GLUCONATE, SODIUM ACETATE, POTASSIUM CHLORIDE AND MAGNESIUM CHLORIDE 526; 502; 368; 37; 30 MG/100ML; MG/100ML; MG/100ML; MG/100ML; MG/100ML
150 INJECTION, SOLUTION INTRAVENOUS CONTINUOUS
Status: DISCONTINUED | OUTPATIENT
Start: 2024-03-11 | End: 2024-03-13

## 2024-03-11 RX ORDER — ROCURONIUM BROMIDE 10 MG/ML
100 INJECTION, SOLUTION INTRAVENOUS
Status: COMPLETED | OUTPATIENT
Start: 2024-03-11 | End: 2024-03-11

## 2024-03-11 RX ORDER — ENOXAPARIN SODIUM 100 MG/ML
30 INJECTION SUBCUTANEOUS DAILY
Status: DISCONTINUED | OUTPATIENT
Start: 2024-03-11 | End: 2024-03-11

## 2024-03-11 RX ADMIN — POTASSIUM CHLORIDE 10 MEQ: 7.45 INJECTION INTRAVENOUS at 17:54

## 2024-03-11 RX ADMIN — SODIUM CHLORIDE: 9 INJECTION, SOLUTION INTRAVENOUS at 17:54

## 2024-03-11 RX ADMIN — SODIUM CHLORIDE, PRESERVATIVE FREE 10 ML: 5 INJECTION INTRAVENOUS at 19:51

## 2024-03-11 RX ADMIN — FENTANYL CITRATE 100 MCG: 50 INJECTION INTRAMUSCULAR; INTRAVENOUS at 20:30

## 2024-03-11 RX ADMIN — 0.12% CHLORHEXIDINE GLUCONATE 15 ML: 1.2 RINSE ORAL at 19:46

## 2024-03-11 RX ADMIN — FAMOTIDINE 20 MG: 10 INJECTION INTRAVENOUS at 20:00

## 2024-03-11 RX ADMIN — FENTANYL CITRATE 50 MCG/HR: at 16:24

## 2024-03-11 RX ADMIN — POTASSIUM CHLORIDE 10 MEQ: 7.45 INJECTION INTRAVENOUS at 19:04

## 2024-03-11 RX ADMIN — VANCOMYCIN HYDROCHLORIDE 1000 MG: 1 INJECTION, POWDER, LYOPHILIZED, FOR SOLUTION INTRAVENOUS at 16:41

## 2024-03-11 RX ADMIN — ETOMIDATE 30 MG: 2 INJECTION, SOLUTION INTRAVENOUS at 16:15

## 2024-03-11 RX ADMIN — PIPERACILLIN AND TAZOBACTAM 4500 MG: 4; .5 INJECTION, POWDER, FOR SOLUTION INTRAVENOUS at 14:40

## 2024-03-11 RX ADMIN — SODIUM CHLORIDE 1000 ML: 9 INJECTION, SOLUTION INTRAVENOUS at 15:06

## 2024-03-11 RX ADMIN — SODIUM CHLORIDE, POTASSIUM CHLORIDE, SODIUM LACTATE AND CALCIUM CHLORIDE: 600; 310; 30; 20 INJECTION, SOLUTION INTRAVENOUS at 18:44

## 2024-03-11 RX ADMIN — SODIUM CHLORIDE 1000 ML: 9 INJECTION, SOLUTION INTRAVENOUS at 16:18

## 2024-03-11 RX ADMIN — HEPARIN SODIUM 5000 UNITS: 5000 INJECTION INTRAVENOUS; SUBCUTANEOUS at 22:00

## 2024-03-11 RX ADMIN — ROCURONIUM BROMIDE 100 MG: 10 INJECTION, SOLUTION INTRAVENOUS at 16:16

## 2024-03-11 RX ADMIN — DEXMEDETOMIDINE HYDROCHLORIDE 0.4 MCG/KG/HR: 4 INJECTION, SOLUTION INTRAVENOUS at 20:33

## 2024-03-11 RX ADMIN — SODIUM CHLORIDE, SODIUM GLUCONATE, SODIUM ACETATE, POTASSIUM CHLORIDE AND MAGNESIUM CHLORIDE 150 ML/HR: 526; 502; 368; 37; 30 INJECTION, SOLUTION INTRAVENOUS at 19:59

## 2024-03-11 RX ADMIN — VANCOMYCIN HYDROCHLORIDE 1000 MG: 1 INJECTION, POWDER, LYOPHILIZED, FOR SOLUTION INTRAVENOUS at 15:06

## 2024-03-11 RX ADMIN — POTASSIUM CHLORIDE 10 MEQ: 7.45 INJECTION INTRAVENOUS at 16:40

## 2024-03-11 RX ADMIN — PIPERACILLIN AND TAZOBACTAM 3375 MG: 3; .375 INJECTION, POWDER, FOR SOLUTION INTRAVENOUS at 19:45

## 2024-03-11 RX ADMIN — POTASSIUM BICARBONATE 20 MEQ: 782 TABLET, EFFERVESCENT ORAL at 19:46

## 2024-03-11 ASSESSMENT — PULMONARY FUNCTION TESTS
PIF_VALUE: 27
PIF_VALUE: 24

## 2024-03-11 ASSESSMENT — PAIN - FUNCTIONAL ASSESSMENT: PAIN_FUNCTIONAL_ASSESSMENT: NONE - DENIES PAIN

## 2024-03-11 NOTE — H&P
[facility]  Pulmonary, Critical Care, and Sleep Medicine    Name: Harry Lema MRN: [id number]   : 1985 Hospital: [facility]   Date: 3/11/2024        Critical Care History and Physical      IMPRESSION:   Acute respiratory failure, hypoxic and hypercarbic- requiring mechanical ventilation, intubated for airway protection  Acute toxic/metabolic encephalopathy: Etiology unclear, patient was on opiates for flank pain, had renal failure which likely caused retention of medication and stacking leading to eventual hypoventilation and AMS  Severe sepsis - likely 2/2 pyelonephritis w/ R sided hydronephrosis vs ?Aspiration PNA/pneumonitis  Acute renal failure: Prerenal azotemia versus ischemic ATN versus pigment nephropathy from rhabdomyolysis  Acute rhabdomyolysis  Moderate R sided hydronephrosis with ureteral stent in place - as seen on CT A/P  Non anion gap metabolic acidosis  Lactic acidosis  Electrolyte abnormalities - hypokalemia, hypomagnesemia  Hx of nephrolithiasis s/p cystoscopy and R ureteral stent placement 24  Hx of malignant HTN  Hx of hypothyroidism  Hx of dyslipidemia  Obesity BMI 38.56 kg/m2     Patient Active Problem List   Diagnosis    Chronic insomnia    Osteoarthritis of spine with radiculopathy, lumbar region    Anxiety    Severe obesity with body mass index (BMI) of 35.0 to 39.9 with serious comorbidity (HCC)    Hypothyroidism    Hypertension    Cholangiectasis    Diarrhea    Epigastric pain    Gastroesophageal reflux disease    Hydronephrosis with urinary obstruction due to ureteral calculus    Hyperlipidemia    Irregular bowel habits    Irritable bowel syndrome with constipation    Irritable bowel syndrome with diarrhea    Mixed anxiety and depressive disorder    Renal stone    Sleep apnea    Malignant hypertension    Morbid obesity (HCC)    Sepsis (HCC)    Acute renal failure (ARF) (HCC)        RECOMMENDATIONS:   Neuro: PRN for breakthrough sedation

## 2024-03-11 NOTE — ED PROVIDER NOTES
every 7 days  Qty: 4 patch, Refills: 6      chlorthalidone (HYGROTON) 25 MG tablet Take 1 tablet by mouth daily      DULoxetine (CYMBALTA) 30 MG extended release capsule Take 3 capsules by mouth daily      levothyroxine (SYNTHROID) 25 MCG tablet Take 1 tablet by mouth daily      olmesartan (BENICAR) 40 MG tablet Take 1 tablet by mouth at bedtime      pantoprazole (PROTONIX) 20 MG tablet Take 2 tablets by mouth daily      topiramate (TOPAMAX) 100 MG tablet Take 1 tablet by mouth daily             ALLERGIES     Patient has no known allergies.    FAMILY HISTORY       Family History   Problem Relation Age of Onset    Asthma Mother     No Known Problems Father     No Known Problems Sister     No Known Problems Brother     No Known Problems Maternal Aunt     No Known Problems Maternal Uncle     No Known Problems Paternal Aunt     No Known Problems Paternal Uncle     No Known Problems Maternal Grandmother     No Known Problems Maternal Grandfather     No Known Problems Paternal Grandmother     No Known Problems Paternal Grandfather     No Known Problems Other           SOCIAL HISTORY       Social History     Socioeconomic History    Marital status: Single   Tobacco Use    Smoking status: Former    Smokeless tobacco: Never   Vaping Use    Vaping Use: Never used   Substance and Sexual Activity    Alcohol use: No     Alcohol/week: 0.0 standard drinks of alcohol    Drug use: No       SCREENINGS         Mary Coma Scale  Eye Opening: Spontaneous  Best Verbal Response: Confused  Best Motor Response: Obeys commands  Riparius Coma Scale Score: 14                     CIWA Assessment  BP: (!) 177/91  Pulse: 91  Nausea and Vomiting: no nausea and no vomiting  Tactile Disturbances: very mild itching, pins and needles, burning or numbness  Tremor: moderate, with patient's arms extended  Auditory Disturbances: extremely severe hallucinations  Paroxysmal Sweats: no sweat visible  Visual Disturbances: severe hallucinations  Anxiety:

## 2024-03-11 NOTE — ED TRIAGE NOTES
Per EMS, Pt was picked up from home living with his parents, found altered and only responds to name for unknown amount of time.     /P  HR 84

## 2024-03-12 ENCOUNTER — APPOINTMENT (OUTPATIENT)
Facility: HOSPITAL | Age: 39
DRG: 871 | End: 2024-03-12
Payer: COMMERCIAL

## 2024-03-12 ENCOUNTER — HOSPITAL ENCOUNTER (INPATIENT)
Facility: HOSPITAL | Age: 39
Discharge: HOME | DRG: 871 | End: 2024-03-15
Payer: COMMERCIAL

## 2024-03-12 VITALS
RESPIRATION RATE: 15 BRPM | OXYGEN SATURATION: 100 % | SYSTOLIC BLOOD PRESSURE: 116 MMHG | DIASTOLIC BLOOD PRESSURE: 69 MMHG | HEART RATE: 53 BPM

## 2024-03-12 LAB
ACCESSION NUMBER, LLC1M: ABNORMAL
ACINETOBACTER CALCOAC BAUMANNII COMPLEX BY PCR: NOT DETECTED
ANION GAP SERPL CALC-SCNC: 11 MMOL/L (ref 3–18)
ARTERIAL PATENCY WRIST A: POSITIVE
B FRAGILIS DNA BLD POS QL NAA+NON-PROBE: NOT DETECTED
BACTERIA SPEC CULT: NORMAL
BASE DEFICIT BLD-SCNC: 4.6 MMOL/L
BASOPHILS # BLD: 0 K/UL (ref 0–0.1)
BASOPHILS NFR BLD: 0 % (ref 0–2)
BDY SITE: ABNORMAL
BIOFIRE TEST COMMENT: ABNORMAL
BODY TEMPERATURE: 98
BUN SERPL-MCNC: 53 MG/DL (ref 7–18)
BUN/CREAT SERPL: 8 (ref 12–20)
C ALBICANS DNA BLD POS QL NAA+NON-PROBE: NOT DETECTED
C AURIS DNA BLD POS QL NAA+NON-PROBE: NOT DETECTED
C GATTII+NEOFOR DNA BLD POS QL NAA+N-PRB: NOT DETECTED
C GLABRATA DNA BLD POS QL NAA+NON-PROBE: NOT DETECTED
C KRUSEI DNA BLD POS QL NAA+NON-PROBE: NOT DETECTED
C PARAP DNA BLD POS QL NAA+NON-PROBE: NOT DETECTED
C TROPICLS DNA BLD POS QL NAA+NON-PROBE: NOT DETECTED
CALCIUM SERPL-MCNC: 7.6 MG/DL (ref 8.5–10.1)
CHLORIDE SERPL-SCNC: 104 MMOL/L (ref 100–111)
CK SERPL-CCNC: 3684 U/L (ref 39–308)
CO2 SERPL-SCNC: 22 MMOL/L (ref 21–32)
CREAT SERPL-MCNC: 6.46 MG/DL (ref 0.6–1.3)
DIFFERENTIAL METHOD BLD: ABNORMAL
E CLOAC COMP DNA BLD POS NAA+NON-PROBE: NOT DETECTED
E COLI DNA BLD POS QL NAA+NON-PROBE: NOT DETECTED
E FAECALIS DNA BLD POS QL NAA+NON-PROBE: NOT DETECTED
E FAECIUM DNA BLD POS QL NAA+NON-PROBE: NOT DETECTED
ECHO AO ASC DIAM: 3 CM
ECHO AO ASCENDING AORTA INDEX: 1.16 CM/M2
ECHO AO ROOT DIAM: 3.4 CM
ECHO AO ROOT INDEX: 1.32 CM/M2
ECHO AV PEAK GRADIENT: 10 MMHG
ECHO AV PEAK VELOCITY: 1.6 M/S
ECHO AV VELOCITY RATIO: 0.81
ECHO BSA: 2.67 M2
ECHO EST RA PRESSURE: 8 MMHG
ECHO LA DIAMETER INDEX: 1.4 CM/M2
ECHO LA DIAMETER: 3.6 CM
ECHO LA TO AORTIC ROOT RATIO: 1.06
ECHO LA VOL A-L A2C: 62 ML (ref 18–58)
ECHO LA VOL A-L A4C: 43 ML (ref 18–58)
ECHO LA VOL BP: 48 ML (ref 18–58)
ECHO LA VOL MOD A2C: 59 ML (ref 18–58)
ECHO LA VOL MOD A4C: 36 ML (ref 18–58)
ECHO LA VOL/BSA BIPLANE: 19 ML/M2 (ref 16–34)
ECHO LA VOLUME AREA LENGTH: 54 ML
ECHO LA VOLUME INDEX A-L A2C: 24 ML/M2 (ref 16–34)
ECHO LA VOLUME INDEX A-L A4C: 17 ML/M2 (ref 16–34)
ECHO LA VOLUME INDEX AREA LENGTH: 21 ML/M2 (ref 16–34)
ECHO LA VOLUME INDEX MOD A2C: 23 ML/M2 (ref 16–34)
ECHO LA VOLUME INDEX MOD A4C: 14 ML/M2 (ref 16–34)
ECHO LV E' LATERAL VELOCITY: 9 CM/S
ECHO LV E' SEPTAL VELOCITY: 11 CM/S
ECHO LV FRACTIONAL SHORTENING: 31 % (ref 28–44)
ECHO LV INTERNAL DIMENSION DIASTOLE INDEX: 2.29 CM/M2
ECHO LV INTERNAL DIMENSION DIASTOLIC: 5.9 CM (ref 4.2–5.9)
ECHO LV INTERNAL DIMENSION SYSTOLIC INDEX: 1.59 CM/M2
ECHO LV INTERNAL DIMENSION SYSTOLIC: 4.1 CM
ECHO LV IVSD: 1.3 CM (ref 0.6–1)
ECHO LV MASS 2D: 322.9 G (ref 88–224)
ECHO LV MASS INDEX 2D: 125.1 G/M2 (ref 49–115)
ECHO LV POSTERIOR WALL DIASTOLIC: 1.2 CM (ref 0.6–1)
ECHO LV RELATIVE WALL THICKNESS RATIO: 0.41
ECHO LVOT PEAK GRADIENT: 7 MMHG
ECHO LVOT PEAK VELOCITY: 1.3 M/S
ECHO MV A VELOCITY: 0.55 M/S
ECHO MV E DECELERATION TIME (DT): 174.4 MS
ECHO MV E VELOCITY: 0.82 M/S
ECHO MV E/A RATIO: 1.49
ECHO MV E/E' LATERAL: 9.11
ECHO MV E/E' RATIO (AVERAGED): 8.28
ECHO PV MAX VELOCITY: 1.4 M/S
ECHO PV PEAK GRADIENT: 8 MMHG
ECHO RIGHT VENTRICULAR SYSTOLIC PRESSURE (RVSP): 42 MMHG
ECHO RV FREE WALL PEAK S': 14 CM/S
ECHO RV TAPSE: 1.6 CM (ref 1.7–?)
ECHO TV REGURGITANT MAX VELOCITY: 2.92 M/S
ECHO TV REGURGITANT PEAK GRADIENT: 34 MMHG
EKG ATRIAL RATE: 85 BPM
EKG DIAGNOSIS: NORMAL
EKG P AXIS: 34 DEGREES
EKG P-R INTERVAL: 152 MS
EKG Q-T INTERVAL: 380 MS
EKG QRS DURATION: 96 MS
EKG QTC CALCULATION (BAZETT): 452 MS
EKG R AXIS: 2 DEGREES
EKG T AXIS: 26 DEGREES
EKG VENTRICULAR RATE: 85 BPM
ENTEROBACTERALES DNA BLD POS NAA+N-PRB: NOT DETECTED
EOSINOPHIL # BLD: 0.1 K/UL (ref 0–0.4)
EOSINOPHIL NFR BLD: 1 % (ref 0–5)
ERYTHROCYTE [DISTWIDTH] IN BLOOD BY AUTOMATED COUNT: 13.3 % (ref 11.6–14.5)
GAS FLOW.O2 O2 DELIVERY SYS: ABNORMAL
GAS FLOW.O2 SETTING OXYMISER: 18 BPM
GLUCOSE BLD STRIP.AUTO-MCNC: 111 MG/DL (ref 70–110)
GLUCOSE BLD STRIP.AUTO-MCNC: 131 MG/DL (ref 70–110)
GLUCOSE SERPL-MCNC: 123 MG/DL (ref 74–99)
GP B STREP DNA BLD POS QL NAA+NON-PROBE: NOT DETECTED
HAEM INFLU DNA BLD POS QL NAA+NON-PROBE: NOT DETECTED
HCO3 BLD-SCNC: 20.1 MMOL/L (ref 22–26)
HCT VFR BLD AUTO: 30 % (ref 36–48)
HGB BLD-MCNC: 10.2 G/DL (ref 13–16)
IMM GRANULOCYTES # BLD AUTO: 0.1 K/UL (ref 0–0.04)
IMM GRANULOCYTES NFR BLD AUTO: 1 % (ref 0–0.5)
K OXYTOCA DNA BLD POS QL NAA+NON-PROBE: NOT DETECTED
KLEBSIELLA SP DNA BLD POS QL NAA+NON-PRB: NOT DETECTED
KLEBSIELLA SP DNA BLD POS QL NAA+NON-PRB: NOT DETECTED
L MONOCYTOG DNA BLD POS QL NAA+NON-PROBE: NOT DETECTED
LACTATE SERPL-SCNC: 1.1 MMOL/L (ref 0.4–2)
LYMPHOCYTES # BLD: 0.5 K/UL (ref 0.9–3.6)
LYMPHOCYTES NFR BLD: 4 % (ref 21–52)
MAGNESIUM SERPL-MCNC: 2.2 MG/DL (ref 1.6–2.6)
MCH RBC QN AUTO: 29.7 PG (ref 24–34)
MCHC RBC AUTO-ENTMCNC: 34 G/DL (ref 31–37)
MCV RBC AUTO: 87.5 FL (ref 78–100)
MECA+MECC ISLT/SPM QL: DETECTED
MONOCYTES # BLD: 1 K/UL (ref 0.05–1.2)
MONOCYTES NFR BLD: 8 % (ref 3–10)
N MEN DNA BLD POS QL NAA+NON-PROBE: NOT DETECTED
NEUTS SEG # BLD: 11.4 K/UL (ref 1.8–8)
NEUTS SEG NFR BLD: 87 % (ref 40–73)
NRBC # BLD: 0 K/UL (ref 0–0.01)
NRBC BLD-RTO: 0 PER 100 WBC
O2/TOTAL GAS SETTING VFR VENT: 40 %
P AERUGINOSA DNA BLD POS NAA+NON-PROBE: NOT DETECTED
PCO2 BLD: 34.3 MMHG (ref 35–45)
PEEP RESPIRATORY: 5 CMH2O
PH BLD: 7.38 (ref 7.35–7.45)
PHOSPHATE SERPL-MCNC: 3 MG/DL (ref 2.5–4.9)
PLATELET # BLD AUTO: 311 K/UL (ref 135–420)
PMV BLD AUTO: 10.1 FL (ref 9.2–11.8)
PO2 BLD: 84 MMHG (ref 80–100)
POTASSIUM SERPL-SCNC: 3.8 MMOL/L (ref 3.5–5.5)
PROTEUS SP DNA BLD POS QL NAA+NON-PROBE: NOT DETECTED
RBC # BLD AUTO: 3.43 M/UL (ref 4.35–5.65)
RESISTANT GENE TARGETS: ABNORMAL
S AUREUS DNA BLD POS QL NAA+NON-PROBE: NOT DETECTED
S AUREUS+CONS DNA BLD POS NAA+NON-PROBE: DETECTED
S EPIDERMIDIS DNA BLD POS QL NAA+NON-PRB: DETECTED
S LUGDUNENSIS DNA BLD POS QL NAA+NON-PRB: NOT DETECTED
S MALTOPHILIA DNA BLD POS QL NAA+NON-PRB: NOT DETECTED
S MARCESCENS DNA BLD POS NAA+NON-PROBE: NOT DETECTED
S PNEUM DNA BLD POS QL NAA+NON-PROBE: NOT DETECTED
S PYO DNA BLD POS QL NAA+NON-PROBE: NOT DETECTED
SALMONELLA DNA BLD POS QL NAA+NON-PROBE: NOT DETECTED
SAO2 % BLD: 96.3 % (ref 92–97)
SERVICE CMNT-IMP: ABNORMAL
SERVICE CMNT-IMP: NORMAL
SODIUM SERPL-SCNC: 137 MMOL/L (ref 136–145)
SPECIMEN TYPE: ABNORMAL
STREPTOCOCCUS DNA BLD POS NAA+NON-PROBE: NOT DETECTED
VANCOMYCIN SERPL-MCNC: 23 UG/ML (ref 5–40)
VENTILATION MODE VENT: ABNORMAL
VT SETTING VENT: 550 ML
WBC # BLD AUTO: 13.2 K/UL (ref 4.6–13.2)

## 2024-03-12 PROCEDURE — 80048 BASIC METABOLIC PNL TOTAL CA: CPT

## 2024-03-12 PROCEDURE — 80202 ASSAY OF VANCOMYCIN: CPT

## 2024-03-12 PROCEDURE — 6360000002 HC RX W HCPCS: Performed by: NURSE PRACTITIONER

## 2024-03-12 PROCEDURE — 2580000003 HC RX 258: Performed by: PHYSICIAN ASSISTANT

## 2024-03-12 PROCEDURE — 6370000000 HC RX 637 (ALT 250 FOR IP): Performed by: PHYSICIAN ASSISTANT

## 2024-03-12 PROCEDURE — 82962 GLUCOSE BLOOD TEST: CPT

## 2024-03-12 PROCEDURE — 6360000002 HC RX W HCPCS: Performed by: STUDENT IN AN ORGANIZED HEALTH CARE EDUCATION/TRAINING PROGRAM

## 2024-03-12 PROCEDURE — 94761 N-INVAS EAR/PLS OXIMETRY MLT: CPT

## 2024-03-12 PROCEDURE — A4216 STERILE WATER/SALINE, 10 ML: HCPCS | Performed by: PHYSICIAN ASSISTANT

## 2024-03-12 PROCEDURE — 85025 COMPLETE CBC W/AUTO DIFF WBC: CPT

## 2024-03-12 PROCEDURE — 93306 TTE W/DOPPLER COMPLETE: CPT

## 2024-03-12 PROCEDURE — 93010 ELECTROCARDIOGRAM REPORT: CPT | Performed by: INTERNAL MEDICINE

## 2024-03-12 PROCEDURE — 2580000003 HC RX 258: Performed by: INTERNAL MEDICINE

## 2024-03-12 PROCEDURE — 76770 US EXAM ABDO BACK WALL COMP: CPT

## 2024-03-12 PROCEDURE — 74018 RADEX ABDOMEN 1 VIEW: CPT

## 2024-03-12 PROCEDURE — 99291 CRITICAL CARE FIRST HOUR: CPT | Performed by: INTERNAL MEDICINE

## 2024-03-12 PROCEDURE — 2500000003 HC RX 250 WO HCPCS: Performed by: STUDENT IN AN ORGANIZED HEALTH CARE EDUCATION/TRAINING PROGRAM

## 2024-03-12 PROCEDURE — 6360000004 HC RX CONTRAST MEDICATION: Performed by: STUDENT IN AN ORGANIZED HEALTH CARE EDUCATION/TRAINING PROGRAM

## 2024-03-12 PROCEDURE — 71045 X-RAY EXAM CHEST 1 VIEW: CPT

## 2024-03-12 PROCEDURE — 99292 CRITICAL CARE ADDL 30 MIN: CPT | Performed by: INTERNAL MEDICINE

## 2024-03-12 PROCEDURE — 6360000002 HC RX W HCPCS: Performed by: INTERNAL MEDICINE

## 2024-03-12 PROCEDURE — 93306 TTE W/DOPPLER COMPLETE: CPT | Performed by: INTERNAL MEDICINE

## 2024-03-12 PROCEDURE — 6360000002 HC RX W HCPCS: Performed by: PHYSICIAN ASSISTANT

## 2024-03-12 PROCEDURE — 84100 ASSAY OF PHOSPHORUS: CPT

## 2024-03-12 PROCEDURE — 2000000000 HC ICU R&B

## 2024-03-12 PROCEDURE — 83605 ASSAY OF LACTIC ACID: CPT

## 2024-03-12 PROCEDURE — 76942 ECHO GUIDE FOR BIOPSY: CPT

## 2024-03-12 PROCEDURE — 36600 WITHDRAWAL OF ARTERIAL BLOOD: CPT

## 2024-03-12 PROCEDURE — 83735 ASSAY OF MAGNESIUM: CPT

## 2024-03-12 PROCEDURE — 82803 BLOOD GASES ANY COMBINATION: CPT

## 2024-03-12 PROCEDURE — 82550 ASSAY OF CK (CPK): CPT

## 2024-03-12 PROCEDURE — 2500000003 HC RX 250 WO HCPCS: Performed by: NURSE PRACTITIONER

## 2024-03-12 PROCEDURE — 6370000000 HC RX 637 (ALT 250 FOR IP): Performed by: NURSE PRACTITIONER

## 2024-03-12 PROCEDURE — 0T9030Z DRAINAGE OF RIGHT KIDNEY WITH DRAINAGE DEVICE, PERCUTANEOUS APPROACH: ICD-10-PCS | Performed by: STUDENT IN AN ORGANIZED HEALTH CARE EDUCATION/TRAINING PROGRAM

## 2024-03-12 PROCEDURE — 36415 COLL VENOUS BLD VENIPUNCTURE: CPT

## 2024-03-12 PROCEDURE — 94003 VENT MGMT INPAT SUBQ DAY: CPT

## 2024-03-12 PROCEDURE — 2500000003 HC RX 250 WO HCPCS: Performed by: PHYSICIAN ASSISTANT

## 2024-03-12 RX ORDER — PROPOFOL 10 MG/ML
5-50 INJECTION, EMULSION INTRAVENOUS CONTINUOUS
Status: DISCONTINUED | OUTPATIENT
Start: 2024-03-12 | End: 2024-03-13

## 2024-03-12 RX ORDER — PROPOFOL 10 MG/ML
INJECTION, EMULSION INTRAVENOUS
Status: DISCONTINUED
Start: 2024-03-12 | End: 2024-03-13

## 2024-03-12 RX ORDER — FENTANYL CITRATE 50 UG/ML
INJECTION, SOLUTION INTRAMUSCULAR; INTRAVENOUS PRN
Status: COMPLETED | OUTPATIENT
Start: 2024-03-12 | End: 2024-03-12

## 2024-03-12 RX ORDER — POTASSIUM CHLORIDE 7.45 MG/ML
10 INJECTION INTRAVENOUS
Status: COMPLETED | OUTPATIENT
Start: 2024-03-12 | End: 2024-03-12

## 2024-03-12 RX ORDER — MIDAZOLAM HYDROCHLORIDE 2 MG/2ML
INJECTION, SOLUTION INTRAMUSCULAR; INTRAVENOUS PRN
Status: COMPLETED | OUTPATIENT
Start: 2024-03-12 | End: 2024-03-12

## 2024-03-12 RX ORDER — LIDOCAINE HYDROCHLORIDE 10 MG/ML
INJECTION, SOLUTION EPIDURAL; INFILTRATION; INTRACAUDAL; PERINEURAL PRN
Status: COMPLETED | OUTPATIENT
Start: 2024-03-12 | End: 2024-03-12

## 2024-03-12 RX ORDER — POTASSIUM CHLORIDE 7.45 MG/ML
10 INJECTION INTRAVENOUS
Status: DISCONTINUED | OUTPATIENT
Start: 2024-03-12 | End: 2024-03-12

## 2024-03-12 RX ADMIN — DEXMEDETOMIDINE HYDROCHLORIDE 1.2 MCG/KG/HR: 4 INJECTION, SOLUTION INTRAVENOUS at 16:30

## 2024-03-12 RX ADMIN — DEXMEDETOMIDINE HYDROCHLORIDE 0.6 MCG/KG/HR: 4 INJECTION, SOLUTION INTRAVENOUS at 00:43

## 2024-03-12 RX ADMIN — MEROPENEM 1000 MG: 1 INJECTION, POWDER, FOR SOLUTION INTRAVENOUS at 10:45

## 2024-03-12 RX ADMIN — POTASSIUM CHLORIDE 10 MEQ: 7.46 INJECTION, SOLUTION INTRAVENOUS at 03:57

## 2024-03-12 RX ADMIN — MIDAZOLAM 2 MG: 1 INJECTION INTRAMUSCULAR; INTRAVENOUS at 01:34

## 2024-03-12 RX ADMIN — HEPARIN SODIUM 5000 UNITS: 5000 INJECTION INTRAVENOUS; SUBCUTANEOUS at 06:12

## 2024-03-12 RX ADMIN — MIDAZOLAM HYDROCHLORIDE 2 MG: 1 INJECTION, SOLUTION INTRAMUSCULAR; INTRAVENOUS at 14:15

## 2024-03-12 RX ADMIN — SODIUM CHLORIDE, SODIUM GLUCONATE, SODIUM ACETATE, POTASSIUM CHLORIDE AND MAGNESIUM CHLORIDE 150 ML/HR: 526; 502; 368; 37; 30 INJECTION, SOLUTION INTRAVENOUS at 03:04

## 2024-03-12 RX ADMIN — SODIUM CHLORIDE, SODIUM GLUCONATE, SODIUM ACETATE, POTASSIUM CHLORIDE AND MAGNESIUM CHLORIDE 150 ML/HR: 526; 502; 368; 37; 30 INJECTION, SOLUTION INTRAVENOUS at 09:07

## 2024-03-12 RX ADMIN — SODIUM CHLORIDE, PRESERVATIVE FREE 10 ML: 5 INJECTION INTRAVENOUS at 20:53

## 2024-03-12 RX ADMIN — SODIUM CHLORIDE, PRESERVATIVE FREE 10 ML: 5 INJECTION INTRAVENOUS at 08:47

## 2024-03-12 RX ADMIN — DEXMEDETOMIDINE HYDROCHLORIDE 0.6 MCG/KG/HR: 4 INJECTION, SOLUTION INTRAVENOUS at 06:52

## 2024-03-12 RX ADMIN — DEXMEDETOMIDINE HYDROCHLORIDE 0.6 MCG/KG/HR: 4 INJECTION, SOLUTION INTRAVENOUS at 22:09

## 2024-03-12 RX ADMIN — SODIUM CHLORIDE, SODIUM GLUCONATE, SODIUM ACETATE, POTASSIUM CHLORIDE AND MAGNESIUM CHLORIDE 150 ML/HR: 526; 502; 368; 37; 30 INJECTION, SOLUTION INTRAVENOUS at 16:04

## 2024-03-12 RX ADMIN — MIDAZOLAM 2 MG: 1 INJECTION INTRAMUSCULAR; INTRAVENOUS at 13:53

## 2024-03-12 RX ADMIN — HEPARIN SODIUM 5000 UNITS: 5000 INJECTION INTRAVENOUS; SUBCUTANEOUS at 22:10

## 2024-03-12 RX ADMIN — ACETAMINOPHEN 325MG 650 MG: 325 TABLET ORAL at 22:10

## 2024-03-12 RX ADMIN — HEPARIN SODIUM 5000 UNITS: 5000 INJECTION INTRAVENOUS; SUBCUTANEOUS at 15:54

## 2024-03-12 RX ADMIN — PIPERACILLIN AND TAZOBACTAM 4500 MG: 4; .5 INJECTION, POWDER, FOR SOLUTION INTRAVENOUS at 03:42

## 2024-03-12 RX ADMIN — IOHEXOL 40 ML: 300 INJECTION, SOLUTION INTRAVENOUS at 14:50

## 2024-03-12 RX ADMIN — FAMOTIDINE 20 MG: 10 INJECTION INTRAVENOUS at 08:46

## 2024-03-12 RX ADMIN — 0.12% CHLORHEXIDINE GLUCONATE 15 ML: 1.2 RINSE ORAL at 20:52

## 2024-03-12 RX ADMIN — PROPOFOL 20 MCG/KG/MIN: 10 INJECTION, EMULSION INTRAVENOUS at 16:30

## 2024-03-12 RX ADMIN — POTASSIUM CHLORIDE 10 MEQ: 7.46 INJECTION, SOLUTION INTRAVENOUS at 02:51

## 2024-03-12 RX ADMIN — POTASSIUM CHLORIDE 10 MEQ: 7.46 INJECTION, SOLUTION INTRAVENOUS at 01:31

## 2024-03-12 RX ADMIN — DEXMEDETOMIDINE HYDROCHLORIDE 0.8 MCG/KG/HR: 4 INJECTION, SOLUTION INTRAVENOUS at 13:15

## 2024-03-12 RX ADMIN — LIDOCAINE HYDROCHLORIDE 10 ML: 10 INJECTION, SOLUTION EPIDURAL; INFILTRATION; INTRACAUDAL; PERINEURAL at 14:40

## 2024-03-12 RX ADMIN — 0.12% CHLORHEXIDINE GLUCONATE 15 ML: 1.2 RINSE ORAL at 08:46

## 2024-03-12 RX ADMIN — FENTANYL CITRATE 100 MCG: 50 INJECTION INTRAMUSCULAR; INTRAVENOUS at 14:15

## 2024-03-12 RX ADMIN — MIDAZOLAM 2 MG: 1 INJECTION INTRAMUSCULAR; INTRAVENOUS at 15:53

## 2024-03-12 RX ADMIN — POTASSIUM BICARBONATE 40 MEQ: 782 TABLET, EFFERVESCENT ORAL at 02:52

## 2024-03-12 RX ADMIN — FENTANYL CITRATE 100 MCG: 50 INJECTION INTRAMUSCULAR; INTRAVENOUS at 14:20

## 2024-03-12 ASSESSMENT — PULMONARY FUNCTION TESTS
PIF_VALUE: 28
PIF_VALUE: 21
PIF_VALUE: 14

## 2024-03-12 NOTE — BRIEF OP NOTE
RADIOLOGY POST PROCEDURE NOTE     March 12, 2024       3:32 PM     Preoperative Diagnosis:   rhabdomyolysis, renal failure    Postoperative Diagnosis:  Same.    :  Robert Jay MD    Assistant:  None.    Type of Anesthesia: 1% plain lidocaine, moderate sedation    Procedure/Description:  Right percutaneous nephrostomy tube placement     Findings:   Successful placement of 8.5 Fr right percutaneous nephrostomy tube.    Estimated blood Loss:  Minimal    Specimen Removed:  none    Blood transfusions:  None.    Implants:  None.    Complications: None    Condition: Stable    Discharge Plan:   return to nursing unit. 3 hours bedrest.   Right PCN placed to leg bag.     ROBERT JAY MD

## 2024-03-12 NOTE — CARE COORDINATION
3/12/24     03/12/24 1052   Service Assessment   Patient Orientation Other (see comment)  (intubated/vented)   Cognition Alert  (intubated/vented)   History Provided By Child/Family   Primary Caregiver Self   Support Systems Parent   Patient's Healthcare Decision Maker is: Legal Next of Kin   PCP Verified by CM Yes   Last Visit to PCP Within last 3 months   Prior Functional Level Independent in ADLs/IADLs   Current Functional Level Assistance with the following:;Cooking;Housework;Shopping;Feeding;Toileting   Can patient return to prior living arrangement Yes   Ability to make needs known: Other (see comment)  (intubated/vented)   Financial Resources Other (Comment)  ([employer])   Community Resources None   Social/Functional History   Lives With Parent   Type of Home House   Home Layout Performs ADL's on one level;Two level   Home Access Stairs to enter with rails   Entrance Stairs - Number of Steps 3   Entrance Stairs - Rails Both   Bathroom Shower/Tub Walk-in shower   Bathroom Toilet Standard   Bathroom Equipment Grab bars in shower;Built-in shower seat   Bathroom Accessibility Not accessible   Home Equipment None   Receives Help From Family   ADL Assistance Independent   Homemaking Assistance Independent   Ambulation Assistance Independent   Transfer Assistance Independent   Active  Yes   Mode of Transportation Car   Occupation Unemployed   Discharge Planning   Type of Residence House   Living Arrangements Parent   Current Services Prior To Admission None   Potential Assistance Needed Home Care   DME Ordered? No   Potential Assistance Purchasing Medications No   Type of Home Care Services Nursing Services   Patient expects to be discharged to: House   One/Two Story Residence Two story   # of Interior Steps 14   Interior Rails Both   Lift Chair Available No   History of falls? 0   Services At/After Discharge   Transition of Care Consult (CM Consult) Home Health   Internal Home Health Yes   Services At/After

## 2024-03-12 NOTE — CARE COORDINATION
03/12/24 1237   /Social Work Whiteboard Notes   /Social Work Whiteboard RED-3/12/24-patient not medically cleared for discharge/downgrade from ICU. Intubated/vent/IV ABX. Urology/Nephrology folliwng. IR-pending nephro tube if needed. Dispo: home with parents/HH if needed would like [facility]. Parents to provide discharge ride.      Adalgisa Mulligan

## 2024-03-13 ENCOUNTER — APPOINTMENT (OUTPATIENT)
Facility: HOSPITAL | Age: 39
DRG: 871 | End: 2024-03-13
Payer: COMMERCIAL

## 2024-03-13 LAB
ANION GAP SERPL CALC-SCNC: 8 MMOL/L (ref 3–18)
ARTERIAL PATENCY WRIST A: POSITIVE
BASE DEFICIT BLD-SCNC: 3.4 MMOL/L
BASOPHILS # BLD: 0.1 K/UL (ref 0–0.1)
BASOPHILS NFR BLD: 1 % (ref 0–2)
BDY SITE: ABNORMAL
BUN SERPL-MCNC: 58 MG/DL (ref 7–18)
BUN/CREAT SERPL: 9 (ref 12–20)
CALCIUM SERPL-MCNC: 8 MG/DL (ref 8.5–10.1)
CHLORIDE SERPL-SCNC: 109 MMOL/L (ref 100–111)
CO2 SERPL-SCNC: 22 MMOL/L (ref 21–32)
CREAT SERPL-MCNC: 6.6 MG/DL (ref 0.6–1.3)
DIFFERENTIAL METHOD BLD: ABNORMAL
EOSINOPHIL # BLD: 0.2 K/UL (ref 0–0.4)
EOSINOPHIL NFR BLD: 1 % (ref 0–5)
ERYTHROCYTE [DISTWIDTH] IN BLOOD BY AUTOMATED COUNT: 13.8 % (ref 11.6–14.5)
GAS FLOW.O2 O2 DELIVERY SYS: ABNORMAL
GAS FLOW.O2 SETTING OXYMISER: 14 BPM
GLUCOSE BLD STRIP.AUTO-MCNC: 134 MG/DL (ref 70–110)
GLUCOSE BLD STRIP.AUTO-MCNC: 138 MG/DL (ref 70–110)
GLUCOSE BLD STRIP.AUTO-MCNC: 153 MG/DL (ref 70–110)
GLUCOSE SERPL-MCNC: 135 MG/DL (ref 74–99)
HCO3 BLD-SCNC: 21.1 MMOL/L (ref 22–26)
HCT VFR BLD AUTO: 29.5 % (ref 36–48)
HGB BLD-MCNC: 9.9 G/DL (ref 13–16)
IMM GRANULOCYTES # BLD AUTO: 0.5 K/UL (ref 0–0.04)
IMM GRANULOCYTES NFR BLD AUTO: 3 % (ref 0–0.5)
IPAP/PIP/HIGH PEEP: 19
LYMPHOCYTES # BLD: 1.4 K/UL (ref 0.9–3.6)
LYMPHOCYTES NFR BLD: 8 % (ref 21–52)
MAGNESIUM SERPL-MCNC: 2.7 MG/DL (ref 1.6–2.6)
MCH RBC QN AUTO: 30 PG (ref 24–34)
MCHC RBC AUTO-ENTMCNC: 33.6 G/DL (ref 31–37)
MCV RBC AUTO: 89.4 FL (ref 78–100)
MONOCYTES # BLD: 1.5 K/UL (ref 0.05–1.2)
MONOCYTES NFR BLD: 9 % (ref 3–10)
NEUTS SEG # BLD: 13.4 K/UL (ref 1.8–8)
NEUTS SEG NFR BLD: 79 % (ref 40–73)
NRBC # BLD: 0 K/UL (ref 0–0.01)
NRBC BLD-RTO: 0 PER 100 WBC
O2/TOTAL GAS SETTING VFR VENT: 35 %
PAW @ MEAN EXP FLOW ON VENT: 11 CMH2O
PCO2 BLD: 35.3 MMHG (ref 35–45)
PEEP RESPIRATORY: 8 CMH2O
PH BLD: 7.39 (ref 7.35–7.45)
PHOSPHATE SERPL-MCNC: 1.9 MG/DL (ref 2.5–4.9)
PLATELET # BLD AUTO: 324 K/UL (ref 135–420)
PMV BLD AUTO: 10.1 FL (ref 9.2–11.8)
PO2 BLD: 100 MMHG (ref 80–100)
POTASSIUM SERPL-SCNC: 4 MMOL/L (ref 3.5–5.5)
RBC # BLD AUTO: 3.3 M/UL (ref 4.35–5.65)
RESPIRATORY RATE, POC: 15 (ref 5–40)
SAO2 % BLD: 97.7 % (ref 92–97)
SERVICE CMNT-IMP: ABNORMAL
SODIUM SERPL-SCNC: 139 MMOL/L (ref 136–145)
SPECIMEN TYPE: ABNORMAL
VANCOMYCIN SERPL-MCNC: 11.5 UG/ML (ref 5–40)
VENTILATION MODE VENT: ABNORMAL
VT SETTING VENT: 500 ML
WBC # BLD AUTO: 17.1 K/UL (ref 4.6–13.2)

## 2024-03-13 PROCEDURE — 2580000003 HC RX 258: Performed by: INTERNAL MEDICINE

## 2024-03-13 PROCEDURE — 83735 ASSAY OF MAGNESIUM: CPT

## 2024-03-13 PROCEDURE — 94003 VENT MGMT INPAT SUBQ DAY: CPT

## 2024-03-13 PROCEDURE — 82803 BLOOD GASES ANY COMBINATION: CPT

## 2024-03-13 PROCEDURE — 6360000002 HC RX W HCPCS: Performed by: INTERNAL MEDICINE

## 2024-03-13 PROCEDURE — 99291 CRITICAL CARE FIRST HOUR: CPT | Performed by: INTERNAL MEDICINE

## 2024-03-13 PROCEDURE — 6370000000 HC RX 637 (ALT 250 FOR IP): Performed by: PHYSICIAN ASSISTANT

## 2024-03-13 PROCEDURE — 2000000000 HC ICU R&B

## 2024-03-13 PROCEDURE — 36600 WITHDRAWAL OF ARTERIAL BLOOD: CPT

## 2024-03-13 PROCEDURE — 84100 ASSAY OF PHOSPHORUS: CPT

## 2024-03-13 PROCEDURE — 6360000002 HC RX W HCPCS: Performed by: PHYSICIAN ASSISTANT

## 2024-03-13 PROCEDURE — 94761 N-INVAS EAR/PLS OXIMETRY MLT: CPT

## 2024-03-13 PROCEDURE — 36415 COLL VENOUS BLD VENIPUNCTURE: CPT

## 2024-03-13 PROCEDURE — 85025 COMPLETE CBC W/AUTO DIFF WBC: CPT

## 2024-03-13 PROCEDURE — A4216 STERILE WATER/SALINE, 10 ML: HCPCS | Performed by: PHYSICIAN ASSISTANT

## 2024-03-13 PROCEDURE — 2500000003 HC RX 250 WO HCPCS: Performed by: NURSE PRACTITIONER

## 2024-03-13 PROCEDURE — 2580000003 HC RX 258: Performed by: PHYSICIAN ASSISTANT

## 2024-03-13 PROCEDURE — 80202 ASSAY OF VANCOMYCIN: CPT

## 2024-03-13 PROCEDURE — 71045 X-RAY EXAM CHEST 1 VIEW: CPT

## 2024-03-13 PROCEDURE — 2500000003 HC RX 250 WO HCPCS: Performed by: PHYSICIAN ASSISTANT

## 2024-03-13 PROCEDURE — 80048 BASIC METABOLIC PNL TOTAL CA: CPT

## 2024-03-13 PROCEDURE — 2580000003 HC RX 258: Performed by: NURSE PRACTITIONER

## 2024-03-13 PROCEDURE — 82962 GLUCOSE BLOOD TEST: CPT

## 2024-03-13 PROCEDURE — 2700000000 HC OXYGEN THERAPY PER DAY

## 2024-03-13 PROCEDURE — 6360000002 HC RX W HCPCS: Performed by: NURSE PRACTITIONER

## 2024-03-13 PROCEDURE — 6360000002 HC RX W HCPCS: Performed by: REGISTERED NURSE

## 2024-03-13 RX ORDER — NICARDIPINE HYDROCHLORIDE 0.1 MG/ML
2.5-15 INJECTION INTRAVENOUS CONTINUOUS
Status: DISCONTINUED | OUTPATIENT
Start: 2024-03-13 | End: 2024-03-13

## 2024-03-13 RX ORDER — CLONIDINE 0.3 MG/24H
1 PATCH, EXTENDED RELEASE TRANSDERMAL WEEKLY
Status: DISCONTINUED | OUTPATIENT
Start: 2024-03-13 | End: 2024-03-13

## 2024-03-13 RX ORDER — CLONIDINE 0.3 MG/24H
1 PATCH, EXTENDED RELEASE TRANSDERMAL WEEKLY
Status: DISCONTINUED | OUTPATIENT
Start: 2024-03-13 | End: 2024-03-26 | Stop reason: HOSPADM

## 2024-03-13 RX ORDER — NICARDIPINE HYDROCHLORIDE 0.1 MG/ML
2.5-15 INJECTION INTRAVENOUS CONTINUOUS
Status: DISCONTINUED | OUTPATIENT
Start: 2024-03-13 | End: 2024-03-15

## 2024-03-13 RX ORDER — CARVEDILOL 6.25 MG/1
6.25 TABLET ORAL 2 TIMES DAILY WITH MEALS
Status: DISCONTINUED | OUTPATIENT
Start: 2024-03-13 | End: 2024-03-16

## 2024-03-13 RX ORDER — CARVEDILOL 12.5 MG/1
12.5 TABLET ORAL 2 TIMES DAILY WITH MEALS
Status: DISCONTINUED | OUTPATIENT
Start: 2024-03-13 | End: 2024-03-13

## 2024-03-13 RX ORDER — FENTANYL CITRATE-0.9 % NACL/PF 10 MCG/ML
25-200 PLASTIC BAG, INJECTION (ML) INTRAVENOUS CONTINUOUS
Status: DISCONTINUED | OUTPATIENT
Start: 2024-03-13 | End: 2024-03-13

## 2024-03-13 RX ORDER — HYDRALAZINE HYDROCHLORIDE 20 MG/ML
10 INJECTION INTRAMUSCULAR; INTRAVENOUS EVERY 6 HOURS PRN
Status: DISCONTINUED | OUTPATIENT
Start: 2024-03-13 | End: 2024-03-16

## 2024-03-13 RX ORDER — LABETALOL HYDROCHLORIDE 5 MG/ML
20 INJECTION, SOLUTION INTRAVENOUS ONCE
Status: COMPLETED | OUTPATIENT
Start: 2024-03-13 | End: 2024-03-13

## 2024-03-13 RX ORDER — LORAZEPAM 2 MG/ML
2 INJECTION INTRAMUSCULAR ONCE
Status: COMPLETED | OUTPATIENT
Start: 2024-03-13 | End: 2024-03-13

## 2024-03-13 RX ORDER — SODIUM CHLORIDE, SODIUM LACTATE, POTASSIUM CHLORIDE, CALCIUM CHLORIDE 600; 310; 30; 20 MG/100ML; MG/100ML; MG/100ML; MG/100ML
INJECTION, SOLUTION INTRAVENOUS CONTINUOUS
Status: DISCONTINUED | OUTPATIENT
Start: 2024-03-13 | End: 2024-03-14

## 2024-03-13 RX ADMIN — 0.12% CHLORHEXIDINE GLUCONATE 15 ML: 1.2 RINSE ORAL at 08:35

## 2024-03-13 RX ADMIN — MIDAZOLAM 2 MG: 1 INJECTION INTRAMUSCULAR; INTRAVENOUS at 04:22

## 2024-03-13 RX ADMIN — NICARDIPINE HYDROCHLORIDE 15 MG/HR: 0.1 INJECTION INTRAVENOUS at 18:21

## 2024-03-13 RX ADMIN — DEXMEDETOMIDINE HYDROCHLORIDE 0.6 MCG/KG/HR: 4 INJECTION, SOLUTION INTRAVENOUS at 02:51

## 2024-03-13 RX ADMIN — HYDRALAZINE HYDROCHLORIDE 10 MG: 20 INJECTION INTRAMUSCULAR; INTRAVENOUS at 12:50

## 2024-03-13 RX ADMIN — NICARDIPINE HYDROCHLORIDE 12.5 MG/HR: 0.1 INJECTION INTRAVENOUS at 22:45

## 2024-03-13 RX ADMIN — SODIUM CHLORIDE, SODIUM GLUCONATE, SODIUM ACETATE, POTASSIUM CHLORIDE AND MAGNESIUM CHLORIDE 150 ML/HR: 526; 502; 368; 37; 30 INJECTION, SOLUTION INTRAVENOUS at 09:03

## 2024-03-13 RX ADMIN — FAMOTIDINE 20 MG: 10 INJECTION INTRAVENOUS at 08:35

## 2024-03-13 RX ADMIN — LABETALOL HYDROCHLORIDE 20 MG: 5 INJECTION, SOLUTION INTRAVENOUS at 14:31

## 2024-03-13 RX ADMIN — HEPARIN SODIUM 5000 UNITS: 5000 INJECTION INTRAVENOUS; SUBCUTANEOUS at 06:04

## 2024-03-13 RX ADMIN — CARVEDILOL 12.5 MG: 12.5 TABLET, FILM COATED ORAL at 08:38

## 2024-03-13 RX ADMIN — HEPARIN SODIUM 5000 UNITS: 5000 INJECTION INTRAVENOUS; SUBCUTANEOUS at 13:30

## 2024-03-13 RX ADMIN — SODIUM CHLORIDE, POTASSIUM CHLORIDE, SODIUM LACTATE AND CALCIUM CHLORIDE: 600; 310; 30; 20 INJECTION, SOLUTION INTRAVENOUS at 10:48

## 2024-03-13 RX ADMIN — MEROPENEM 500 MG: 500 INJECTION, POWDER, FOR SOLUTION INTRAVENOUS at 01:00

## 2024-03-13 RX ADMIN — HEPARIN SODIUM 5000 UNITS: 5000 INJECTION INTRAVENOUS; SUBCUTANEOUS at 22:02

## 2024-03-13 RX ADMIN — FENTANYL CITRATE 50 MCG/HR: at 06:20

## 2024-03-13 RX ADMIN — SODIUM CHLORIDE, PRESERVATIVE FREE 10 ML: 5 INJECTION INTRAVENOUS at 08:36

## 2024-03-13 RX ADMIN — LORAZEPAM 2 MG: 2 INJECTION INTRAMUSCULAR; INTRAVENOUS at 17:31

## 2024-03-13 RX ADMIN — NICARDIPINE HYDROCHLORIDE 15 MG/HR: 0.1 INJECTION INTRAVENOUS at 21:06

## 2024-03-13 RX ADMIN — NICARDIPINE HYDROCHLORIDE 5 MG/HR: 0.1 INJECTION INTRAVENOUS at 16:21

## 2024-03-13 RX ADMIN — NICARDIPINE HYDROCHLORIDE 15 MG/HR: 0.1 INJECTION INTRAVENOUS at 19:41

## 2024-03-13 RX ADMIN — SODIUM CHLORIDE, PRESERVATIVE FREE 10 ML: 5 INJECTION INTRAVENOUS at 21:08

## 2024-03-13 RX ADMIN — SODIUM CHLORIDE, POTASSIUM CHLORIDE, SODIUM LACTATE AND CALCIUM CHLORIDE: 600; 310; 30; 20 INJECTION, SOLUTION INTRAVENOUS at 18:24

## 2024-03-13 RX ADMIN — VANCOMYCIN HYDROCHLORIDE 1500 MG: 1 INJECTION, POWDER, LYOPHILIZED, FOR SOLUTION INTRAVENOUS at 12:17

## 2024-03-13 RX ADMIN — MEROPENEM 500 MG: 500 INJECTION, POWDER, FOR SOLUTION INTRAVENOUS at 12:30

## 2024-03-13 RX ADMIN — SODIUM CHLORIDE, SODIUM GLUCONATE, SODIUM ACETATE, POTASSIUM CHLORIDE AND MAGNESIUM CHLORIDE 150 ML/HR: 526; 502; 368; 37; 30 INJECTION, SOLUTION INTRAVENOUS at 02:54

## 2024-03-13 RX ADMIN — SODIUM PHOSPHATE, MONOBASIC, MONOHYDRATE AND SODIUM PHOSPHATE, DIBASIC, ANHYDROUS 30 MMOL: 142; 276 INJECTION, SOLUTION INTRAVENOUS at 06:19

## 2024-03-13 RX ADMIN — PROPOFOL 15 MCG/KG/MIN: 10 INJECTION, EMULSION INTRAVENOUS at 01:00

## 2024-03-13 ASSESSMENT — PULMONARY FUNCTION TESTS: PIF_VALUE: 17

## 2024-03-14 LAB
ANION GAP SERPL CALC-SCNC: 10 MMOL/L (ref 3–18)
BASOPHILS # BLD: 0 K/UL (ref 0–0.1)
BASOPHILS NFR BLD: 0 % (ref 0–2)
BUN SERPL-MCNC: 46 MG/DL (ref 7–18)
BUN/CREAT SERPL: 14 (ref 12–20)
CALCIUM SERPL-MCNC: 9.1 MG/DL (ref 8.5–10.1)
CHLORIDE SERPL-SCNC: 111 MMOL/L (ref 100–111)
CK SERPL-CCNC: 1233 U/L (ref 39–308)
CO2 SERPL-SCNC: 23 MMOL/L (ref 21–32)
CREAT SERPL-MCNC: 3.37 MG/DL (ref 0.6–1.3)
DIFFERENTIAL METHOD BLD: ABNORMAL
EOSINOPHIL # BLD: 0.2 K/UL (ref 0–0.4)
EOSINOPHIL NFR BLD: 1 % (ref 0–5)
ERYTHROCYTE [DISTWIDTH] IN BLOOD BY AUTOMATED COUNT: 13.6 % (ref 11.6–14.5)
GLUCOSE BLD STRIP.AUTO-MCNC: 137 MG/DL (ref 70–110)
GLUCOSE BLD STRIP.AUTO-MCNC: 140 MG/DL (ref 70–110)
GLUCOSE BLD STRIP.AUTO-MCNC: 168 MG/DL (ref 70–110)
GLUCOSE SERPL-MCNC: 154 MG/DL (ref 74–99)
HCT VFR BLD AUTO: 36.2 % (ref 36–48)
HGB BLD-MCNC: 12.3 G/DL (ref 13–16)
IMM GRANULOCYTES # BLD AUTO: 0 K/UL (ref 0–0.04)
IMM GRANULOCYTES NFR BLD AUTO: 0 % (ref 0–0.5)
LYMPHOCYTES # BLD: 1.8 K/UL (ref 0.9–3.6)
LYMPHOCYTES NFR BLD: 10 % (ref 21–52)
MAGNESIUM SERPL-MCNC: 2.1 MG/DL (ref 1.6–2.6)
MCH RBC QN AUTO: 29.4 PG (ref 24–34)
MCHC RBC AUTO-ENTMCNC: 34 G/DL (ref 31–37)
MCV RBC AUTO: 86.6 FL (ref 78–100)
MONOCYTES # BLD: 1.4 K/UL (ref 0.05–1.2)
MONOCYTES NFR BLD: 8 % (ref 3–10)
MYELOCYTES NFR BLD MANUAL: 1 %
NEUTS SEG # BLD: 14.5 K/UL (ref 1.8–8)
NEUTS SEG NFR BLD: 80 % (ref 40–73)
NRBC # BLD: 0 K/UL (ref 0–0.01)
NRBC BLD-RTO: 0 PER 100 WBC
PHOSPHATE SERPL-MCNC: 3.2 MG/DL (ref 2.5–4.9)
PLATELET # BLD AUTO: 539 K/UL (ref 135–420)
PLATELET COMMENT: ABNORMAL
PMV BLD AUTO: 10.1 FL (ref 9.2–11.8)
POTASSIUM SERPL-SCNC: 3.8 MMOL/L (ref 3.5–5.5)
RBC # BLD AUTO: 4.18 M/UL (ref 4.35–5.65)
RBC MORPH BLD: ABNORMAL
SODIUM SERPL-SCNC: 144 MMOL/L (ref 136–145)
VANCOMYCIN SERPL-MCNC: 16.5 UG/ML (ref 5–40)
WBC # BLD AUTO: 18.1 K/UL (ref 4.6–13.2)
WBC MORPH BLD: ABNORMAL

## 2024-03-14 PROCEDURE — 87040 BLOOD CULTURE FOR BACTERIA: CPT

## 2024-03-14 PROCEDURE — 6360000002 HC RX W HCPCS: Performed by: REGISTERED NURSE

## 2024-03-14 PROCEDURE — 2580000003 HC RX 258: Performed by: INTERNAL MEDICINE

## 2024-03-14 PROCEDURE — 99291 CRITICAL CARE FIRST HOUR: CPT | Performed by: INTERNAL MEDICINE

## 2024-03-14 PROCEDURE — 80048 BASIC METABOLIC PNL TOTAL CA: CPT

## 2024-03-14 PROCEDURE — 84100 ASSAY OF PHOSPHORUS: CPT

## 2024-03-14 PROCEDURE — 6360000002 HC RX W HCPCS: Performed by: INTERNAL MEDICINE

## 2024-03-14 PROCEDURE — 2500000003 HC RX 250 WO HCPCS: Performed by: REGISTERED NURSE

## 2024-03-14 PROCEDURE — 82962 GLUCOSE BLOOD TEST: CPT

## 2024-03-14 PROCEDURE — 6370000000 HC RX 637 (ALT 250 FOR IP): Performed by: PHYSICIAN ASSISTANT

## 2024-03-14 PROCEDURE — 83735 ASSAY OF MAGNESIUM: CPT

## 2024-03-14 PROCEDURE — 36415 COLL VENOUS BLD VENIPUNCTURE: CPT

## 2024-03-14 PROCEDURE — 85025 COMPLETE CBC W/AUTO DIFF WBC: CPT

## 2024-03-14 PROCEDURE — 2580000003 HC RX 258: Performed by: REGISTERED NURSE

## 2024-03-14 PROCEDURE — 2580000003 HC RX 258: Performed by: PHYSICIAN ASSISTANT

## 2024-03-14 PROCEDURE — 80202 ASSAY OF VANCOMYCIN: CPT

## 2024-03-14 PROCEDURE — 6360000002 HC RX W HCPCS: Performed by: PHYSICIAN ASSISTANT

## 2024-03-14 PROCEDURE — A4216 STERILE WATER/SALINE, 10 ML: HCPCS | Performed by: PHYSICIAN ASSISTANT

## 2024-03-14 PROCEDURE — 82550 ASSAY OF CK (CPK): CPT

## 2024-03-14 PROCEDURE — 94761 N-INVAS EAR/PLS OXIMETRY MLT: CPT

## 2024-03-14 PROCEDURE — 2000000000 HC ICU R&B

## 2024-03-14 PROCEDURE — 2500000003 HC RX 250 WO HCPCS: Performed by: PHYSICIAN ASSISTANT

## 2024-03-14 RX ORDER — DEXTROSE, SODIUM CHLORIDE, SODIUM LACTATE, POTASSIUM CHLORIDE, AND CALCIUM CHLORIDE 5; .6; .31; .03; .02 G/100ML; G/100ML; G/100ML; G/100ML; G/100ML
INJECTION, SOLUTION INTRAVENOUS CONTINUOUS
Status: DISCONTINUED | OUTPATIENT
Start: 2024-03-14 | End: 2024-03-15

## 2024-03-14 RX ORDER — DEXMEDETOMIDINE HYDROCHLORIDE 4 UG/ML
.1-1.5 INJECTION, SOLUTION INTRAVENOUS CONTINUOUS
Status: DISCONTINUED | OUTPATIENT
Start: 2024-03-14 | End: 2024-03-15

## 2024-03-14 RX ADMIN — NICARDIPINE HYDROCHLORIDE 10 MG/HR: 0.1 INJECTION INTRAVENOUS at 05:19

## 2024-03-14 RX ADMIN — SODIUM CHLORIDE, PRESERVATIVE FREE 10 ML: 5 INJECTION INTRAVENOUS at 20:02

## 2024-03-14 RX ADMIN — HEPARIN SODIUM 5000 UNITS: 5000 INJECTION INTRAVENOUS; SUBCUTANEOUS at 06:08

## 2024-03-14 RX ADMIN — NICARDIPINE HYDROCHLORIDE 7.5 MG/HR: 0.1 INJECTION INTRAVENOUS at 08:21

## 2024-03-14 RX ADMIN — DEXMEDETOMIDINE HYDROCHLORIDE 0.2 MCG/KG/HR: 4 INJECTION, SOLUTION INTRAVENOUS at 20:30

## 2024-03-14 RX ADMIN — NICARDIPINE HYDROCHLORIDE 7.5 MG/HR: 0.1 INJECTION INTRAVENOUS at 11:58

## 2024-03-14 RX ADMIN — ACETAMINOPHEN 650 MG: 650 SUPPOSITORY RECTAL at 20:58

## 2024-03-14 RX ADMIN — HEPARIN SODIUM 5000 UNITS: 5000 INJECTION INTRAVENOUS; SUBCUTANEOUS at 22:14

## 2024-03-14 RX ADMIN — VANCOMYCIN HYDROCHLORIDE 1500 MG: 1 INJECTION, POWDER, LYOPHILIZED, FOR SOLUTION INTRAVENOUS at 13:30

## 2024-03-14 RX ADMIN — NICARDIPINE HYDROCHLORIDE 10 MG/HR: 0.1 INJECTION INTRAVENOUS at 14:11

## 2024-03-14 RX ADMIN — NICARDIPINE HYDROCHLORIDE 10 MG/HR: 0.1 INJECTION INTRAVENOUS at 23:32

## 2024-03-14 RX ADMIN — SODIUM CHLORIDE, POTASSIUM CHLORIDE, SODIUM LACTATE AND CALCIUM CHLORIDE: 600; 310; 30; 20 INJECTION, SOLUTION INTRAVENOUS at 02:27

## 2024-03-14 RX ADMIN — SODIUM CHLORIDE, SODIUM LACTATE, POTASSIUM CHLORIDE, CALCIUM CHLORIDE AND DEXTROSE MONOHYDRATE: 5; 600; 310; 30; 20 INJECTION, SOLUTION INTRAVENOUS at 20:02

## 2024-03-14 RX ADMIN — WATER 10 MG: 1 INJECTION INTRAMUSCULAR; INTRAVENOUS; SUBCUTANEOUS at 22:37

## 2024-03-14 RX ADMIN — MEROPENEM 500 MG: 500 INJECTION, POWDER, FOR SOLUTION INTRAVENOUS at 01:18

## 2024-03-14 RX ADMIN — NICARDIPINE HYDROCHLORIDE 10 MG/HR: 0.1 INJECTION INTRAVENOUS at 02:26

## 2024-03-14 RX ADMIN — NICARDIPINE HYDROCHLORIDE 12.5 MG/HR: 0.1 INJECTION INTRAVENOUS at 19:55

## 2024-03-14 RX ADMIN — SODIUM CHLORIDE, PRESERVATIVE FREE 10 ML: 5 INJECTION INTRAVENOUS at 08:25

## 2024-03-14 RX ADMIN — NICARDIPINE HYDROCHLORIDE 12.5 MG/HR: 0.1 INJECTION INTRAVENOUS at 00:41

## 2024-03-14 RX ADMIN — NICARDIPINE HYDROCHLORIDE 10 MG/HR: 0.1 INJECTION INTRAVENOUS at 16:31

## 2024-03-14 RX ADMIN — FAMOTIDINE 20 MG: 10 INJECTION INTRAVENOUS at 08:21

## 2024-03-14 RX ADMIN — SODIUM CHLORIDE, SODIUM LACTATE, POTASSIUM CHLORIDE, CALCIUM CHLORIDE AND DEXTROSE MONOHYDRATE: 5; 600; 310; 30; 20 INJECTION, SOLUTION INTRAVENOUS at 11:54

## 2024-03-14 RX ADMIN — HEPARIN SODIUM 5000 UNITS: 5000 INJECTION INTRAVENOUS; SUBCUTANEOUS at 13:30

## 2024-03-15 LAB
ANION GAP SERPL CALC-SCNC: 7 MMOL/L (ref 3–18)
BACTERIA SPEC CULT: ABNORMAL
BACTERIA SPEC CULT: ABNORMAL
BUN SERPL-MCNC: 39 MG/DL (ref 7–18)
BUN/CREAT SERPL: 18 (ref 12–20)
CALCIUM SERPL-MCNC: 8.6 MG/DL (ref 8.5–10.1)
CHLORIDE SERPL-SCNC: 118 MMOL/L (ref 100–111)
CO2 SERPL-SCNC: 25 MMOL/L (ref 21–32)
CREAT SERPL-MCNC: 2.18 MG/DL (ref 0.6–1.3)
GLUCOSE BLD STRIP.AUTO-MCNC: 183 MG/DL (ref 70–110)
GLUCOSE SERPL-MCNC: 141 MG/DL (ref 74–99)
GRAM STN SPEC: ABNORMAL
MAGNESIUM SERPL-MCNC: 1.8 MG/DL (ref 1.6–2.6)
PHOSPHATE SERPL-MCNC: 2 MG/DL (ref 2.5–4.9)
POTASSIUM SERPL-SCNC: 3.9 MMOL/L (ref 3.5–5.5)
SERVICE CMNT-IMP: ABNORMAL
SERVICE CMNT-IMP: ABNORMAL
SODIUM SERPL-SCNC: 150 MMOL/L (ref 136–145)
VANCOMYCIN SERPL-MCNC: 16.6 UG/ML (ref 5–40)

## 2024-03-15 PROCEDURE — 6360000002 HC RX W HCPCS: Performed by: INTERNAL MEDICINE

## 2024-03-15 PROCEDURE — 99291 CRITICAL CARE FIRST HOUR: CPT | Performed by: INTERNAL MEDICINE

## 2024-03-15 PROCEDURE — 2580000003 HC RX 258: Performed by: PHYSICIAN ASSISTANT

## 2024-03-15 PROCEDURE — 2580000003 HC RX 258: Performed by: INTERNAL MEDICINE

## 2024-03-15 PROCEDURE — A4216 STERILE WATER/SALINE, 10 ML: HCPCS | Performed by: PHYSICIAN ASSISTANT

## 2024-03-15 PROCEDURE — 6360000002 HC RX W HCPCS: Performed by: REGISTERED NURSE

## 2024-03-15 PROCEDURE — 92526 ORAL FUNCTION THERAPY: CPT

## 2024-03-15 PROCEDURE — 6370000000 HC RX 637 (ALT 250 FOR IP): Performed by: PHYSICIAN ASSISTANT

## 2024-03-15 PROCEDURE — 80202 ASSAY OF VANCOMYCIN: CPT

## 2024-03-15 PROCEDURE — 36415 COLL VENOUS BLD VENIPUNCTURE: CPT

## 2024-03-15 PROCEDURE — 80048 BASIC METABOLIC PNL TOTAL CA: CPT

## 2024-03-15 PROCEDURE — 82962 GLUCOSE BLOOD TEST: CPT

## 2024-03-15 PROCEDURE — 6360000002 HC RX W HCPCS: Performed by: PHYSICIAN ASSISTANT

## 2024-03-15 PROCEDURE — 2500000003 HC RX 250 WO HCPCS: Performed by: REGISTERED NURSE

## 2024-03-15 PROCEDURE — 83735 ASSAY OF MAGNESIUM: CPT

## 2024-03-15 PROCEDURE — 84100 ASSAY OF PHOSPHORUS: CPT

## 2024-03-15 PROCEDURE — 2700000000 HC OXYGEN THERAPY PER DAY

## 2024-03-15 PROCEDURE — 94761 N-INVAS EAR/PLS OXIMETRY MLT: CPT

## 2024-03-15 PROCEDURE — 2000000000 HC ICU R&B

## 2024-03-15 PROCEDURE — 6370000000 HC RX 637 (ALT 250 FOR IP): Performed by: NURSE PRACTITIONER

## 2024-03-15 PROCEDURE — 92610 EVALUATE SWALLOWING FUNCTION: CPT

## 2024-03-15 PROCEDURE — 2580000003 HC RX 258: Performed by: REGISTERED NURSE

## 2024-03-15 PROCEDURE — 2500000003 HC RX 250 WO HCPCS: Performed by: PHYSICIAN ASSISTANT

## 2024-03-15 RX ORDER — LEVOTHYROXINE SODIUM 0.03 MG/1
25 TABLET ORAL DAILY
Status: DISCONTINUED | OUTPATIENT
Start: 2024-03-15 | End: 2024-03-26 | Stop reason: HOSPADM

## 2024-03-15 RX ORDER — ZOLPIDEM TARTRATE 5 MG/1
5 TABLET ORAL NIGHTLY PRN
Status: DISCONTINUED | OUTPATIENT
Start: 2024-03-15 | End: 2024-03-26 | Stop reason: HOSPADM

## 2024-03-15 RX ORDER — TOPIRAMATE 100 MG/1
100 TABLET, FILM COATED ORAL DAILY
Status: DISCONTINUED | OUTPATIENT
Start: 2024-03-15 | End: 2024-03-26 | Stop reason: HOSPADM

## 2024-03-15 RX ORDER — METHOCARBAMOL 750 MG/1
750 TABLET, FILM COATED ORAL 4 TIMES DAILY
Status: ON HOLD | COMMUNITY
Start: 2024-02-20 | End: 2024-03-26 | Stop reason: HOSPADM

## 2024-03-15 RX ORDER — ARIPIPRAZOLE 5 MG/1
5 TABLET ORAL EVERY MORNING
Status: ON HOLD | COMMUNITY
Start: 2024-02-21 | End: 2024-03-26 | Stop reason: HOSPADM

## 2024-03-15 RX ORDER — CLONAZEPAM 0.5 MG/1
1 TABLET ORAL EVERY 12 HOURS
Status: DISCONTINUED | OUTPATIENT
Start: 2024-03-15 | End: 2024-03-18

## 2024-03-15 RX ORDER — ZOLPIDEM TARTRATE 10 MG/1
5 TABLET ORAL NIGHTLY PRN
Status: ON HOLD | COMMUNITY
Start: 2024-02-20 | End: 2024-03-26 | Stop reason: HOSPADM

## 2024-03-15 RX ORDER — PANTOPRAZOLE SODIUM 40 MG/1
40 TABLET, DELAYED RELEASE ORAL
Status: DISCONTINUED | OUTPATIENT
Start: 2024-03-16 | End: 2024-03-26 | Stop reason: HOSPADM

## 2024-03-15 RX ORDER — MECOBALAMIN 5000 MCG
5 TABLET,DISINTEGRATING ORAL NIGHTLY
Status: DISCONTINUED | OUTPATIENT
Start: 2024-03-15 | End: 2024-03-15

## 2024-03-15 RX ORDER — DULOXETIN HYDROCHLORIDE 60 MG/1
60 CAPSULE, DELAYED RELEASE ORAL DAILY
Status: DISCONTINUED | OUTPATIENT
Start: 2024-03-15 | End: 2024-03-26 | Stop reason: HOSPADM

## 2024-03-15 RX ORDER — HYDROMORPHONE HYDROCHLORIDE 2 MG/1
2 TABLET ORAL EVERY 6 HOURS PRN
Status: ON HOLD | COMMUNITY
Start: 2024-03-03 | End: 2024-03-26 | Stop reason: HOSPADM

## 2024-03-15 RX ORDER — SODIUM CHLORIDE, SODIUM GLUCONATE, SODIUM ACETATE, POTASSIUM CHLORIDE AND MAGNESIUM CHLORIDE 526; 502; 368; 37; 30 MG/100ML; MG/100ML; MG/100ML; MG/100ML; MG/100ML
100 INJECTION, SOLUTION INTRAVENOUS CONTINUOUS
Status: DISCONTINUED | OUTPATIENT
Start: 2024-03-15 | End: 2024-03-15

## 2024-03-15 RX ORDER — GABAPENTIN 100 MG/1
300 CAPSULE ORAL 3 TIMES DAILY
COMMUNITY

## 2024-03-15 RX ORDER — GABAPENTIN 300 MG/1
300 CAPSULE ORAL 3 TIMES DAILY
Status: DISCONTINUED | OUTPATIENT
Start: 2024-03-15 | End: 2024-03-26 | Stop reason: HOSPADM

## 2024-03-15 RX ORDER — CLONAZEPAM 1 MG/1
1 TABLET ORAL 2 TIMES DAILY
Status: ON HOLD | COMMUNITY
Start: 2024-02-15 | End: 2024-03-26 | Stop reason: HOSPADM

## 2024-03-15 RX ADMIN — LEVOTHYROXINE SODIUM 25 MCG: 0.05 TABLET ORAL at 15:20

## 2024-03-15 RX ADMIN — POTASSIUM PHOSPHATE, MONOBASIC POTASSIUM PHOSPHATE, DIBASIC 10 MMOL: 224; 236 INJECTION, SOLUTION, CONCENTRATE INTRAVENOUS at 10:26

## 2024-03-15 RX ADMIN — CLONAZEPAM 1 MG: 0.5 TABLET ORAL at 15:20

## 2024-03-15 RX ADMIN — SODIUM CHLORIDE, SODIUM GLUCONATE, SODIUM ACETATE, POTASSIUM CHLORIDE AND MAGNESIUM CHLORIDE 100 ML/HR: 526; 502; 368; 37; 30 INJECTION, SOLUTION INTRAVENOUS at 11:45

## 2024-03-15 RX ADMIN — DEXMEDETOMIDINE HYDROCHLORIDE 1.5 MCG/KG/HR: 4 INJECTION, SOLUTION INTRAVENOUS at 07:49

## 2024-03-15 RX ADMIN — DULOXETINE HYDROCHLORIDE 60 MG: 60 CAPSULE, DELAYED RELEASE ORAL at 11:58

## 2024-03-15 RX ADMIN — GABAPENTIN 300 MG: 300 CAPSULE ORAL at 15:20

## 2024-03-15 RX ADMIN — HEPARIN SODIUM 5000 UNITS: 5000 INJECTION INTRAVENOUS; SUBCUTANEOUS at 15:20

## 2024-03-15 RX ADMIN — HEPARIN SODIUM 5000 UNITS: 5000 INJECTION INTRAVENOUS; SUBCUTANEOUS at 22:19

## 2024-03-15 RX ADMIN — DEXMEDETOMIDINE HYDROCHLORIDE 0.8 MCG/KG/HR: 4 INJECTION, SOLUTION INTRAVENOUS at 01:20

## 2024-03-15 RX ADMIN — HYDRALAZINE HYDROCHLORIDE 10 MG: 20 INJECTION INTRAMUSCULAR; INTRAVENOUS at 22:17

## 2024-03-15 RX ADMIN — DEXMEDETOMIDINE HYDROCHLORIDE 1.5 MCG/KG/HR: 4 INJECTION, SOLUTION INTRAVENOUS at 10:02

## 2024-03-15 RX ADMIN — WATER 10 MG: 1 INJECTION INTRAMUSCULAR; INTRAVENOUS; SUBCUTANEOUS at 02:15

## 2024-03-15 RX ADMIN — TOPIRAMATE 100 MG: 100 TABLET, FILM COATED ORAL at 11:58

## 2024-03-15 RX ADMIN — DEXMEDETOMIDINE HYDROCHLORIDE 1.5 MCG/KG/HR: 4 INJECTION, SOLUTION INTRAVENOUS at 05:04

## 2024-03-15 RX ADMIN — ACETAMINOPHEN 325MG 650 MG: 325 TABLET ORAL at 22:16

## 2024-03-15 RX ADMIN — FAMOTIDINE 20 MG: 10 INJECTION INTRAVENOUS at 10:32

## 2024-03-15 RX ADMIN — VANCOMYCIN HYDROCHLORIDE 1750 MG: 1 INJECTION, POWDER, LYOPHILIZED, FOR SOLUTION INTRAVENOUS at 11:58

## 2024-03-15 RX ADMIN — NICARDIPINE HYDROCHLORIDE 8 MG/HR: 0.1 INJECTION INTRAVENOUS at 05:01

## 2024-03-15 RX ADMIN — SODIUM CHLORIDE, SODIUM LACTATE, POTASSIUM CHLORIDE, CALCIUM CHLORIDE AND DEXTROSE MONOHYDRATE: 5; 600; 310; 30; 20 INJECTION, SOLUTION INTRAVENOUS at 05:03

## 2024-03-15 RX ADMIN — GABAPENTIN 300 MG: 300 CAPSULE ORAL at 22:18

## 2024-03-15 RX ADMIN — ZOLPIDEM TARTRATE 5 MG: 5 TABLET ORAL at 23:34

## 2024-03-15 RX ADMIN — HEPARIN SODIUM 5000 UNITS: 5000 INJECTION INTRAVENOUS; SUBCUTANEOUS at 06:09

## 2024-03-15 RX ADMIN — CARVEDILOL 6.25 MG: 6.25 TABLET, FILM COATED ORAL at 16:41

## 2024-03-15 RX ADMIN — ACETAMINOPHEN 650 MG: 650 SUPPOSITORY RECTAL at 02:15

## 2024-03-15 RX ADMIN — NICARDIPINE HYDROCHLORIDE 8 MG/HR: 0.1 INJECTION INTRAVENOUS at 02:30

## 2024-03-15 RX ADMIN — SODIUM CHLORIDE, PRESERVATIVE FREE 10 ML: 5 INJECTION INTRAVENOUS at 22:23

## 2024-03-15 NOTE — ACP (ADVANCE CARE PLANNING)
Advance Care Planning   Healthcare Decision Maker:    Primary Decision Maker: Juliana Lema - Parent - [phone]    Secondary Decision Maker: Ebenezer Lema - Parent - [phone]    Click here to complete Healthcare Decision Makers including selection of the Healthcare Decision Maker Relationship (ie \"Primary\").  Today we documented Decision Maker(s) consistent with Legal Next of Kin hierarchy.        conducted a Follow up consultation and Spiritual Assessment for Harry Lema, who is a 38 y.o.,male.      The  provided the following Interventions:  Continued the relationship of care and support. Patient was alert and engaging in conversation. Mr Ebenezer Lema was by the bedside at the time of my visit.  Listened empathically. Documented healthcare decision makers based on nearest of kin. Patient lives with parents. Patient mentioned that he has an older sister, too. Patient is not interested in completing an Advance Medical Directive at this time.  Offered assurance of continued prayer on behalf of the patient.    Intervention/Outcome:    Patient and family expressed gratitude to 's visit.    Plan:  Continue support as per need or as a request basis.  All caregivers, family members, call  if patient develops any spiritual or emotional crises.     Camille Guevara, [facility]  Spiritual Care  [phone]

## 2024-03-15 NOTE — CARE COORDINATION
3/15/24     review chart.  Patient not medically cleared for discharge/downgrade from ICU.   Extubated 3/13-now on room air/IV ABX per ID.   Urology/Nephrology/ID/ following.   Right neph tube placement 3/12.   Confusion/anxiety. Zyprexa increased. Monitor sodium levels.   Dispo: home with parents/HH if needed would like [facility] pending medical condition. Parents to provide discharge ride.    Adalgisa Mulligan  Care Management

## 2024-03-15 NOTE — INTERDISCIPLINARY ROUNDS
[facility]  Pulmonary, Critical Care, and Sleep Medicine  Interdisciplinary and Ventilator Weaning Rounds     Patient discussed in morning walking rounds and interdisciplinary rounds.     ICU admission day: 3/11     Overnight events:   Confused  Hypertensive on cardene     Assessments and best practice:  Ventilator  N/A  Glycemic control  Diet  NPO     Stress ulcer prophylaxis.  Pepcid  DVT prophylaxis.  SQH  Need for Lines, ramirez assessed.  Yes  Restraint Reevaluation      Yes  I have reevaluated the patient one hour after initiation of intervention. The patient is comfortable, uninjured, but continues to pose an imminent risk of injury to self to themselves and/or serious disruption of medical treatment required to keep patient stable. The patient's current medical and behavioral conditions that warrant the use intervention include Poor safety judgment:   and Pulling out devices:  Pulling lines.  Restraint or seclusion will be discontinued at the earliest possible time, regardless of the scheduled expiration of the order. Based on my evaluation, restraints will be continued: Yes  Disposition regarding transferring out of the ICU  RED        Family contact/MPOA: Juliana Lema, Parent  Family updated family to be updated     Palliative consult within 3 days of admission to ICU-  Ethics Guidance: 21 days        Daily Plans:  D5LR @125cc/hr        LULU time 20 minutes        Joy Oliva PA-C  03/14/24  Pulmonary, Critical Care Medicine  [facility]     
[facility]  Pulmonary, Critical Care, and Sleep Medicine  Interdisciplinary and Ventilator Weaning Rounds     Patient discussed in morning walking rounds and interdisciplinary rounds.     ICU admission day: 3/11     Overnight events:   Very altered/confused   Weaning precedex and cardene      Assessments and best practice:  Ventilator  N/A  Glycemic control  Diet  NPO     Stress ulcer prophylaxis.  Pepcid  DVT prophylaxis.  SQH  Need for Lines, ramirez assessed.  Yes  Restraint Reevaluation      Yes  I have reevaluated the patient one hour after initiation of intervention. The patient is comfortable, uninjured, but continues to pose an imminent risk of injury to self to themselves and/or serious disruption of medical treatment required to keep patient stable. The patient's current medical and behavioral conditions that warrant the use intervention include Poor safety judgment:   and Pulling out devices:  Pulling lines.  Restraint or seclusion will be discontinued at the earliest possible time, regardless of the scheduled expiration of the order. Based on my evaluation, restraints will be continued: Yes  Disposition regarding transferring out of the ICU  yellow        Family contact/MPOA: Juliana Lema, Parent  Family updated? Updated at bedside by Dr Noyola      Palliative consult within 3 days of admission to ICU-  Ethics Guidance: 21 days        Daily Plans:  Med rec  Wean medications  Transfer afternoon / early tomorrow         LULU time 15 minutes        Diane Betts PA-C  03/15/24  Pulmonary, Critical Care Medicine  [facility]      
[facility]  Pulmonary, Critical Care, and Sleep Medicine  Interdisciplinary and Ventilator Weaning Rounds    Patient discussed in morning walking rounds and interdisciplinary rounds.    ICU admission day: 3/11    Overnight events:   Admitted yesterday     Assessments and best practice:  Ventilator  Ventilator Day(s): 2  Vent Settings  FiO2 : 40 %  Resp Rate (Set): 18 bpm  PEEP/CPAP (cmH2O): 5  Insp Time (sec): 0.8 sec  Insp Rise Time (%): 50 %  Flow Sensitivity: 3 L/min  Pressure Sensitivity: 3 cm H2O   VAP bundle, aim to keep peak plateau pressure 25-30cm H2O  Weaning assessed and documented   Patient does notmeet criteria for SBT.   Patient is  on sedation holiday.  Plan to wean with PS YES.  Outcome: PS   Final plan: PS   Glycemic control  Diet  NPO    Stress ulcer prophylaxis.  Pepcid  DVT prophylaxis.  SQH  Need for Lines, ramirez assessed.  Yes  Restraint Reevaluation     Yes  I have reevaluated the patient one hour after initiation of intervention. The patient is comfortable, uninjured, but continues to pose an imminent risk of injury to self to themselves and/or serious disruption of medical treatment required to keep patient stable. The patient's current medical and behavioral conditions that warrant the use intervention include Poor safety judgment:   and Pulling out devices:  Pulling lines.  Restraint or seclusion will be discontinued at the earliest possible time, regardless of the scheduled expiration of the order. Based on my evaluation, restraints will be continued: Yes  Disposition regarding transferring out of the ICU  RED      Family contact/MPOA: Juliana Lema, Parent  Family updated family to be updated    Palliative consult within 3 days of admission to ICU-  Ethics Guidance: 21 days      Daily Plans:  SBT      LULU time 20 minutes      Diane Resendiz RN  03/12/24  Pulmonary, Critical Care Medicine  [facility]         
RN  03/13/24  Pulmonary, Critical Care Medicine  [facility]

## 2024-03-16 LAB
ALBUMIN SERPL-MCNC: 2.1 G/DL (ref 3.4–5)
ANION GAP SERPL CALC-SCNC: 5 MMOL/L (ref 3–18)
ANION GAP SERPL CALC-SCNC: 6 MMOL/L (ref 3–18)
BASOPHILS # BLD: 0.1 K/UL (ref 0–0.1)
BASOPHILS NFR BLD: 1 % (ref 0–2)
BUN SERPL-MCNC: 23 MG/DL (ref 7–18)
BUN SERPL-MCNC: 29 MG/DL (ref 7–18)
BUN/CREAT SERPL: 17 (ref 12–20)
BUN/CREAT SERPL: 17 (ref 12–20)
CALCIUM SERPL-MCNC: 7.8 MG/DL (ref 8.5–10.1)
CALCIUM SERPL-MCNC: 8.5 MG/DL (ref 8.5–10.1)
CHLORIDE SERPL-SCNC: 116 MMOL/L (ref 100–111)
CHLORIDE SERPL-SCNC: 118 MMOL/L (ref 100–111)
CK SERPL-CCNC: 1625 U/L (ref 39–308)
CO2 SERPL-SCNC: 25 MMOL/L (ref 21–32)
CO2 SERPL-SCNC: 31 MMOL/L (ref 21–32)
CREAT SERPL-MCNC: 1.39 MG/DL (ref 0.6–1.3)
CREAT SERPL-MCNC: 1.7 MG/DL (ref 0.6–1.3)
DIFFERENTIAL METHOD BLD: ABNORMAL
EOSINOPHIL # BLD: 0.3 K/UL (ref 0–0.4)
EOSINOPHIL NFR BLD: 3 % (ref 0–5)
ERYTHROCYTE [DISTWIDTH] IN BLOOD BY AUTOMATED COUNT: 14.5 % (ref 11.6–14.5)
GLUCOSE BLD STRIP.AUTO-MCNC: 125 MG/DL (ref 70–110)
GLUCOSE BLD STRIP.AUTO-MCNC: 133 MG/DL (ref 70–110)
GLUCOSE SERPL-MCNC: 106 MG/DL (ref 74–99)
GLUCOSE SERPL-MCNC: 162 MG/DL (ref 74–99)
HCT VFR BLD AUTO: 33.7 % (ref 36–48)
HGB BLD-MCNC: 10.9 G/DL (ref 13–16)
IMM GRANULOCYTES # BLD AUTO: 0.7 K/UL (ref 0–0.04)
IMM GRANULOCYTES NFR BLD AUTO: 6 % (ref 0–0.5)
LYMPHOCYTES # BLD: 3 K/UL (ref 0.9–3.6)
LYMPHOCYTES NFR BLD: 24 % (ref 21–52)
MAGNESIUM SERPL-MCNC: 1.6 MG/DL (ref 1.6–2.6)
MAGNESIUM SERPL-MCNC: 1.9 MG/DL (ref 1.6–2.6)
MCH RBC QN AUTO: 29.5 PG (ref 24–34)
MCHC RBC AUTO-ENTMCNC: 32.3 G/DL (ref 31–37)
MCV RBC AUTO: 91.3 FL (ref 78–100)
MONOCYTES # BLD: 1.3 K/UL (ref 0.05–1.2)
MONOCYTES NFR BLD: 11 % (ref 3–10)
NEUTS SEG # BLD: 6.9 K/UL (ref 1.8–8)
NEUTS SEG NFR BLD: 56 % (ref 40–73)
NRBC # BLD: 0 K/UL (ref 0–0.01)
NRBC BLD-RTO: 0 PER 100 WBC
PHOSPHATE SERPL-MCNC: 3.1 MG/DL (ref 2.5–4.9)
PHOSPHATE SERPL-MCNC: 4.3 MG/DL (ref 2.5–4.9)
PLATELET # BLD AUTO: 445 K/UL (ref 135–420)
PMV BLD AUTO: 9.7 FL (ref 9.2–11.8)
POTASSIUM SERPL-SCNC: 3.1 MMOL/L (ref 3.5–5.5)
POTASSIUM SERPL-SCNC: 3.2 MMOL/L (ref 3.5–5.5)
RBC # BLD AUTO: 3.69 M/UL (ref 4.35–5.65)
SODIUM SERPL-SCNC: 147 MMOL/L (ref 136–145)
SODIUM SERPL-SCNC: 154 MMOL/L (ref 136–145)
VANCOMYCIN SERPL-MCNC: 13.4 UG/ML (ref 5–40)
WBC # BLD AUTO: 12.3 K/UL (ref 4.6–13.2)

## 2024-03-16 PROCEDURE — 94761 N-INVAS EAR/PLS OXIMETRY MLT: CPT

## 2024-03-16 PROCEDURE — 2580000003 HC RX 258: Performed by: PHYSICIAN ASSISTANT

## 2024-03-16 PROCEDURE — 85025 COMPLETE CBC W/AUTO DIFF WBC: CPT

## 2024-03-16 PROCEDURE — 36415 COLL VENOUS BLD VENIPUNCTURE: CPT

## 2024-03-16 PROCEDURE — 97166 OT EVAL MOD COMPLEX 45 MIN: CPT

## 2024-03-16 PROCEDURE — 97162 PT EVAL MOD COMPLEX 30 MIN: CPT

## 2024-03-16 PROCEDURE — 6360000002 HC RX W HCPCS: Performed by: INTERNAL MEDICINE

## 2024-03-16 PROCEDURE — 84100 ASSAY OF PHOSPHORUS: CPT

## 2024-03-16 PROCEDURE — 2500000003 HC RX 250 WO HCPCS: Performed by: INTERNAL MEDICINE

## 2024-03-16 PROCEDURE — 6370000000 HC RX 637 (ALT 250 FOR IP): Performed by: PHYSICIAN ASSISTANT

## 2024-03-16 PROCEDURE — 97530 THERAPEUTIC ACTIVITIES: CPT

## 2024-03-16 PROCEDURE — 6360000002 HC RX W HCPCS: Performed by: PHYSICIAN ASSISTANT

## 2024-03-16 PROCEDURE — 97535 SELF CARE MNGMENT TRAINING: CPT

## 2024-03-16 PROCEDURE — 2580000003 HC RX 258: Performed by: INTERNAL MEDICINE

## 2024-03-16 PROCEDURE — 82962 GLUCOSE BLOOD TEST: CPT

## 2024-03-16 PROCEDURE — 83735 ASSAY OF MAGNESIUM: CPT

## 2024-03-16 PROCEDURE — 99291 CRITICAL CARE FIRST HOUR: CPT | Performed by: INTERNAL MEDICINE

## 2024-03-16 PROCEDURE — 80202 ASSAY OF VANCOMYCIN: CPT

## 2024-03-16 PROCEDURE — 80048 BASIC METABOLIC PNL TOTAL CA: CPT

## 2024-03-16 PROCEDURE — 6360000002 HC RX W HCPCS: Performed by: NURSE PRACTITIONER

## 2024-03-16 PROCEDURE — 2000000000 HC ICU R&B

## 2024-03-16 PROCEDURE — 82550 ASSAY OF CK (CPK): CPT

## 2024-03-16 PROCEDURE — 80069 RENAL FUNCTION PANEL: CPT

## 2024-03-16 PROCEDURE — 6370000000 HC RX 637 (ALT 250 FOR IP): Performed by: NURSE PRACTITIONER

## 2024-03-16 RX ORDER — HYDRALAZINE HYDROCHLORIDE 20 MG/ML
20 INJECTION INTRAMUSCULAR; INTRAVENOUS EVERY 4 HOURS PRN
Status: DISCONTINUED | OUTPATIENT
Start: 2024-03-16 | End: 2024-03-17

## 2024-03-16 RX ORDER — THIAMINE HYDROCHLORIDE 100 MG/ML
400 INJECTION, SOLUTION INTRAMUSCULAR; INTRAVENOUS 3 TIMES DAILY
Status: COMPLETED | OUTPATIENT
Start: 2024-03-16 | End: 2024-03-18

## 2024-03-16 RX ORDER — DEXTROSE MONOHYDRATE 50 MG/ML
INJECTION, SOLUTION INTRAVENOUS CONTINUOUS
Status: DISCONTINUED | OUTPATIENT
Start: 2024-03-16 | End: 2024-03-17

## 2024-03-16 RX ORDER — LORAZEPAM 2 MG/ML
1 INJECTION INTRAMUSCULAR
Status: DISCONTINUED | OUTPATIENT
Start: 2024-03-16 | End: 2024-03-21

## 2024-03-16 RX ORDER — LORAZEPAM 2 MG/ML
2 INJECTION INTRAMUSCULAR
Status: DISCONTINUED | OUTPATIENT
Start: 2024-03-16 | End: 2024-03-21

## 2024-03-16 RX ORDER — LORAZEPAM 1 MG/1
3 TABLET ORAL
Status: DISCONTINUED | OUTPATIENT
Start: 2024-03-16 | End: 2024-03-21

## 2024-03-16 RX ORDER — DEXTROSE AND SODIUM CHLORIDE 5; .45 G/100ML; G/100ML
INJECTION, SOLUTION INTRAVENOUS CONTINUOUS
Status: DISCONTINUED | OUTPATIENT
Start: 2024-03-16 | End: 2024-03-16

## 2024-03-16 RX ORDER — DEXMEDETOMIDINE HYDROCHLORIDE 4 UG/ML
.1-1.5 INJECTION, SOLUTION INTRAVENOUS CONTINUOUS
Status: DISCONTINUED | OUTPATIENT
Start: 2024-03-16 | End: 2024-03-17

## 2024-03-16 RX ORDER — SODIUM CHLORIDE 0.9 % (FLUSH) 0.9 %
5-40 SYRINGE (ML) INJECTION PRN
Status: DISCONTINUED | OUTPATIENT
Start: 2024-03-16 | End: 2024-03-26 | Stop reason: HOSPADM

## 2024-03-16 RX ORDER — THIAMINE HYDROCHLORIDE 100 MG/ML
200 INJECTION, SOLUTION INTRAMUSCULAR; INTRAVENOUS DAILY
Status: COMPLETED | OUTPATIENT
Start: 2024-03-19 | End: 2024-03-23

## 2024-03-16 RX ORDER — HYDRALAZINE HYDROCHLORIDE 20 MG/ML
10 INJECTION INTRAMUSCULAR; INTRAVENOUS ONCE
Status: COMPLETED | OUTPATIENT
Start: 2024-03-16 | End: 2024-03-16

## 2024-03-16 RX ORDER — LORAZEPAM 1 MG/1
4 TABLET ORAL
Status: DISCONTINUED | OUTPATIENT
Start: 2024-03-16 | End: 2024-03-21

## 2024-03-16 RX ORDER — MAGNESIUM SULFATE IN WATER 40 MG/ML
2000 INJECTION, SOLUTION INTRAVENOUS ONCE
Status: COMPLETED | OUTPATIENT
Start: 2024-03-16 | End: 2024-03-16

## 2024-03-16 RX ORDER — LORAZEPAM 1 MG/1
1 TABLET ORAL
Status: DISCONTINUED | OUTPATIENT
Start: 2024-03-16 | End: 2024-03-21

## 2024-03-16 RX ORDER — POTASSIUM CHLORIDE 7.45 MG/ML
10 INJECTION INTRAVENOUS
Status: DISPENSED | OUTPATIENT
Start: 2024-03-16 | End: 2024-03-16

## 2024-03-16 RX ORDER — LORAZEPAM 1 MG/1
2 TABLET ORAL
Status: DISCONTINUED | OUTPATIENT
Start: 2024-03-16 | End: 2024-03-21

## 2024-03-16 RX ORDER — LORAZEPAM 2 MG/ML
4 INJECTION INTRAMUSCULAR
Status: DISCONTINUED | OUTPATIENT
Start: 2024-03-16 | End: 2024-03-21

## 2024-03-16 RX ORDER — CARVEDILOL 25 MG/1
25 TABLET ORAL 2 TIMES DAILY WITH MEALS
Status: DISCONTINUED | OUTPATIENT
Start: 2024-03-16 | End: 2024-03-22

## 2024-03-16 RX ORDER — LORAZEPAM 2 MG/ML
3 INJECTION INTRAMUSCULAR
Status: DISCONTINUED | OUTPATIENT
Start: 2024-03-16 | End: 2024-03-21

## 2024-03-16 RX ORDER — CARVEDILOL 12.5 MG/1
12.5 TABLET ORAL 2 TIMES DAILY WITH MEALS
Status: DISCONTINUED | OUTPATIENT
Start: 2024-03-16 | End: 2024-03-16

## 2024-03-16 RX ORDER — SODIUM CHLORIDE 0.9 % (FLUSH) 0.9 %
5-40 SYRINGE (ML) INJECTION EVERY 12 HOURS SCHEDULED
Status: DISCONTINUED | OUTPATIENT
Start: 2024-03-16 | End: 2024-03-26 | Stop reason: HOSPADM

## 2024-03-16 RX ORDER — SODIUM CHLORIDE 9 MG/ML
INJECTION, SOLUTION INTRAVENOUS PRN
Status: DISCONTINUED | OUTPATIENT
Start: 2024-03-16 | End: 2024-03-26 | Stop reason: HOSPADM

## 2024-03-16 RX ADMIN — POTASSIUM CHLORIDE 10 MEQ: 7.45 INJECTION INTRAVENOUS at 16:00

## 2024-03-16 RX ADMIN — POTASSIUM CHLORIDE 10 MEQ: 7.45 INJECTION INTRAVENOUS at 11:59

## 2024-03-16 RX ADMIN — MAGNESIUM SULFATE HEPTAHYDRATE 2000 MG: 40 INJECTION, SOLUTION INTRAVENOUS at 05:07

## 2024-03-16 RX ADMIN — CLONAZEPAM 1 MG: 0.5 TABLET ORAL at 03:36

## 2024-03-16 RX ADMIN — HEPARIN SODIUM 5000 UNITS: 5000 INJECTION INTRAVENOUS; SUBCUTANEOUS at 06:00

## 2024-03-16 RX ADMIN — HEPARIN SODIUM 5000 UNITS: 5000 INJECTION INTRAVENOUS; SUBCUTANEOUS at 21:58

## 2024-03-16 RX ADMIN — DEXMEDETOMIDINE HYDROCHLORIDE 1.5 MCG/KG/HR: 4 INJECTION, SOLUTION INTRAVENOUS at 13:30

## 2024-03-16 RX ADMIN — POTASSIUM CHLORIDE 10 MEQ: 7.45 INJECTION INTRAVENOUS at 05:02

## 2024-03-16 RX ADMIN — THIAMINE HYDROCHLORIDE 400 MG: 100 INJECTION, SOLUTION INTRAMUSCULAR; INTRAVENOUS at 20:36

## 2024-03-16 RX ADMIN — DEXTROSE MONOHYDRATE: 50 INJECTION, SOLUTION INTRAVENOUS at 13:16

## 2024-03-16 RX ADMIN — OLANZAPINE 10 MG: 10 INJECTION, POWDER, FOR SOLUTION INTRAMUSCULAR at 10:15

## 2024-03-16 RX ADMIN — CARVEDILOL 25 MG: 25 TABLET, FILM COATED ORAL at 09:15

## 2024-03-16 RX ADMIN — SODIUM CHLORIDE, PRESERVATIVE FREE 10 ML: 5 INJECTION INTRAVENOUS at 20:10

## 2024-03-16 RX ADMIN — CLONAZEPAM 1 MG: 0.5 TABLET ORAL at 15:01

## 2024-03-16 RX ADMIN — DEXTROSE AND SODIUM CHLORIDE: 5; 450 INJECTION, SOLUTION INTRAVENOUS at 11:01

## 2024-03-16 RX ADMIN — DEXMEDETOMIDINE HYDROCHLORIDE 1.5 MCG/KG/HR: 4 INJECTION, SOLUTION INTRAVENOUS at 16:06

## 2024-03-16 RX ADMIN — HEPARIN SODIUM 5000 UNITS: 5000 INJECTION INTRAVENOUS; SUBCUTANEOUS at 15:01

## 2024-03-16 RX ADMIN — DEXTROSE MONOHYDRATE: 50 INJECTION, SOLUTION INTRAVENOUS at 23:57

## 2024-03-16 RX ADMIN — LORAZEPAM 4 MG: 2 INJECTION INTRAMUSCULAR; INTRAVENOUS at 13:11

## 2024-03-16 RX ADMIN — THIAMINE HYDROCHLORIDE 400 MG: 100 INJECTION, SOLUTION INTRAMUSCULAR; INTRAVENOUS at 15:01

## 2024-03-16 RX ADMIN — DEXMEDETOMIDINE HYDROCHLORIDE 0.8 MCG/KG/HR: 4 INJECTION, SOLUTION INTRAVENOUS at 11:50

## 2024-03-16 RX ADMIN — GABAPENTIN 300 MG: 300 CAPSULE ORAL at 20:36

## 2024-03-16 RX ADMIN — DEXMEDETOMIDINE HYDROCHLORIDE 0.6 MCG/KG/HR: 4 INJECTION, SOLUTION INTRAVENOUS at 11:20

## 2024-03-16 RX ADMIN — HYDRALAZINE HYDROCHLORIDE 10 MG: 20 INJECTION, SOLUTION INTRAMUSCULAR; INTRAVENOUS at 05:03

## 2024-03-16 RX ADMIN — SODIUM CHLORIDE, PRESERVATIVE FREE 10 ML: 5 INJECTION INTRAVENOUS at 09:21

## 2024-03-16 RX ADMIN — DULOXETINE HYDROCHLORIDE 60 MG: 60 CAPSULE, DELAYED RELEASE ORAL at 09:17

## 2024-03-16 RX ADMIN — HYDRALAZINE HYDROCHLORIDE 10 MG: 20 INJECTION INTRAMUSCULAR; INTRAVENOUS at 03:42

## 2024-03-16 RX ADMIN — DEXMEDETOMIDINE HYDROCHLORIDE 0.2 MCG/KG/HR: 4 INJECTION, SOLUTION INTRAVENOUS at 10:20

## 2024-03-16 RX ADMIN — PANTOPRAZOLE SODIUM 40 MG: 40 TABLET, DELAYED RELEASE ORAL at 09:18

## 2024-03-16 RX ADMIN — DEXMEDETOMIDINE HYDROCHLORIDE 1.2 MCG/KG/HR: 4 INJECTION, SOLUTION INTRAVENOUS at 13:00

## 2024-03-16 RX ADMIN — POTASSIUM CHLORIDE 10 MEQ: 7.45 INJECTION INTRAVENOUS at 14:23

## 2024-03-16 RX ADMIN — DEXMEDETOMIDINE HYDROCHLORIDE 1.3 MCG/KG/HR: 4 INJECTION, SOLUTION INTRAVENOUS at 20:33

## 2024-03-16 RX ADMIN — LORAZEPAM 4 MG: 2 INJECTION INTRAMUSCULAR; INTRAVENOUS at 18:11

## 2024-03-16 RX ADMIN — SODIUM CHLORIDE, PRESERVATIVE FREE 10 ML: 5 INJECTION INTRAVENOUS at 20:09

## 2024-03-16 RX ADMIN — DEXMEDETOMIDINE HYDROCHLORIDE 1.5 MCG/KG/HR: 4 INJECTION, SOLUTION INTRAVENOUS at 18:22

## 2024-03-16 RX ADMIN — GABAPENTIN 300 MG: 300 CAPSULE ORAL at 15:01

## 2024-03-16 RX ADMIN — POTASSIUM CHLORIDE 10 MEQ: 7.45 INJECTION INTRAVENOUS at 06:00

## 2024-03-16 RX ADMIN — LEVOTHYROXINE SODIUM 25 MCG: 0.05 TABLET ORAL at 09:16

## 2024-03-16 RX ADMIN — LORAZEPAM 4 MG: 2 INJECTION INTRAMUSCULAR; INTRAVENOUS at 11:53

## 2024-03-16 RX ADMIN — GABAPENTIN 300 MG: 300 CAPSULE ORAL at 09:18

## 2024-03-16 RX ADMIN — DEXMEDETOMIDINE HYDROCHLORIDE 1 MCG/KG/HR: 4 INJECTION, SOLUTION INTRAVENOUS at 12:30

## 2024-03-16 RX ADMIN — TOPIRAMATE 100 MG: 100 TABLET, FILM COATED ORAL at 09:20

## 2024-03-16 RX ADMIN — DEXMEDETOMIDINE HYDROCHLORIDE 0.4 MCG/KG/HR: 4 INJECTION, SOLUTION INTRAVENOUS at 10:50

## 2024-03-16 RX ADMIN — THIAMINE HYDROCHLORIDE 400 MG: 100 INJECTION, SOLUTION INTRAMUSCULAR; INTRAVENOUS at 10:41

## 2024-03-16 RX ADMIN — LORAZEPAM 2 MG: 2 INJECTION INTRAMUSCULAR; INTRAVENOUS at 23:08

## 2024-03-16 NOTE — FLOWSHEET NOTE
Remains confused intermittently overnight. Total output 7 medium soft stools on bedpan. Rec'd tylenol for general discomfort / achiness. PO intake WNL. Takes meds with no dificulty /IV site to left upper arm reddened, removed.  inserted 22 to left hand, is difficult stick/ needs electrolyte replacement with potassium runs, potassium 3.1 on am labs. . May need picc/midline.  B/P elevated systolically 180-190's, and rec;d prn's with additional one time hydralazine.  See Mar/ last temp 99.7 axillary.

## 2024-03-17 PROBLEM — J96.01 ACUTE RESPIRATORY FAILURE WITH HYPOXIA AND HYPERCAPNIA (HCC): Status: ACTIVE | Noted: 2024-03-17

## 2024-03-17 PROBLEM — N12 PYELONEPHRITIS: Status: ACTIVE | Noted: 2024-03-17

## 2024-03-17 PROBLEM — M62.82 RHABDOMYOLYSIS: Status: ACTIVE | Noted: 2024-03-17

## 2024-03-17 PROBLEM — J96.02 ACUTE RESPIRATORY FAILURE WITH HYPOXIA AND HYPERCAPNIA (HCC): Status: ACTIVE | Noted: 2024-03-17

## 2024-03-17 LAB
ANION GAP SERPL CALC-SCNC: 6 MMOL/L (ref 3–18)
ANION GAP SERPL CALC-SCNC: 6 MMOL/L (ref 3–18)
BASOPHILS # BLD: 0.1 K/UL (ref 0–0.1)
BASOPHILS NFR BLD: 1 % (ref 0–2)
BUN SERPL-MCNC: 23 MG/DL (ref 7–18)
BUN SERPL-MCNC: 23 MG/DL (ref 7–18)
BUN/CREAT SERPL: 15 (ref 12–20)
BUN/CREAT SERPL: 17 (ref 12–20)
CALCIUM SERPL-MCNC: 7.6 MG/DL (ref 8.5–10.1)
CALCIUM SERPL-MCNC: 8.4 MG/DL (ref 8.5–10.1)
CHLORIDE SERPL-SCNC: 112 MMOL/L (ref 100–111)
CHLORIDE SERPL-SCNC: 118 MMOL/L (ref 100–111)
CO2 SERPL-SCNC: 26 MMOL/L (ref 21–32)
CO2 SERPL-SCNC: 26 MMOL/L (ref 21–32)
CREAT SERPL-MCNC: 1.34 MG/DL (ref 0.6–1.3)
CREAT SERPL-MCNC: 1.55 MG/DL (ref 0.6–1.3)
DIFFERENTIAL METHOD BLD: ABNORMAL
EOSINOPHIL # BLD: 0.3 K/UL (ref 0–0.4)
EOSINOPHIL NFR BLD: 3 % (ref 0–5)
ERYTHROCYTE [DISTWIDTH] IN BLOOD BY AUTOMATED COUNT: 14.6 % (ref 11.6–14.5)
GLUCOSE BLD STRIP.AUTO-MCNC: 140 MG/DL (ref 70–110)
GLUCOSE SERPL-MCNC: 126 MG/DL (ref 74–99)
GLUCOSE SERPL-MCNC: 148 MG/DL (ref 74–99)
HCT VFR BLD AUTO: 32.6 % (ref 36–48)
HGB BLD-MCNC: 10.5 G/DL (ref 13–16)
IMM GRANULOCYTES # BLD AUTO: 0.5 K/UL (ref 0–0.04)
IMM GRANULOCYTES NFR BLD AUTO: 4 % (ref 0–0.5)
LYMPHOCYTES # BLD: 2.3 K/UL (ref 0.9–3.6)
LYMPHOCYTES NFR BLD: 21 % (ref 21–52)
MAGNESIUM SERPL-MCNC: 1.9 MG/DL (ref 1.6–2.6)
MAGNESIUM SERPL-MCNC: 2.2 MG/DL (ref 1.6–2.6)
MCH RBC QN AUTO: 30.3 PG (ref 24–34)
MCHC RBC AUTO-ENTMCNC: 32.2 G/DL (ref 31–37)
MCV RBC AUTO: 94.2 FL (ref 78–100)
MONOCYTES # BLD: 0.7 K/UL (ref 0.05–1.2)
MONOCYTES NFR BLD: 6 % (ref 3–10)
NEUTS SEG # BLD: 7 K/UL (ref 1.8–8)
NEUTS SEG NFR BLD: 65 % (ref 40–73)
NRBC # BLD: 0 K/UL (ref 0–0.01)
NRBC BLD-RTO: 0 PER 100 WBC
PHOSPHATE SERPL-MCNC: 4.3 MG/DL (ref 2.5–4.9)
PLATELET # BLD AUTO: 372 K/UL (ref 135–420)
PMV BLD AUTO: 9.9 FL (ref 9.2–11.8)
POTASSIUM SERPL-SCNC: 3.1 MMOL/L (ref 3.5–5.5)
POTASSIUM SERPL-SCNC: 3.7 MMOL/L (ref 3.5–5.5)
RBC # BLD AUTO: 3.46 M/UL (ref 4.35–5.65)
SODIUM SERPL-SCNC: 144 MMOL/L (ref 136–145)
SODIUM SERPL-SCNC: 150 MMOL/L (ref 136–145)
VANCOMYCIN SERPL-MCNC: 11.2 UG/ML (ref 5–40)
WBC # BLD AUTO: 10.8 K/UL (ref 4.6–13.2)

## 2024-03-17 PROCEDURE — 2000000000 HC ICU R&B

## 2024-03-17 PROCEDURE — 80048 BASIC METABOLIC PNL TOTAL CA: CPT

## 2024-03-17 PROCEDURE — 2580000003 HC RX 258: Performed by: INTERNAL MEDICINE

## 2024-03-17 PROCEDURE — 85025 COMPLETE CBC W/AUTO DIFF WBC: CPT

## 2024-03-17 PROCEDURE — 82962 GLUCOSE BLOOD TEST: CPT

## 2024-03-17 PROCEDURE — 2500000003 HC RX 250 WO HCPCS: Performed by: INTERNAL MEDICINE

## 2024-03-17 PROCEDURE — 6360000002 HC RX W HCPCS: Performed by: PHYSICIAN ASSISTANT

## 2024-03-17 PROCEDURE — 94761 N-INVAS EAR/PLS OXIMETRY MLT: CPT

## 2024-03-17 PROCEDURE — 84100 ASSAY OF PHOSPHORUS: CPT

## 2024-03-17 PROCEDURE — 80202 ASSAY OF VANCOMYCIN: CPT

## 2024-03-17 PROCEDURE — 83735 ASSAY OF MAGNESIUM: CPT

## 2024-03-17 PROCEDURE — 6370000000 HC RX 637 (ALT 250 FOR IP): Performed by: NURSE PRACTITIONER

## 2024-03-17 PROCEDURE — 2580000003 HC RX 258: Performed by: PHYSICIAN ASSISTANT

## 2024-03-17 PROCEDURE — 6370000000 HC RX 637 (ALT 250 FOR IP): Performed by: HOSPITALIST

## 2024-03-17 PROCEDURE — 6370000000 HC RX 637 (ALT 250 FOR IP): Performed by: PHYSICIAN ASSISTANT

## 2024-03-17 PROCEDURE — 99291 CRITICAL CARE FIRST HOUR: CPT | Performed by: INTERNAL MEDICINE

## 2024-03-17 PROCEDURE — 6360000002 HC RX W HCPCS: Performed by: NURSE PRACTITIONER

## 2024-03-17 PROCEDURE — 99232 SBSQ HOSP IP/OBS MODERATE 35: CPT

## 2024-03-17 PROCEDURE — 36415 COLL VENOUS BLD VENIPUNCTURE: CPT

## 2024-03-17 RX ORDER — POTASSIUM CHLORIDE 7.45 MG/ML
10 INJECTION INTRAVENOUS
Status: COMPLETED | OUTPATIENT
Start: 2024-03-17 | End: 2024-03-17

## 2024-03-17 RX ORDER — CARBOXYMETHYLCELLULOSE SODIUM 10 MG/ML
1 GEL OPHTHALMIC 3 TIMES DAILY
Status: DISCONTINUED | OUTPATIENT
Start: 2024-03-17 | End: 2024-03-26 | Stop reason: HOSPADM

## 2024-03-17 RX ORDER — CARBOXYMETHYLCELLULOSE SODIUM 10 MG/ML
1 GEL OPHTHALMIC 3 TIMES DAILY
Status: DISCONTINUED | OUTPATIENT
Start: 2024-03-17 | End: 2024-03-17

## 2024-03-17 RX ORDER — SODIUM CHLORIDE, SODIUM LACTATE, POTASSIUM CHLORIDE, CALCIUM CHLORIDE 600; 310; 30; 20 MG/100ML; MG/100ML; MG/100ML; MG/100ML
INJECTION, SOLUTION INTRAVENOUS CONTINUOUS
Status: DISCONTINUED | OUTPATIENT
Start: 2024-03-17 | End: 2024-03-19

## 2024-03-17 RX ORDER — HYDRALAZINE HYDROCHLORIDE 20 MG/ML
10 INJECTION INTRAMUSCULAR; INTRAVENOUS EVERY 6 HOURS PRN
Status: DISCONTINUED | OUTPATIENT
Start: 2024-03-17 | End: 2024-03-26 | Stop reason: HOSPADM

## 2024-03-17 RX ORDER — MAGNESIUM SULFATE 1 G/100ML
1000 INJECTION INTRAVENOUS ONCE
Status: COMPLETED | OUTPATIENT
Start: 2024-03-17 | End: 2024-03-17

## 2024-03-17 RX ADMIN — CARBOXYMETHYLCELLULOSE SODIUM 1 DROP: 10 GEL OPHTHALMIC at 21:06

## 2024-03-17 RX ADMIN — HEPARIN SODIUM 5000 UNITS: 5000 INJECTION INTRAVENOUS; SUBCUTANEOUS at 06:21

## 2024-03-17 RX ADMIN — LORAZEPAM 3 MG: 2 INJECTION INTRAMUSCULAR; INTRAVENOUS at 22:30

## 2024-03-17 RX ADMIN — ZOLPIDEM TARTRATE 5 MG: 5 TABLET ORAL at 23:26

## 2024-03-17 RX ADMIN — CLONAZEPAM 1 MG: 0.5 TABLET ORAL at 02:20

## 2024-03-17 RX ADMIN — POTASSIUM CHLORIDE 10 MEQ: 7.46 INJECTION, SOLUTION INTRAVENOUS at 01:01

## 2024-03-17 RX ADMIN — GABAPENTIN 300 MG: 300 CAPSULE ORAL at 20:57

## 2024-03-17 RX ADMIN — PANTOPRAZOLE SODIUM 40 MG: 40 TABLET, DELAYED RELEASE ORAL at 09:41

## 2024-03-17 RX ADMIN — ACETAMINOPHEN 325MG 650 MG: 325 TABLET ORAL at 23:26

## 2024-03-17 RX ADMIN — GABAPENTIN 300 MG: 300 CAPSULE ORAL at 09:41

## 2024-03-17 RX ADMIN — LEVOTHYROXINE SODIUM 25 MCG: 0.05 TABLET ORAL at 09:41

## 2024-03-17 RX ADMIN — THIAMINE HYDROCHLORIDE 400 MG: 100 INJECTION, SOLUTION INTRAMUSCULAR; INTRAVENOUS at 20:57

## 2024-03-17 RX ADMIN — TOPIRAMATE 100 MG: 100 TABLET, FILM COATED ORAL at 09:41

## 2024-03-17 RX ADMIN — DULOXETINE HYDROCHLORIDE 60 MG: 60 CAPSULE, DELAYED RELEASE ORAL at 09:41

## 2024-03-17 RX ADMIN — MAGNESIUM SULFATE IN DEXTROSE 1000 MG: 10 INJECTION, SOLUTION INTRAVENOUS at 00:58

## 2024-03-17 RX ADMIN — LORAZEPAM 2 MG: 2 INJECTION INTRAMUSCULAR; INTRAVENOUS at 19:49

## 2024-03-17 RX ADMIN — THIAMINE HYDROCHLORIDE 400 MG: 100 INJECTION, SOLUTION INTRAMUSCULAR; INTRAVENOUS at 16:56

## 2024-03-17 RX ADMIN — HEPARIN SODIUM 5000 UNITS: 5000 INJECTION INTRAVENOUS; SUBCUTANEOUS at 22:19

## 2024-03-17 RX ADMIN — LORAZEPAM 2 MG: 2 INJECTION INTRAMUSCULAR; INTRAVENOUS at 01:22

## 2024-03-17 RX ADMIN — SODIUM CHLORIDE, PRESERVATIVE FREE 10 ML: 5 INJECTION INTRAVENOUS at 21:02

## 2024-03-17 RX ADMIN — THIAMINE HYDROCHLORIDE 400 MG: 100 INJECTION, SOLUTION INTRAMUSCULAR; INTRAVENOUS at 09:40

## 2024-03-17 RX ADMIN — CARVEDILOL 25 MG: 25 TABLET, FILM COATED ORAL at 09:41

## 2024-03-17 RX ADMIN — SODIUM CHLORIDE, PRESERVATIVE FREE 10 ML: 5 INJECTION INTRAVENOUS at 09:42

## 2024-03-17 RX ADMIN — LORAZEPAM 1 MG: 2 INJECTION INTRAMUSCULAR; INTRAVENOUS at 17:39

## 2024-03-17 RX ADMIN — CARVEDILOL 25 MG: 25 TABLET, FILM COATED ORAL at 17:40

## 2024-03-17 RX ADMIN — LORAZEPAM 1 MG: 2 INJECTION INTRAMUSCULAR; INTRAVENOUS at 12:23

## 2024-03-17 RX ADMIN — DEXMEDETOMIDINE HYDROCHLORIDE 0.7 MCG/KG/HR: 4 INJECTION, SOLUTION INTRAVENOUS at 00:37

## 2024-03-17 RX ADMIN — CLONAZEPAM 1 MG: 0.5 TABLET ORAL at 14:07

## 2024-03-17 RX ADMIN — GABAPENTIN 300 MG: 300 CAPSULE ORAL at 14:07

## 2024-03-17 RX ADMIN — HEPARIN SODIUM 5000 UNITS: 5000 INJECTION INTRAVENOUS; SUBCUTANEOUS at 14:08

## 2024-03-17 RX ADMIN — POTASSIUM BICARBONATE 40 MEQ: 782 TABLET, EFFERVESCENT ORAL at 00:58

## 2024-03-17 RX ADMIN — POTASSIUM CHLORIDE 10 MEQ: 7.46 INJECTION, SOLUTION INTRAVENOUS at 02:04

## 2024-03-17 RX ADMIN — SODIUM CHLORIDE, POTASSIUM CHLORIDE, SODIUM LACTATE AND CALCIUM CHLORIDE: 600; 310; 30; 20 INJECTION, SOLUTION INTRAVENOUS at 14:21

## 2024-03-17 ASSESSMENT — PAIN SCALES - GENERAL
PAINLEVEL_OUTOF10: 5
PAINLEVEL_OUTOF10: 0

## 2024-03-17 ASSESSMENT — PAIN DESCRIPTION - DESCRIPTORS: DESCRIPTORS: DULL;ACHING

## 2024-03-17 ASSESSMENT — PAIN DESCRIPTION - LOCATION: LOCATION: HEAD

## 2024-03-18 LAB
ANION GAP SERPL CALC-SCNC: 8 MMOL/L (ref 3–18)
BASOPHILS # BLD: 0.1 K/UL (ref 0–0.1)
BASOPHILS NFR BLD: 1 % (ref 0–2)
BUN SERPL-MCNC: 19 MG/DL (ref 7–18)
BUN/CREAT SERPL: 13 (ref 12–20)
CALCIUM SERPL-MCNC: 8.4 MG/DL (ref 8.5–10.1)
CHLORIDE SERPL-SCNC: 115 MMOL/L (ref 100–111)
CK SERPL-CCNC: 641 U/L (ref 39–308)
CO2 SERPL-SCNC: 24 MMOL/L (ref 21–32)
CREAT SERPL-MCNC: 1.42 MG/DL (ref 0.6–1.3)
DIFFERENTIAL METHOD BLD: ABNORMAL
EOSINOPHIL # BLD: 0.3 K/UL (ref 0–0.4)
EOSINOPHIL NFR BLD: 2 % (ref 0–5)
ERYTHROCYTE [DISTWIDTH] IN BLOOD BY AUTOMATED COUNT: 14.1 % (ref 11.6–14.5)
GLUCOSE BLD STRIP.AUTO-MCNC: 120 MG/DL (ref 70–110)
GLUCOSE SERPL-MCNC: 109 MG/DL (ref 74–99)
HCT VFR BLD AUTO: 29.9 % (ref 36–48)
HGB BLD-MCNC: 9.9 G/DL (ref 13–16)
IMM GRANULOCYTES # BLD AUTO: 0.6 K/UL (ref 0–0.04)
IMM GRANULOCYTES NFR BLD AUTO: 4 % (ref 0–0.5)
LYMPHOCYTES # BLD: 2.4 K/UL (ref 0.9–3.6)
LYMPHOCYTES NFR BLD: 18 % (ref 21–52)
MAGNESIUM SERPL-MCNC: 1.9 MG/DL (ref 1.6–2.6)
MCH RBC QN AUTO: 30.2 PG (ref 24–34)
MCHC RBC AUTO-ENTMCNC: 33.1 G/DL (ref 31–37)
MCV RBC AUTO: 91.2 FL (ref 78–100)
MONOCYTES # BLD: 0.7 K/UL (ref 0.05–1.2)
MONOCYTES NFR BLD: 5 % (ref 3–10)
NEUTS SEG # BLD: 9.4 K/UL (ref 1.8–8)
NEUTS SEG NFR BLD: 70 % (ref 40–73)
NRBC # BLD: 0 K/UL (ref 0–0.01)
NRBC BLD-RTO: 0 PER 100 WBC
PHOSPHATE SERPL-MCNC: 3.3 MG/DL (ref 2.5–4.9)
PLATELET # BLD AUTO: 396 K/UL (ref 135–420)
PMV BLD AUTO: 9.9 FL (ref 9.2–11.8)
POTASSIUM SERPL-SCNC: 3 MMOL/L (ref 3.5–5.5)
RBC # BLD AUTO: 3.28 M/UL (ref 4.35–5.65)
SODIUM SERPL-SCNC: 147 MMOL/L (ref 136–145)
VANCOMYCIN SERPL-MCNC: 20.9 UG/ML (ref 5–40)
WBC # BLD AUTO: 13.5 K/UL (ref 4.6–13.2)

## 2024-03-18 PROCEDURE — 6370000000 HC RX 637 (ALT 250 FOR IP): Performed by: PHYSICIAN ASSISTANT

## 2024-03-18 PROCEDURE — 82550 ASSAY OF CK (CPK): CPT

## 2024-03-18 PROCEDURE — 2580000003 HC RX 258: Performed by: INTERNAL MEDICINE

## 2024-03-18 PROCEDURE — 2000000000 HC ICU R&B

## 2024-03-18 PROCEDURE — 2580000003 HC RX 258: Performed by: PHYSICIAN ASSISTANT

## 2024-03-18 PROCEDURE — 6370000000 HC RX 637 (ALT 250 FOR IP): Performed by: HOSPITALIST

## 2024-03-18 PROCEDURE — 80048 BASIC METABOLIC PNL TOTAL CA: CPT

## 2024-03-18 PROCEDURE — 2700000000 HC OXYGEN THERAPY PER DAY

## 2024-03-18 PROCEDURE — 85025 COMPLETE CBC W/AUTO DIFF WBC: CPT

## 2024-03-18 PROCEDURE — 94761 N-INVAS EAR/PLS OXIMETRY MLT: CPT

## 2024-03-18 PROCEDURE — 83735 ASSAY OF MAGNESIUM: CPT

## 2024-03-18 PROCEDURE — 82962 GLUCOSE BLOOD TEST: CPT

## 2024-03-18 PROCEDURE — 36415 COLL VENOUS BLD VENIPUNCTURE: CPT

## 2024-03-18 PROCEDURE — 84100 ASSAY OF PHOSPHORUS: CPT

## 2024-03-18 PROCEDURE — 6370000000 HC RX 637 (ALT 250 FOR IP): Performed by: NURSE PRACTITIONER

## 2024-03-18 PROCEDURE — 99222 1ST HOSP IP/OBS MODERATE 55: CPT | Performed by: PSYCHIATRY & NEUROLOGY

## 2024-03-18 PROCEDURE — 6360000002 HC RX W HCPCS: Performed by: INTERNAL MEDICINE

## 2024-03-18 PROCEDURE — 6360000002 HC RX W HCPCS: Performed by: PHYSICIAN ASSISTANT

## 2024-03-18 PROCEDURE — 80202 ASSAY OF VANCOMYCIN: CPT

## 2024-03-18 RX ORDER — SODIUM CHLORIDE 0.9 % (FLUSH) 0.9 %
5-40 SYRINGE (ML) INJECTION EVERY 12 HOURS SCHEDULED
Status: DISCONTINUED | OUTPATIENT
Start: 2024-03-18 | End: 2024-03-26 | Stop reason: HOSPADM

## 2024-03-18 RX ORDER — POTASSIUM CHLORIDE 7.45 MG/ML
10 INJECTION INTRAVENOUS
Status: COMPLETED | OUTPATIENT
Start: 2024-03-18 | End: 2024-03-18

## 2024-03-18 RX ORDER — MAGNESIUM SULFATE 1 G/100ML
1000 INJECTION INTRAVENOUS ONCE
Status: COMPLETED | OUTPATIENT
Start: 2024-03-18 | End: 2024-03-18

## 2024-03-18 RX ORDER — SODIUM CHLORIDE 0.9 % (FLUSH) 0.9 %
5-40 SYRINGE (ML) INJECTION PRN
Status: DISCONTINUED | OUTPATIENT
Start: 2024-03-18 | End: 2024-03-26 | Stop reason: HOSPADM

## 2024-03-18 RX ORDER — GAUZE BANDAGE 2" X 2"
100 BANDAGE TOPICAL DAILY
Status: DISCONTINUED | OUTPATIENT
Start: 2024-03-24 | End: 2024-03-26 | Stop reason: HOSPADM

## 2024-03-18 RX ADMIN — SODIUM CHLORIDE, POTASSIUM CHLORIDE, SODIUM LACTATE AND CALCIUM CHLORIDE: 600; 310; 30; 20 INJECTION, SOLUTION INTRAVENOUS at 09:12

## 2024-03-18 RX ADMIN — LORAZEPAM 2 MG: 2 INJECTION INTRAMUSCULAR; INTRAVENOUS at 20:08

## 2024-03-18 RX ADMIN — LORAZEPAM 3 MG: 2 INJECTION INTRAMUSCULAR; INTRAVENOUS at 23:07

## 2024-03-18 RX ADMIN — DULOXETINE HYDROCHLORIDE 60 MG: 60 CAPSULE, DELAYED RELEASE ORAL at 08:18

## 2024-03-18 RX ADMIN — GABAPENTIN 300 MG: 300 CAPSULE ORAL at 15:13

## 2024-03-18 RX ADMIN — LORAZEPAM 2 MG: 1 TABLET ORAL at 08:17

## 2024-03-18 RX ADMIN — VANCOMYCIN HYDROCHLORIDE 1750 MG: 1 INJECTION, POWDER, LYOPHILIZED, FOR SOLUTION INTRAVENOUS at 17:44

## 2024-03-18 RX ADMIN — POTASSIUM CHLORIDE 10 MEQ: 7.45 INJECTION INTRAVENOUS at 11:40

## 2024-03-18 RX ADMIN — POTASSIUM CHLORIDE 10 MEQ: 7.45 INJECTION INTRAVENOUS at 09:21

## 2024-03-18 RX ADMIN — MAGNESIUM SULFATE IN DEXTROSE 1000 MG: 10 INJECTION, SOLUTION INTRAVENOUS at 09:21

## 2024-03-18 RX ADMIN — GABAPENTIN 300 MG: 300 CAPSULE ORAL at 08:18

## 2024-03-18 RX ADMIN — THIAMINE HYDROCHLORIDE 400 MG: 100 INJECTION, SOLUTION INTRAMUSCULAR; INTRAVENOUS at 21:20

## 2024-03-18 RX ADMIN — LORAZEPAM 4 MG: 2 INJECTION INTRAMUSCULAR; INTRAVENOUS at 21:56

## 2024-03-18 RX ADMIN — LORAZEPAM 3 MG: 2 INJECTION INTRAMUSCULAR; INTRAVENOUS at 01:16

## 2024-03-18 RX ADMIN — HEPARIN SODIUM 5000 UNITS: 5000 INJECTION INTRAVENOUS; SUBCUTANEOUS at 06:09

## 2024-03-18 RX ADMIN — CARVEDILOL 25 MG: 25 TABLET, FILM COATED ORAL at 08:17

## 2024-03-18 RX ADMIN — LORAZEPAM 2 MG: 2 INJECTION INTRAMUSCULAR; INTRAVENOUS at 02:40

## 2024-03-18 RX ADMIN — HEPARIN SODIUM 5000 UNITS: 5000 INJECTION INTRAVENOUS; SUBCUTANEOUS at 21:28

## 2024-03-18 RX ADMIN — THIAMINE HYDROCHLORIDE 400 MG: 100 INJECTION, SOLUTION INTRAMUSCULAR; INTRAVENOUS at 15:30

## 2024-03-18 RX ADMIN — POTASSIUM CHLORIDE 10 MEQ: 7.45 INJECTION INTRAVENOUS at 12:52

## 2024-03-18 RX ADMIN — GABAPENTIN 300 MG: 300 CAPSULE ORAL at 21:20

## 2024-03-18 RX ADMIN — SODIUM CHLORIDE, PRESERVATIVE FREE 10 ML: 5 INJECTION INTRAVENOUS at 21:23

## 2024-03-18 RX ADMIN — THIAMINE HYDROCHLORIDE 400 MG: 100 INJECTION, SOLUTION INTRAMUSCULAR; INTRAVENOUS at 08:17

## 2024-03-18 RX ADMIN — CLONAZEPAM 1 MG: 0.5 TABLET ORAL at 15:13

## 2024-03-18 RX ADMIN — CLONAZEPAM 1 MG: 0.5 TABLET ORAL at 02:34

## 2024-03-18 RX ADMIN — TOPIRAMATE 100 MG: 100 TABLET, FILM COATED ORAL at 08:18

## 2024-03-18 RX ADMIN — LORAZEPAM 2 MG: 2 INJECTION INTRAMUSCULAR; INTRAVENOUS at 06:25

## 2024-03-18 RX ADMIN — CARVEDILOL 25 MG: 25 TABLET, FILM COATED ORAL at 17:44

## 2024-03-18 RX ADMIN — POTASSIUM CHLORIDE 10 MEQ: 7.45 INJECTION INTRAVENOUS at 10:19

## 2024-03-18 RX ADMIN — HEPARIN SODIUM 5000 UNITS: 5000 INJECTION INTRAVENOUS; SUBCUTANEOUS at 15:30

## 2024-03-18 RX ADMIN — CARBOXYMETHYLCELLULOSE SODIUM 1 DROP: 10 GEL OPHTHALMIC at 08:19

## 2024-03-18 RX ADMIN — CARBOXYMETHYLCELLULOSE SODIUM 1 DROP: 10 GEL OPHTHALMIC at 21:22

## 2024-03-18 RX ADMIN — LEVOTHYROXINE SODIUM 25 MCG: 0.05 TABLET ORAL at 08:17

## 2024-03-18 NOTE — CARE COORDINATION
3/18/24     reviewed chart.  Patient not medically cleared for discharge/may transfer from ICU.  IV ABX/BC pending.  ID/Nephro/Psych following.  Dispo: home with parents. [facility] if needed at discharge.     Adalgisa Mulligan  Care Management

## 2024-03-19 PROBLEM — N30.00 ACUTE CYSTITIS WITHOUT HEMATURIA: Status: ACTIVE | Noted: 2024-03-19

## 2024-03-19 PROBLEM — R41.82 ALTERED MENTAL STATUS: Status: ACTIVE | Noted: 2024-03-19

## 2024-03-19 LAB
ALBUMIN SERPL-MCNC: 2.6 G/DL (ref 3.4–5)
ALBUMIN/GLOB SERPL: 0.7 (ref 0.8–1.7)
ALP SERPL-CCNC: 93 U/L (ref 45–117)
ALT SERPL-CCNC: 134 U/L (ref 16–61)
ANION GAP SERPL CALC-SCNC: 7 MMOL/L (ref 3–18)
AST SERPL-CCNC: 96 U/L (ref 10–38)
BASOPHILS # BLD: 0.1 K/UL (ref 0–0.1)
BASOPHILS NFR BLD: 1 % (ref 0–2)
BILIRUB SERPL-MCNC: 0.6 MG/DL (ref 0.2–1)
BUN SERPL-MCNC: 16 MG/DL (ref 7–18)
BUN/CREAT SERPL: 12 (ref 12–20)
CALCIUM SERPL-MCNC: 8.8 MG/DL (ref 8.5–10.1)
CHLORIDE SERPL-SCNC: 114 MMOL/L (ref 100–111)
CK SERPL-CCNC: 713 U/L (ref 39–308)
CO2 SERPL-SCNC: 23 MMOL/L (ref 21–32)
CREAT SERPL-MCNC: 1.39 MG/DL (ref 0.6–1.3)
DIFFERENTIAL METHOD BLD: ABNORMAL
EOSINOPHIL # BLD: 0.3 K/UL (ref 0–0.4)
EOSINOPHIL NFR BLD: 2 % (ref 0–5)
ERYTHROCYTE [DISTWIDTH] IN BLOOD BY AUTOMATED COUNT: 14.3 % (ref 11.6–14.5)
GLOBULIN SER CALC-MCNC: 3.9 G/DL (ref 2–4)
GLUCOSE SERPL-MCNC: 114 MG/DL (ref 74–99)
HCT VFR BLD AUTO: 34.4 % (ref 36–48)
HGB BLD-MCNC: 10.9 G/DL (ref 13–16)
IMM GRANULOCYTES # BLD AUTO: 0.4 K/UL (ref 0–0.04)
IMM GRANULOCYTES NFR BLD AUTO: 3 % (ref 0–0.5)
LYMPHOCYTES # BLD: 1.9 K/UL (ref 0.9–3.6)
LYMPHOCYTES NFR BLD: 14 % (ref 21–52)
MCH RBC QN AUTO: 30 PG (ref 24–34)
MCHC RBC AUTO-ENTMCNC: 31.7 G/DL (ref 31–37)
MCV RBC AUTO: 94.8 FL (ref 78–100)
MONOCYTES # BLD: 0.6 K/UL (ref 0.05–1.2)
MONOCYTES NFR BLD: 4 % (ref 3–10)
NEUTS SEG # BLD: 10.7 K/UL (ref 1.8–8)
NEUTS SEG NFR BLD: 77 % (ref 40–73)
NRBC # BLD: 0 K/UL (ref 0–0.01)
NRBC BLD-RTO: 0 PER 100 WBC
PLATELET # BLD AUTO: 332 K/UL (ref 135–420)
PMV BLD AUTO: 11.1 FL (ref 9.2–11.8)
POTASSIUM SERPL-SCNC: 3.6 MMOL/L (ref 3.5–5.5)
PROT SERPL-MCNC: 6.5 G/DL (ref 6.4–8.2)
RBC # BLD AUTO: 3.63 M/UL (ref 4.35–5.65)
SODIUM SERPL-SCNC: 144 MMOL/L (ref 136–145)
WBC # BLD AUTO: 14 K/UL (ref 4.6–13.2)

## 2024-03-19 PROCEDURE — 6370000000 HC RX 637 (ALT 250 FOR IP): Performed by: PHYSICIAN ASSISTANT

## 2024-03-19 PROCEDURE — 2580000003 HC RX 258: Performed by: INTERNAL MEDICINE

## 2024-03-19 PROCEDURE — 82550 ASSAY OF CK (CPK): CPT

## 2024-03-19 PROCEDURE — 6360000002 HC RX W HCPCS: Performed by: PHYSICIAN ASSISTANT

## 2024-03-19 PROCEDURE — 2580000003 HC RX 258: Performed by: PHYSICIAN ASSISTANT

## 2024-03-19 PROCEDURE — 6370000000 HC RX 637 (ALT 250 FOR IP): Performed by: NURSE PRACTITIONER

## 2024-03-19 PROCEDURE — 1100000003 HC PRIVATE W/ TELEMETRY

## 2024-03-19 PROCEDURE — 99231 SBSQ HOSP IP/OBS SF/LOW 25: CPT | Performed by: PSYCHIATRY & NEUROLOGY

## 2024-03-19 PROCEDURE — 2700000000 HC OXYGEN THERAPY PER DAY

## 2024-03-19 PROCEDURE — 97535 SELF CARE MNGMENT TRAINING: CPT

## 2024-03-19 PROCEDURE — 99232 SBSQ HOSP IP/OBS MODERATE 35: CPT | Performed by: INTERNAL MEDICINE

## 2024-03-19 PROCEDURE — 85025 COMPLETE CBC W/AUTO DIFF WBC: CPT

## 2024-03-19 PROCEDURE — 36415 COLL VENOUS BLD VENIPUNCTURE: CPT

## 2024-03-19 PROCEDURE — 6360000002 HC RX W HCPCS: Performed by: INTERNAL MEDICINE

## 2024-03-19 PROCEDURE — 80053 COMPREHEN METABOLIC PANEL: CPT

## 2024-03-19 PROCEDURE — 6370000000 HC RX 637 (ALT 250 FOR IP): Performed by: HOSPITALIST

## 2024-03-19 PROCEDURE — 94761 N-INVAS EAR/PLS OXIMETRY MLT: CPT

## 2024-03-19 RX ORDER — SODIUM CHLORIDE, SODIUM LACTATE, POTASSIUM CHLORIDE, CALCIUM CHLORIDE 600; 310; 30; 20 MG/100ML; MG/100ML; MG/100ML; MG/100ML
INJECTION, SOLUTION INTRAVENOUS CONTINUOUS
Status: DISCONTINUED | OUTPATIENT
Start: 2024-03-19 | End: 2024-03-21

## 2024-03-19 RX ADMIN — ZOLPIDEM TARTRATE 5 MG: 5 TABLET ORAL at 23:45

## 2024-03-19 RX ADMIN — SODIUM CHLORIDE, PRESERVATIVE FREE 10 ML: 5 INJECTION INTRAVENOUS at 08:24

## 2024-03-19 RX ADMIN — LORAZEPAM 1 MG: 1 TABLET ORAL at 14:48

## 2024-03-19 RX ADMIN — VANCOMYCIN HYDROCHLORIDE 1750 MG: 1 INJECTION, POWDER, LYOPHILIZED, FOR SOLUTION INTRAVENOUS at 23:46

## 2024-03-19 RX ADMIN — SODIUM CHLORIDE, POTASSIUM CHLORIDE, SODIUM LACTATE AND CALCIUM CHLORIDE: 600; 310; 30; 20 INJECTION, SOLUTION INTRAVENOUS at 03:22

## 2024-03-19 RX ADMIN — CARVEDILOL 25 MG: 25 TABLET, FILM COATED ORAL at 17:17

## 2024-03-19 RX ADMIN — SODIUM CHLORIDE, PRESERVATIVE FREE 10 ML: 5 INJECTION INTRAVENOUS at 20:26

## 2024-03-19 RX ADMIN — SODIUM CHLORIDE, PRESERVATIVE FREE 10 ML: 5 INJECTION INTRAVENOUS at 20:25

## 2024-03-19 RX ADMIN — VANCOMYCIN HYDROCHLORIDE 1750 MG: 1 INJECTION, POWDER, LYOPHILIZED, FOR SOLUTION INTRAVENOUS at 08:23

## 2024-03-19 RX ADMIN — LORAZEPAM 3 MG: 2 INJECTION INTRAMUSCULAR; INTRAVENOUS at 10:15

## 2024-03-19 RX ADMIN — SODIUM CHLORIDE, POTASSIUM CHLORIDE, SODIUM LACTATE AND CALCIUM CHLORIDE: 600; 310; 30; 20 INJECTION, SOLUTION INTRAVENOUS at 23:46

## 2024-03-19 RX ADMIN — HEPARIN SODIUM 5000 UNITS: 5000 INJECTION INTRAVENOUS; SUBCUTANEOUS at 20:25

## 2024-03-19 RX ADMIN — CARBOXYMETHYLCELLULOSE SODIUM 1 DROP: 10 GEL OPHTHALMIC at 08:23

## 2024-03-19 RX ADMIN — GABAPENTIN 300 MG: 300 CAPSULE ORAL at 20:25

## 2024-03-19 RX ADMIN — CARBOXYMETHYLCELLULOSE SODIUM 1 DROP: 10 GEL OPHTHALMIC at 13:35

## 2024-03-19 RX ADMIN — HEPARIN SODIUM 5000 UNITS: 5000 INJECTION INTRAVENOUS; SUBCUTANEOUS at 13:34

## 2024-03-19 RX ADMIN — THIAMINE HYDROCHLORIDE 200 MG: 100 INJECTION, SOLUTION INTRAMUSCULAR; INTRAVENOUS at 08:22

## 2024-03-19 RX ADMIN — GABAPENTIN 300 MG: 300 CAPSULE ORAL at 13:34

## 2024-03-19 RX ADMIN — LORAZEPAM 4 MG: 2 INJECTION INTRAMUSCULAR; INTRAVENOUS at 19:27

## 2024-03-19 RX ADMIN — SODIUM CHLORIDE, PRESERVATIVE FREE 10 ML: 5 INJECTION INTRAVENOUS at 08:23

## 2024-03-19 RX ADMIN — PANTOPRAZOLE SODIUM 40 MG: 40 TABLET, DELAYED RELEASE ORAL at 08:22

## 2024-03-19 RX ADMIN — CARVEDILOL 25 MG: 25 TABLET, FILM COATED ORAL at 08:22

## 2024-03-19 RX ADMIN — LORAZEPAM 3 MG: 2 INJECTION INTRAMUSCULAR; INTRAVENOUS at 23:57

## 2024-03-19 RX ADMIN — TOPIRAMATE 100 MG: 100 TABLET, FILM COATED ORAL at 08:30

## 2024-03-19 RX ADMIN — GABAPENTIN 300 MG: 300 CAPSULE ORAL at 08:30

## 2024-03-19 RX ADMIN — DULOXETINE HYDROCHLORIDE 60 MG: 60 CAPSULE, DELAYED RELEASE ORAL at 08:23

## 2024-03-19 RX ADMIN — HEPARIN SODIUM 5000 UNITS: 5000 INJECTION INTRAVENOUS; SUBCUTANEOUS at 06:09

## 2024-03-19 RX ADMIN — LEVOTHYROXINE SODIUM 25 MCG: 0.05 TABLET ORAL at 08:22

## 2024-03-19 NOTE — CONSENT
Informed Consent for Blood Component Transfusion Note    I have discussed with the patient the rationale for blood component transfusion; its benefits in treating or preventing fatigue, organ damage, or death; and its risk which includes mild transfusion reactions, rare risk of blood borne infection, or more serious but rare reactions. I have discussed the alternatives to transfusion, including the risk and consequences of not receiving transfusion. The patient had an opportunity to ask questions and had agreed to proceed with transfusion of blood components.    Electronically signed by Isaak Arellano DO on 3/19/24 at 6:39 PM EDT

## 2024-03-20 LAB
ALBUMIN SERPL-MCNC: 2.8 G/DL (ref 3.4–5)
ALBUMIN/GLOB SERPL: 0.8 (ref 0.8–1.7)
ALP SERPL-CCNC: 90 U/L (ref 45–117)
ALT SERPL-CCNC: 111 U/L (ref 16–61)
ANION GAP SERPL CALC-SCNC: 7 MMOL/L (ref 3–18)
AST SERPL-CCNC: 60 U/L (ref 10–38)
BACTERIA SPEC CULT: NORMAL
BACTERIA SPEC CULT: NORMAL
BASOPHILS # BLD: 0.1 K/UL (ref 0–0.1)
BASOPHILS NFR BLD: 1 % (ref 0–2)
BILIRUB SERPL-MCNC: 0.7 MG/DL (ref 0.2–1)
BUN SERPL-MCNC: 16 MG/DL (ref 7–18)
BUN/CREAT SERPL: 13 (ref 12–20)
CALCIUM SERPL-MCNC: 8.8 MG/DL (ref 8.5–10.1)
CHLORIDE SERPL-SCNC: 114 MMOL/L (ref 100–111)
CO2 SERPL-SCNC: 23 MMOL/L (ref 21–32)
CREAT SERPL-MCNC: 1.23 MG/DL (ref 0.6–1.3)
DIFFERENTIAL METHOD BLD: ABNORMAL
EOSINOPHIL # BLD: 0.3 K/UL (ref 0–0.4)
EOSINOPHIL NFR BLD: 2 % (ref 0–5)
ERYTHROCYTE [DISTWIDTH] IN BLOOD BY AUTOMATED COUNT: 14 % (ref 11.6–14.5)
GLOBULIN SER CALC-MCNC: 3.4 G/DL (ref 2–4)
GLUCOSE SERPL-MCNC: 105 MG/DL (ref 74–99)
HCT VFR BLD AUTO: 31.1 % (ref 36–48)
HGB BLD-MCNC: 10.3 G/DL (ref 13–16)
IMM GRANULOCYTES # BLD AUTO: 0.3 K/UL (ref 0–0.04)
IMM GRANULOCYTES NFR BLD AUTO: 2 % (ref 0–0.5)
LYMPHOCYTES # BLD: 1.9 K/UL (ref 0.9–3.6)
LYMPHOCYTES NFR BLD: 14 % (ref 21–52)
MCH RBC QN AUTO: 30.7 PG (ref 24–34)
MCHC RBC AUTO-ENTMCNC: 33.1 G/DL (ref 31–37)
MCV RBC AUTO: 92.8 FL (ref 78–100)
MONOCYTES # BLD: 0.7 K/UL (ref 0.05–1.2)
MONOCYTES NFR BLD: 5 % (ref 3–10)
NEUTS SEG # BLD: 10 K/UL (ref 1.8–8)
NEUTS SEG NFR BLD: 76 % (ref 40–73)
NRBC # BLD: 0 K/UL (ref 0–0.01)
NRBC BLD-RTO: 0 PER 100 WBC
PLATELET # BLD AUTO: 400 K/UL (ref 135–420)
PMV BLD AUTO: 10.1 FL (ref 9.2–11.8)
POTASSIUM SERPL-SCNC: 3.2 MMOL/L (ref 3.5–5.5)
PROCALCITONIN SERPL-MCNC: 0.05 NG/ML
PROT SERPL-MCNC: 6.2 G/DL (ref 6.4–8.2)
RBC # BLD AUTO: 3.35 M/UL (ref 4.35–5.65)
SERVICE CMNT-IMP: NORMAL
SERVICE CMNT-IMP: NORMAL
SODIUM SERPL-SCNC: 144 MMOL/L (ref 136–145)
WBC # BLD AUTO: 13.2 K/UL (ref 4.6–13.2)

## 2024-03-20 PROCEDURE — 36415 COLL VENOUS BLD VENIPUNCTURE: CPT

## 2024-03-20 PROCEDURE — 6360000002 HC RX W HCPCS: Performed by: INTERNAL MEDICINE

## 2024-03-20 PROCEDURE — 6370000000 HC RX 637 (ALT 250 FOR IP): Performed by: NURSE PRACTITIONER

## 2024-03-20 PROCEDURE — 51798 US URINE CAPACITY MEASURE: CPT

## 2024-03-20 PROCEDURE — 94761 N-INVAS EAR/PLS OXIMETRY MLT: CPT

## 2024-03-20 PROCEDURE — 97530 THERAPEUTIC ACTIVITIES: CPT

## 2024-03-20 PROCEDURE — 2580000003 HC RX 258: Performed by: INTERNAL MEDICINE

## 2024-03-20 PROCEDURE — 1100000003 HC PRIVATE W/ TELEMETRY

## 2024-03-20 PROCEDURE — 99232 SBSQ HOSP IP/OBS MODERATE 35: CPT | Performed by: INTERNAL MEDICINE

## 2024-03-20 PROCEDURE — 6370000000 HC RX 637 (ALT 250 FOR IP): Performed by: HOSPITALIST

## 2024-03-20 PROCEDURE — 2580000003 HC RX 258: Performed by: PHYSICIAN ASSISTANT

## 2024-03-20 PROCEDURE — 6360000002 HC RX W HCPCS: Performed by: PHYSICIAN ASSISTANT

## 2024-03-20 PROCEDURE — 6370000000 HC RX 637 (ALT 250 FOR IP): Performed by: PHYSICIAN ASSISTANT

## 2024-03-20 PROCEDURE — 80053 COMPREHEN METABOLIC PANEL: CPT

## 2024-03-20 PROCEDURE — 51701 INSERT BLADDER CATHETER: CPT

## 2024-03-20 PROCEDURE — 6370000000 HC RX 637 (ALT 250 FOR IP): Performed by: INTERNAL MEDICINE

## 2024-03-20 PROCEDURE — 85025 COMPLETE CBC W/AUTO DIFF WBC: CPT

## 2024-03-20 PROCEDURE — 84145 PROCALCITONIN (PCT): CPT

## 2024-03-20 PROCEDURE — 97116 GAIT TRAINING THERAPY: CPT

## 2024-03-20 RX ORDER — POTASSIUM CHLORIDE 7.45 MG/ML
10 INJECTION INTRAVENOUS
Status: COMPLETED | OUTPATIENT
Start: 2024-03-20 | End: 2024-03-20

## 2024-03-20 RX ORDER — POTASSIUM CHLORIDE 20 MEQ/1
40 TABLET, EXTENDED RELEASE ORAL ONCE
Status: COMPLETED | OUTPATIENT
Start: 2024-03-20 | End: 2024-03-20

## 2024-03-20 RX ORDER — MAGNESIUM SULFATE 1 G/100ML
1000 INJECTION INTRAVENOUS ONCE
Status: COMPLETED | OUTPATIENT
Start: 2024-03-20 | End: 2024-03-20

## 2024-03-20 RX ADMIN — GABAPENTIN 300 MG: 300 CAPSULE ORAL at 21:30

## 2024-03-20 RX ADMIN — POTASSIUM CHLORIDE 10 MEQ: 7.45 INJECTION INTRAVENOUS at 17:55

## 2024-03-20 RX ADMIN — MAGNESIUM SULFATE HEPTAHYDRATE 1000 MG: 1 INJECTION, SOLUTION INTRAVENOUS at 17:22

## 2024-03-20 RX ADMIN — POTASSIUM CHLORIDE 10 MEQ: 7.45 INJECTION INTRAVENOUS at 19:37

## 2024-03-20 RX ADMIN — CARVEDILOL 25 MG: 25 TABLET, FILM COATED ORAL at 16:10

## 2024-03-20 RX ADMIN — POTASSIUM BICARBONATE 50 MEQ: 978 TABLET, EFFERVESCENT ORAL at 16:10

## 2024-03-20 RX ADMIN — THIAMINE HYDROCHLORIDE 200 MG: 100 INJECTION, SOLUTION INTRAMUSCULAR; INTRAVENOUS at 08:46

## 2024-03-20 RX ADMIN — POTASSIUM CHLORIDE 40 MEQ: 1500 TABLET, EXTENDED RELEASE ORAL at 06:10

## 2024-03-20 RX ADMIN — CARBOXYMETHYLCELLULOSE SODIUM 1 DROP: 10 GEL OPHTHALMIC at 08:47

## 2024-03-20 RX ADMIN — ACETAMINOPHEN 325MG 650 MG: 325 TABLET ORAL at 20:04

## 2024-03-20 RX ADMIN — GABAPENTIN 300 MG: 300 CAPSULE ORAL at 08:46

## 2024-03-20 RX ADMIN — SODIUM CHLORIDE, PRESERVATIVE FREE 10 ML: 5 INJECTION INTRAVENOUS at 21:12

## 2024-03-20 RX ADMIN — POTASSIUM CHLORIDE 10 MEQ: 7.45 INJECTION INTRAVENOUS at 17:00

## 2024-03-20 RX ADMIN — SODIUM CHLORIDE, PRESERVATIVE FREE 10 ML: 5 INJECTION INTRAVENOUS at 08:47

## 2024-03-20 RX ADMIN — DULOXETINE HYDROCHLORIDE 60 MG: 60 CAPSULE, DELAYED RELEASE ORAL at 08:46

## 2024-03-20 RX ADMIN — POTASSIUM CHLORIDE 10 MEQ: 7.45 INJECTION INTRAVENOUS at 20:30

## 2024-03-20 RX ADMIN — HEPARIN SODIUM 5000 UNITS: 5000 INJECTION INTRAVENOUS; SUBCUTANEOUS at 21:31

## 2024-03-20 RX ADMIN — VANCOMYCIN HYDROCHLORIDE 1750 MG: 1 INJECTION, POWDER, LYOPHILIZED, FOR SOLUTION INTRAVENOUS at 17:00

## 2024-03-20 RX ADMIN — TOPIRAMATE 100 MG: 100 TABLET, FILM COATED ORAL at 08:46

## 2024-03-20 RX ADMIN — PANTOPRAZOLE SODIUM 40 MG: 40 TABLET, DELAYED RELEASE ORAL at 06:10

## 2024-03-20 RX ADMIN — LORAZEPAM 2 MG: 2 INJECTION INTRAMUSCULAR; INTRAVENOUS at 21:25

## 2024-03-20 RX ADMIN — LEVOTHYROXINE SODIUM 25 MCG: 0.05 TABLET ORAL at 06:10

## 2024-03-20 RX ADMIN — LORAZEPAM 4 MG: 2 INJECTION INTRAMUSCULAR; INTRAVENOUS at 16:52

## 2024-03-20 RX ADMIN — SODIUM CHLORIDE, PRESERVATIVE FREE 10 ML: 5 INJECTION INTRAVENOUS at 08:46

## 2024-03-20 RX ADMIN — GABAPENTIN 300 MG: 300 CAPSULE ORAL at 14:09

## 2024-03-20 RX ADMIN — CARVEDILOL 25 MG: 25 TABLET, FILM COATED ORAL at 08:46

## 2024-03-20 NOTE — CARE COORDINATION
03/20/24 1202   /Social Work Whiteboard Notes   /Social Work Whiteboard RED: 3/20/24. Transfer from ICU. Pt not medically stable for discharge. CIWA, IV antibx.  Anticipate HH when stable.

## 2024-03-20 NOTE — CARE COORDINATION
03/20/24 1200   /Social Work Whiteboard Notes   /Social Work Whiteboard RED: 3/20/24. Pt not medically stable for discharge. Anticipate SNF vs HH pending progress. [email or web address]

## 2024-03-21 LAB
ALBUMIN SERPL-MCNC: 3.2 G/DL (ref 3.4–5)
ALBUMIN/GLOB SERPL: 0.8 (ref 0.8–1.7)
ALP SERPL-CCNC: 104 U/L (ref 45–117)
ALT SERPL-CCNC: 105 U/L (ref 16–61)
ANION GAP SERPL CALC-SCNC: 8 MMOL/L (ref 3–18)
AST SERPL-CCNC: 62 U/L (ref 10–38)
BASOPHILS # BLD: 0.1 K/UL (ref 0–0.1)
BASOPHILS NFR BLD: 1 % (ref 0–2)
BILIRUB SERPL-MCNC: 0.8 MG/DL (ref 0.2–1)
BUN SERPL-MCNC: 16 MG/DL (ref 7–18)
BUN/CREAT SERPL: 13 (ref 12–20)
CALCIUM SERPL-MCNC: 9 MG/DL (ref 8.5–10.1)
CHLORIDE SERPL-SCNC: 109 MMOL/L (ref 100–111)
CO2 SERPL-SCNC: 22 MMOL/L (ref 21–32)
CREAT SERPL-MCNC: 1.27 MG/DL (ref 0.6–1.3)
DIFFERENTIAL METHOD BLD: ABNORMAL
EOSINOPHIL # BLD: 0.2 K/UL (ref 0–0.4)
EOSINOPHIL NFR BLD: 1 % (ref 0–5)
ERYTHROCYTE [DISTWIDTH] IN BLOOD BY AUTOMATED COUNT: 14 % (ref 11.6–14.5)
GLOBULIN SER CALC-MCNC: 3.8 G/DL (ref 2–4)
GLUCOSE SERPL-MCNC: 117 MG/DL (ref 74–99)
HCT VFR BLD AUTO: 32.4 % (ref 36–48)
HGB BLD-MCNC: 10.5 G/DL (ref 13–16)
IMM GRANULOCYTES # BLD AUTO: 0.2 K/UL (ref 0–0.04)
IMM GRANULOCYTES NFR BLD AUTO: 1 % (ref 0–0.5)
LYMPHOCYTES # BLD: 0.9 K/UL (ref 0.9–3.6)
LYMPHOCYTES NFR BLD: 7 % (ref 21–52)
MCH RBC QN AUTO: 30.3 PG (ref 24–34)
MCHC RBC AUTO-ENTMCNC: 32.4 G/DL (ref 31–37)
MCV RBC AUTO: 93.4 FL (ref 78–100)
MONOCYTES # BLD: 0.5 K/UL (ref 0.05–1.2)
MONOCYTES NFR BLD: 4 % (ref 3–10)
NEUTS SEG # BLD: 11.6 K/UL (ref 1.8–8)
NEUTS SEG NFR BLD: 86 % (ref 40–73)
NRBC # BLD: 0 K/UL (ref 0–0.01)
NRBC BLD-RTO: 0 PER 100 WBC
PLATELET # BLD AUTO: 460 K/UL (ref 135–420)
PMV BLD AUTO: 10 FL (ref 9.2–11.8)
POTASSIUM SERPL-SCNC: 3.6 MMOL/L (ref 3.5–5.5)
PROT SERPL-MCNC: 7 G/DL (ref 6.4–8.2)
RBC # BLD AUTO: 3.47 M/UL (ref 4.35–5.65)
SODIUM SERPL-SCNC: 139 MMOL/L (ref 136–145)
WBC # BLD AUTO: 13.4 K/UL (ref 4.6–13.2)

## 2024-03-21 PROCEDURE — 2580000003 HC RX 258: Performed by: INTERNAL MEDICINE

## 2024-03-21 PROCEDURE — 80053 COMPREHEN METABOLIC PANEL: CPT

## 2024-03-21 PROCEDURE — 6360000002 HC RX W HCPCS: Performed by: INTERNAL MEDICINE

## 2024-03-21 PROCEDURE — 6370000000 HC RX 637 (ALT 250 FOR IP): Performed by: PHYSICIAN ASSISTANT

## 2024-03-21 PROCEDURE — 94761 N-INVAS EAR/PLS OXIMETRY MLT: CPT

## 2024-03-21 PROCEDURE — 51798 US URINE CAPACITY MEASURE: CPT

## 2024-03-21 PROCEDURE — 6360000002 HC RX W HCPCS: Performed by: PHYSICIAN ASSISTANT

## 2024-03-21 PROCEDURE — 6370000000 HC RX 637 (ALT 250 FOR IP): Performed by: HOSPITALIST

## 2024-03-21 PROCEDURE — 92526 ORAL FUNCTION THERAPY: CPT

## 2024-03-21 PROCEDURE — 36415 COLL VENOUS BLD VENIPUNCTURE: CPT

## 2024-03-21 PROCEDURE — 85025 COMPLETE CBC W/AUTO DIFF WBC: CPT

## 2024-03-21 PROCEDURE — 2580000003 HC RX 258: Performed by: PHYSICIAN ASSISTANT

## 2024-03-21 PROCEDURE — 6370000000 HC RX 637 (ALT 250 FOR IP): Performed by: NURSE PRACTITIONER

## 2024-03-21 PROCEDURE — 1100000003 HC PRIVATE W/ TELEMETRY

## 2024-03-21 PROCEDURE — 99232 SBSQ HOSP IP/OBS MODERATE 35: CPT | Performed by: HOSPITALIST

## 2024-03-21 RX ORDER — LORAZEPAM 1 MG/1
1 TABLET ORAL EVERY 4 HOURS PRN
Status: DISCONTINUED | OUTPATIENT
Start: 2024-03-21 | End: 2024-03-24

## 2024-03-21 RX ORDER — HALOPERIDOL 5 MG/ML
5 INJECTION INTRAMUSCULAR ONCE
Status: COMPLETED | OUTPATIENT
Start: 2024-03-21 | End: 2024-03-21

## 2024-03-21 RX ADMIN — GABAPENTIN 300 MG: 300 CAPSULE ORAL at 21:28

## 2024-03-21 RX ADMIN — TOPIRAMATE 100 MG: 100 TABLET, FILM COATED ORAL at 08:10

## 2024-03-21 RX ADMIN — DULOXETINE HYDROCHLORIDE 60 MG: 60 CAPSULE, DELAYED RELEASE ORAL at 08:08

## 2024-03-21 RX ADMIN — LORAZEPAM 2 MG: 2 INJECTION INTRAMUSCULAR; INTRAVENOUS at 00:25

## 2024-03-21 RX ADMIN — ACETAMINOPHEN 325MG 650 MG: 325 TABLET ORAL at 13:29

## 2024-03-21 RX ADMIN — GABAPENTIN 300 MG: 300 CAPSULE ORAL at 14:30

## 2024-03-21 RX ADMIN — PANTOPRAZOLE SODIUM 40 MG: 40 TABLET, DELAYED RELEASE ORAL at 08:08

## 2024-03-21 RX ADMIN — GABAPENTIN 300 MG: 300 CAPSULE ORAL at 08:10

## 2024-03-21 RX ADMIN — CARBOXYMETHYLCELLULOSE SODIUM 1 DROP: 10 GEL OPHTHALMIC at 14:30

## 2024-03-21 RX ADMIN — SODIUM CHLORIDE, PRESERVATIVE FREE 10 ML: 5 INJECTION INTRAVENOUS at 08:09

## 2024-03-21 RX ADMIN — VANCOMYCIN HYDROCHLORIDE 1750 MG: 1 INJECTION, POWDER, LYOPHILIZED, FOR SOLUTION INTRAVENOUS at 13:26

## 2024-03-21 RX ADMIN — CARVEDILOL 25 MG: 25 TABLET, FILM COATED ORAL at 08:08

## 2024-03-21 RX ADMIN — THIAMINE HYDROCHLORIDE 200 MG: 100 INJECTION, SOLUTION INTRAMUSCULAR; INTRAVENOUS at 08:08

## 2024-03-21 RX ADMIN — CARBOXYMETHYLCELLULOSE SODIUM 1 DROP: 10 GEL OPHTHALMIC at 10:46

## 2024-03-21 RX ADMIN — HEPARIN SODIUM 5000 UNITS: 5000 INJECTION INTRAVENOUS; SUBCUTANEOUS at 14:30

## 2024-03-21 RX ADMIN — CARBOXYMETHYLCELLULOSE SODIUM 1 DROP: 10 GEL OPHTHALMIC at 21:36

## 2024-03-21 RX ADMIN — ACETAMINOPHEN 325MG 650 MG: 325 TABLET ORAL at 21:28

## 2024-03-21 RX ADMIN — HALOPERIDOL LACTATE 5 MG: 5 INJECTION, SOLUTION INTRAMUSCULAR at 04:40

## 2024-03-21 RX ADMIN — SODIUM CHLORIDE, POTASSIUM CHLORIDE, SODIUM LACTATE AND CALCIUM CHLORIDE: 600; 310; 30; 20 INJECTION, SOLUTION INTRAVENOUS at 00:27

## 2024-03-21 RX ADMIN — LEVOTHYROXINE SODIUM 25 MCG: 0.05 TABLET ORAL at 08:08

## 2024-03-21 RX ADMIN — LORAZEPAM 1 MG: 1 TABLET ORAL at 21:28

## 2024-03-21 RX ADMIN — LORAZEPAM 4 MG: 2 INJECTION INTRAMUSCULAR; INTRAVENOUS at 02:30

## 2024-03-21 RX ADMIN — LORAZEPAM 2 MG: 2 INJECTION INTRAMUSCULAR; INTRAVENOUS at 04:40

## 2024-03-21 ASSESSMENT — PAIN - FUNCTIONAL ASSESSMENT
PAIN_FUNCTIONAL_ASSESSMENT: PREVENTS OR INTERFERES SOME ACTIVE ACTIVITIES AND ADLS
PAIN_FUNCTIONAL_ASSESSMENT: ACTIVITIES ARE NOT PREVENTED

## 2024-03-21 ASSESSMENT — PAIN DESCRIPTION - LOCATION
LOCATION: LEG;HIP
LOCATION: HIP;LEG
LOCATION: ABDOMEN

## 2024-03-21 ASSESSMENT — PAIN DESCRIPTION - FREQUENCY: FREQUENCY: CONTINUOUS

## 2024-03-21 ASSESSMENT — PAIN SCALES - GENERAL
PAINLEVEL_OUTOF10: 6
PAINLEVEL_OUTOF10: 2
PAINLEVEL_OUTOF10: 4
PAINLEVEL_OUTOF10: 6

## 2024-03-21 ASSESSMENT — PAIN DESCRIPTION - DESCRIPTORS
DESCRIPTORS: NAGGING;THROBBING
DESCRIPTORS: NAGGING;THROBBING
DESCRIPTORS: DISCOMFORT

## 2024-03-21 ASSESSMENT — PAIN DESCRIPTION - PAIN TYPE: TYPE: ACUTE PAIN

## 2024-03-21 ASSESSMENT — PAIN DESCRIPTION - DIRECTION: RADIATING_TOWARDS: NOT RADIATING

## 2024-03-21 ASSESSMENT — PAIN DESCRIPTION - ORIENTATION
ORIENTATION: RIGHT;LEFT
ORIENTATION: RIGHT;LEFT
ORIENTATION: MID

## 2024-03-21 ASSESSMENT — PAIN DESCRIPTION - ONSET: ONSET: ON-GOING

## 2024-03-21 NOTE — CARE COORDINATION
SW spoke with patient at bedside and informed patient that HH was recommended. Patient is agreed to HH and patient asked presented with started list and patient [facility]. Patient was placed in the QUE to Dex GARZA from [facility] and accepted. Patient was also agreeable to a visit from Reed Fam from community outreach to assist with alcohol/drug addiction.    Medicaid screening request sent via email sent.      Desirae VERNONW   Case Management

## 2024-03-22 LAB
ANION GAP SERPL CALC-SCNC: 6 MMOL/L (ref 3–18)
BASOPHILS # BLD: 0.1 K/UL (ref 0–0.1)
BASOPHILS NFR BLD: 1 % (ref 0–2)
BUN SERPL-MCNC: 13 MG/DL (ref 7–18)
BUN/CREAT SERPL: 11 (ref 12–20)
CALCIUM SERPL-MCNC: 8.8 MG/DL (ref 8.5–10.1)
CHLORIDE SERPL-SCNC: 111 MMOL/L (ref 100–111)
CO2 SERPL-SCNC: 26 MMOL/L (ref 21–32)
CREAT SERPL-MCNC: 1.23 MG/DL (ref 0.6–1.3)
DIFFERENTIAL METHOD BLD: ABNORMAL
EKG ATRIAL RATE: 67 BPM
EKG DIAGNOSIS: NORMAL
EKG P AXIS: 48 DEGREES
EKG P-R INTERVAL: 150 MS
EKG Q-T INTERVAL: 396 MS
EKG QRS DURATION: 90 MS
EKG QTC CALCULATION (BAZETT): 418 MS
EKG R AXIS: 16 DEGREES
EKG T AXIS: 54 DEGREES
EKG VENTRICULAR RATE: 67 BPM
EOSINOPHIL # BLD: 0.1 K/UL (ref 0–0.4)
EOSINOPHIL NFR BLD: 2 % (ref 0–5)
ERYTHROCYTE [DISTWIDTH] IN BLOOD BY AUTOMATED COUNT: 14 % (ref 11.6–14.5)
GLUCOSE SERPL-MCNC: 101 MG/DL (ref 74–99)
HCT VFR BLD AUTO: 30.4 % (ref 36–48)
HGB BLD-MCNC: 10 G/DL (ref 13–16)
IMM GRANULOCYTES # BLD AUTO: 0.1 K/UL (ref 0–0.04)
IMM GRANULOCYTES NFR BLD AUTO: 1 % (ref 0–0.5)
LYMPHOCYTES # BLD: 1.5 K/UL (ref 0.9–3.6)
LYMPHOCYTES NFR BLD: 21 % (ref 21–52)
MAGNESIUM SERPL-MCNC: 2 MG/DL (ref 1.6–2.6)
MCH RBC QN AUTO: 30.6 PG (ref 24–34)
MCHC RBC AUTO-ENTMCNC: 32.9 G/DL (ref 31–37)
MCV RBC AUTO: 93 FL (ref 78–100)
MONOCYTES # BLD: 0.8 K/UL (ref 0.05–1.2)
MONOCYTES NFR BLD: 11 % (ref 3–10)
NEUTS SEG # BLD: 4.6 K/UL (ref 1.8–8)
NEUTS SEG NFR BLD: 65 % (ref 40–73)
NRBC # BLD: 0 K/UL (ref 0–0.01)
NRBC BLD-RTO: 0 PER 100 WBC
PLATELET # BLD AUTO: 412 K/UL (ref 135–420)
PMV BLD AUTO: 9.6 FL (ref 9.2–11.8)
POTASSIUM SERPL-SCNC: 3.5 MMOL/L (ref 3.5–5.5)
RBC # BLD AUTO: 3.27 M/UL (ref 4.35–5.65)
SODIUM SERPL-SCNC: 143 MMOL/L (ref 136–145)
VANCOMYCIN SERPL-MCNC: 14 UG/ML (ref 5–40)
WBC # BLD AUTO: 7.1 K/UL (ref 4.6–13.2)

## 2024-03-22 PROCEDURE — 36415 COLL VENOUS BLD VENIPUNCTURE: CPT

## 2024-03-22 PROCEDURE — 93005 ELECTROCARDIOGRAM TRACING: CPT | Performed by: PSYCHIATRY & NEUROLOGY

## 2024-03-22 PROCEDURE — 80202 ASSAY OF VANCOMYCIN: CPT

## 2024-03-22 PROCEDURE — 6370000000 HC RX 637 (ALT 250 FOR IP): Performed by: PHYSICIAN ASSISTANT

## 2024-03-22 PROCEDURE — 6370000000 HC RX 637 (ALT 250 FOR IP): Performed by: PSYCHIATRY & NEUROLOGY

## 2024-03-22 PROCEDURE — 6360000002 HC RX W HCPCS: Performed by: INTERNAL MEDICINE

## 2024-03-22 PROCEDURE — 85025 COMPLETE CBC W/AUTO DIFF WBC: CPT

## 2024-03-22 PROCEDURE — 2580000003 HC RX 258: Performed by: PHYSICIAN ASSISTANT

## 2024-03-22 PROCEDURE — 2580000003 HC RX 258: Performed by: INTERNAL MEDICINE

## 2024-03-22 PROCEDURE — 6370000000 HC RX 637 (ALT 250 FOR IP): Performed by: HOSPITALIST

## 2024-03-22 PROCEDURE — 6370000000 HC RX 637 (ALT 250 FOR IP): Performed by: NURSE PRACTITIONER

## 2024-03-22 PROCEDURE — 94761 N-INVAS EAR/PLS OXIMETRY MLT: CPT

## 2024-03-22 PROCEDURE — 93010 ELECTROCARDIOGRAM REPORT: CPT | Performed by: INTERNAL MEDICINE

## 2024-03-22 PROCEDURE — 83735 ASSAY OF MAGNESIUM: CPT

## 2024-03-22 PROCEDURE — 97116 GAIT TRAINING THERAPY: CPT

## 2024-03-22 PROCEDURE — 6360000002 HC RX W HCPCS: Performed by: PHYSICIAN ASSISTANT

## 2024-03-22 PROCEDURE — 1100000003 HC PRIVATE W/ TELEMETRY

## 2024-03-22 PROCEDURE — 97164 PT RE-EVAL EST PLAN CARE: CPT

## 2024-03-22 PROCEDURE — 99231 SBSQ HOSP IP/OBS SF/LOW 25: CPT | Performed by: PSYCHIATRY & NEUROLOGY

## 2024-03-22 PROCEDURE — 80048 BASIC METABOLIC PNL TOTAL CA: CPT

## 2024-03-22 PROCEDURE — 97535 SELF CARE MNGMENT TRAINING: CPT

## 2024-03-22 RX ORDER — HALOPERIDOL 2 MG/1
5 TABLET ORAL EVERY 6 HOURS PRN
Status: DISCONTINUED | OUTPATIENT
Start: 2024-03-22 | End: 2024-03-26 | Stop reason: HOSPADM

## 2024-03-22 RX ORDER — HALOPERIDOL 2 MG/1
2 TABLET ORAL 2 TIMES DAILY
Status: DISCONTINUED | OUTPATIENT
Start: 2024-03-22 | End: 2024-03-25

## 2024-03-22 RX ORDER — CARVEDILOL 12.5 MG/1
12.5 TABLET ORAL 2 TIMES DAILY WITH MEALS
Status: DISCONTINUED | OUTPATIENT
Start: 2024-03-23 | End: 2024-03-26 | Stop reason: HOSPADM

## 2024-03-22 RX ORDER — HALOPERIDOL 5 MG/ML
5 INJECTION INTRAMUSCULAR EVERY 6 HOURS PRN
Status: DISCONTINUED | OUTPATIENT
Start: 2024-03-22 | End: 2024-03-26 | Stop reason: HOSPADM

## 2024-03-22 RX ADMIN — VANCOMYCIN HYDROCHLORIDE 1750 MG: 1 INJECTION, POWDER, LYOPHILIZED, FOR SOLUTION INTRAVENOUS at 06:24

## 2024-03-22 RX ADMIN — DULOXETINE HYDROCHLORIDE 60 MG: 60 CAPSULE, DELAYED RELEASE ORAL at 09:45

## 2024-03-22 RX ADMIN — CARBOXYMETHYLCELLULOSE SODIUM 1 DROP: 10 GEL OPHTHALMIC at 09:43

## 2024-03-22 RX ADMIN — CARBOXYMETHYLCELLULOSE SODIUM 1 DROP: 10 GEL OPHTHALMIC at 14:32

## 2024-03-22 RX ADMIN — LEVOTHYROXINE SODIUM 25 MCG: 0.05 TABLET ORAL at 09:44

## 2024-03-22 RX ADMIN — HEPARIN SODIUM 5000 UNITS: 5000 INJECTION INTRAVENOUS; SUBCUTANEOUS at 14:33

## 2024-03-22 RX ADMIN — PANTOPRAZOLE SODIUM 40 MG: 40 TABLET, DELAYED RELEASE ORAL at 09:44

## 2024-03-22 RX ADMIN — THIAMINE HYDROCHLORIDE 200 MG: 100 INJECTION, SOLUTION INTRAMUSCULAR; INTRAVENOUS at 09:44

## 2024-03-22 RX ADMIN — LORAZEPAM 1 MG: 1 TABLET ORAL at 19:18

## 2024-03-22 RX ADMIN — HALOPERIDOL 2 MG: 2 TABLET ORAL at 20:41

## 2024-03-22 RX ADMIN — CARVEDILOL 25 MG: 25 TABLET, FILM COATED ORAL at 09:45

## 2024-03-22 RX ADMIN — HALOPERIDOL 2 MG: 2 TABLET ORAL at 14:33

## 2024-03-22 RX ADMIN — GABAPENTIN 300 MG: 300 CAPSULE ORAL at 14:33

## 2024-03-22 RX ADMIN — ACETAMINOPHEN 325MG 650 MG: 325 TABLET ORAL at 09:45

## 2024-03-22 RX ADMIN — SODIUM CHLORIDE, PRESERVATIVE FREE 10 ML: 5 INJECTION INTRAVENOUS at 09:46

## 2024-03-22 RX ADMIN — TOPIRAMATE 100 MG: 100 TABLET, FILM COATED ORAL at 09:44

## 2024-03-22 RX ADMIN — GABAPENTIN 300 MG: 300 CAPSULE ORAL at 09:44

## 2024-03-22 RX ADMIN — HEPARIN SODIUM 5000 UNITS: 5000 INJECTION INTRAVENOUS; SUBCUTANEOUS at 20:41

## 2024-03-22 RX ADMIN — HEPARIN SODIUM 5000 UNITS: 5000 INJECTION INTRAVENOUS; SUBCUTANEOUS at 06:24

## 2024-03-22 RX ADMIN — GABAPENTIN 300 MG: 300 CAPSULE ORAL at 20:41

## 2024-03-22 RX ADMIN — SODIUM CHLORIDE, PRESERVATIVE FREE 10 ML: 5 INJECTION INTRAVENOUS at 20:48

## 2024-03-22 RX ADMIN — SODIUM CHLORIDE, PRESERVATIVE FREE 10 ML: 5 INJECTION INTRAVENOUS at 09:47

## 2024-03-22 ASSESSMENT — PAIN SCALES - GENERAL
PAINLEVEL_OUTOF10: 6
PAINLEVEL_OUTOF10: 2
PAINLEVEL_OUTOF10: 4
PAINLEVEL_OUTOF10: 4
PAINLEVEL_OUTOF10: 2
PAINLEVEL_OUTOF10: 8

## 2024-03-22 ASSESSMENT — PAIN - FUNCTIONAL ASSESSMENT
PAIN_FUNCTIONAL_ASSESSMENT: ACTIVITIES ARE NOT PREVENTED
PAIN_FUNCTIONAL_ASSESSMENT: ACTIVITIES ARE NOT PREVENTED

## 2024-03-22 ASSESSMENT — PAIN DESCRIPTION - DESCRIPTORS
DESCRIPTORS: THROBBING
DESCRIPTORS: ACHING
DESCRIPTORS: ACHING
DESCRIPTORS: SHARP;SORE

## 2024-03-22 ASSESSMENT — PAIN DESCRIPTION - LOCATION
LOCATION: LEG
LOCATION: LEG
LOCATION: BACK;LEG
LOCATION: HEAD

## 2024-03-22 ASSESSMENT — PAIN DESCRIPTION - ONSET
ONSET: ON-GOING
ONSET: PROGRESSIVE

## 2024-03-22 ASSESSMENT — PAIN DESCRIPTION - ORIENTATION
ORIENTATION: LEFT;RIGHT;POSTERIOR
ORIENTATION: ANTERIOR
ORIENTATION: RIGHT;LEFT
ORIENTATION: RIGHT;LEFT;POSTERIOR

## 2024-03-22 ASSESSMENT — PAIN DESCRIPTION - FREQUENCY
FREQUENCY: CONTINUOUS
FREQUENCY: CONTINUOUS

## 2024-03-22 ASSESSMENT — PAIN DESCRIPTION - PAIN TYPE
TYPE: OTHER (COMMENT)
TYPE: ACUTE PAIN
TYPE: ACUTE PAIN

## 2024-03-22 NOTE — PROCEDURES
Spoke with house supervisor, who requested PIV for this patient.  Patient has been hallucinating throughout the day and has pulled out several previous PIVs.  Nurse states that the patient has required ultrasound guided PIVs previously.  20g 1.88 PIV inserted into the left upper extremity via ultrasound guidance.  PIV covered and secured.  Advised nurse that I was able to get PIV and that it was usable at this time.

## 2024-03-23 PROCEDURE — 6370000000 HC RX 637 (ALT 250 FOR IP): Performed by: HOSPITALIST

## 2024-03-23 PROCEDURE — 2580000003 HC RX 258: Performed by: INTERNAL MEDICINE

## 2024-03-23 PROCEDURE — 99232 SBSQ HOSP IP/OBS MODERATE 35: CPT | Performed by: HOSPITALIST

## 2024-03-23 PROCEDURE — 2580000003 HC RX 258: Performed by: PHYSICIAN ASSISTANT

## 2024-03-23 PROCEDURE — 6360000002 HC RX W HCPCS: Performed by: INTERNAL MEDICINE

## 2024-03-23 PROCEDURE — 6360000002 HC RX W HCPCS: Performed by: PHYSICIAN ASSISTANT

## 2024-03-23 PROCEDURE — 6370000000 HC RX 637 (ALT 250 FOR IP): Performed by: PSYCHIATRY & NEUROLOGY

## 2024-03-23 PROCEDURE — 94761 N-INVAS EAR/PLS OXIMETRY MLT: CPT

## 2024-03-23 PROCEDURE — 6370000000 HC RX 637 (ALT 250 FOR IP): Performed by: NURSE PRACTITIONER

## 2024-03-23 PROCEDURE — 1100000003 HC PRIVATE W/ TELEMETRY

## 2024-03-23 PROCEDURE — 6370000000 HC RX 637 (ALT 250 FOR IP): Performed by: PHYSICIAN ASSISTANT

## 2024-03-23 RX ADMIN — GABAPENTIN 300 MG: 300 CAPSULE ORAL at 09:02

## 2024-03-23 RX ADMIN — HEPARIN SODIUM 5000 UNITS: 5000 INJECTION INTRAVENOUS; SUBCUTANEOUS at 06:32

## 2024-03-23 RX ADMIN — HALOPERIDOL 2 MG: 2 TABLET ORAL at 21:15

## 2024-03-23 RX ADMIN — THIAMINE HYDROCHLORIDE 200 MG: 100 INJECTION, SOLUTION INTRAMUSCULAR; INTRAVENOUS at 09:03

## 2024-03-23 RX ADMIN — TOPIRAMATE 100 MG: 100 TABLET, FILM COATED ORAL at 09:07

## 2024-03-23 RX ADMIN — LEVOTHYROXINE SODIUM 25 MCG: 0.05 TABLET ORAL at 06:32

## 2024-03-23 RX ADMIN — HEPARIN SODIUM 5000 UNITS: 5000 INJECTION INTRAVENOUS; SUBCUTANEOUS at 14:05

## 2024-03-23 RX ADMIN — CARVEDILOL 12.5 MG: 12.5 TABLET, FILM COATED ORAL at 16:56

## 2024-03-23 RX ADMIN — ZOLPIDEM TARTRATE 5 MG: 5 TABLET ORAL at 22:31

## 2024-03-23 RX ADMIN — DULOXETINE HYDROCHLORIDE 60 MG: 60 CAPSULE, DELAYED RELEASE ORAL at 09:03

## 2024-03-23 RX ADMIN — CARBOXYMETHYLCELLULOSE SODIUM 1 DROP: 10 GEL OPHTHALMIC at 09:06

## 2024-03-23 RX ADMIN — SODIUM CHLORIDE, PRESERVATIVE FREE 10 ML: 5 INJECTION INTRAVENOUS at 09:06

## 2024-03-23 RX ADMIN — GABAPENTIN 300 MG: 300 CAPSULE ORAL at 14:05

## 2024-03-23 RX ADMIN — GABAPENTIN 300 MG: 300 CAPSULE ORAL at 21:15

## 2024-03-23 RX ADMIN — HEPARIN SODIUM 5000 UNITS: 5000 INJECTION INTRAVENOUS; SUBCUTANEOUS at 21:15

## 2024-03-23 RX ADMIN — CARBOXYMETHYLCELLULOSE SODIUM 1 DROP: 10 GEL OPHTHALMIC at 14:57

## 2024-03-23 RX ADMIN — LORAZEPAM 1 MG: 1 TABLET ORAL at 11:39

## 2024-03-23 RX ADMIN — HALOPERIDOL 2 MG: 2 TABLET ORAL at 09:02

## 2024-03-23 RX ADMIN — SODIUM CHLORIDE, PRESERVATIVE FREE 10 ML: 5 INJECTION INTRAVENOUS at 09:02

## 2024-03-23 RX ADMIN — SODIUM CHLORIDE, PRESERVATIVE FREE 10 ML: 5 INJECTION INTRAVENOUS at 21:30

## 2024-03-23 RX ADMIN — CARVEDILOL 12.5 MG: 12.5 TABLET, FILM COATED ORAL at 09:03

## 2024-03-23 RX ADMIN — PANTOPRAZOLE SODIUM 40 MG: 40 TABLET, DELAYED RELEASE ORAL at 06:32

## 2024-03-23 RX ADMIN — SODIUM CHLORIDE, PRESERVATIVE FREE 10 ML: 5 INJECTION INTRAVENOUS at 09:05

## 2024-03-23 RX ADMIN — HALOPERIDOL 5 MG: 2 TABLET ORAL at 15:08

## 2024-03-24 LAB
ANION GAP SERPL CALC-SCNC: 3 MMOL/L (ref 3–18)
BASOPHILS # BLD: 0.1 K/UL (ref 0–0.1)
BASOPHILS NFR BLD: 1 % (ref 0–2)
BUN SERPL-MCNC: 12 MG/DL (ref 7–18)
BUN/CREAT SERPL: 11 (ref 12–20)
CALCIUM SERPL-MCNC: 8.8 MG/DL (ref 8.5–10.1)
CHLORIDE SERPL-SCNC: 110 MMOL/L (ref 100–111)
CO2 SERPL-SCNC: 27 MMOL/L (ref 21–32)
CREAT SERPL-MCNC: 1.1 MG/DL (ref 0.6–1.3)
DIFFERENTIAL METHOD BLD: ABNORMAL
EOSINOPHIL # BLD: 0.2 K/UL (ref 0–0.4)
EOSINOPHIL NFR BLD: 3 % (ref 0–5)
ERYTHROCYTE [DISTWIDTH] IN BLOOD BY AUTOMATED COUNT: 13.9 % (ref 11.6–14.5)
GLUCOSE SERPL-MCNC: 157 MG/DL (ref 74–99)
HCT VFR BLD AUTO: 30.8 % (ref 36–48)
HGB BLD-MCNC: 10.1 G/DL (ref 13–16)
IMM GRANULOCYTES # BLD AUTO: 0 K/UL (ref 0–0.04)
IMM GRANULOCYTES NFR BLD AUTO: 0 % (ref 0–0.5)
LYMPHOCYTES # BLD: 1.2 K/UL (ref 0.9–3.6)
LYMPHOCYTES NFR BLD: 17 % (ref 21–52)
MCH RBC QN AUTO: 30.1 PG (ref 24–34)
MCHC RBC AUTO-ENTMCNC: 32.8 G/DL (ref 31–37)
MCV RBC AUTO: 91.7 FL (ref 78–100)
MONOCYTES # BLD: 0.5 K/UL (ref 0.05–1.2)
MONOCYTES NFR BLD: 7 % (ref 3–10)
NEUTS SEG # BLD: 5.5 K/UL (ref 1.8–8)
NEUTS SEG NFR BLD: 73 % (ref 40–73)
NRBC # BLD: 0 K/UL (ref 0–0.01)
NRBC BLD-RTO: 0 PER 100 WBC
PLATELET # BLD AUTO: 485 K/UL (ref 135–420)
PMV BLD AUTO: 9.1 FL (ref 9.2–11.8)
POTASSIUM SERPL-SCNC: 3.5 MMOL/L (ref 3.5–5.5)
RBC # BLD AUTO: 3.36 M/UL (ref 4.35–5.65)
SODIUM SERPL-SCNC: 140 MMOL/L (ref 136–145)
WBC # BLD AUTO: 7.5 K/UL (ref 4.6–13.2)

## 2024-03-24 PROCEDURE — 36415 COLL VENOUS BLD VENIPUNCTURE: CPT

## 2024-03-24 PROCEDURE — 6370000000 HC RX 637 (ALT 250 FOR IP): Performed by: PHYSICIAN ASSISTANT

## 2024-03-24 PROCEDURE — 97168 OT RE-EVAL EST PLAN CARE: CPT

## 2024-03-24 PROCEDURE — 97116 GAIT TRAINING THERAPY: CPT

## 2024-03-24 PROCEDURE — 6370000000 HC RX 637 (ALT 250 FOR IP): Performed by: HOSPITALIST

## 2024-03-24 PROCEDURE — 6370000000 HC RX 637 (ALT 250 FOR IP): Performed by: PSYCHIATRY & NEUROLOGY

## 2024-03-24 PROCEDURE — 85025 COMPLETE CBC W/AUTO DIFF WBC: CPT

## 2024-03-24 PROCEDURE — 6360000002 HC RX W HCPCS: Performed by: PHYSICIAN ASSISTANT

## 2024-03-24 PROCEDURE — 2580000003 HC RX 258: Performed by: PHYSICIAN ASSISTANT

## 2024-03-24 PROCEDURE — 6370000000 HC RX 637 (ALT 250 FOR IP): Performed by: INTERNAL MEDICINE

## 2024-03-24 PROCEDURE — 94761 N-INVAS EAR/PLS OXIMETRY MLT: CPT

## 2024-03-24 PROCEDURE — 99232 SBSQ HOSP IP/OBS MODERATE 35: CPT | Performed by: HOSPITALIST

## 2024-03-24 PROCEDURE — 1100000003 HC PRIVATE W/ TELEMETRY

## 2024-03-24 PROCEDURE — 2580000003 HC RX 258: Performed by: INTERNAL MEDICINE

## 2024-03-24 PROCEDURE — 6370000000 HC RX 637 (ALT 250 FOR IP): Performed by: NURSE PRACTITIONER

## 2024-03-24 PROCEDURE — 97110 THERAPEUTIC EXERCISES: CPT

## 2024-03-24 PROCEDURE — 97535 SELF CARE MNGMENT TRAINING: CPT

## 2024-03-24 PROCEDURE — 80048 BASIC METABOLIC PNL TOTAL CA: CPT

## 2024-03-24 RX ADMIN — SODIUM CHLORIDE, PRESERVATIVE FREE 10 ML: 5 INJECTION INTRAVENOUS at 09:10

## 2024-03-24 RX ADMIN — GABAPENTIN 300 MG: 300 CAPSULE ORAL at 20:52

## 2024-03-24 RX ADMIN — DULOXETINE HYDROCHLORIDE 60 MG: 60 CAPSULE, DELAYED RELEASE ORAL at 09:02

## 2024-03-24 RX ADMIN — ACETAMINOPHEN 325MG 650 MG: 325 TABLET ORAL at 20:53

## 2024-03-24 RX ADMIN — HALOPERIDOL 5 MG: 2 TABLET ORAL at 16:20

## 2024-03-24 RX ADMIN — HEPARIN SODIUM 5000 UNITS: 5000 INJECTION INTRAVENOUS; SUBCUTANEOUS at 14:30

## 2024-03-24 RX ADMIN — SODIUM CHLORIDE, PRESERVATIVE FREE 10 ML: 5 INJECTION INTRAVENOUS at 09:11

## 2024-03-24 RX ADMIN — ACETAMINOPHEN 325MG 650 MG: 325 TABLET ORAL at 05:21

## 2024-03-24 RX ADMIN — LEVOTHYROXINE SODIUM 25 MCG: 0.05 TABLET ORAL at 05:11

## 2024-03-24 RX ADMIN — HALOPERIDOL 2 MG: 2 TABLET ORAL at 20:53

## 2024-03-24 RX ADMIN — CARVEDILOL 12.5 MG: 12.5 TABLET, FILM COATED ORAL at 09:02

## 2024-03-24 RX ADMIN — ZOLPIDEM TARTRATE 5 MG: 5 TABLET ORAL at 20:52

## 2024-03-24 RX ADMIN — TOPIRAMATE 100 MG: 100 TABLET, FILM COATED ORAL at 09:02

## 2024-03-24 RX ADMIN — HEPARIN SODIUM 5000 UNITS: 5000 INJECTION INTRAVENOUS; SUBCUTANEOUS at 21:45

## 2024-03-24 RX ADMIN — THIAMINE HCL TAB 100 MG 100 MG: 100 TAB at 09:02

## 2024-03-24 RX ADMIN — LORAZEPAM 1 MG: 1 TABLET ORAL at 05:11

## 2024-03-24 RX ADMIN — ACETAMINOPHEN 325MG 650 MG: 325 TABLET ORAL at 11:04

## 2024-03-24 RX ADMIN — SODIUM CHLORIDE, PRESERVATIVE FREE 10 ML: 5 INJECTION INTRAVENOUS at 20:54

## 2024-03-24 RX ADMIN — GABAPENTIN 300 MG: 300 CAPSULE ORAL at 14:30

## 2024-03-24 RX ADMIN — GABAPENTIN 300 MG: 300 CAPSULE ORAL at 09:02

## 2024-03-24 RX ADMIN — CARBOXYMETHYLCELLULOSE SODIUM 1 DROP: 10 GEL OPHTHALMIC at 15:52

## 2024-03-24 RX ADMIN — CARVEDILOL 12.5 MG: 12.5 TABLET, FILM COATED ORAL at 15:52

## 2024-03-24 RX ADMIN — CARBOXYMETHYLCELLULOSE SODIUM 1 DROP: 10 GEL OPHTHALMIC at 09:05

## 2024-03-24 RX ADMIN — CARBOXYMETHYLCELLULOSE SODIUM 1 DROP: 10 GEL OPHTHALMIC at 20:53

## 2024-03-24 RX ADMIN — HALOPERIDOL 2 MG: 2 TABLET ORAL at 09:02

## 2024-03-24 RX ADMIN — PANTOPRAZOLE SODIUM 40 MG: 40 TABLET, DELAYED RELEASE ORAL at 05:11

## 2024-03-24 RX ADMIN — HEPARIN SODIUM 5000 UNITS: 5000 INJECTION INTRAVENOUS; SUBCUTANEOUS at 05:11

## 2024-03-24 ASSESSMENT — PAIN SCALES - GENERAL
PAINLEVEL_OUTOF10: 7
PAINLEVEL_OUTOF10: 5
PAINLEVEL_OUTOF10: 0
PAINLEVEL_OUTOF10: 3
PAINLEVEL_OUTOF10: 0
PAINLEVEL_OUTOF10: 7
PAINLEVEL_OUTOF10: 6
PAINLEVEL_OUTOF10: 0

## 2024-03-24 ASSESSMENT — PAIN DESCRIPTION - LOCATION
LOCATION: LEG

## 2024-03-24 ASSESSMENT — PAIN DESCRIPTION - ORIENTATION
ORIENTATION: RIGHT;LEFT
ORIENTATION: LEFT;RIGHT
ORIENTATION: LEFT;RIGHT
ORIENTATION: RIGHT;LEFT

## 2024-03-24 ASSESSMENT — PAIN DESCRIPTION - DESCRIPTORS
DESCRIPTORS: SORE;DISCOMFORT
DESCRIPTORS: DISCOMFORT;SORE

## 2024-03-24 ASSESSMENT — PAIN - FUNCTIONAL ASSESSMENT
PAIN_FUNCTIONAL_ASSESSMENT: ACTIVITIES ARE NOT PREVENTED

## 2024-03-25 PROCEDURE — 6370000000 HC RX 637 (ALT 250 FOR IP): Performed by: PHYSICIAN ASSISTANT

## 2024-03-25 PROCEDURE — 94761 N-INVAS EAR/PLS OXIMETRY MLT: CPT

## 2024-03-25 PROCEDURE — 6370000000 HC RX 637 (ALT 250 FOR IP): Performed by: HOSPITALIST

## 2024-03-25 PROCEDURE — 6360000002 HC RX W HCPCS: Performed by: PHYSICIAN ASSISTANT

## 2024-03-25 PROCEDURE — 6370000000 HC RX 637 (ALT 250 FOR IP): Performed by: PSYCHIATRY & NEUROLOGY

## 2024-03-25 PROCEDURE — 1100000003 HC PRIVATE W/ TELEMETRY

## 2024-03-25 PROCEDURE — 6370000000 HC RX 637 (ALT 250 FOR IP): Performed by: INTERNAL MEDICINE

## 2024-03-25 PROCEDURE — 99232 SBSQ HOSP IP/OBS MODERATE 35: CPT | Performed by: HOSPITALIST

## 2024-03-25 PROCEDURE — 2580000003 HC RX 258: Performed by: PHYSICIAN ASSISTANT

## 2024-03-25 PROCEDURE — 2580000003 HC RX 258: Performed by: INTERNAL MEDICINE

## 2024-03-25 PROCEDURE — 99232 SBSQ HOSP IP/OBS MODERATE 35: CPT | Performed by: PSYCHIATRY & NEUROLOGY

## 2024-03-25 PROCEDURE — 6370000000 HC RX 637 (ALT 250 FOR IP): Performed by: NURSE PRACTITIONER

## 2024-03-25 RX ORDER — SIMETHICONE 80 MG
80 TABLET,CHEWABLE ORAL 2 TIMES DAILY PRN
Status: DISCONTINUED | OUTPATIENT
Start: 2024-03-25 | End: 2024-03-26 | Stop reason: HOSPADM

## 2024-03-25 RX ORDER — HALOPERIDOL 2 MG/1
5 TABLET ORAL NIGHTLY
Status: DISCONTINUED | OUTPATIENT
Start: 2024-03-25 | End: 2024-03-26 | Stop reason: HOSPADM

## 2024-03-25 RX ORDER — POLYETHYLENE GLYCOL 3350 17 G/17G
17 POWDER, FOR SOLUTION ORAL DAILY
Status: DISCONTINUED | OUTPATIENT
Start: 2024-03-25 | End: 2024-03-26 | Stop reason: HOSPADM

## 2024-03-25 RX ADMIN — SODIUM CHLORIDE, PRESERVATIVE FREE 10 ML: 5 INJECTION INTRAVENOUS at 20:43

## 2024-03-25 RX ADMIN — CARBOXYMETHYLCELLULOSE SODIUM 1 DROP: 10 GEL OPHTHALMIC at 07:43

## 2024-03-25 RX ADMIN — GABAPENTIN 300 MG: 300 CAPSULE ORAL at 08:30

## 2024-03-25 RX ADMIN — THIAMINE HCL TAB 100 MG 100 MG: 100 TAB at 07:45

## 2024-03-25 RX ADMIN — SODIUM CHLORIDE, PRESERVATIVE FREE 10 ML: 5 INJECTION INTRAVENOUS at 07:47

## 2024-03-25 RX ADMIN — ACETAMINOPHEN 325MG 650 MG: 325 TABLET ORAL at 18:08

## 2024-03-25 RX ADMIN — ZOLPIDEM TARTRATE 5 MG: 5 TABLET ORAL at 20:39

## 2024-03-25 RX ADMIN — LEVOTHYROXINE SODIUM 25 MCG: 0.05 TABLET ORAL at 05:48

## 2024-03-25 RX ADMIN — SIMETHICONE 80 MG: 80 TABLET, CHEWABLE ORAL at 20:40

## 2024-03-25 RX ADMIN — SODIUM CHLORIDE, PRESERVATIVE FREE 10 ML: 5 INJECTION INTRAVENOUS at 20:41

## 2024-03-25 RX ADMIN — HALOPERIDOL 5 MG: 2 TABLET ORAL at 16:30

## 2024-03-25 RX ADMIN — GABAPENTIN 300 MG: 300 CAPSULE ORAL at 20:40

## 2024-03-25 RX ADMIN — CARBOXYMETHYLCELLULOSE SODIUM 1 DROP: 10 GEL OPHTHALMIC at 21:33

## 2024-03-25 RX ADMIN — ACETAMINOPHEN 325MG 650 MG: 325 TABLET ORAL at 03:36

## 2024-03-25 RX ADMIN — HEPARIN SODIUM 5000 UNITS: 5000 INJECTION INTRAVENOUS; SUBCUTANEOUS at 05:48

## 2024-03-25 RX ADMIN — DULOXETINE HYDROCHLORIDE 60 MG: 60 CAPSULE, DELAYED RELEASE ORAL at 07:43

## 2024-03-25 RX ADMIN — GABAPENTIN 300 MG: 300 CAPSULE ORAL at 14:08

## 2024-03-25 RX ADMIN — HALOPERIDOL 5 MG: 2 TABLET ORAL at 05:48

## 2024-03-25 RX ADMIN — HALOPERIDOL 2 MG: 2 TABLET ORAL at 07:43

## 2024-03-25 RX ADMIN — CARBOXYMETHYLCELLULOSE SODIUM 1 DROP: 10 GEL OPHTHALMIC at 14:09

## 2024-03-25 RX ADMIN — ACETAMINOPHEN 325MG 650 MG: 325 TABLET ORAL at 11:25

## 2024-03-25 RX ADMIN — CARVEDILOL 12.5 MG: 12.5 TABLET, FILM COATED ORAL at 16:30

## 2024-03-25 RX ADMIN — CARVEDILOL 12.5 MG: 12.5 TABLET, FILM COATED ORAL at 07:43

## 2024-03-25 RX ADMIN — TOPIRAMATE 100 MG: 100 TABLET, FILM COATED ORAL at 08:30

## 2024-03-25 RX ADMIN — HEPARIN SODIUM 5000 UNITS: 5000 INJECTION INTRAVENOUS; SUBCUTANEOUS at 14:08

## 2024-03-25 RX ADMIN — POLYETHYLENE GLYCOL 3350 17 G: 17 POWDER, FOR SOLUTION ORAL at 11:25

## 2024-03-25 RX ADMIN — HEPARIN SODIUM 5000 UNITS: 5000 INJECTION INTRAVENOUS; SUBCUTANEOUS at 20:41

## 2024-03-25 RX ADMIN — HALOPERIDOL 5 MG: 2 TABLET ORAL at 20:40

## 2024-03-25 RX ADMIN — PANTOPRAZOLE SODIUM 40 MG: 40 TABLET, DELAYED RELEASE ORAL at 05:48

## 2024-03-25 RX ADMIN — SODIUM CHLORIDE, PRESERVATIVE FREE 10 ML: 5 INJECTION INTRAVENOUS at 07:46

## 2024-03-25 ASSESSMENT — PAIN - FUNCTIONAL ASSESSMENT
PAIN_FUNCTIONAL_ASSESSMENT: ACTIVITIES ARE NOT PREVENTED

## 2024-03-25 ASSESSMENT — PAIN DESCRIPTION - LOCATION
LOCATION: LEG
LOCATION: GROIN
LOCATION: LEG
LOCATION: BACK;LEG;NECK
LOCATION: LEG;FOOT

## 2024-03-25 ASSESSMENT — PAIN DESCRIPTION - ORIENTATION
ORIENTATION: LEFT;RIGHT
ORIENTATION: RIGHT;LEFT;LOWER
ORIENTATION: RIGHT;LEFT

## 2024-03-25 ASSESSMENT — PAIN DESCRIPTION - DESCRIPTORS
DESCRIPTORS: SORE;DISCOMFORT
DESCRIPTORS: SORE
DESCRIPTORS: ACHING

## 2024-03-25 ASSESSMENT — PAIN SCALES - GENERAL
PAINLEVEL_OUTOF10: 3
PAINLEVEL_OUTOF10: 5
PAINLEVEL_OUTOF10: 0
PAINLEVEL_OUTOF10: 0
PAINLEVEL_OUTOF10: 5
PAINLEVEL_OUTOF10: 3
PAINLEVEL_OUTOF10: 0
PAINLEVEL_OUTOF10: 5
PAINLEVEL_OUTOF10: 3

## 2024-03-25 NOTE — CARE COORDINATION
SW spoke with patient and pt's mother  Juliana Lema at bedside, to confirm patient discharge plan and dispo.     Patient mother Juliana state she is agreeable for pt to be discharge home with her and .       Mother will transport at time of d/c.      Bonifacio Ahuja BSW  Case Management

## 2024-03-26 VITALS
WEIGHT: 287.7 LBS | DIASTOLIC BLOOD PRESSURE: 84 MMHG | BODY MASS INDEX: 36.92 KG/M2 | SYSTOLIC BLOOD PRESSURE: 126 MMHG | HEART RATE: 81 BPM | RESPIRATION RATE: 17 BRPM | HEIGHT: 74 IN | OXYGEN SATURATION: 94 % | TEMPERATURE: 98.9 F

## 2024-03-26 PROBLEM — J96.02 ACUTE RESPIRATORY FAILURE WITH HYPOXIA AND HYPERCAPNIA (HCC): Status: RESOLVED | Noted: 2024-03-17 | Resolved: 2024-03-26

## 2024-03-26 PROBLEM — J96.01 ACUTE RESPIRATORY FAILURE WITH HYPOXIA AND HYPERCAPNIA (HCC): Status: RESOLVED | Noted: 2024-03-17 | Resolved: 2024-03-26

## 2024-03-26 PROBLEM — R41.82 ALTERED MENTAL STATUS: Status: RESOLVED | Noted: 2024-03-19 | Resolved: 2024-03-26

## 2024-03-26 PROBLEM — N12 PYELONEPHRITIS: Status: RESOLVED | Noted: 2024-03-17 | Resolved: 2024-03-26

## 2024-03-26 PROBLEM — N30.00 ACUTE CYSTITIS WITHOUT HEMATURIA: Status: RESOLVED | Noted: 2024-03-19 | Resolved: 2024-03-26

## 2024-03-26 PROBLEM — A41.9 SEPSIS (HCC): Status: RESOLVED | Noted: 2024-03-11 | Resolved: 2024-03-26

## 2024-03-26 PROBLEM — N17.9 AKI (ACUTE KIDNEY INJURY) (HCC): Status: RESOLVED | Noted: 2024-03-11 | Resolved: 2024-03-26

## 2024-03-26 PROBLEM — M62.82 RHABDOMYOLYSIS: Status: RESOLVED | Noted: 2024-03-17 | Resolved: 2024-03-26

## 2024-03-26 PROCEDURE — 6370000000 HC RX 637 (ALT 250 FOR IP): Performed by: INTERNAL MEDICINE

## 2024-03-26 PROCEDURE — 2580000003 HC RX 258: Performed by: PHYSICIAN ASSISTANT

## 2024-03-26 PROCEDURE — 6370000000 HC RX 637 (ALT 250 FOR IP): Performed by: HOSPITALIST

## 2024-03-26 PROCEDURE — 6370000000 HC RX 637 (ALT 250 FOR IP): Performed by: PHYSICIAN ASSISTANT

## 2024-03-26 PROCEDURE — 2580000003 HC RX 258: Performed by: INTERNAL MEDICINE

## 2024-03-26 PROCEDURE — 6370000000 HC RX 637 (ALT 250 FOR IP): Performed by: PSYCHIATRY & NEUROLOGY

## 2024-03-26 PROCEDURE — 99239 HOSP IP/OBS DSCHRG MGMT >30: CPT | Performed by: HOSPITALIST

## 2024-03-26 PROCEDURE — 99232 SBSQ HOSP IP/OBS MODERATE 35: CPT | Performed by: PSYCHIATRY & NEUROLOGY

## 2024-03-26 PROCEDURE — 6360000002 HC RX W HCPCS: Performed by: PHYSICIAN ASSISTANT

## 2024-03-26 RX ORDER — THIAMINE MONONITRATE (VIT B1) 100 MG
100 TABLET ORAL DAILY
Qty: 30 TABLET | Refills: 0 | Status: SHIPPED | OUTPATIENT
Start: 2024-03-27 | End: 2024-04-26

## 2024-03-26 RX ORDER — HYDROXYZINE PAMOATE 25 MG/1
50 CAPSULE ORAL 3 TIMES DAILY PRN
Qty: 20 CAPSULE | Refills: 0 | Status: SHIPPED | OUTPATIENT
Start: 2024-03-26 | End: 2024-04-09

## 2024-03-26 RX ORDER — HALOPERIDOL 5 MG/1
5 TABLET ORAL NIGHTLY
Qty: 10 TABLET | Refills: 0 | Status: SHIPPED | OUTPATIENT
Start: 2024-03-26 | End: 2024-04-05

## 2024-03-26 RX ORDER — SIMETHICONE 80 MG
80 TABLET,CHEWABLE ORAL 2 TIMES DAILY PRN
Qty: 30 TABLET | Refills: 0 | Status: SHIPPED | OUTPATIENT
Start: 2024-03-26

## 2024-03-26 RX ORDER — CLONIDINE 0.3 MG/24H
1 PATCH, EXTENDED RELEASE TRANSDERMAL WEEKLY
Qty: 4 PATCH | Refills: 0 | Status: SHIPPED | OUTPATIENT
Start: 2024-03-27

## 2024-03-26 RX ORDER — CARVEDILOL 12.5 MG/1
12.5 TABLET ORAL 2 TIMES DAILY WITH MEALS
Qty: 60 TABLET | Refills: 3 | Status: SHIPPED | OUTPATIENT
Start: 2024-03-26

## 2024-03-26 RX ADMIN — HALOPERIDOL 5 MG: 2 TABLET ORAL at 07:47

## 2024-03-26 RX ADMIN — HEPARIN SODIUM 5000 UNITS: 5000 INJECTION INTRAVENOUS; SUBCUTANEOUS at 06:04

## 2024-03-26 RX ADMIN — SODIUM CHLORIDE, PRESERVATIVE FREE 10 ML: 5 INJECTION INTRAVENOUS at 07:53

## 2024-03-26 RX ADMIN — CARBOXYMETHYLCELLULOSE SODIUM 1 DROP: 10 GEL OPHTHALMIC at 07:46

## 2024-03-26 RX ADMIN — DULOXETINE HYDROCHLORIDE 60 MG: 60 CAPSULE, DELAYED RELEASE ORAL at 07:47

## 2024-03-26 RX ADMIN — TOPIRAMATE 100 MG: 100 TABLET, FILM COATED ORAL at 08:30

## 2024-03-26 RX ADMIN — SODIUM CHLORIDE, PRESERVATIVE FREE 10 ML: 5 INJECTION INTRAVENOUS at 07:52

## 2024-03-26 RX ADMIN — THIAMINE HCL TAB 100 MG 100 MG: 100 TAB at 07:47

## 2024-03-26 RX ADMIN — ACETAMINOPHEN 325MG 650 MG: 325 TABLET ORAL at 07:47

## 2024-03-26 RX ADMIN — POLYETHYLENE GLYCOL 3350 17 G: 17 POWDER, FOR SOLUTION ORAL at 07:46

## 2024-03-26 RX ADMIN — GABAPENTIN 300 MG: 300 CAPSULE ORAL at 08:30

## 2024-03-26 RX ADMIN — LEVOTHYROXINE SODIUM 25 MCG: 0.05 TABLET ORAL at 06:04

## 2024-03-26 RX ADMIN — CARVEDILOL 12.5 MG: 12.5 TABLET, FILM COATED ORAL at 07:47

## 2024-03-26 RX ADMIN — PANTOPRAZOLE SODIUM 40 MG: 40 TABLET, DELAYED RELEASE ORAL at 06:04

## 2024-03-26 ASSESSMENT — PAIN SCALES - GENERAL
PAINLEVEL_OUTOF10: 5
PAINLEVEL_OUTOF10: 0
PAINLEVEL_OUTOF10: 0
PAINLEVEL_OUTOF10: 3

## 2024-03-26 ASSESSMENT — PAIN DESCRIPTION - LOCATION: LOCATION: LEG

## 2024-03-26 NOTE — DISCHARGE SUMMARY
Discharge Summary    Patient: Harry Lema MRN: [id number]  [facility]: [id number]    YOB: 1985  Age: 38 y.o.  Sex: male    DOA: 3/11/2024 LOS:  LOS: 15 days        Disposition: Home with [facility]    Discharge Date: 3/26/2024    Admission Diagnosis: Sepsis (HCC) [A41.9]  Acute renal failure (ARF) (HCC) [N17.9]    Discharge Diagnosis:    Acute hypoxic and hypercarbic respiratory failure requiring intubation-extubated on 3/13/2024  Suicidal attempt and thoughts  Acute toxic versus metabolic encephalopathy, resolved  Severe sepsis  Aspiration pneumonia  Acute renal failure  Acute rhabdomyolysis  Moderate right-sided hydronephrosis with urethral stent in place-status post right PCN  None anion gap metabolic acidosis-resolved  Lactic acidosis-resolved  Pyelonephritis  History of nephrolithiasis status post cystoscopy and right ureteral stent placement on 2/29/2024  History of malignant hypertension  Hypothyroidism  Dyslipidemia  Hematuria, improved    Discharge Condition: Stable      PHYSICAL EXAM  Visit Vitals  /84   Pulse 81   Temp 98.9 °F (37.2 °C) (Oral)   Resp 17   Ht 1.88 m (6' 2\")   Wt 130.5 kg (287 lb 11.2 oz)   SpO2 94%   BMI 36.94 kg/m²       General:  Alert, cooperative, no acute distress    Pulmonary:  CTA Bilaterally. No Wheezing/Rales.  Cardiovascular: Regular rate and Rhythm.  GI:  Soft, Non distended, Non tender. + Bowel sounds.  Extremities:  No edema. No calf tenderness.   Psych: Not anxious or agitated.  Neurologic: Alert and oriented X 3.  Depressed mood.moves all ext.  Additional: right sided PCN                                Hospital Course:   Mr Lema is a 38-year-old male with past medical history of hypertension, dyslipidemia, nephrolithiasis status post ureteral stent placed on 2/29/2024 who presented to [facility] ER via EMS for AMS.  Per chart review patient was found down with unknown downtime.  Initially upon arrival patient was found to be hypothermic, hypotensive and unable to

## 2024-03-26 NOTE — PROGRESS NOTES
RENAL DAILY PROGRESS NOTE    Subjective:       Complaint:   exubated  Awake and alert but confused  UO of 8750 in last 24 hrs    IMPRESSION:   Acute kidney injury.  Appears to be related to postobstructive causes (malfunctioning ureteral stent)with ATN.Hydronephrosis is still persistent but does not appear to be a new finding.  Another possibility is sepsis related to urinary source and septic ATN/MATILDA.  Urinalysis shows ketones apart from infection.   S/p PCN on right 3/12/24.   Question of rhabdomyolysis with high CK.  Hydronephrosis status post stent placement  Hypertension  Obesity  Obstructive sleep apnea   PLAN:    c/w fluids. Having post obstructive diuresis currently.  Monitor output  Monitor daily labs  Record input  Keep MAP>65  Continue with ramirez catheter  Abx per primary team                Current Facility-Administered Medications   Medication Dose Route Frequency    dextrose 5 % in lactated ringers infusion   IntraVENous Continuous    carvedilol (COREG) tablet 6.25 mg  6.25 mg Oral BID WC    hydrALAZINE (APRESOLINE) injection 10 mg  10 mg IntraVENous Q6H PRN    cloNIDine (CATAPRES) 0.3 MG/24HR 1 patch  1 patch TransDERmal Weekly    niCARdipine (CARDENE) 20 mg in 0.9 % sodium chloride 200 mL solution  2.5-15 mg/hr IntraVENous Continuous    perflutren lipid microspheres (DEFINITY) injection 2 mL  2 mL IntraVENous ONCE PRN    sodium chloride flush 0.9 % injection 5-40 mL  5-40 mL IntraVENous 2 times per day    sodium chloride flush 0.9 % injection 5-40 mL  5-40 mL IntraVENous PRN    0.9 % sodium chloride infusion   IntraVENous PRN    acetaminophen (TYLENOL) tablet 650 mg  650 mg Oral Q6H PRN    Or    acetaminophen (TYLENOL) suppository 650 mg  650 mg Rectal Q6H PRN    heparin (porcine) injection 5,000 Units  5,000 Units SubCUTAneous 3 times per day    famotidine (PEPCID) 20 mg in sodium chloride (PF) 0.9 % 10 mL injection  20 mg IntraVENous Daily    vancomycin (VANCOCIN) intermittent dosing 
      RENAL DAILY PROGRESS NOTE  38y M with PMH  HTN, nephrolithiasis, s/p uretheral stent placement admitted for sepsis, following for renal failure.   Subjective:       Complaint:   Overnight event noted  Documented urine output about 4.3L  Less agitation    IMPRESSION:   Acute kidney injury.   ATN, sepsis, rhabdo, obstructive uropathy , S/p PCN on right 3/12/24.   Hydronephrosis status post stent placement , required PCN placement   Sepsis,   Hypertension  Obesity  Obstructive sleep apnea   PLAN:   His CK remain on higher side, BUN and creatinine also on higher side, marked improvement in hypernatremia. Plan to continue iv hydration with ringer lactate , monitor CK and renal function.        Dicsussed with ICU team.         Current Facility-Administered Medications   Medication Dose Route Frequency    vancomycin (VANCOCIN) 2000 mg in 0.9% sodium chloride 500 mL IVPB  2,000 mg IntraVENous Once    hydrALAZINE (APRESOLINE) injection 20 mg  20 mg IntraVENous Q4H PRN    carvedilol (COREG) tablet 25 mg  25 mg Oral BID WC    sodium chloride flush 0.9 % injection 5-40 mL  5-40 mL IntraVENous 2 times per day    sodium chloride flush 0.9 % injection 5-40 mL  5-40 mL IntraVENous PRN    0.9 % sodium chloride infusion   IntraVENous PRN    LORazepam (ATIVAN) tablet 1 mg  1 mg Oral Q1H PRN    Or    LORazepam (ATIVAN) injection 1 mg  1 mg IntraVENous Q1H PRN    Or    LORazepam (ATIVAN) tablet 2 mg  2 mg Oral Q1H PRN    Or    LORazepam (ATIVAN) injection 2 mg  2 mg IntraVENous Q1H PRN    Or    LORazepam (ATIVAN) tablet 3 mg  3 mg Oral Q1H PRN    Or    LORazepam (ATIVAN) injection 3 mg  3 mg IntraVENous Q1H PRN    Or    LORazepam (ATIVAN) tablet 4 mg  4 mg Oral Q1H PRN    Or    LORazepam (ATIVAN) injection 4 mg  4 mg IntraVENous Q1H PRN    thiamine (B-1) injection 400 mg  400 mg IntraVENous TID    Followed by    [START ON 3/19/2024] thiamine (B-1) injection 200 mg  200 mg IntraVENous Daily    topiramate (TOPAMAX) tablet 100 mg  
      RENAL DAILY PROGRESS NOTE  38y M with PMH  HTN, nephrolithiasis, s/p uretheral stent placement admitted for sepsis, following for renal failure.   Subjective:       Complaint:   Overnight event noted  Documented urine output about 4.4L      IMPRESSION:   Acute kidney injury.   ATN, sepsis, rhabdo, obstructive uropathy , S/p PCN on right 3/12/24.   Hydronephrosis status post stent placement , required PCN placement   Sepsis,   Hypertension  Obesity  Obstructive sleep apnea   PLAN:   Continue iv hydration, potassium repletion.  Check CK.   Nolen management per urology.               Current Facility-Administered Medications   Medication Dose Route Frequency    potassium chloride 10 mEq/100 mL IVPB (Peripheral Line)  10 mEq IntraVENous Q1H    vancomycin (VANCOCIN) 1,750 mg in sodium chloride 0.9 % 500 mL IVPB  1,750 mg IntraVENous Q18H    lactated ringers IV soln infusion   IntraVENous Continuous    hydrALAZINE (APRESOLINE) injection 10 mg  10 mg IntraVENous Q6H PRN    carboxymethylcellulose PF (THERATEARS/REFRESH) 1 % ophthalmic gel 1 drop  1 drop Both Eyes TID    carvedilol (COREG) tablet 25 mg  25 mg Oral BID WC    sodium chloride flush 0.9 % injection 5-40 mL  5-40 mL IntraVENous 2 times per day    sodium chloride flush 0.9 % injection 5-40 mL  5-40 mL IntraVENous PRN    0.9 % sodium chloride infusion   IntraVENous PRN    LORazepam (ATIVAN) tablet 1 mg  1 mg Oral Q1H PRN    Or    LORazepam (ATIVAN) injection 1 mg  1 mg IntraVENous Q1H PRN    Or    LORazepam (ATIVAN) tablet 2 mg  2 mg Oral Q1H PRN    Or    LORazepam (ATIVAN) injection 2 mg  2 mg IntraVENous Q1H PRN    Or    LORazepam (ATIVAN) tablet 3 mg  3 mg Oral Q1H PRN    Or    LORazepam (ATIVAN) injection 3 mg  3 mg IntraVENous Q1H PRN    Or    LORazepam (ATIVAN) tablet 4 mg  4 mg Oral Q1H PRN    Or    LORazepam (ATIVAN) injection 4 mg  4 mg IntraVENous Q1H PRN    thiamine (B-1) injection 400 mg  400 mg IntraVENous TID    Followed by    [START ON 
      RENAL DAILY PROGRESS NOTE  38y M with PMH  HTN, nephrolithiasis, s/p uretheral stent placement admitted for sepsis, following for renal failure.   Subjective:       Complaint:   Overnight event noted  Nolen catheter is out  Improving potassium,       IMPRESSION:   Acute kidney injury.   ATN, sepsis, rhabdo, obstructive uropathy , S/p PCN on right 3/12/24.   Hydronephrosis status post stent placement , required PCN placement   Sepsis,   Hypertension  Obesity  Obstructive sleep apnea   PLAN:   Renal function is close to baseline. NO new recommendation, will be available if any questions or concern.                Current Facility-Administered Medications   Medication Dose Route Frequency    lactated ringers IV soln infusion   IntraVENous Continuous    vancomycin (VANCOCIN) 1,750 mg in sodium chloride 0.9 % 500 mL IVPB  1,750 mg IntraVENous Q18H    sodium chloride flush 0.9 % injection 5-40 mL  5-40 mL IntraVENous 2 times per day    sodium chloride flush 0.9 % injection 5-40 mL  5-40 mL IntraVENous PRN    [START ON 3/24/2024] thiamine mononitrate tablet 100 mg  100 mg Oral Daily    hydrALAZINE (APRESOLINE) injection 10 mg  10 mg IntraVENous Q6H PRN    carboxymethylcellulose PF (THERATEARS/REFRESH) 1 % ophthalmic gel 1 drop  1 drop Both Eyes TID    carvedilol (COREG) tablet 25 mg  25 mg Oral BID WC    sodium chloride flush 0.9 % injection 5-40 mL  5-40 mL IntraVENous 2 times per day    sodium chloride flush 0.9 % injection 5-40 mL  5-40 mL IntraVENous PRN    0.9 % sodium chloride infusion   IntraVENous PRN    LORazepam (ATIVAN) tablet 1 mg  1 mg Oral Q1H PRN    Or    LORazepam (ATIVAN) injection 1 mg  1 mg IntraVENous Q1H PRN    Or    LORazepam (ATIVAN) tablet 2 mg  2 mg Oral Q1H PRN    Or    LORazepam (ATIVAN) injection 2 mg  2 mg IntraVENous Q1H PRN    Or    LORazepam (ATIVAN) tablet 3 mg  3 mg Oral Q1H PRN    Or    LORazepam (ATIVAN) injection 3 mg  3 mg IntraVENous Q1H PRN    Or    LORazepam (ATIVAN) tablet 4 
      RENAL DAILY PROGRESS NOTE  38y M with PMH  HTN, nephrolithiasis, s/p uretheral stent placement admitted for sepsis, following for renal failure.   Subjective:       Complaint:   Overnight event noted  Urine output about 2.8L  Remain confused  Fluid changes to D5W and half NS  His serum sodium and chloride trending up,   Blood sugar acceptable.     IMPRESSION:   Acute kidney injury.   ATN, sepsis, rhabdo, obstructive uropathy , S/p PCN on right 3/12/24.   Hydronephrosis status post stent placement , required PCN placement   Sepsis,   Hypertension  Obesity  Obstructive sleep apnea   PLAN:   I would suggest to switch back D5W , stop half NS, monitor sodium and other electrolytes.   Agree with potassium repletion .   Check CK        Dicsussed with ICU team.         Current Facility-Administered Medications   Medication Dose Route Frequency    hydrALAZINE (APRESOLINE) injection 20 mg  20 mg IntraVENous Q4H PRN    carvedilol (COREG) tablet 25 mg  25 mg Oral BID WC    dextrose 5 % and 0.45 % sodium chloride infusion   IntraVENous Continuous    sodium chloride flush 0.9 % injection 5-40 mL  5-40 mL IntraVENous 2 times per day    sodium chloride flush 0.9 % injection 5-40 mL  5-40 mL IntraVENous PRN    0.9 % sodium chloride infusion   IntraVENous PRN    LORazepam (ATIVAN) tablet 1 mg  1 mg Oral Q1H PRN    Or    LORazepam (ATIVAN) injection 1 mg  1 mg IntraVENous Q1H PRN    Or    LORazepam (ATIVAN) tablet 2 mg  2 mg Oral Q1H PRN    Or    LORazepam (ATIVAN) injection 2 mg  2 mg IntraVENous Q1H PRN    Or    LORazepam (ATIVAN) tablet 3 mg  3 mg Oral Q1H PRN    Or    LORazepam (ATIVAN) injection 3 mg  3 mg IntraVENous Q1H PRN    Or    LORazepam (ATIVAN) tablet 4 mg  4 mg Oral Q1H PRN    Or    LORazepam (ATIVAN) injection 4 mg  4 mg IntraVENous Q1H PRN    thiamine (B-1) injection 400 mg  400 mg IntraVENous TID    Followed by    [START ON 3/19/2024] thiamine (B-1) injection 200 mg  200 mg IntraVENous Daily    dexmedeTOMIDine 
      Urology Progress Note    1. Sepsis, due to unspecified organism, unspecified whether acute organ dysfunction present (HCC)        Assessment/Plan:   Severe sepsis              WBC 17.1 > 13.2 < 21.7     H/o kidney stones s/p 1st stage URS on 2/29 with Dr. Olmstead at [facility]  Mild right hydro with stent in place on CT 3/11  KEO 3/12: Markedly limited exam. Probable mild to moderate R hydronephrosis similar to recent CT.               UA 3/11: large blood, moderate LE, TNTC WBC, 3+ bacteria              Ucx 3/11: pending    Blood cx 3/11: prelim GPC, staph coag neg       S/p R PCN 3/12 by IR Dr. Jay  ARF              Cr 6.60 > 6.46 > 6.36 > 5.09      Acute respiratory failure, intubated  Acute toxic/metabolic encephalopathy  Acute rhabdomyolysis  Obesity  Obstructive sleep apnea    Plan:  Appreciate management per PCCM and medicine  Antibiotics per ID - on Vanc   Nephrology following   Continue to trend labs   Maintain ramirez catheter and R PCN  Flush ramirez PRN with sterile water for decreased UOP/clots  Will need definitive OP stone management once infection resolves  Following     Clarisse Bennett PA-C  [facility]  Available M-Fri, 8AM- 5PM  Pager: [phone]    I discussed the case and independently reviewed the pertinent vital signs, lab values, and imaging. I performed a substantive portion of the medical decision making. I agree with the findings and the plan of care, as documented in the note.    Roberto Dooley MD    [address] [id number] [phone] Office  [phone] Pager        Subjective:     Daily Progress Note: 3/13/2024 1:19 PM    Patient extubated today. He is c/o pain of his mouth and is requesting for pain medication.  Notes some soreness of right flank.  Mother present at bedside.     Objective:     BP (!) 212/114   Pulse 79   Temp 98.8 °F (37.1 °C) (Axillary)   Resp 17   Ht 1.88 m (6' 2\")   Wt 136.1 kg (300 lb)   SpO2 100%   BMI 38.52 kg/m²  
      Urology Progress Note    1. Sepsis, due to unspecified organism, unspecified whether acute organ dysfunction present (HCC)      Assessment/Plan:   Severe sepsis              WBC 18.1>17.1 > 13.2 < 21.7     H/o kidney stones s/p 1st stage URS on  with Dr. Olmstead at [facility]  Mild right hydro with stent in place on CT 3/11  KEO 3/12: Markedly limited exam. Probable mild to moderate R hydronephrosis similar to recent CT.               UA 3/11: large blood, moderate LE, TNTC WBC, 3+ bacteria              Ucx 3/11: NO GROWTH              Blood cx 3/11: prelim GPC, staph coag neg               Blood cx 3/14: pending               S/p R PCN 3/12 by IR Dr. Jay    ARF              Cr 3.37 < 6.60 > 6.46 > 6.36 > 5.09      Acute respiratory failure, extubated 3/13  Acute toxic/metabolic encephalopathy  Acute rhabdomyolysis  Obesity  Obstructive sleep apnea    Plan:  Appreciate management per PCCM and medicine  Antibiotics per ID - on Merrem and Vanc   Maintain R PCN and ramirez catheter.  Consider VT once Cr nadirs, prior to DC      I have seen and examined this patient independently, I reviewed pertinent labs and imaging, and I agree with the assessing provider's assessment and plan as outlined above, with ammendments as follows:     Clinically improving but with AMS still     Creatinine improving     Can VOID trial once AMS better and Creatinine nadirs     Message sent to schedulers to change him to PCNL in fute with me      Trevor Bolden MD  [facility], [facility]  Pager [phone]          Subjective:     Daily Progress Note: 3/14/2024 10:30 AM      Objective:     BP (!) 179/85   Pulse 97   Temp 98.9 °F (37.2 °C) (Oral)   Resp 24   Ht 1.88 m (6' 2\")   Wt (!) 137.7 kg (303 lb 9.6 oz)   SpO2 100%   BMI 38.98 kg/m²      Temp (24hrs), Av.8 °F (37.1 °C), Min:98.4 °F (36.9 °C), Max:99.1 °F (37.3 °C)      Intake and Output:   1901 -  0700  In: 3921.1 [I.V.:3921.1]  Out: 9765 [Urine:9765]  701 - 
      Urology Progress Note    1. Sepsis, due to unspecified organism, unspecified whether acute organ dysfunction present (HCC)      Assessment/Plan:   Severe sepsis   WBC 13.2 < 21.7    H/o kidney stones s/p 1st stage URS on  with Dr. Olmstead at [facility]  Mild right hydro with stent in place on CT 3/11  KEO 3/12: Markedly limited exam. Probable mild to moderate R hydronephrosis similar to recent CT.    UA 3/11: large blood, moderate LE, TNTC WBC, 3+ bacteria   Ucx 3/11: pending   ARF   Cr 6.46 > 6.36 > 5.09     Acute respiratory failure, intubated  Acute toxic/metabolic encephalopathy  Acute rhabdomyolysis    Plan:    Appreciate overall management per PCCM  Abx per primary  Nephrology following  Continue to trend labs. WBC improving, Cr rising.  Work-up reviewed by Dr. Hagan, recommends placement of R PCN.  Discussed with REG Colvin.   Maintain NPO  Following     Clarisse Bennett PA-C  [facility]  Available -Fri, 8AM- 5PM  Pager: [phone]      I have reviewed the diagnosis and discussed the plan with Clarisse Bennett PA-C, and I agree.   I spoke with Dr Jay re: PCN and appreciate his assistance.     Sb Hagan MD  [facility]  [phone]         Subjective:     Daily Progress Note: 3/12/2024 12:01 PM    Patient remains intubated. Mother present at bedside.     Objective:     BP 98/64   Pulse 63   Temp 99.5 °F (37.5 °C)   Resp 12   Ht 1.88 m (6' 2\")   Wt 136.1 kg (300 lb)   SpO2 95%   BMI 38.52 kg/m²      Temp (24hrs), Av.6 °F (36.4 °C), Min:92.8 °F (33.8 °C), Max:100.2 °F (37.9 °C)      Intake and Output:  03/10 1901 -  0700  In: 3988.1 [I.V.:1530.5]  Out: 3800 [Urine:3800]   0701 -  1900  In: 679.4 [I.V.:679.4]  Out: 200 [Urine:200]    PHYSICAL EXAMINATION:   BP 98/64   Pulse 63   Temp 99.5 °F (37.5 °C)   Resp 12   Ht 1.88 m (6' 2\")   Wt 136.1 kg (300 lb)   SpO2 95%   BMI 38.52 kg/m²   Constitutional: 
    Interventional Radiology Progress Note    Patient: Harry Lema               Sex: male          DOA: 3/11/2024       YOB: 1985      Age:  38 y.o.        LOS: 2 days       Assessment/Plan     Patient is POD#1 s/p right PCN placement by Dr. Jay.     1.  Continue management per primary team. Continue antibiotics    2. Please flush PCN with 10 cc sterile saline towards the body every shift and document output   3.  Maintain PCN dressing clean and dry  4.  Keep nephrostomy tube to gravity    From an IR standpoint, the patient is progressing appropriately without any apparent complications.     Subjective:     Patient intubated and unable to provide history or ROS     No grimacing when palpating along right PCN site.     Objective:      Visit Vitals  BP (!) 212/114   Pulse 79   Temp 98.8 °F (37.1 °C) (Axillary)   Resp 17   Ht 1.88 m (6' 2\")   Wt 136.1 kg (300 lb)   SpO2 100%   BMI 38.52 kg/m²       Recent images:  Procedure images are available for review in PACS    Interval history:  Low grade fever overnight tmax 100.4F  Hemoglobin stable at 10  Cr 6.6 < 6.46  Urine culture no growth to date  Blood culture growing gram + cocci in clusters in both bottles   Urine output in last 24 hours documented 2245 ml. Unsure of output from right PCN (150 ml in bag on exam)    Physical Exam:  Constitutional: intubated, alert and awake  Respiratory: Normal respiratory effort. Symmetrical rise and fall of chest.    Cardiovascular: Regular rate.   Gastrointestinal: Soft, NT, ND.  Procedure site: CDI dressing right flank with 8.5 Fr drain in place with light pink urine    Thank you,  JOSÉ MIGUEL Rhoades  [phone]  
   03/11/24 1613   Patient Observation   Pulse 85   Respirations 18   SpO2 100 %   Breath Sounds   Breath Sounds Bilateral Coarse crackles   Vent Information   Ventilator Day(s) 1   Ventilator ID 1   Ventilator Safety Check Performed Pre-Use Yes   Ventilator Initiate Yes   Vent Mode AC/PRVC   $Ventilation $Initial Day   Ventilator Settings   FiO2  100 %   Insp Time (sec) 0.8 sec   Resp Rate (Set) 18 bpm   Target Vt 500   PEEP/CPAP (cmH2O) 5   Vent Patient Data (Readings)   Vt (Measured) 526 mL   Peak Inspiratory Pressure (cmH2O) 27 cmH2O   Rate Measured 18 br/min   Minute Volume (L/min) 8.91 Liters   Mean Airway Pressure (cmH2O) 11 cmH20   Plateau Pressure (cm H2O) 21 cm H2O   Driving Pressure 16   Inspiratory Time 0.8 sec   I:E Ratio 1:3.2   Flow Sensitivity 3 L/min   Static Compliance (L/cm H2O) 33   I Time/ I Time % 0.8 s   Insp Rise Time (%) 50 %   Backup Apnea On   Backup Rate 18 Breaths Per Minute   Backup Vt 500   Backup I Time 1   Vent Alarm Settings   Low Pressure (cmH2O) 11 cmH2O   High Pressure (cmH2O) 40 cmH2O   Low Minute Volume (lpm) 3 L/min   High Minute Volume (lpm) 18 L/min   Low Exhaled Vt (ml) 200 mL   High Exhaled Vt (ml) 800 mL   RR High (bpm) 40 br/min   Apnea (secs) 20 secs   Additional Respiratoray Assessments   Humidification Source Heated wire   Humidification Temp 37   Circuit Condensation Drained   Ambu Bag With Mask At Bedside Yes   Airway Clearance   Suction ET Tube   Subglottic Suction Done Yes   Suction Device Inline suction catheter   Sputum Method Obtained Endotracheal   Sputum Amount Small   Sputum Color/Odor White;Clear   Sputum Consistency Thin   ETT    Placement Date/Time: 03/11/24 1605   Present on Admission/Arrival: No  Placed By: In ED  Placement Verified By: Auscultation;Capnometry;Chest X-ray;Colorimetric ETCO2 device  Preoxygenation: Yes  Mask Ventilation: Ventilated by mask (1)  Technique: Vi...   $ Intubation Emergent  $ Yes   Secured At 25 cm   Measured From Lips   ETT 
  [facility]  (A Division of [facility])    Follow-up Note      Date of Admission: 3/11/2024       Date of Note:  3/19/2024          Current Antimicrobials:    Prior Antimicrobials:  Vancomycin 3/11to present Zosyn 3/11  Meropenem 3/12 to 3/13     Assessment:         Coag negative staph bacteremia: Most likely related to  process; in all 4 bottles.  Subsequent cultures remain negative  Severe sepsis: Fevers as well as subsequent hypothermia, leukocytosis, tachycardia.  Not requiring pressors.  Right hydronephrosis: Prior renal stent from  noted 3/11 CT scan-suspect malfunction of stent   -Status post right-sided nephrostomy tube 3/12 by IR  Acute renal failure: With postobstructive diuresis  Acute respiratory failure: Intubated 3/11 through 3/13; history of obstructive sleep apnea  Electrolyte abnormalities  Rhabdomyolysis: Elevated CPK-trending down      Plan:   Continue vancomycin for staph epi bacteremia   -Plan for another 3 days    Trend CBC, BMP, lactic acid, procalcitonin    Discussed with Dr. Arellano    Starting to work on placement    Nick Larose DO  [facility]  [address], [address], [place] [address]  Office: [phone], Ext [phone]       Microbiology:  3/11 blood culture 4 out of 4 staph species, mec-A gene present  3/11 urine culture: No growth to date  3/14 blood cultures: No growth to date    Lines / Catheters:  Peripheral, Nolen, right nephrostomy      Subjective:   Patient seen and examined.    Slowly becoming more interactive.  He continues to deny any particular complaints.    Denies any flank tenderness, abdominal pain, nausea or vomiting.      Tmax 99.0  WBCs 14.0    Objective:      BP (!) 146/75   Pulse 83   Temp 98.2 °F (36.8 °C) (Oral)   Resp 25   Ht 1.88 m (6' 2\")   Wt (!) 137.7 kg (303 lb 9.6 oz)   SpO2 98%   BMI 38.98 kg/m²   Temp (24hrs), Av.5 °F (36.9 °C), Min:98.1 °F (36.7 °C), Max:99 °F 
  [facility]  (A Division of [facility])    Follow-up Note      Date of Admission: 3/11/2024       Date of Note:  3/25/2024          Current Antimicrobials:    Prior Antimicrobials:  Vancomycin 3/11to present Zosyn 3/11  Meropenem 3/12 to 3/13     Assessment:         Coag negative staph bacteremia: Most likely related to  process; in all 4 bottles.  Subsequent cultures remain negative  Severe sepsis: Fevers as well as subsequent hypothermia, leukocytosis, tachycardia.  Not requiring pressors.  Right hydronephrosis: Prior renal stent from  noted 3/11 CT scan-suspect malfunction of stent   -Status post right-sided nephrostomy tube 3/12 by IR  Acute renal failure: With postobstructive diuresis  Acute respiratory failure: Intubated 3/11 through 3/13; history of obstructive sleep apnea  Electrolyte abnormalities  Rhabdomyolysis: Elevated CPK-trending down      Plan:   Completed vancomycin for staph epi bacteremia     Ok for d/c from ID perspective    Nick Larose DO  [facility]  [address], [address], [place] [address]  Office: [phone], Ext [phone]       Microbiology:  3/11 blood culture 4 out of 4 staph species, mec-A gene present  3/11 urine culture: No growth to date  3/14 blood cultures: No growth to date    Lines / Catheters:  Peripheral, Nolen, right nephrostomy      Subjective:   Patient seen and examined.    Flat affect.   Denies any flank tenderness, abdominal pain, nausea or vomiting.  Noted to have some hematuria overnight.    Tmax 98.4  WBCs 7.4    Objective:      /89   Pulse 80   Temp 97.6 °F (36.4 °C) (Oral)   Resp 18   Ht 1.88 m (6' 2\")   Wt 130.5 kg (287 lb 11.2 oz)   SpO2 94%   BMI 36.94 kg/m²   Temp (24hrs), Av °F (36.7 °C), Min:97.6 °F (36.4 °C), Max:98.4 °F (36.9 °C)        General:  Awake, oriented to person   Skin:   no rashes or skin lesions noted on limited exam, dry and warm   HEENT:  No 
  [facility]  (A Division of [facility])    Follow-up Note      Date of Admission: 3/11/2024       Date of Note:  3/17/2024          Current Antimicrobials:    Prior Antimicrobials:  Vancomycin 3/11to present Zosyn 3/11  Meropenem 3/12 to 3/13     Assessment:         Coag negative staph bacteremia: Most likely related to  process; in all 4 bottles.  Subsequent cultures remain negative  Severe sepsis: Fevers as well as subsequent hypothermia, leukocytosis, tachycardia.  Not requiring pressors.  Right hydronephrosis: Prior renal stent from 2/29 noted 3/11 CT scan-suspect malfunction of stent   -Status post right-sided nephrostomy tube 3/12 by IR  Acute renal failure: With postobstructive diuresis  Acute respiratory failure: Intubated 3/11 through 3/13; history of obstructive sleep apnea  Electrolyte abnormalities  Rhabdomyolysis: Elevated CPK-trending down      Plan:   Continue vancomycin for staph epi bacteremia   -Plan for another 5 days    Follow-up Repeat blood cultures from 3/14-no growth  Trend CBC, BMP, lactic acid, procalcitonin    Discussed with ICU team on rounds ez Larose DO  [facility]  [address], [address], [place] [address]  Office: [phone], Ext 8       Microbiology:  3/11 blood culture 4 out of 4 staph species, mec-A gene present  3/11 urine culture: No growth to date  3/14 blood cultures: No growth to date    Lines / Catheters:  Peripheral, Nolen, right nephrostomy      Subjective:   Patient seen and examined.    Nursing is at bedside.  Patient seems more alert and quicker to respond.  He continues to deny any particular complaints.  He says that his scalp and forehead are itchy.  Denies any flank tenderness, abdominal pain, nausea or vomiting.  Not eating well    Tmax 98.7  WBCs 10.8    Objective:      BP (!) 177/91   Pulse 91   Temp 98 °F (36.7 °C)   Resp 14   Ht 1.88 m (6' 2\")   Wt 
  [facility]  (A Division of [facility])    Follow-up Note      Date of Admission: 3/11/2024       Date of Note:  3/18/2024          Current Antimicrobials:    Prior Antimicrobials:  Vancomycin 3/11to present Zosyn 3/11  Meropenem 3/12 to 3/13     Assessment:         Coag negative staph bacteremia: Most likely related to  process; in all 4 bottles.  Subsequent cultures remain negative  Severe sepsis: Fevers as well as subsequent hypothermia, leukocytosis, tachycardia.  Not requiring pressors.  Right hydronephrosis: Prior renal stent from  noted 3/11 CT scan-suspect malfunction of stent   -Status post right-sided nephrostomy tube 3/12 by IR  Acute renal failure: With postobstructive diuresis  Acute respiratory failure: Intubated 3/11 through 3/13; history of obstructive sleep apnea  Electrolyte abnormalities  Rhabdomyolysis: Elevated CPK-trending down      Plan:   Continue vancomycin for staph epi bacteremia   -Plan for another 4 days    Follow-up Repeat blood cultures from 3/14-no growth  Trend CBC, BMP, lactic acid, procalcitonin    Discussed with ICU team on rounds ez Larose DO  [facility]  [address], [address], [place] [address]  Office: [phone], Ext 8       Microbiology:  3/11 blood culture 4 out of 4 staph species, mec-A gene present  3/11 urine culture: No growth to date  3/14 blood cultures: No growth to date    Lines / Catheters:  Peripheral, Nolen, right nephrostomy      Subjective:   Patient seen and examined.    Staff at bedside.  He continues to deny any particular complaints.    Denies any flank tenderness, abdominal pain, nausea or vomiting.      Tmax 99.0  WBCs 13.5    Objective:      BP (!) 170/82   Pulse 87   Temp 99 °F (37.2 °C) (Oral)   Resp (!) 5   Ht 1.88 m (6' 2\")   Wt (!) 137.7 kg (303 lb 9.6 oz)   SpO2 (!) 89%   BMI 38.98 kg/m²   Temp (24hrs), Av °F (37.2 °C), 
  [facility]  (A Division of [facility])    Follow-up Note      Date of Admission: 3/11/2024       Date of Note:  3/20/2024          Current Antimicrobials:    Prior Antimicrobials:  Vancomycin 3/11to present Zosyn 3/11  Meropenem 3/12 to 3/13     Assessment:         Coag negative staph bacteremia: Most likely related to  process; in all 4 bottles.  Subsequent cultures remain negative  Severe sepsis: Fevers as well as subsequent hypothermia, leukocytosis, tachycardia.  Not requiring pressors.  Right hydronephrosis: Prior renal stent from  noted 3/11 CT scan-suspect malfunction of stent   -Status post right-sided nephrostomy tube 3/12 by IR  Acute renal failure: With postobstructive diuresis  Acute respiratory failure: Intubated 3/11 through 3/13; history of obstructive sleep apnea  Electrolyte abnormalities  Rhabdomyolysis: Elevated CPK-trending down      Plan:   Continue vancomycin for staph epi bacteremia   -Plan for another 2 days    Trend CBC, BMP, lactic acid, procalcitonin    Discussed with Dr. Arellano    Starting to work on placement    Nick Larose DO  [facility]  [address], [address], [place] [address]  Office: [phone], Ext 8       Microbiology:  3/11 blood culture 4 out of 4 staph species, mec-A gene present  3/11 urine culture: No growth to date  3/14 blood cultures: No growth to date    Lines / Catheters:  Peripheral, Nolen, right nephrostomy      Subjective:   Patient seen and examined.    Slowly becoming more interactive.  He continues to deny any particular complaints.    Denies any flank tenderness, abdominal pain, nausea or vomiting.  Noted to have some hematuria overnight.    Tmax 99.0  WBCs 13.2    Objective:      /74   Pulse 84   Temp 98.6 °F (37 °C) (Oral)   Resp 21   Ht 1.88 m (6' 2\")   Wt 130.5 kg (287 lb 11.2 oz)   SpO2 95%   BMI 36.94 kg/m²   Temp (24hrs), Av.5 °F (36.9 °C), 
  Pharmacy Note     Meropenem 1 gm EVERY 12 hours ordered for treatment of UTI. Per [facility] Policy, Meropenem will be changed to Meropenem 1 gm ONCE followed by 500 mg EVERY 12 hours.     Estimated Creatinine Clearance: Estimated Creatinine Clearance: 23 mL/min (A) (based on SCr of 6.46 mg/dL (H)).  Dialysis Status, MATILDA, CKD: N/A    BMI:  Body mass index is 38.56 kg/m².    Rationale for Adjustment:  [facility] B-Lactam extended infusion policy    Pharmacy will continue to monitor and adjust dose as necessary.      Please call with any questions.    Thank you,  [facility]  
  Physician Progress Note      PATIENT:               FREDRICK TARIQ  CSN #:                  [id number]  :                       1985  ADMIT DATE:       3/11/2024 2:02 PM  DISCH DATE:  RESPONDING  PROVIDER #:        Diane Betts PA-C          QUERY TEXT:    Pt admitted with sepsis. Pt noted to have MATILDA. Per Nephrology, \"Appears to be   related to postobstructive causes (malfunctioning ureteral stent)with ATN.\" If   possible, please document in the progress notes and discharge summary if you   are evaluating and/or treating any of the following:    The medical record reflects the following:  Risk Factors: 38 y.o. male with a past medical history of HTN, dyslipidemia,   nephrolithiasis s/p ureteral stent placement on , presented to the ED via   EMS for altered mental status.  Clinical Indicators: 3/13 Neph: Acute kidney injury.  Appears to be related to   postobstructive diuresis with ATN.Hydronephrosis is still persistent but does   not appear to be a new finding.  Another possibility is sepsis related to   urinary source and septic ATN/MATILDA.  3/13 CC: Acute renal failure - Prerenal azotemia versus ischemic ATN versus   pigment nephropathy from rhabdomyolysis; initially monitored on antibiotics   per urology, then patient underwent nephrostomy tube placement by IR on   Tuesday, tolerated successfully.  Patient making urine, however still has   significant acute renal failure.  Treatment: change fluids to RL, Keep MAP>65    Thank you,  Robles Corral RN, CDI  [email or web address]  >  Options provided:  -- Acute kidney failure with acute tubular necrosis  -- Other - I will add my own diagnosis  -- Disagree - Not applicable / Not valid  -- Disagree - Clinically unable to determine / Unknown  -- Refer to Clinical Documentation Reviewer    PROVIDER RESPONSE TEXT:    Provider disagreed with this query.  MATILDA -multifactorial :postobstructive and ATN    Query created by: Robles Corral on 3/14/2024 12:09 
  [facility]  (A Division of [facility])    Follow-up Note      Date of Admission: 3/11/2024       Date of Note:  3/21/2024          Current Antimicrobials:    Prior Antimicrobials:  Vancomycin 3/11to present Zosyn 3/11  Meropenem 3/12 to 3/13     Assessment:         Coag negative staph bacteremia: Most likely related to  process; in all 4 bottles.  Subsequent cultures remain negative  Severe sepsis: Fevers as well as subsequent hypothermia, leukocytosis, tachycardia.  Not requiring pressors.  Right hydronephrosis: Prior renal stent from  noted 3/11 CT scan-suspect malfunction of stent   -Status post right-sided nephrostomy tube 3/12 by IR  Acute renal failure: With postobstructive diuresis  Acute respiratory failure: Intubated 3/11 through 3/13; history of obstructive sleep apnea  Electrolyte abnormalities  Rhabdomyolysis: Elevated CPK-trending down      Plan:   Continue vancomycin for staph epi bacteremia   -Plan for 1 more day    Trend CBC, BMP, lactic acid, procalcitonin    Starting to work on placement  Ok for d/c from ID perspective    Nick Larose DO  [facility]  [address], [address], [place] [address]  Office: [phone], Ext 8       Microbiology:  3/11 blood culture 4 out of 4 staph species, mec-A gene present  3/11 urine culture: No growth to date  3/14 blood cultures: No growth to date    Lines / Catheters:  Peripheral, Nolen, right nephrostomy      Subjective:   Patient seen and examined.    Doing ok.  He continues to deny any particular complaints.    Denies any flank tenderness, abdominal pain, nausea or vomiting.  Noted to have some hematuria overnight.    Tmax 99.6  WBCs 13.4    Objective:      /73   Pulse 93   Temp 98.2 °F (36.8 °C) (Oral)   Resp 16   Ht 1.88 m (6' 2\")   Wt 130.5 kg (287 lb 11.2 oz)   SpO2 92%   BMI 36.94 kg/m²   Temp (24hrs), Av °F (37.2 °C), Min:98.2 °F (36.8 °C), 
  [facility]  (A Division of [facility])    Follow-up Note      Date of Admission: 3/11/2024       Date of Note:  3/15/2024          Current Antimicrobials:    Prior Antimicrobials:  Meropenem 3/12 to present  Vancomycin 3/11to present Zosyn 3/11     Assessment:         Gram-positive cocci bacteremia: Most likely related to  process  Severe sepsis: Fevers as well as subsequent hypothermia, leukocytosis, tachycardia.  Not requiring pressors.  Right hydronephrosis: Prior renal stent from  noted 3/11 CT scan-suspect malfunction of stent   -Status post right-sided nephrostomy tube 3/12 by IR  Acute renal failure: With postobstructive diuresis  Acute respiratory failure: Intubated 3/11 through 3/13; history of obstructive sleep apnea  Electrolyte abnormalities  Rhabdomyolysis: Elevated CPK-trending down      Plan:   Continue vancomycin for staph epi bacteremia    Follow-up Repeat blood cultures again today x 2 sets  Reviewed 3/12 echocardiogram-no obvious valvular vegetations were noted    Trend CBC, BMP, lactic acid, procalcitonin    Discussed with ICU team on rounds    DO [facility]  [address], [address], [place] [address]  Office: [phone], Ext 8       Microbiology:  3/11 blood culture 4 out of 4 staph species, mec-A gene present  3/11 urine culture: No growth to date  3/14 blood cultures: No growth to date    Lines / Catheters:  Peripheral, Nolen, right nephrostomy      Subjective:   Patient seen and examined.    Remains confused and needs to be redirected frequently.     Tmax 100.0  WBCs none today    Objective:      /79   Pulse 51   Temp 98 °F (36.7 °C) (Oral)   Resp 21   Ht 1.88 m (6' 2\")   Wt (!) 137.7 kg (303 lb 9.6 oz)   SpO2 99%   BMI 38.98 kg/m²   Temp (24hrs), Av °F (37.2 °C), Min:98 °F (36.7 °C), Max:100 °F (37.8 °C)        General:  Awake, oriented to person   Skin:   no rashes 
  [facility]  (A Division of [facility])    Follow-up Note      Date of Admission: 3/11/2024       Date of Note:  3/22/2024          Current Antimicrobials:    Prior Antimicrobials:  Vancomycin 3/11to present Zosyn 3/11  Meropenem 3/12 to 3/13     Assessment:         Coag negative staph bacteremia: Most likely related to  process; in all 4 bottles.  Subsequent cultures remain negative  Severe sepsis: Fevers as well as subsequent hypothermia, leukocytosis, tachycardia.  Not requiring pressors.  Right hydronephrosis: Prior renal stent from  noted 3/11 CT scan-suspect malfunction of stent   -Status post right-sided nephrostomy tube 3/12 by IR  Acute renal failure: With postobstructive diuresis  Acute respiratory failure: Intubated 3/11 through 3/13; history of obstructive sleep apnea  Electrolyte abnormalities  Rhabdomyolysis: Elevated CPK-trending down      Plan:   Complete vancomycin for staph epi bacteremia     Starting to work on placement  Ok for d/c from ID perspective    Nick Larose DO  [facility]  [address], [address] [address]  Office: [phone], Ext 8       Microbiology:  3/11 blood culture 4 out of 4 staph species, mec-A gene present  3/11 urine culture: No growth to date  3/14 blood cultures: No growth to date    Lines / Catheters:  Peripheral, Nolen, right nephrostomy      Subjective:   Patient seen and examined.    Doing ok.  He continues to deny any particular complaints.    Denies any flank tenderness, abdominal pain, nausea or vomiting.  Noted to have some hematuria overnight.    Tmax 100.1  WBCs 7.1    Objective:      /63   Pulse 81   Temp 100.1 °F (37.8 °C) (Oral)   Resp 16   Ht 1.88 m (6' 2\")   Wt 130.5 kg (287 lb 11.2 oz)   SpO2 96%   BMI 36.94 kg/m²   Temp (24hrs), Av.7 °F (37.1 °C), Min:97.9 °F (36.6 °C), Max:100.1 °F (37.8 °C)        General:  Awake, oriented to person 
 attended the interdisciplinary rounds for Harry Lema, who is a 38 y.o.,male. Patient's Primary Language is: English.   According to the patient's EMR Druze Affiliation is: Non-Zoroastrianism.     The reason the Patient came to the hospital is:   Patient Active Problem List    Diagnosis Date Noted    Sepsis ([facility]) 03/11/2024    Acute renal failure (ARF) ([facility]) 03/11/2024    Cholangiectasis 07/13/2023    Diarrhea 07/13/2023    Epigastric pain 07/13/2023    Irregular bowel habits 07/13/2023    Irritable bowel syndrome with constipation 07/13/2023    Irritable bowel syndrome with diarrhea 07/13/2023    Gastroesophageal reflux disease 10/18/2022    Hyperlipidemia 10/18/2022    Mixed anxiety and depressive disorder 10/18/2022    Sleep apnea 10/18/2022    Malignant hypertension 10/18/2022    Morbid obesity ([facility]) 10/18/2022    Hypothyroidism 08/11/2022    Hypertension 08/11/2022    Hydronephrosis with urinary obstruction due to ureteral calculus 04/28/2020    Renal stone 04/28/2020    Severe obesity with body mass index (BMI) of 35.0 to 39.9 with serious comorbidity ([facility]) 08/29/2018    Osteoarthritis of spine with radiculopathy, lumbar region 07/11/2017    Chronic insomnia 01/16/2015    Anxiety           Plan:   participated and listen to the recommendations of the IDR team.    Patient extubated. Oriented/alert. Follows command. Confused at times.      Chaplains will continue to follow and will provide pastoral care on an as needed/requested basis.     recommends bedside caregivers page  on duty if patient shows signs of acute spiritual or emotional distress.     [facility]  Staff   Spiritual Health   [phone]       
 attended the interdisciplinary rounds for Harry Lema, who is a 38 y.o.,male. Patient's Primary Language is: English.   According to the patient's EMR Mormon Affiliation is: Non-Yazdanism.     The reason the Patient came to the hospital is:   Patient Active Problem List    Diagnosis Date Noted    Sepsis ([facility]) 03/11/2024    Acute renal failure (ARF) ([facility]) 03/11/2024    Cholangiectasis 07/13/2023    Diarrhea 07/13/2023    Epigastric pain 07/13/2023    Irregular bowel habits 07/13/2023    Irritable bowel syndrome with constipation 07/13/2023    Irritable bowel syndrome with diarrhea 07/13/2023    Gastroesophageal reflux disease 10/18/2022    Hyperlipidemia 10/18/2022    Mixed anxiety and depressive disorder 10/18/2022    Sleep apnea 10/18/2022    Malignant hypertension 10/18/2022    Morbid obesity ([facility]) 10/18/2022    Hypothyroidism 08/11/2022    Hypertension 08/11/2022    Hydronephrosis with urinary obstruction due to ureteral calculus 04/28/2020    Renal stone 04/28/2020    Severe obesity with body mass index (BMI) of 35.0 to 39.9 with serious comorbidity ([facility]) 08/29/2018    Osteoarthritis of spine with radiculopathy, lumbar region 07/11/2017    Chronic insomnia 01/16/2015    Anxiety           Plan:   participated and listen to the recommendations of the IDR team.    Chaplains will continue to follow and will provide pastoral care on an as needed/requested basis.     recommends bedside caregivers page  on duty if patient shows signs of acute spiritual or emotional distress.     [facility]  Staff   Spiritual Health   [phone]       
 attended the interdisciplinary rounds for Harry Lema, who is a 38 y.o.,male. Patient's Primary Language is: English.   According to the patient's EMR Protestant Affiliation is: Non-Jehovah's witness.     The reason the Patient came to the hospital is:   Patient Active Problem List    Diagnosis Date Noted    Sepsis ([facility]) 03/11/2024    Acute renal failure (ARF) ([facility]) 03/11/2024    Cholangiectasis 07/13/2023    Diarrhea 07/13/2023    Epigastric pain 07/13/2023    Irregular bowel habits 07/13/2023    Irritable bowel syndrome with constipation 07/13/2023    Irritable bowel syndrome with diarrhea 07/13/2023    Gastroesophageal reflux disease 10/18/2022    Hyperlipidemia 10/18/2022    Mixed anxiety and depressive disorder 10/18/2022    Sleep apnea 10/18/2022    Malignant hypertension 10/18/2022    Morbid obesity ([facility]) 10/18/2022    Hypothyroidism 08/11/2022    Hypertension 08/11/2022    Hydronephrosis with urinary obstruction due to ureteral calculus 04/28/2020    Renal stone 04/28/2020    Severe obesity with body mass index (BMI) of 35.0 to 39.9 with serious comorbidity ([facility]) 08/29/2018    Osteoarthritis of spine with radiculopathy, lumbar region 07/11/2017    Chronic insomnia 01/16/2015    Anxiety           Plan:   participated and listen to the recommendations of the IDR team.    Patient intubated. No major overnight events.     Chaplains will continue to follow and will provide pastoral care on an as needed/requested basis.     recommends bedside caregivers page  on duty if patient shows signs of acute spiritual or emotional distress.     [facility]  Staff   Spiritual Health   [phone]       
0700: Bedside and Verbal shift change report given to Daisy RN and NAIMA Resendiz (oncoming nurse) by NAIMA Hdez (offgoing nurse). Report included the following information Nurse Handoff Report, Index, Adult Overview, MAR, Recent Results, Cardiac Rhythm NSR, and Alarm Parameters.      Sitter Jourdan at bedside. C-SSRS freq screening complete at bedside shift change. Jourdan completing paper copy of constant observer recording every 15 min.     0920: Pt c/o of back and leg ache, pt repositioned, pt given ice compress to assist with pain, pt reports 6/10 pain.     0924:Attempted to call family and update regarding events overnight, no answer, left voicemail on Juliana Lema cell (pt's mother).    1200: Spoke with Dr. Carias, family at bedside, psych consult order placed.    1600: spoke with pt, pt does not wish to self harm, pt has no active or current plan. Family at bedside.    1900: Bedside and Verbal shift change report given to NAIMA Hdez (oncoming nurse) by NAIMA Villa and NAIMA Resendiz (offgoing nurse). Report included the following information Nurse Handoff Report, Index, MAR, and Alarm Parameters.    
0716: Bedside and Verbal shift change report given to NAIMA Park (oncoming nurse) by NAIMA Hdez (offgoing nurse). Report included the following information Nurse Handoff Report, Adult Overview, Intake/Output, MAR, and Recent Results. Client was also observed with bruising around the right wrist with an unknown origin.     1139: PRN Ativan given.     1508: PRN Haloperidol given    1935: Bedside and Verbal shift change report given to NAIMA Farooq (oncoming nurse) by NAIMA Park (offgoing nurse). Report included the following information Nurse Handoff Report, Adult Overview, Intake/Output, MAR, and Recent Results  
0730: Bedside and Verbal shift change report given to Daisy RN and Tova RN (oncoming nurse) by NAIMA Schroeder (offgoing nurse). Report included the following information Nurse Handoff Report, Index, Adult Overview, MAR, Recent Results, Cardiac Rhythm NSR, and Alarm Parameters.      Sitter Jourdan at bedside.    0800: CIWA score 6, no auditory hallucinations present.     0915: RN to enter room, pt stood up and walked away from IV pump and pulled out Right wrist 18 gauge. Bleeding at site, Pt reoriented and pt laying down in bed.  Pt reported hearing voices at this time.    0930: Pt laying supine in bed, eyes closed.    1000: Pt's ,mother Juliana at bedside, family member updated.    1400: Pt voided in bathroom, no signs of hematuria, post residual tested with bladder scan, 62 ml total.    1630: Pt has no sitter at the moment, family member left, RN rounding q 15 minutes between rooms, all staff notified to keep eyes on pt.    1745: RN at bedside, pt in chair, call light within reach. Pt eating dinner tray. Pt denies pain at this time.    1900: RN walked past room, pt pale white, RN to come to bedside, pt had call bell wrapped around neck 3 times, RN removed and pt reoriented, pulses present and pt circulation returned, RN to stay with pt until sitter is present, all cords removed from room and suicide precautions implemented. Dr. Carias updated and 1:1 patient care.    1915: vitals obtained, pt stable, pt talking about killing himself, pt asking when staff gets off trying to get a plan in place, sitter Tani at bedside.     .shift  
1915- Bedside shift change report given to Ketty RN (oncoming nurse) by Josy RN (offgoing nurse). Report included the following information Nurse Handoff Report, Quality Measures, Neuro Assessment, and Event Log.    Upon walking into the room, patient extremely agitated.  Trying to get out of bed with a sitter present.  Patient not in restraints.     1920- Patient out of bed, this nurse, sitter, and Josy RN in room with patient.  Patient trying to push through all  three staff members. MD paged awaiting phone call back.     1927- CIWA score 21 4mg IV Ativan given  1935- MD paged awaiting phone call back    2005- MD paged and perfect served.     2016- Per perfect serve with Dr. Ren, ok to do restraints.     2039- Bilateral wrist restraints applied. Patient screaming \"Don't cut my karolyn off, you guys are barbaric wanting to cut my karolyn off\", This nurse reinforced to patient that no one was wanting to cut his penis off.     2346- Patient medicated per MAR    2357- CIWA score 19, patient requesting propofol.  This nurse explained that we can not do that medication on this unit, 3mg of ativan given. Patient still thinking that he's going to the OR to have his penis removed.     0200- rounded on patient, patient resting in bed.  Still agitated, but not fighting restraints or trying to get out of bed.  Pt still confused, and delusional.    0400- Vitals obtained, patient urinated 200mL of bright red blood with clots MD paged    0445- Dr. Ren perfect served awaiting a response back.     0543- Per conversation with Dr. Ren, hold heparin due to hematuria  
1930: Bedside shift report received from NAIMA Sanchez. Sitter at the bedside,     Pt complained of leg tremors and pain to the right leg, supported pt to the chair.   
1953: On-call psych called regarding finding pt with call light wrapped around his neck 3 times. Message and call back number left for MD. Awaiting return call. Oncoming RN, Alexandre, informed.   
2006: MD paged regarding IV access placement. Awaiting return page.     2007: MD paged back regarding IV access. MD informed this writer, no to midline placement. MD wants PIV placement. Telephone order received for Ativan 1 mg PO Q4 PRN, Discontinue CIWA Ativan orders. RBV. MD informed of pt being a hard stick. Telephone order received for PICC access team for Ultrasound PIV placement.   
Bedside and Verbal shift change report given to Alexandre MCNAMARA (oncoming nurse) by Daisy MCNAMARA (offgoing nurse). Report included the following information Nurse Handoff Report, Index, Adult Overview, MAR, Cardiac Rhythm Sinus, and Quality Measures.      1915:Upon assessment, patient answered every question 100% correct. Patient knows why hes is here, his name/date of birth, the month and where he is    2050: Patient voided 300 mL in bathroom, post residual bladder scan volume 39 mL      2245: Patient handoff report given to Ketty MCNAMARA. Sitter at bedside, patient in bed, eyes closed, in no apparent distress or discomfort  
Bedside and Verbal shift change report given to Alexandre MCNAMARA (oncoming nurse) by Josephine RN (offgoing nurse). Report included the following information Nurse Handoff Report, Index, Adult Overview, MAR, Cardiac Rhythm Sinus, and Quality Measures.      0425: Paged hospitalist Dr. Ren d/t patient being restless, with agitation, patient has been given 8 mg of ativan. Dr. Ren ordered 5 mg of Halodol IM    0440: Halodol 5 mg IM given to patient along with Ativan 2 mg d/t 12 CIWA score, will continue to monitor    Bedside shift report given to Daisy/Tova MCNAMARA  
Bedside and Verbal shift change report given to Daisy RN and NAIMA Bernardo (oncoming nurse) by NAIMA Méndez (offgoing nurse). Report included the following information Nurse Handoff Report, Index, Adult Overview, Intake/Output, Cardiac Rhythm NS, Alarm Parameters, and Neuro Assessment.      1035:  Assisted patient to the rest room for number 1, patient is calm and followed commands.    1830: RN to enter room, Rounded on patient and patient attempted to strangle himself with the call light cord. Pt reoriented, pulse checked and his lying in bed calm.    1900: sitter at the bedside.  
Bedside and Verbal shift change report given to NAIMA Resendiz / NAIMA Villa (oncoming nurse) by NAIMA Schroeder (offgoing nurse). Report included the following information Nurse Handoff Report, Index, Adult Overview, Surgery Report, Intake/Output, MAR, Cardiac Rhythm NSR, and Event Log       0945: PT/OT at bedside for morning consult.    1215: Psych consult at bedside     1440: sitter at bedside, pt expressed \"hearing voices talk about me\" and feels as though \"people are tarnishing my name\"    1630: Primary Nurse NAIMA Villa at bedside.    1917: Bedside and Verbal shift change report given to NAIMA Hdez (oncoming nurse) by NAIMA Villa (offgoing nurse). Report included the following information Nurse Handoff Report, Index, Adult Overview, MAR, Recent Results, Cardiac Rhythm NSR, Neuro Assessment, and Event Log.    
Bedside shift change report given to NAIMA Farooq (oncoming nurse) by Roseanna Cummings RN (offgoing nurse). Report included the following information Nurse Handoff Report, Adult Overview, Recent Results, Med Rec Status, Neuro Assessment, and Event Log.      Patient DAVID X 4. No complaints at this time. Sitter at bedside. Suicide precautions maintained.   
Called to ED for intubation for airway protection. Patient intubated by MD 7.5 ETT and is 25 at the lip.   
Chelsy Easton RN from poison board called for update about this patient  
Comprehensive Nutrition Assessment    Type and Reason for Visit:  Initial, NPO/Clear Liquid    Nutrition Recommendations/Plan:   Continue NPO status for today.   Continue to monitor readiness for diet advancement, weight, labs, and plan of care during admission.         Malnutrition Assessment:  Malnutrition Status:  At risk for malnutrition (Comment) (s/p ext, NPO) (03/14/24 1147)    Context:  Acute Illness       Nutrition History and Allergies:   PMHx: HTN, dyslipidemia, nephrolithiasis s/p ureteral stent placement on 02/29. Wt hx: 325 lb (11/3/22), 306 lb (7/13/23), 307 lb (1/15/24), 303 lb (3/14/24).  Wt overall stable x 1 year, -6.7% x >1.5 years per EMR hx.  NKFA per chart.    Nutrition Assessment:    Admitted for AMS, found down with unknown downtime. Intubated for airway protection 3/11, s/p extubation 3/13. Discussed care during interdisciplinary rounds. S/p R PCN placement by IR on 3/12. Pt has been NPO since admit x 3 days. Per team, to hold on SLP eval/diet advancement due to delirium. Team switched to D5 LR. Nutritional needs are not being met.    Nutrition Related Findings:    Last BM (including prior to admit):  (PTA)  Pertinent Meds: pepcid, vanco, cardene, D5 LR @ 125 ml/hr (150g dex, 510 kcal)   Pertinent Labs: POC Glucose 137-153 mg/dl x 24 hrs, BUN/Cr 46/3.37, GFR 23 Wound Type: None       Current Nutrition Intake & Therapies:    Average Meal Intake: NPO     Diet NPO    Anthropometric Measures:  Height: 188 cm (6' 2\")  Ideal Body Weight (IBW): 190 lbs (86 kg)    Admission Body Weight: 137.7 kg (303 lb 9.2 oz)  Current Body Weight: 137.7 kg (303 lb 9.2 oz), 159.8 % IBW.    Current BMI (kg/m2): 39  BMI Categories: Obese Class 2 (BMI 35.0 -39.9)    Estimated Daily Nutrient Needs:  Energy Requirements Based On: Formula  Weight Used for Energy Requirements: Admission  Energy (kcal/day): 1481-6055 (MSJ 1-1.1)  Weight Used for Protein Requirements: Admission  Protein (g/day): 138-165 (1-1.2)  Method 
Discharge instructions reviewed with pt and pt's mother, and all questions answered. Pt denies CP and SOB at this time.  PIV access removed,  Pt departed facility with family in private vehicle.   
Discussed with nursing, patient has a condom cath and was passing bright red blood overnight with some clots. Ordered a bladder scan. Heparin on hold currently.  
Episode of hematuria   D/w nursing   Consult Urology in AM   
Ida Beckett, Psychiatrist, [place], [address]    Diane Betts PA-C  03/17/24  Pulmonary, Critical Care Medicine  [facility]       
[facility]   Pharmacy Pharmacokinetic Monitoring Service - Vancomycin    Indication: sepsis  Goal AUC/SHAE: 400-600  Day of Therapy: 9  Additional Antimicrobials: none    Pertinent Laboratory Values:   Wt Readings from Last 1 Encounters:   03/14/24 (!) 137.7 kg (303 lb 9.6 oz)     Temp Readings from Last 1 Encounters:   03/19/24 98.9 °F (37.2 °C) (Oral)     Estimated Creatinine Clearance: 108 mL/min (A) (based on SCr of 1.39 mg/dL (H)).    Recent Labs     03/18/24  0111 03/19/24  0153   CREATININE 1.42* 1.39*   BUN 19* 16   WBC 13.5* 14.0*     Pertinent Cultures:  Date Source Results   3/11 urine NGF   3/11 blood Staph epi in 2/2   3/14 blood NGTD   MRSA Nasal Swab: NA    Assessment:  Date Current Dose Level (mg/L) Timing of Level (h) AUC/SHAE   3/11 1,000 mg x2 - - -   3/12 - 23.0 11 NA   3/13 1,500 mg x1 11.5 34 NA   3/14 1,500 mg x1 16.5 15 NA   3/15 1,750 mg x1 16.6 12 NA   3/16 1750 mg x 1 13.4 14 NA   3/17 2,000 mg x 1 11.2 21 NA   3/18 1,750 mg q18h 20.9 11 528   3/19 1,750 mg q18h - - -     Plan:  Continue current dose of 1,750 mg q18h  No level ordered at this time  Pharmacy will continue to monitor patient and adjust therapy as indicated    Thank you for the consult,  Renan Kwan RPH  3/19/2024  
[facility]   Pharmacy Pharmacokinetic Monitoring Service - Vancomycin    Indication: sepsis  Goal AUC/SHAE: 400-600  Day of Therapy: 12  Additional Antimicrobials: none    Pertinent Laboratory Values:   Wt Readings from Last 1 Encounters:   03/20/24 130.5 kg (287 lb 11.2 oz)     Temp Readings from Last 1 Encounters:   03/22/24 100.1 °F (37.8 °C) (Oral)     Estimated Creatinine Clearance: 119 mL/min (based on SCr of 1.23 mg/dL).    Recent Labs     03/21/24  0420 03/22/24  0321   CREATININE 1.27 1.23   BUN 16 13   WBC 13.4* 7.1     Pertinent Cultures:  Date Source Results   3/11 urine NGF   3/11 blood Staph epi in 2/2   3/14 blood NGF   MRSA Nasal Swab: NA    Assessment:  Date Current Dose Level (mg/L) Timing of Level (h) AUC/SHAE   3/11 1,000 mg x2 - - -   3/12 - 23.0 11 NA   3/13 1,500 mg x1 11.5 34 NA   3/14 1,500 mg x1 16.5 15 NA   3/15 1,750 mg x1 16.6 12 NA   3/16 1750 mg x 1 13.4 14 NA   3/17 2,000 mg x 1 11.2 21 NA   3/18 1,750 mg q18h 20.9 11 528   3/19 1,750 mg q18h - - -   3/20 1,750 mg q18h - - -   3/21 1,750 mg q18h - - -   3/22 1,750 mg q18h  2,000 mg q18h 14.0 14 445  502     Plan:  Increase dose from 1,750 mg q18h to 2,000 mg q18h  No level ordered at this time  Pharmacy will continue to monitor patient and adjust therapy as indicated    Thank you for the consult,  Renan Kwan [facility]  3/22/2024  
[facility]   Pharmacy Pharmacokinetic Monitoring Service - Vancomycin    Indication: sepsis  Goal level: 10-20 mg/L  Day of Therapy: 4  Additional Antimicrobials: none    Pertinent Laboratory Values:   Wt Readings from Last 1 Encounters:   03/14/24 (!) 137.7 kg (303 lb 9.6 oz)     Temp Readings from Last 1 Encounters:   03/14/24 98.9 °F (37.2 °C) (Oral)     Estimated Creatinine Clearance: 45 mL/min (A) (based on SCr of 3.37 mg/dL (H)).    Recent Labs     03/13/24  0037 03/14/24  0329   CREATININE 6.60* 3.37*   BUN 58* 46*   WBC 17.1* 18.1*     Pertinent Cultures:  Date Source Results   3/11 urine NGF   3/11 blood CoNS in 2/2   MRSA Nasal Swab: NA    Assessment:  Date Current Dose Level (mg/L) Timing of Level (h)   3/11 1,000 mg x2 - -   3/12 - 23.0 11   3/13 1,500 mg x1 11.5 34   3/14 1,500 mg x1 16.5 15     Plan:  Dose by level  Dose: 1,500 mg x1  Ordered a level for 3/15 with AM labs  Pharmacy will continue to monitor patient and adjust therapy as indicated    Thank you for the consult,  Renan Kwan [facility]  3/14/2024  
[facility]  Progress Note    Patient: Harry Lema Age: 38 y.o. : 1985 MR#: [id number] SSN: [id number]  Date/Time: 3/22/2024     Subjective: Patient lying in the bed, feels depressed, hearing voices, states he is very paranoid and thinks people are talking about him going to [facility].  He also hears a lot of crazy things from the people.  Sitter and dad at the bedside.  Overnight patient had suicidal ideations and also had an attempt by wrapping call bell cord around his neck.  Patient has been placed on suicidal precaution since last night with a sitter at the bedside    Patient currently denies any suicidal ideation today but continues to hear voices and states he is paranoid.     Assessment/Plan:   Acute hypoxic and hypercarbic respiratory failure requiring intubation-extubated on 3/13/2024  Suicidal attempt and thoughts  Acute toxic versus metabolic encephalopathy-etiology unclear, patient was on opiates for flank pain, had renal failure which likely cause retention of medication and eventual hypoventilation and AMS  Severe sepsis likely secondary to pyelonephritis with right-sided hydronephrosis versus questionable aspiration pneumonia/pneumonitis  Acute renal failure-prerenal azotemia versus ischemic ATN versus from rhabdomyolysis- improving   Acute rhabdomyolysis  Moderate right-sided hydronephrosis with urethral stent in place-status post right PCN  None anion gap metabolic acidosis-resolved  Lactic acidosis-resolved  Electrolyte abnormalities  Pyelonephritis  History of nephrolithiasis status post cystoscopy and right ureteral stent placement on 2024  History of malignant hypertension  Hypothyroidism  Dyslipidemia  OTC supplement resulting in withdrawal:  Pt reportedly taking  tianeptine, yagona leaf, phenibut, L-theanine. Was discussed with poison control, chronic use and then cessation known to cause acute withdrawal - had activation of mu opioid 
[facility]  Progress Note    Patient: Harry Lema Age: 38 y.o. : 1985 MR#: [id number] SSN: [id number]  Date/Time: 3/21/2024     Subjective: Patient sitting in the side of the bed, feeling much better.  Denies any new complaints.  He has been urinating without any problem.  No hematuria.     Assessment/Plan:   Acute hypoxic and hypercarbic respiratory failure requiring intubation-extubated on 3/13/2024  Acute toxic versus metabolic encephalopathy-etiology unclear, patient was on opiates for flank pain, had renal failure which likely cause retention of medication and eventual hypoventilation and AMS  Severe sepsis likely secondary to pyelonephritis with right-sided hydronephrosis versus questionable aspiration pneumonia/pneumonitis  Acute renal failure-prerenal azotemia versus ischemic ATN versus from rhabdomyolysis- improving   Acute rhabdomyolysis  Moderate right-sided hydronephrosis with urethral stent in place-status post right PCN  None anion gap metabolic acidosis-resolved  Lactic acidosis-resolved  Electrolyte abnormalities  Pyelonephritis  History of nephrolithiasis status post cystoscopy and right ureteral stent placement on 2024  History of malignant hypertension  Hypothyroidism  Dyslipidemia  OTC supplement resulting in withdrawal:  Pt reportedly taking  tianeptine, yagona leaf, phenibut, L-theanine. Was discussed with poison control, chronic use and then cessation known to cause acute withdrawal - had activation of mu opioid receptor and lyly receptor  Hematuria, improved    Plan  Continue vanco for staph epi bacteremia for 1 more day per ID  Follow cultures as needed  [facility] protocol for withdrawal  Psychiatry input noted  Completed high dose thiamine, now on 100 mg oral  Status post right-sided PCN on 3/12/2024, per urology outpatient PCN removal  Off Nolen catheter, urinating well.  Continue Cymbalta  Discontinue IV fluids  CK level overall 
[facility].  Pulmonary, Critical Care, and Sleep Medicine    Name: Harry Lema MRN: [id number]   : 1985 Hospital: [facility]   Date: 3/14/2024  Admission Date: 3/11/2024     Chart and notes reviewed. Data reviewed. I have evaluated all findings.    [x]I have reviewed the flowsheet and previous day’s notes.    [x]The patient is unable to give any meaningful history or review of systems because the patient is:  [x]Intubated [x]Sedated   []Unresponsive      [x]The patient is critically ill on      [x]Mechanical ventilation []Pressors   []BiPAP []         Interval HPI:38-year-old male with a past medical history of hypertension, dyslipidemia, nephrolithiasis status post ureteral stent placement on  who presented to [facility] ER brought in by EMS for altered mental status reported by family. Family reported that patient was found down with unknown downtime. In the ER patient was be hypothermic, hypotensive and unable to protect airway, intubated via RSI. Further workup revealed leukocytosis, lactic acidosis, as well as rhabdomyolysis and acute renal failure and possible acute cystitis. Patient was started on broad-spectrum antibiotics, CT abdomen pelvis revealed moderate right sided hydronephrosis with ureteral stent in place. Urology was consulted- who indicated that patient's presentation although is suspicious for malfunction of ureteral stent, patient currently making urine, presentation showing sepsis but not septic shock. Plan was to perform bedside limited EEG with Ceribell, however patient now gradually awakening and following some mild commands so we will hold off at this time. Nephrology consulted. S/p R PCN placement by IR on 3/12. Pt was extubated on 3/13. He now is exhibiting opioid withdrawal and HTN requiring cardene gtt.     Subjective 24  Hospital Day: 3  Vent Day: intubated 3/11, extubated 3/13.  Overnight events:  No significant 
[facility].  Pulmonary, Critical Care, and Sleep Medicine    Name: Harry Lema MRN: [id number]   : 1985 Hospital: [facility]   Date: 3/13/2024  Admission Date: 3/11/2024     Chart and notes reviewed. Data reviewed. I have evaluated all findings.    [x]I have reviewed the flowsheet and previous day’s notes.    [x]The patient is unable to give any meaningful history or review of systems because the patient is:  [x]Intubated [x]Sedated   []Unresponsive      [x]The patient is critically ill on      [x]Mechanical ventilation []Pressors   []BiPAP []         Interval HPI:38-year-old male with a past medical history of hypertension, dyslipidemia, nephrolithiasis status post ureteral stent placement on  who presented to [facility] ER brought in by EMS for altered mental status reported by family. Family reported that patient was found down with unknown downtime. In the ER patient was be hypothermic, hypotensive and unable to protect airway, intubated via RSI. Further workup revealed leukocytosis, lactic acidosis, as well as rhabdomyolysis and acute renal failure and possible acute cystitis. Patient was started on broad-spectrum antibiotics, CT abdomen pelvis revealed moderate right sided hydronephrosis with ureteral stent in place. Urology was consulted- who indicated that patient's presentation although is suspicious for malfunction of ureteral stent, patient currently making urine, presentation showing sepsis but not septic shock. Plan was to perform bedside limited EEG with Ceribell, however patient now gradually awakening and following some mild commands so we will hold off at this time. Nephrology consulted. S/p R PCN placement by IR on 3/12.     Subjective 24  Hospital Day: 1  Vent Day: intubated 3/11  Overnight events: Precedex and propofol discontinued due to bradycardia. Fentanyl added to tx regimen  Mentation/Activity: sedated  Respiratory/ 
[facility].  Pulmonary, Critical Care, and Sleep Medicine    Name: Harry Lema MRN: [id number]   : 1985 Hospital: [facility]   Date: 3/17/2024  Admission Date: 3/11/2024     Chart and notes reviewed. Data reviewed. I have evaluated all findings.    [x]I have reviewed the flowsheet and previous day’s notes.    [x]The patient is unable to give any meaningful history or review of systems because the patient is:  []Intubated []Sedated   []Unresponsive      [x]The patient is critically ill on      []Mechanical ventilation []Pressors   []BiPAP []         Interval HPI:38-year-old male with a past medical history of hypertension, dyslipidemia, nephrolithiasis status post ureteral stent placement on  who presented to [facility] ER brought in by EMS for altered mental status reported by family. Family reported that patient was found down with unknown downtime. In the ER patient was be hypothermic, hypotensive and unable to protect airway, intubated via RSI. Further workup revealed leukocytosis, lactic acidosis, as well as rhabdomyolysis and acute renal failure and possible acute cystitis. Patient was started on broad-spectrum antibiotics, CT abdomen pelvis revealed moderate right sided hydronephrosis with ureteral stent in place. Urology was consulted- who indicated that patient's presentation although is suspicious for malfunction of ureteral stent, patient currently making urine, presentation showing sepsis but not septic shock. Plan was to perform bedside limited EEG with Ceribell, however patient now gradually awakening and following some mild commands so we will hold off at this time. Nephrology consulted. S/p R PCN placement by IR on 3/12. Pt was extubated on 3/13. He now is exhibiting opioid withdrawal and HTN requiring cardene gtt.     Subjective 24  Hospital Day: 6   Pt seen and examined at bedside.  No acute overnight events.  Pt oriented this 
[facility].  Pulmonary, Critical Care, and Sleep Medicine    Name: Harry Lema MRN: [id number]   : 1985 Hospital: [facility]   Date: 3/15/2024  Admission Date: 3/11/2024     Chart and notes reviewed. Data reviewed. I have evaluated all findings.    [x]I have reviewed the flowsheet and previous day’s notes.    [x]The patient is unable to give any meaningful history or review of systems because the patient is:  []Intubated []Sedated   []Unresponsive      [x]The patient is critically ill on      []Mechanical ventilation []Pressors   []BiPAP []         Interval HPI:38-year-old male with a past medical history of hypertension, dyslipidemia, nephrolithiasis status post ureteral stent placement on  who presented to [facility] ER brought in by EMS for altered mental status reported by family. Family reported that patient was found down with unknown downtime. In the ER patient was be hypothermic, hypotensive and unable to protect airway, intubated via RSI. Further workup revealed leukocytosis, lactic acidosis, as well as rhabdomyolysis and acute renal failure and possible acute cystitis. Patient was started on broad-spectrum antibiotics, CT abdomen pelvis revealed moderate right sided hydronephrosis with ureteral stent in place. Urology was consulted- who indicated that patient's presentation although is suspicious for malfunction of ureteral stent, patient currently making urine, presentation showing sepsis but not septic shock. Plan was to perform bedside limited EEG with Ceribell, however patient now gradually awakening and following some mild commands so we will hold off at this time. Nephrology consulted. S/p R PCN placement by IR on 3/12. Pt was extubated on 3/13. He now is exhibiting opioid withdrawal and HTN requiring cardene gtt.     Subjective 03/15/24  Hospital Day: 4  Vent Day: intubated 3/11, extubated 3/13.  Overnight events:  had increased 
[facility].  Pulmonary, Critical Care, and Sleep Medicine    Name: Harry Lema MRN: [id number]   : 1985 Hospital: [facility]   Date: 3/16/2024  Admission Date: 3/11/2024     Chart and notes reviewed. Data reviewed. I have evaluated all findings.    [x]I have reviewed the flowsheet and previous day’s notes.    [x]The patient is unable to give any meaningful history or review of systems because the patient is:  []Intubated []Sedated   []Unresponsive      [x]The patient is critically ill on      []Mechanical ventilation []Pressors   []BiPAP []         Interval HPI:38-year-old male with a past medical history of hypertension, dyslipidemia, nephrolithiasis status post ureteral stent placement on  who presented to [facility] ER brought in by EMS for altered mental status reported by family. Family reported that patient was found down with unknown downtime. In the ER patient was be hypothermic, hypotensive and unable to protect airway, intubated via RSI. Further workup revealed leukocytosis, lactic acidosis, as well as rhabdomyolysis and acute renal failure and possible acute cystitis. Patient was started on broad-spectrum antibiotics, CT abdomen pelvis revealed moderate right sided hydronephrosis with ureteral stent in place. Urology was consulted- who indicated that patient's presentation although is suspicious for malfunction of ureteral stent, patient currently making urine, presentation showing sepsis but not septic shock. Plan was to perform bedside limited EEG with Ceribell, however patient now gradually awakening and following some mild commands so we will hold off at this time. Nephrology consulted. S/p R PCN placement by IR on 3/12. Pt was extubated on 3/13. He now is exhibiting opioid withdrawal and HTN requiring cardene gtt.     Subjective 24  Hospital Day: 5   Pt seen and examined at bedside.  Pt remained delirious overnight.  Pt now having 
[facility]  Progress Note    Patient: Harry Lema Age: 38 y.o. : 1985 MR#: [id number] SSN: [id number]  Date: 3/17/2024        Assessment/Plan:     Hospital Problems             Last Modified POA    * (Principal) Sepsis ([facility]) 3/11/2024 Yes    Anxiety 3/17/2024 Yes    Hypothyroidism 3/17/2024 Yes    Hypertension 3/17/2024 Yes    Gastroesophageal reflux disease 3/17/2024 Yes    Hyperlipidemia 3/17/2024 Yes    Morbid obesity ([facility]) 3/17/2024 Yes    Acute renal failure (ARF) ([facility]) 3/11/2024 Yes    Acute respiratory failure with hypoxia and hypercapnia ([facility]) 3/17/2024 Yes    Pyelonephritis 3/17/2024 Yes    Rhabdomyolysis 3/17/2024 Yes       Assessment:   Acute hypoxic and hypercarbic respiratory failure requiring intubation-extubated on 3/13/2024  Acute toxic versus metabolic encephalopathy-etiology unclear, patient was on opiates for flank pain, had renal failure which likely cause retention of medication and eventual hypoventilation and AMS  Severe sepsis likely secondary to pyelonephritis with right-sided hydronephrosis versus questionable aspiration pneumonia/pneumonitis  Acute renal failure-prerenal azotemia versus ischemic ATN versus from rhabdomyolysis- improving   Acute rhabdomyolysis  Moderate right-sided hydronephrosis with urethral stent in place-status post right PCN  None anion gap metabolic acidosis-resolved  Lactic acidosis-resolved  Electrolyte abnormalities  Pyelonephritis  History of nephrolithiasis status post cystoscopy and right ureteral stent placement on 2024  History of malignant hypertension  Hypothyroidism  Dyslipidemia  Obesity  OTC supplement resulting in withdrawal:  Pt taking TD plus with tianeptine, yagona leaf, phenibut, L-theanine. Was discussed with poison control, chronic use and then cessation known to cause acute withdrawal - had activation of mu opioid receptor and lyly receptor    Plan:  - Con't Vanco for snehal epi 
[facility]  Progress Note    Patient: Harry Lema Age: 38 y.o. : 1985 MR#: [id number] SSN: [id number]  Date/Time: 3/23/2024     Subjective: Patient lying in the bed, feels depressed, hearing voices, states he is very paranoid.  He does not feel well and gets very nervous.  Mother at the bedside.  Not really expressing suicidal ideation but get some thoughts on and off per patient.     Assessment/Plan:   Acute hypoxic and hypercarbic respiratory failure requiring intubation-extubated on 3/13/2024  Suicidal attempt and thoughts  Acute toxic versus metabolic encephalopathy-etiology unclear, patient was on opiates for flank pain, had renal failure which likely cause retention of medication and eventual hypoventilation and AMS  Severe sepsis likely secondary to pyelonephritis with right-sided hydronephrosis versus questionable aspiration pneumonia/pneumonitis  Acute renal failure-prerenal azotemia versus ischemic ATN versus from rhabdomyolysis- improving   Acute rhabdomyolysis  Moderate right-sided hydronephrosis with urethral stent in place-status post right PCN  None anion gap metabolic acidosis-resolved  Lactic acidosis-resolved  Electrolyte abnormalities  Pyelonephritis  History of nephrolithiasis status post cystoscopy and right ureteral stent placement on 2024  History of malignant hypertension  Hypothyroidism  Dyslipidemia  OTC supplement resulting in withdrawal:  Pt reportedly taking  tianeptine, yagona leaf, phenibut, L-theanine. Was discussed with poison control, chronic use and then cessation known to cause acute withdrawal - had activation of mu opioid receptor and lyly receptor  Hematuria, improved    Plan  Will continue strict one-to-one observation with suicidal precautions  Psychiatry been consulted, discussed with Dr. David about transfer to psych service since patient medically stable.  Per psychiatrist, no beds available or staffing issue 
[facility]   Pharmacy Pharmacokinetic Monitoring Service - Vancomycin    Indication: sepsis  Goal level: 10-20 mg/L  Day of Therapy: 8  Additional Antimicrobials: none    Pertinent Laboratory Values:   Wt Readings from Last 1 Encounters:   03/14/24 (!) 137.7 kg (303 lb 9.6 oz)     Temp Readings from Last 1 Encounters:   03/18/24 99 °F (37.2 °C) (Oral)     Estimated Creatinine Clearance: 106 mL/min (A) (based on SCr of 1.42 mg/dL (H)).    Recent Labs     03/17/24  0320 03/18/24  0111   CREATININE 1.55* 1.42*   BUN 23* 19*   WBC 10.8 13.5*     Pertinent Cultures:  Date Source Results   3/11 urine NGF   3/11 blood Staph epi in 2/2   3/14 blood NGTD   MRSA Nasal Swab: NA    Assessment:  Date Current Dose Level (mg/L) Timing of Level (h) AUC/SHAE   3/11 1,000 mg x2 - - -   3/12 - 23.0 11 NA   3/13 1,500 mg x1 11.5 34 NA   3/14 1,500 mg x1 16.5 15 NA   3/15 1,750 mg x1 16.6 12 NA   3/16 1750 mg x 1 13.4 14 NA   3/17 2,000 mg x 1 11.2 21 NA   3/18 1,750 mg q18h 20.9 11 528     Plan:  Dose by level  Dose: 1,750 mg q18h  No level ordered at this time  Pharmacy will continue to monitor patient and adjust therapy as indicated    Thank you for the consult,  Renan Kwan RPH  3/18/2024  
[facility]   Pharmacy Pharmacokinetic Monitoring Service - Vancomycin    Indication: sepsis  Goal AUC/SHAE: 400-600  Day of Therapy: 11  Additional Antimicrobials: none    Pertinent Laboratory Values:   Wt Readings from Last 1 Encounters:   03/20/24 130.5 kg (287 lb 11.2 oz)     Temp Readings from Last 1 Encounters:   03/21/24 99.5 °F (37.5 °C) (Oral)     Estimated Creatinine Clearance: 115 mL/min (based on SCr of 1.27 mg/dL).    Recent Labs     03/20/24  0245 03/21/24  0420   CREATININE 1.23 1.27   BUN 16 16   WBC 13.2 13.4*     Pertinent Cultures:  Date Source Results   3/11 urine NGF   3/11 blood Staph epi in 2/2   3/14 blood NGF   MRSA Nasal Swab: NA    Assessment:  Date Current Dose Level (mg/L) Timing of Level (h) AUC/SHAE   3/11 1,000 mg x2 - - -   3/12 - 23.0 11 NA   3/13 1,500 mg x1 11.5 34 NA   3/14 1,500 mg x1 16.5 15 NA   3/15 1,750 mg x1 16.6 12 NA   3/16 1750 mg x 1 13.4 14 NA   3/17 2,000 mg x 1 11.2 21 NA   3/18 1,750 mg q18h 20.9 11 528   3/19 1,750 mg q18h - - -   3/20 1,750 mg q18h - - -   3/21 1,750 mg q18h - - -     Plan:  Continue current dose of 1,750 mg q18h  Ordered a level for 3/22 with AM labs  Pharmacy will continue to monitor patient and adjust therapy as indicated    Thank you for the consult,  Renan Kwan [facility]  3/21/2024  
[facility]   Pharmacy Pharmacokinetic Monitoring Service - Vancomycin    Indication: sepsis  Goal level: 10-20 mg/L  Day of Therapy: 3  Additional Antimicrobials: pip-tazo    Pertinent Laboratory Values:   Wt Readings from Last 1 Encounters:   03/12/24 136.1 kg (300 lb)     Temp Readings from Last 1 Encounters:   03/13/24 98.6 °F (37 °C)     Estimated Creatinine Clearance: 23 mL/min (A) (based on SCr of 6.6 mg/dL (H)).    Recent Labs     03/12/24  0206 03/13/24  0037   CREATININE 6.46* 6.60*   BUN 53* 58*   WBC 13.2 17.1*     Pertinent Cultures:  Date Source Results   3/11 urine NGF   3/11 blood GPC in 2/2   MRSA Nasal Swab: NA    Assessment:  Date Current Dose Level (mg/L) Timing of Level (h)   3/11 1,000 mg x2 - -   3/12 - 23.0 11   3/13 1,500 mg x1 11.5 34     Plan:  Dose by level  Dose: 1,500 mg x1  Ordered a level for 3/14 with AM labs  Pharmacy will continue to monitor patient and adjust therapy as indicated    Thank you for the consult,  Renan Kwan RPH  3/13/2024  
[facility]   Pharmacy Pharmacokinetic Monitoring Service - Vancomycin    Indication: sepsis  Goal level: 10-20 mg/L  Day of Therapy: 1  Additional Antimicrobials: pip-tazo    Pertinent Laboratory Values:   Wt Readings from Last 1 Encounters:   03/11/24 (!) 136.2 kg (300 lb 4.8 oz)     Temp Readings from Last 1 Encounters:   03/11/24 98 °F (36.7 °C) (Axillary)     Estimated Creatinine Clearance: 29 mL/min (A) (based on SCr of 5.09 mg/dL (H)).    Recent Labs     03/11/24  1500 03/11/24  1505   CREATININE  --  5.09*   BUN  --  45*   WBC 21.7*  --      Pertinent Cultures:  Date Source Results   3/11 urine pending   3/11 blood pending   MRSA Nasal Swab: NA    Assessment:  Date Current Dose Level (mg/L) Timing of Level (h)   3/11 1,000 mg x2 - -     Plan:  Dose by level  No additional doses today  Ordered a level for 3/12 with AM labs  Pharmacy will continue to monitor patient and adjust therapy as indicated    Thank you for the consult,  Renan Kwan RPH  3/11/2024  
[facility]   Pharmacy Pharmacokinetic Monitoring Service - Vancomycin    Indication: sepsis  Goal level: 10-20 mg/L  Day of Therapy: 5  Additional Antimicrobials: none    Pertinent Laboratory Values:   Wt Readings from Last 1 Encounters:   03/14/24 (!) 137.7 kg (303 lb 9.6 oz)     Temp Readings from Last 1 Encounters:   03/15/24 98 °F (36.7 °C) (Oral)     Estimated Creatinine Clearance: 69 mL/min (A) (based on SCr of 2.18 mg/dL (H)).    Recent Labs     03/13/24  0037 03/14/24  0329 03/15/24  0137   CREATININE 6.60* 3.37* 2.18*   BUN 58* 46* 39*   WBC 17.1* 18.1*  --      Pertinent Cultures:  Date Source Results   3/11 urine NGF   3/11 blood CoNS in 2/2   MRSA Nasal Swab: NA    Assessment:  Date Current Dose Level (mg/L) Timing of Level (h)   3/11 1,000 mg x2 - -   3/12 - 23.0 11   3/13 1,500 mg x1 11.5 34   3/14 1,500 mg x1 16.5 15   3/15 1,750 mg x1 16.6 12     Plan:  Dose by level  Dose: 1,500 mg x1  Ordered a level for 3/16 with AM labs  Pharmacy will continue to monitor patient and adjust therapy as indicated    Thank you for the consult,  Renan Kwan [facility]  3/15/2024  
[facility]   Pharmacy Pharmacokinetic Monitoring Service - Vancomycin    Indication: sepsis  Goal AUC/SHAE: 400-600  Day of Therapy: 10  Additional Antimicrobials: none    Pertinent Laboratory Values:   Wt Readings from Last 1 Encounters:   03/20/24 130.5 kg (287 lb 11.2 oz)     Temp Readings from Last 1 Encounters:   03/20/24 98.6 °F (37 °C) (Oral)     Estimated Creatinine Clearance: 119 mL/min (based on SCr of 1.23 mg/dL).    Recent Labs     03/19/24  0153 03/20/24  0245   CREATININE 1.39* 1.23   BUN 16 16   WBC 14.0* 13.2     Pertinent Cultures:  Date Source Results   3/11 urine NGF   3/11 blood Staph epi in 2/2   3/14 blood NGTD   MRSA Nasal Swab: NA    Assessment:  Date Current Dose Level (mg/L) Timing of Level (h) AUC/SHAE   3/11 1,000 mg x2 - - -   3/12 - 23.0 11 NA   3/13 1,500 mg x1 11.5 34 NA   3/14 1,500 mg x1 16.5 15 NA   3/15 1,750 mg x1 16.6 12 NA   3/16 1750 mg x 1 13.4 14 NA   3/17 2,000 mg x 1 11.2 21 NA   3/18 1,750 mg q18h 20.9 11 528   3/19 1,750 mg q18h - - -   3/20 1,750 mg q18h - - -     Plan:  Continue current dose of 1,750 mg q18h  No level ordered at this time  Pharmacy will continue to monitor patient and adjust therapy as indicated    Thank you for the consult,  Renan Kwan RPH  3/20/2024  
Nutrition Note    Admitted for AMS, found down with unknown downtime. Intubated for airway protection 3/11, s/p extubation 3/13, s/p R PCN placement by IR 3/12. Pt was NPO x 3 days. Per SLP 3/15, advance PO diet to regular, thin liquids. Discussed care during interdisciplinary rounds. Per RN, pt very altered/confused. Pt hypernatremic, ordered for D5 LR @ 125 ml/hr (150g dex, 510 kcal). Low phos, ordered for replacements. Will continue to monitor PO intake and Will add oral supplements to increase calorie/protein intake opportunity. Noted ICU plan to downgrade pt in afternoon/early tomorrow.    Estimated Nutrition Needs: 137.7 kg, IBW 86 kg  2377 - 2614 kcals/day (MSJ 1-1.1)  138 - 165 g protein (1-1.2 g/kg)  2377 - 2614 ml fluid (1 ml/kcal)    Nutrition Recommendations/Plan:   Continue current diet as tolerated per SLP recs.  Order/trial Ensure Plus High Protein (provides 350 kcal, 20g protein) once daily and Gelatein (each provides 80 kcal, 20g protein) once daily.   Continue to monitor tolerance of PO, compliance of oral supplements, weight, labs, and plan of care during admission.         Electronically signed by Megan Dockweiler, MS, RD, CNSC on 3/15/2024 at 10:49 AM.    Contact: [phone]    
Patient refused placement of Nolen. Will continue bladder scan Q6 and educate on need for Nolen  
Received a call from RN that patient's suicidal and had call bell wrapped around his neck  When RN found the patient in the bed, no respiratory distress  No respiratory symptoms, no cyanosis or hypoxia  No bruises or scratch marks around neck  Will place patient on strict suicidal precautions, one-to-one observation  Vital stable  
Respiratory  Assessment / Plan / Goal    VENTILATOR CARE PLAN    Problem: Ventilator Management  Goal: *Adequate oxygenation/ ventilation/ and extubation      Patient:        Harry Lema     38 y.o.   male     3/12/2024  8:42 AM  Patient Active Problem List   Diagnosis    Chronic insomnia    Osteoarthritis of spine with radiculopathy, lumbar region    Anxiety    Severe obesity with body mass index (BMI) of 35.0 to 39.9 with serious comorbidity (HCC)    Hypothyroidism    Hypertension    Cholangiectasis    Diarrhea    Epigastric pain    Gastroesophageal reflux disease    Hydronephrosis with urinary obstruction due to ureteral calculus    Hyperlipidemia    Irregular bowel habits    Irritable bowel syndrome with constipation    Irritable bowel syndrome with diarrhea    Mixed anxiety and depressive disorder    Renal stone    Sleep apnea    Malignant hypertension    Morbid obesity (HCC)    Sepsis (HCC)    Acute renal failure (ARF) (HCC)       Sepsis (HCC) [A41.9]  Acute renal failure (ARF) (HCC) [N17.9]    Reason patient intubated:  Acute respiratory failure, hypoxic and hypercarbic- requiring mechanical ventilation, intubated for airway protection  Ventilator day: 2    Ventilator settings: AC/VC+ 18/550/40%/5    ETT Size/Placement: 7.5 / 25 at lip on left / center     Wean Screen Pass (Yes or No):  Wean Screen Reason for Failure:  Duration of Weaning Trial:  Additional Comments:        PLAN OF CARE: Assess for SBT       GOAL:      OUTCOME:     ABG:  Date:3/12/2024  7.38 / 34.3 / 84 / 20.1       Chest X-ray:  Date:3/12/2024 pending       Lab Test:  Date:3/12/2024  WBC:   Lab Results   Component Value Date/Time    WBC 13.2 03/12/2024 02:06 AM   HGB:   Lab Results   Component Value Date/Time    HGB 10.2 03/12/2024 02:06 AM    PLTS:   Lab Results   Component Value Date/Time     03/12/2024 02:06 AM         Vital Signs:   Patient Vitals for the past 8 hrs:   Temp Pulse Resp BP SpO2   03/12/24 0836 -- 67 18 -- 95 % 
Respiratory Note     1155     - To room around 0955 patient bucking do to secretions . Suctioned for large amounts of tan mucus.  - Following patient seem awake an therefore placed on SBT    SBT  - PS of 7   --600   - RR 18 RSBI 28 HR 86 SPO2 99 on 40% reduced to 30    Plan ; Leave on PS . Patient at times still seem confused to time and place  
Restraint type: Soft restraint:  bilateral wrist   Reason for restraints: Pulling out devices:  Pulling lines  Duration: 24 hours    Restraints must be removed when an alternative is available and effective and/or patient no longer meets criteria.    Orders must be renewed every calendar day or when discontinued.    The MD must conduct a face to face assessment within 1 calendar day of initiation when initial restraint order is verbal.   
Reviewed chart.  K  is low.  Cr is 5.09.  eGFR is 14  Calcium is 7.2.  Albumin is 2.1.  Monitor on fluids  Monitor output  Monitor labs    
SBT started patient awake and alert stable at this time will continue to monitor  
Speech Pathology    Pt seen for bedside swallow eval with recs of regular solids and thin liquids. Full report to follow.     Thank you for this referral.    Mary Gallardo M.S. CCC-SLP  Speech Language Pathologist    
TRANSFER - OUT REPORT:    Verbal report given to RN, on Harry Lema  being transferred to [address] [place] [address] for routine progression of patient care.    Report consisted of patient’s Situation, Background, Assessment and   Recommendations(SBAR).     Information from the following report(s) Nurse Handoff Report, Intake/Output, MAR, Cardiac Rhythm and Neuro Assessment was reviewed with the receiving nurse.    Opportunity for questions and clarification was provided.    All belongings returned to patient.    Patient transported with:   Tech via wheelchair.         
      Subjective:     Daily Progress Note: 3/21/2024 1:32 PM    Patient has no complaints, denies any new  concerns.  Tolerating PCN. Reports urinating with out issues, denies blood.    Objective:     /73   Pulse 93   Temp 98.2 °F (36.8 °C) (Oral)   Resp 16   Ht 1.88 m (6' 2\")   Wt 130.5 kg (287 lb 11.2 oz)   SpO2 92%   BMI 36.94 kg/m²      Temp (24hrs), Av.9 °F (37.2 °C), Min:97.9 °F (36.6 °C), Max:99.6 °F (37.6 °C)      Intake and Output:   1901 -  0700  In: 441.7 [P.O.:250; I.V.:100]  Out: 3650 [Urine:3650]  No intake/output data recorded.    PHYSICAL EXAMINATION:   /73   Pulse 93   Temp 98.2 °F (36.8 °C) (Oral)   Resp 16   Ht 1.88 m (6' 2\")   Wt 130.5 kg (287 lb 11.2 oz)   SpO2 92%   BMI 36.94 kg/m²     Constitutional: Well developed, well nourished.  No acute distress.    HEENT: Normocephalic, Atraumatic, EOM's intact   Respiratory: No respiratory distress or difficulties breathing   Abdomen:  Soft and non-tender   Male: R PCN clear.   Skin: No evidence of jaundice.  Normal color  Neuro/Psych:  Alert and oriented.  Flat appropriate.  Noticeable fine tremor    Lab/Data Review:      Labs:     Labs: Results:   Chemistry    Recent Labs     24  0153 24  0245 24  0420    144 139   K 3.6 3.2* 3.6   * 114* 109   CO2 23 23 22   BUN 16 16 16   GLOB 3.9 3.4 3.8      CBC w/Diff Recent Labs     24  0153 24  0245 24  0420   WBC 14.0* 13.2 13.4*   RBC 3.63* 3.35* 3.47*   HGB 10.9* 10.3* 10.5*   HCT 34.4* 31.1* 32.4*    400 460*      Cultures Invalid input(s): \"CULT\"  [unfilled]      Urinalysis Potassium   Date Value Ref Range Status   2024 3.6 3.5 - 5.5 mmol/L Final     BUN   Date Value Ref Range Status   2024 16 7.0 - 18 MG/DL Final      PSA No results for input(s): \"PSA\" in the last 72 hours.   Coagulation Lab Results   Component Value Date/Time    INR 1.2 2024 09:10 PM    INR 1.1 2024 02:14 PM     
    I discussed the case and independently reviewed the pertinent vital signs, lab values, and imaging. I performed a substantive portion of the medical decision making. I agree with the findings and the plan of care, as documented in the note.    Roebrto Dooley MD    [address] [place] [address] [phone] Office  [phone] Pager      Subjective:     Daily Progress Note: 3/20/2024 3:11 PM    Patient has no complaints, denies any suprapubic pain.  Tolerating PCN.  Does state his bandage needs to be changed.    Objective:     /74   Pulse 84   Temp 98.6 °F (37 °C) (Oral)   Resp 21   Ht 1.88 m (6' 2\")   Wt 130.5 kg (287 lb 11.2 oz)   SpO2 95%   BMI 36.94 kg/m²      Temp (24hrs), Av.5 °F (36.9 °C), Min:98 °F (36.7 °C), Max:99 °F (37.2 °C)      Intake and Output:   1901 -  0700  In: 2425 [P.O.:150; I.V.:1275]  Out: 4060 [Urine:4060]   0701 -  190  In: -   Out: 900 [Urine:900]    PHYSICAL EXAMINATION:   /74   Pulse 84   Temp 98.6 °F (37 °C) (Oral)   Resp 21   Ht 1.88 m (6' 2\")   Wt 130.5 kg (287 lb 11.2 oz)   SpO2 95%   BMI 36.94 kg/m²     Constitutional: Well developed, well nourished.  No acute distress.    HEENT: Normocephalic, Atraumatic, EOM's intact   CV:  no edema  Respiratory: No respiratory distress or difficulties breathing   Abdomen:  Soft and non-tender   Male: R PCN clear.   Skin: No evidence of jaundice.  Normal color  Neuro/Psych:  Alert and oriented.  Flat appropriate.  Noticeable fine tremor    Lab/Data Review:      Labs:     Labs: Results:   Chemistry    Recent Labs     24  0111 24  0153 24  0245   * 144 144   K 3.0* 3.6 3.2*   * 114* 114*   CO2 24 23 23   BUN 19* 16 16   GLOB  --  3.9 3.4        CBC w/Diff Recent Labs     24  0111 24  0153 24  0245   WBC 13.5* 14.0* 13.2   RBC 3.28* 3.63* 3.35*   HGB 9.9* 10.9* 10.3*   HCT 29.9* 34.4* 31.1*    332 400        Cultures 
   Or    LORazepam (ATIVAN) injection 3 mg  3 mg IntraVENous Q1H PRN    Or    LORazepam (ATIVAN) tablet 4 mg  4 mg Oral Q1H PRN    Or    LORazepam (ATIVAN) injection 4 mg  4 mg IntraVENous Q1H PRN    thiamine (B-1) injection 200 mg  200 mg IntraVENous Daily    topiramate (TOPAMAX) tablet 100 mg  100 mg Oral Daily    DULoxetine (CYMBALTA) extended release capsule 60 mg  60 mg Oral Daily    gabapentin (NEURONTIN) capsule 300 mg  300 mg Oral TID    pantoprazole (PROTONIX) tablet 40 mg  40 mg Oral QAM AC    levothyroxine (SYNTHROID) tablet 25 mcg  25 mcg Oral Daily    zolpidem (AMBIEN) tablet 5 mg  5 mg Oral Nightly PRN    cloNIDine (CATAPRES) 0.3 MG/24HR 1 patch  1 patch TransDERmal Weekly    perflutren lipid microspheres (DEFINITY) injection 2 mL  2 mL IntraVENous ONCE PRN    sodium chloride flush 0.9 % injection 5-40 mL  5-40 mL IntraVENous 2 times per day    sodium chloride flush 0.9 % injection 5-40 mL  5-40 mL IntraVENous PRN    0.9 % sodium chloride infusion   IntraVENous PRN    acetaminophen (TYLENOL) tablet 650 mg  650 mg Oral Q6H PRN    Or    acetaminophen (TYLENOL) suppository 650 mg  650 mg Rectal Q6H PRN    heparin (porcine) injection 5,000 Units  5,000 Units SubCUTAneous 3 times per day       Review of Symptoms: comprehensive ROS negative except above.   Objective:   Patient Vitals for the past 24 hrs:   Temp Pulse Resp BP SpO2   03/20/24 1045 98.6 °F (37 °C) 84 21 135/74 94 %   03/20/24 0715 98.7 °F (37.1 °C) 87 19 (!) 166/84 93 %   03/20/24 0500 -- 62 -- -- --   03/20/24 0415 98.7 °F (37.1 °C) 83 20 (!) 163/94 93 %   03/20/24 0300 -- 94 -- -- --   03/19/24 2330 98 °F (36.7 °C) 70 20 (!) 169/89 93 %   03/19/24 1945 98.1 °F (36.7 °C) 71 20 137/85 92 %   03/19/24 1900 -- 68 -- -- --   03/19/24 1843 99 °F (37.2 °C) 75 18 (!) 145/92 93 %   03/19/24 1800 -- 70 (!) 7 129/78 94 %   03/19/24 1700 -- -- -- (!) 117/94 --   03/19/24 1600 98.1 °F (36.7 °C) 72 27 134/84 90 %   03/19/24 1500 -- 83 25 -- 98 %   03/19/24 
%  ADULT ORAL NUTRITION SUPPLEMENT; Lunch; Standard High Calorie/High Protein Oral Supplement  ADULT ORAL NUTRITION SUPPLEMENT; Dinner; Fortified Gelatin Oral Supplement  ADULT DIET; Regular    Anthropometric Measures:  Height: 188 cm (6' 2\")  Ideal Body Weight (IBW): 190 lbs (86 kg)    Admission Body Weight: 137.7 kg (303 lb 9.2 oz)  Current Body Weight: 130.5 kg (287 lb 11.2 oz) (Pt -12.5 L overall per I&Os.), 159.8 % IBW.    Current BMI (kg/m2): 36.9  BMI Categories: Obese Class 2 (BMI 35.0 -39.9)    Estimated Daily Nutrient Needs:  Energy Requirements Based On: Formula  Weight Used for Energy Requirements: Current  Energy (kcal/day): 2127-9760 (MSJ 1-1.1)  Weight Used for Protein Requirements: Current  Protein (g/day): 131-157 (1-1.2)  Method Used for Fluid Requirements: 1 ml/kcal  Fluid (ml/day): 8075-1869    Nutrition Diagnosis:   Inadequate oral intake related to acute injury/trauma, cognitive or neurological impairment as evidenced by intake 26-50%, intake 51-75%    Nutrition Interventions:   Food and/or Nutrient Delivery: Continue Current Diet, Modify Oral Nutrition Supplement  Nutrition Education/Counseling: No recommendation at this time  Coordination of Nutrition Care: Continue to monitor while inpatient  Plan of Care discussed with: pt    Goals:  Previous Goal Met: Progressing toward Goal(s)  Goals: Meet at least 75% of estimated needs, by next RD assessment       Nutrition Monitoring and Evaluation:   Behavioral-Environmental Outcomes: None Identified  Food/Nutrient Intake Outcomes: Food and Nutrient Intake, Supplement Intake  Physical Signs/Symptoms Outcomes: Chewing or Swallowing, Biochemical Data, GI Status, Fluid Status or Edema, Meal Time Behavior, Hemodynamic Status, Weight    Discharge Planning:    Continue current diet     Megan Dockweiler, MS, RD, CNSC  Contact: [phone]   
IntraVENous PRN    acetaminophen (TYLENOL) tablet 650 mg  650 mg Oral Q6H PRN    Or    acetaminophen (TYLENOL) suppository 650 mg  650 mg Rectal Q6H PRN    heparin (porcine) injection 5,000 Units  5,000 Units SubCUTAneous 3 times per day    famotidine (PEPCID) 20 mg in sodium chloride (PF) 0.9 % 10 mL injection  20 mg IntraVENous Daily    vancomycin (VANCOCIN) intermittent dosing (placeholder)   Other RX Placeholder       Review of Symptoms: comprehensive ROS negative except above.   Objective:   Patient Vitals for the past 24 hrs:   Temp Pulse Resp BP SpO2   03/15/24 1000 -- 51 21 126/79 99 %   03/15/24 0945 -- 53 21 -- 99 %   03/15/24 0930 -- 54 23 137/84 99 %   03/15/24 0915 -- 54 25 -- 98 %   03/15/24 0900 -- 55 26 (!) 140/86 99 %   03/15/24 0845 -- 58 21 -- 98 %   03/15/24 0830 -- 58 26 (!) 145/83 98 %   03/15/24 0815 -- 61 25 -- 98 %   03/15/24 0800 98 °F (36.7 °C) 75 19 137/75 98 %   03/15/24 0745 -- 70 19 -- 95 %   03/15/24 0739 -- 69 (!) 34 -- 95 %   03/15/24 0730 -- 79 23 (!) 159/90 92 %   03/15/24 0715 -- 90 (!) 42 -- 92 %   03/15/24 0700 -- (!) 115 (!) 43 (!) 152/87 94 %   03/15/24 0645 -- (!) 107 (!) 33 -- 99 %   03/15/24 0630 -- (!) 142 22 (!) 126/97 100 %   03/15/24 0600 98.8 °F (37.1 °C) 71 24 (!) 148/83 99 %   03/15/24 0530 -- 68 20 (!) 156/89 95 %   03/15/24 0500 -- 78 (!) 36 (!) 163/87 92 %   03/15/24 0430 -- 88 (!) 35 (!) 178/93 93 %   03/15/24 0400 -- (!) 102 (!) 38 -- 94 %   03/15/24 0330 -- (!) 110 27 -- 98 %   03/15/24 0300 99.5 °F (37.5 °C) 100 (!) 34 (!) 173/96 94 %   03/15/24 0230 -- (!) 114 (!) 37 (!) 174/97 95 %   03/15/24 0130 -- (!) 111 (!) 36 (!) 172/96 92 %   03/15/24 0124 -- (!) 103 (!) 44 -- --   03/15/24 0123 -- (!) 106 (!) 42 (!) 151/80 --   03/15/24 0100 98.9 °F (37.2 °C) (!) 117 (!) 34 (!) 158/78 (!) 88 %   03/15/24 0030 -- (!) 116 (!) 37 (!) 171/98 94 %   03/15/24 0000 -- (!) 104 (!) 41 (!) 158/97 95 %   03/14/24 2300 99.5 °F (37.5 °C) (!) 122 (!) 43 (!) 155/104 91 % 
opioid receptor and lyly receptor    Patient now on 3n  Cardiac monitoring  Continue vanco for staph epi bacteremia  ID following  Follow cultures as needed  [facility] protocol for withdrawal  Psychiatry input noted  Continue high-dose thiamine  Status post right-sided PCN on 3/12/2024, per urology outpatient PCN removal  Voiding trial  Continue Cymbalta  Continue IV fluids  CK level overall improved  Continue clonidine and Coreg for hypertension  Continue Topamax      I spent 40 minutes with the patient in face-to-face consultation, of which greater than 50% was spent in counseling and coordination of care as described above.    Case discussed with:  [x]Patient  []Family  []Nursing  []Case Management  DVT Prophylaxis:  []Lovenox  [x]Hep SQ  []SCDs  []Coumadin   []Eliquis/Xarelto     Objective:   VS: /78   Pulse 70   Temp 98.1 °F (36.7 °C) (Oral)   Resp (!) 7   Ht 1.88 m (6' 2\")   Wt (!) 137.7 kg (303 lb 9.6 oz)   SpO2 94%   BMI 38.98 kg/m²    Tmax/24hrs: Temp (24hrs), Av.4 °F (36.9 °C), Min:98.1 °F (36.7 °C), Max:99 °F (37.2 °C)  IOBRIEF  Intake/Output Summary (Last 24 hours) at 3/19/2024 1839  Last data filed at 3/19/2024 1200  Gross per 24 hour   Intake 2500 ml   Output 3460 ml   Net -960 ml       General:  Alert, cooperative, no acute distress    HEENT: PERRLA, anicteric sclerae.  Pulmonary:  CTA Bilaterally. No Wheezing/Rales.  Cardiovascular: Regular rate and Rhythm.  GI:  Soft, Non distended, Non tender. + Bowel sounds.  Extremities:  No edema. No calf tenderness.   Psych: Good insight. Not anxious or agitated.  Neurologic: Alert and oriented X 2.  Confused overall .moves all ext.  Additional: ramirez in place, right sided PCN    Medications:   Current Facility-Administered Medications   Medication Dose Route Frequency    vancomycin (VANCOCIN) 1,750 mg in sodium chloride 0.9 % 500 mL IVPB  1,750 mg IntraVENous Q18H    sodium chloride flush 0.9 % injection 5-40 mL  5-40 mL IntraVENous 2 times per day 
      Culture       STAPHYLOCOCCUS SPECIES, COAGULASE NEGATIVE GROWING IN BOTH BOTTLES DRAWN No Site Indicated IDENTIFICATION AND SUSCEPTIBILITY TO FOLLOW          Culture, Blood, PCR ID Panel [[id number]]  (Abnormal) Collected: 03/11/24 1415    Order Status: Completed Specimen: Blood Updated: 03/12/24 7986     Accession Number [id number]     Enterococcus faecalis by PCR Not detected        Enterococcus faecium by PCR Not detected        Listeria monocytogenes by PCR Not detected        STAPHYLOCOCCUS Detected        Staphylococcus Aureus Not detected        Staphylococcus epidermidis by PCR Detected        Staphylococcus lugdunensis by PCR Not detected        STREPTOCOCCUS Not detected        Streptococcus agalactiae (Group B) Not detected        Strep pneumoniae Not detected        Strep pyogenes,(Grp. A) Not detected        Acinetobacter calcoac baumannii complex by PCR Not detected        Bacteroides fragilis by PCR Not detected        Enterobacteriaceae by PCR Not detected        Enterobacter cloacae complex by PCR Not detected        Escherichia Coli Not detected        Klebsiella aerogenes by PCR Not detected        Klebsiella oxytoca by PCR Not detected        Klebsiella pneumoniae group by PCR Not detected        Proteus by PCR Not detected        Salmonella species by PCR Not detected        Serratia marcescens by PCR Not detected        Haemophilus Influenzae by PCR Not detected        Neisseria meningitidis by PCR Not detected        Pseudomonas aeruginosa Not detected        Stenotrophomonas maltophilia by PCR Not detected        Candida albicans by PCR Not detected        Candida auris by PCR Not detected        Candida glabrata Not detected        Candida krusei by PCR Not detected        Candida parapsilosis by PCR Not detected        Candida tropicalis by PCR Not detected        Cryptococcus neoformans/gattii by PCR Not detected        Resistant gene targets          Methicillin Resistance mecA/C  by 
     03/17 1901 - 03/19 0700  In: 2985 [P.O.:610; I.V.:1875]  Out: 5550 [Urine:5550]    Intake/Output Summary (Last 24 hours) at 3/19/2024 1245  Last data filed at 3/19/2024 0800  Gross per 24 hour   Intake 1625 ml   Output 3760 ml   Net -2135 ml     Physical Exam:   General: comfortable, no acute distress   HEENT sclera anicteric, supple neck, no thyromegaly  CVS: S1S2 heard,  no rub  RS: + air entry b/l,   Abd: Soft,  Neuro: confused  Extrm:  no cyanosis, clubbing   Skin: no visible  Rash          Data Review:     LABS:   Hematology:   Recent Labs     03/17/24  0320 03/18/24  0111 03/19/24  0153   WBC 10.8 13.5* 14.0*   HGB 10.5* 9.9* 10.9*   HCT 32.6* 29.9* 34.4*     Chemistry:   Recent Labs     03/16/24  1656 03/16/24  2130 03/17/24  0320 03/18/24  0111 03/19/24  0153   BUN 23* 23* 23* 19* 16   K 3.2* 3.1* 3.7 3.0* 3.6   * 150* 144 147* 144   * 118* 112* 115* 114*   CO2 25 26 26 24 23   PHOS 4.3  --  4.3 3.3  --             Procedures/imaging: see electronic medical records for all procedures, Xrays and details which were not copied into this note but were reviewed prior to creation of Plan          Assessment & Plan:     See above        Jerzy Noyola MD  3/19/2024  12:45 PM  
  03/12/24 2000 99.1 °F (37.3 °C) 78 22 138/78 100 %   03/12/24 1945 -- 57 17 -- 99 %   03/12/24 1930 -- 57 17 (!) 102/54 99 %   03/12/24 1915 -- 57 19 -- 99 %   03/12/24 1900 -- 58 17 (!) 107/56 99 %   03/12/24 1830 -- 59 17 (!) 107/57 99 %   03/12/24 1800 -- 60 18 (!) 107/57 98 %   03/12/24 1730 -- 62 18 (!) 102/57 97 %   03/12/24 1700 -- 67 20 (!) 111/58 94 %   03/12/24 1655 -- 67 20 -- 96 %   03/12/24 1630 -- 81 18 (!) 152/69 99 %   03/12/24 1600 98.8 °F (37.1 °C) 88 18 (!) 198/150 99 %   03/12/24 1300 99.5 °F (37.5 °C) 60 14 (!) 102/58 92 %   03/12/24 1230 99.5 °F (37.5 °C) 75 18 (!) 150/88 98 %   03/12/24 1200 99.1 °F (37.3 °C) 81 19 (!) 150/95 99 %   03/12/24 1130 99 °F (37.2 °C) 57 13 (!) 98/53 94 %   03/12/24 1118 -- -- -- 98/64 --   03/12/24 1100 99.5 °F (37.5 °C) 63 12 (!) 96/56 95 %        Weight change: 0 kg (0 lb)     03/11 1901 - 03/13 0700  In: 4102.3 [I.V.:3737.3]  Out: 3245 [Urine:3245]    Intake/Output Summary (Last 24 hours) at 3/13/2024 1042  Last data filed at 3/13/2024 0800  Gross per 24 hour   Intake 1815.4 ml   Output 2795 ml   Net -979.6 ml     Physical Exam:   General: comfortable, no acute distress   HEENT sclera anicteric, supple neck, no thyromegaly  CVS: S1S2 heard,  no rub  RS: + air entry b/l,   Abd: Soft, Non tender, Not distended, Positive bowel sounds, no organomegaly, no CVA / supra pubic tenderness  Neuro: non focal, awake, alert , CN II-XII are grossly intact  Extrm:  no cyanosis, clubbing   Skin: no visible  Rash  Musculoskeletal: No gross joints or bone deformities         Data Review:     LABS:   Hematology:   Recent Labs     03/11/24  1500 03/12/24  0206 03/13/24  0037   WBC 21.7* 13.2 17.1*   HGB 11.6* 10.2* 9.9*   HCT 34.6* 30.0* 29.5*     Chemistry:   Recent Labs     03/11/24  1505 03/11/24  2110 03/12/24  0206 03/13/24  0037   BUN 45* 47* 53* 58*   K 2.9* 3.3* 3.8 4.0    137 137 139    103 104 109   CO2 20* 24 22 22   PHOS  --  3.6 3.0 1.9*    
(THERATEARS/REFRESH) 1 % ophthalmic gel 1 drop  1 drop Both Eyes TID    sodium chloride flush 0.9 % injection 5-40 mL  5-40 mL IntraVENous 2 times per day    sodium chloride flush 0.9 % injection 5-40 mL  5-40 mL IntraVENous PRN    0.9 % sodium chloride infusion   IntraVENous PRN    topiramate (TOPAMAX) tablet 100 mg  100 mg Oral Daily    DULoxetine (CYMBALTA) extended release capsule 60 mg  60 mg Oral Daily    gabapentin (NEURONTIN) capsule 300 mg  300 mg Oral TID    pantoprazole (PROTONIX) tablet 40 mg  40 mg Oral QAM AC    levothyroxine (SYNTHROID) tablet 25 mcg  25 mcg Oral Daily    zolpidem (AMBIEN) tablet 5 mg  5 mg Oral Nightly PRN    cloNIDine (CATAPRES) 0.3 MG/24HR 1 patch  1 patch TransDERmal Weekly    perflutren lipid microspheres (DEFINITY) injection 2 mL  2 mL IntraVENous ONCE PRN    sodium chloride flush 0.9 % injection 5-40 mL  5-40 mL IntraVENous 2 times per day    sodium chloride flush 0.9 % injection 5-40 mL  5-40 mL IntraVENous PRN    0.9 % sodium chloride infusion   IntraVENous PRN    acetaminophen (TYLENOL) tablet 650 mg  650 mg Oral Q6H PRN    Or    acetaminophen (TYLENOL) suppository 650 mg  650 mg Rectal Q6H PRN    heparin (porcine) injection 5,000 Units  5,000 Units SubCUTAneous 3 times per day       Labs:    No results found for this or any previous visit (from the past 24 hour(s)).        Signed By: Mark Macedo MD     March 25, 2024      Disclaimer: Sections of this note are dictated using utilizing voice recognition software.  Minor typographical errors may be present. If questions arise, please do not hesitate to contact me or call our department.    
EPIDERMIDIS GROWING IN BOTH BOTTLES DRAWN No Site Indicated (OXACILLIN RESISTANT)          Susceptibility        Staphylococcus epidermidis      BACTERIAL SUSCEPTIBILITY PANEL SHAE      ciprofloxacin <=0.5 ug/mL Sensitive      clindamycin 0.5 ug/mL Resistant      DAPTOmycin 0.5 ug/mL Sensitive      erythromycin >=8 ug/mL Resistant      gentamicin <=0.5 ug/mL Sensitive      Inducible Clindamycin Positive ug/mL Sensitive      levofloxacin <=0.12 ug/mL Sensitive      linezolid 1 ug/mL Sensitive      oxacillin >=4 ug/mL Resistant      tetracycline 2 ug/mL Sensitive      trimethoprim-sulfamethoxazole <=10 ug/mL Sensitive      vancomycin 2 ug/mL Sensitive                           Culture, Blood, PCR ID Panel [[id number]]  (Abnormal) Collected: 03/11/24 1415    Order Status: Completed Specimen: Blood Updated: 03/12/24 5710     Accession Number [id number]     Enterococcus faecalis by PCR Not detected        Enterococcus faecium by PCR Not detected        Listeria monocytogenes by PCR Not detected        STAPHYLOCOCCUS Detected        Staphylococcus Aureus Not detected        Staphylococcus epidermidis by PCR Detected        Staphylococcus lugdunensis by PCR Not detected        STREPTOCOCCUS Not detected        Streptococcus agalactiae (Group B) Not detected        Strep pneumoniae Not detected        Strep pyogenes,(Grp. A) Not detected        Acinetobacter calcoac baumannii complex by PCR Not detected        Bacteroides fragilis by PCR Not detected        Enterobacteriaceae by PCR Not detected        Enterobacter cloacae complex by PCR Not detected        Escherichia Coli Not detected        Klebsiella aerogenes by PCR Not detected        Klebsiella oxytoca by PCR Not detected        Klebsiella pneumoniae group by PCR Not detected        Proteus by PCR Not detected        Salmonella species by PCR Not detected        Serratia marcescens by PCR Not detected        Haemophilus Influenzae by PCR Not detected        Neisseria 
Enterococcus faecium by PCR Not detected        Listeria monocytogenes by PCR Not detected        STAPHYLOCOCCUS Detected        Staphylococcus Aureus Not detected        Staphylococcus epidermidis by PCR Detected        Staphylococcus lugdunensis by PCR Not detected        STREPTOCOCCUS Not detected        Streptococcus agalactiae (Group B) Not detected        Strep pneumoniae Not detected        Strep pyogenes,(Grp. A) Not detected        Acinetobacter calcoac baumannii complex by PCR Not detected        Bacteroides fragilis by PCR Not detected        Enterobacteriaceae by PCR Not detected        Enterobacter cloacae complex by PCR Not detected        Escherichia Coli Not detected        Klebsiella aerogenes by PCR Not detected        Klebsiella oxytoca by PCR Not detected        Klebsiella pneumoniae group by PCR Not detected        Proteus by PCR Not detected        Salmonella species by PCR Not detected        Serratia marcescens by PCR Not detected        Haemophilus Influenzae by PCR Not detected        Neisseria meningitidis by PCR Not detected        Pseudomonas aeruginosa Not detected        Stenotrophomonas maltophilia by PCR Not detected        Candida albicans by PCR Not detected        Candida auris by PCR Not detected        Candida glabrata Not detected        Candida krusei by PCR Not detected        Candida parapsilosis by PCR Not detected        Candida tropicalis by PCR Not detected        Cryptococcus neoformans/gattii by PCR Not detected        Resistant gene targets          Methicillin Resistance mecA/C  by PCR Detected        [employer] test comment       False positive results may rarely occur. Correlate with clinical,epidemiologic, and other laboratory findings           Comment: Please see BC[facility] Interpretation Guide in EPIC Links       Blood Culture 1 [[id number]]  (Abnormal) Collected: 03/11/24 1414    Order Status: Completed Specimen: Blood Updated: 03/12/24 2206     Special Requests NO 
injection 5-40 mL  5-40 mL IntraVENous 2 times per day    sodium chloride flush 0.9 % injection 5-40 mL  5-40 mL IntraVENous PRN    thiamine mononitrate tablet 100 mg  100 mg Oral Daily    hydrALAZINE (APRESOLINE) injection 10 mg  10 mg IntraVENous Q6H PRN    carboxymethylcellulose PF (THERATEARS/REFRESH) 1 % ophthalmic gel 1 drop  1 drop Both Eyes TID    sodium chloride flush 0.9 % injection 5-40 mL  5-40 mL IntraVENous 2 times per day    sodium chloride flush 0.9 % injection 5-40 mL  5-40 mL IntraVENous PRN    0.9 % sodium chloride infusion   IntraVENous PRN    topiramate (TOPAMAX) tablet 100 mg  100 mg Oral Daily    DULoxetine (CYMBALTA) extended release capsule 60 mg  60 mg Oral Daily    gabapentin (NEURONTIN) capsule 300 mg  300 mg Oral TID    pantoprazole (PROTONIX) tablet 40 mg  40 mg Oral QAM AC    levothyroxine (SYNTHROID) tablet 25 mcg  25 mcg Oral Daily    zolpidem (AMBIEN) tablet 5 mg  5 mg Oral Nightly PRN    cloNIDine (CATAPRES) 0.3 MG/24HR 1 patch  1 patch TransDERmal Weekly    perflutren lipid microspheres (DEFINITY) injection 2 mL  2 mL IntraVENous ONCE PRN    sodium chloride flush 0.9 % injection 5-40 mL  5-40 mL IntraVENous 2 times per day    sodium chloride flush 0.9 % injection 5-40 mL  5-40 mL IntraVENous PRN    0.9 % sodium chloride infusion   IntraVENous PRN    acetaminophen (TYLENOL) tablet 650 mg  650 mg Oral Q6H PRN    Or    acetaminophen (TYLENOL) suppository 650 mg  650 mg Rectal Q6H PRN    heparin (porcine) injection 5,000 Units  5,000 Units SubCUTAneous 3 times per day       Labs:    Recent Results (from the past 24 hour(s))   CBC with Auto Differential    Collection Time: 03/24/24  9:58 AM   Result Value Ref Range    WBC 7.5 4.6 - 13.2 K/uL    RBC 3.36 (L) 4.35 - 5.65 M/uL    Hemoglobin 10.1 (L) 13.0 - 16.0 g/dL    Hematocrit 30.8 (L) 36.0 - 48.0 %    MCV 91.7 78.0 - 100.0 FL    MCH 30.1 24.0 - 34.0 PG    MCHC 32.8 31.0 - 37.0 g/dL    RDW 13.9 11.6 - 14.5 %    Platelets 485 (H) 135 - 
chloride infusion   IntraVENous PRN    acetaminophen (TYLENOL) tablet 650 mg  650 mg Oral Q6H PRN    Or    acetaminophen (TYLENOL) suppository 650 mg  650 mg Rectal Q6H PRN    heparin (porcine) injection 5,000 Units  5,000 Units SubCUTAneous 3 times per day       Labs:    Recent Results (from the past 24 hour(s))   CBC with Auto Differential    Collection Time: 03/18/24  1:11 AM   Result Value Ref Range    WBC 13.5 (H) 4.6 - 13.2 K/uL    RBC 3.28 (L) 4.35 - 5.65 M/uL    Hemoglobin 9.9 (L) 13.0 - 16.0 g/dL    Hematocrit 29.9 (L) 36.0 - 48.0 %    MCV 91.2 78.0 - 100.0 FL    MCH 30.2 24.0 - 34.0 PG    MCHC 33.1 31.0 - 37.0 g/dL    RDW 14.1 11.6 - 14.5 %    Platelets 396 135 - 420 K/uL    MPV 9.9 9.2 - 11.8 FL    Nucleated RBCs 0.0 0  WBC    nRBC 0.00 0.00 - 0.01 K/uL    Neutrophils % 70 40 - 73 %    Lymphocytes % 18 (L) 21 - 52 %    Monocytes % 5 3 - 10 %    Eosinophils % 2 0 - 5 %    Basophils % 1 0 - 2 %    Immature Granulocytes 4 (H) 0.0 - 0.5 %    Neutrophils Absolute 9.4 (H) 1.8 - 8.0 K/UL    Lymphocytes Absolute 2.4 0.9 - 3.6 K/UL    Monocytes Absolute 0.7 0.05 - 1.2 K/UL    Eosinophils Absolute 0.3 0.0 - 0.4 K/UL    Basophils Absolute 0.1 0.0 - 0.1 K/UL    Absolute Immature Granulocyte 0.6 (H) 0.00 - 0.04 K/UL    Differential Type AUTOMATED     Basic Metabolic Panel    Collection Time: 03/18/24  1:11 AM   Result Value Ref Range    Sodium 147 (H) 136 - 145 mmol/L    Potassium 3.0 (L) 3.5 - 5.5 mmol/L    Chloride 115 (H) 100 - 111 mmol/L    CO2 24 21 - 32 mmol/L    Anion Gap 8 3.0 - 18 mmol/L    Glucose 109 (H) 74 - 99 mg/dL    BUN 19 (H) 7.0 - 18 MG/DL    Creatinine 1.42 (H) 0.6 - 1.3 MG/DL    Bun/Cre Ratio 13 12 - 20      Est, Glom Filt Rate >60 >60 ml/min/1.73m2    Calcium 8.4 (L) 8.5 - 10.1 MG/DL   Magnesium    Collection Time: 03/18/24  1:11 AM   Result Value Ref Range    Magnesium 1.9 1.6 - 2.6 mg/dL   Phosphorus    Collection Time: 03/18/24  1:11 AM   Result Value Ref Range    Phosphorus 3.3 2.5 - 
sodium chloride flush 0.9 % injection 5-40 mL  5-40 mL IntraVENous 2 times per day    sodium chloride flush 0.9 % injection 5-40 mL  5-40 mL IntraVENous PRN    0.9 % sodium chloride infusion   IntraVENous PRN    acetaminophen (TYLENOL) tablet 650 mg  650 mg Oral Q6H PRN    Or    acetaminophen (TYLENOL) suppository 650 mg  650 mg Rectal Q6H PRN    heparin (porcine) injection 5,000 Units  5,000 Units SubCUTAneous 3 times per day       Labs:    Recent Results (from the past 24 hour(s))   Comprehensive Metabolic Panel    Collection Time: 03/20/24  2:45 AM   Result Value Ref Range    Sodium 144 136 - 145 mmol/L    Potassium 3.2 (L) 3.5 - 5.5 mmol/L    Chloride 114 (H) 100 - 111 mmol/L    CO2 23 21 - 32 mmol/L    Anion Gap 7 3.0 - 18 mmol/L    Glucose 105 (H) 74 - 99 mg/dL    BUN 16 7.0 - 18 MG/DL    Creatinine 1.23 0.6 - 1.3 MG/DL    Bun/Cre Ratio 13 12 - 20      Est, Glom Filt Rate >60 >60 ml/min/1.73m2    Calcium 8.8 8.5 - 10.1 MG/DL    Total Bilirubin 0.7 0.2 - 1.0 MG/DL     (H) 16 - 61 U/L    AST 60 (H) 10 - 38 U/L    Alk Phosphatase 90 45 - 117 U/L    Total Protein 6.2 (L) 6.4 - 8.2 g/dL    Albumin 2.8 (L) 3.4 - 5.0 g/dL    Globulin 3.4 2.0 - 4.0 g/dL    Albumin/Globulin Ratio 0.8 0.8 - 1.7     CBC with Auto Differential    Collection Time: 03/20/24  2:45 AM   Result Value Ref Range    WBC 13.2 4.6 - 13.2 K/uL    RBC 3.35 (L) 4.35 - 5.65 M/uL    Hemoglobin 10.3 (L) 13.0 - 16.0 g/dL    Hematocrit 31.1 (L) 36.0 - 48.0 %    MCV 92.8 78.0 - 100.0 FL    MCH 30.7 24.0 - 34.0 PG    MCHC 33.1 31.0 - 37.0 g/dL    RDW 14.0 11.6 - 14.5 %    Platelets 400 135 - 420 K/uL    MPV 10.1 9.2 - 11.8 FL    Nucleated RBCs 0.0 0  WBC    nRBC 0.00 0.00 - 0.01 K/uL    Neutrophils % 76 (H) 40 - 73 %    Lymphocytes % 14 (L) 21 - 52 %    Monocytes % 5 3 - 10 %    Eosinophils % 2 0 - 5 %    Basophils % 1 0 - 2 %    Immature Granulocytes 2 (H) 0.0 - 0.5 %    Neutrophils Absolute 10.0 (H) 1.8 - 8.0 K/UL    Lymphocytes Absolute 1.9 
required to keep patient stable.     The patient's current medical and behavioral conditions that warrant the use intervention include Poor safety judgment:  Poor balance or unsteady gait attempting to get out of bed without assistance and Pulling out devices:  Pulling lines.  Restraint or seclusion will be discontinued at the earliest possible time, regardless of the scheduled expiration of the order.    Based on my evaluation, restraints will be continued: Yes      LULU time 20 minutes    Joy Oliva PA-C  03/12/24  Pulmonary, Critical Care Medicine  [facility]          Attending Note:  I saw and evaluated the patient, performing all elements of service personally.  I discussed the findings, assessment and plan with the PA and agree with the PA's findings and plan as documented in the PA's note.  All edits and changes made above or are mentioned in my attending note which was independently assessed as well as written.     Total of 112 min critical care time spent at bedside (personally) during the course of care providing evaluation,management and care decisions and ordering appropriate treatment related to critical care problems exclusive of procedures.  I performed the substantive portion of this split shared encounter (greater than 50% of time)  The reason for providing this level of medical care for this critically ill patient was due a critical illness that impaired one or more vital organ systems such that there was a high probability of imminent or life threatening deterioration in the patients condition. This care involved high complexity decision making to assess, manipulate, and support vital system functions, to treat this degree vital organ system failure and to prevent further life threatening deterioration of the patient’s condition.  This time was independent of other practitioners.     38-year-old male with a past medical history of hypertension, dyslipidemia,

## 2024-03-26 NOTE — CARE COORDINATION
D/C order noted for today. Orders reviewed. No needs identified at this time.  SW ordered pt shower chair, via [employer] in [place]  to be delivered to pt home.  Patient mother to transport home.SW/CM remains available if needed.       Bonifacio Ahuja BSW  Case Management

## 2024-03-26 NOTE — CONSULTS
[facility]  (A Division of [facility])      Consultation Note      Date of Admission: 3/11/2024    Date of Note: 3/13/2024      Reason for Referral: sepsis  Referring Physician: Dr. YAZMIN Purvis from this admission:   3/11 blood culture 4 out of 4 staph species, mec-A gene present  3/11 urine culture: No growth to date    Current Antimicrobials:    Prior Antimicrobials:  Meropenem 3/12 to present  Vancomycin 3/11to present Zosyn 3/11        Assessment:         Gram-positive cocci bacteremia: Most likely related to  process  Severe sepsis: Fevers as well as subsequent hypothermia, leukocytosis, tachycardia.  Not requiring pressors.  Right hydronephrosis: Prior renal stent from 2/29 noted 3/11 CT scan-suspect malfunction of stent              -IR consulted for nephrostomy tube  Acute renal failure: With postobstructive diuresis  Acute respiratory failure: Remains intubated; history of obstructive sleep apnea  Electrolyte abnormalities  Rhabdomyolysis: Elevated CPK    Plan:   Continue vancomycin for GPC bacteremia  Continue meropenem for now while cultures are pending     Follow-up 3/11 blood cultures until final  Pending echocardiogram to rule out endocarditis although this is less likely     Trend CBC, BMP, lactic acid, procalcitonin    For right-sided nephrostomy tube today     Discussed with ICU team on rounds    DO Oskar [facility]  [address], [address], [place] [address]  Office: [phone], Ext [phone]      Lines / Catheters:  Peripheral, Nolne, right nephrostomy  ET    HPI:  38-year-old male with a past medical history of hypertension, dyslipidemia, nephrolithiasis status post ureteral stent placement on 2/29 who presented to [facility] ER brought in by EMS for altered mental status reported by family. Family reported that patient was found down with unknown downtime. In the ER patient was 
      No diagnosis found.    ASSESSMENT:   #1 Hypothermia, AMS, hypertension, normal Heart rate   #2 S/p 1st stage URS 2/29 with Dr. Olmstead at [facility]   #3 Mild Right hydro with stent in place, WBC 21k, pyuria on UA   #4 + opiate UDS   #5 Lenny to Cre 5 with normal contralateral kidney, baseline 1.0, with copious urine output undergoing hydration       Very unusual presentation  Hypothermic, hypertensive, renal failure    Nolen placed, making copious urine. Normal heart rate and BP stable.  Lactate only 2.2, procal pending.  WBC 21k.      CT with some mild hydro with stent in place - only placed 10 days ago after URS with DR. Olmstead, complete stent occlusion less likely     Would favor observation on Abx but given severity of illness, contacted IR so that we are prepared for Nephrostomy tube placement with ANY degree of clinical decline     Plan is close observation and contact DIRECTLY IR for Right nephrostomy tube placement if clinically worsens.     PERSONALLY discussed with IR attending on call who reassured their availability if needed for neph tube         I have seen and examined this patient independently, I reviewed pertinent labs and imaging, and I agree with the assessing provider's assessment and plan as outlined above, with ammendments as follows:        Trevor Bolden MD  [facility] [place]  Pager [phone]          Chief Complaint   Patient presents with    Altered Mental Status     Responds to name       HISTORY OF PRESENT ILLNESS:  Harry Lema is a 38 y.o. male who is seen in consultation as referred by Dr. Palumbo  for hydro, LENNY, s/p RT cysto, URS, stent placement 2/29/24.             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 
     Interventional Radiology     Consult received from Dr. Bolden for evaluation of obstructive uropathy and sepsis.     Case and images reviewed by Dr. Jay.     38 year old male with history of nephrolithiasis s/p cystoscopy and right ureteral stent placement 2/29/24. Patient [facility] ER 3/11/24 via EMS for altered mental status. Patient intubated in ER shortly after arrival. ER workup remarkable for hypothermia with temp of 93 F, leukocytosis of 21.7, hemoglobin 11.6, plt 309, Cr 5.09 (baseline Cr 1), K 2.9, lactate 2.2, urine moderate leukocytes with too numerous to count WBCs, and CT abdomen with moderate right sided hydronephrosis with ureteral stent in place and small amount of stone debris in ureter adjacent to stent. Patient will be admitted to ICU. No tachycardia or hypotension.   Patient started on vancomycin and zosyn. Recommend antibiotics and can plan for image guided right PCN placement if worsening of condition.     Contact IR physician on call if worsening of condition overnight.      Full consult note to follow.      Thank you,  JOSÉ MIGUEL Rhoades  [phone]  
    Interventional Radiology Consult Note  Patient: Harry Lema               Sex: male          DOA: 3/11/2024       YOB: 1985      Age:  38 y.o.        LOS:  LOS: 1 day              Assessment   MATILDA  Nephrolithiasis  Rhabdomyolysis    Right PCN placement is indicated to aid in diagnostics and guide further management.    Case and images reviewed by Dr. Jay .    Plan     Image guided right PCN placement 3/12/24 as IR schedule allows    Additional specimen orders per referring team    - NPO after midnight   - Hold SQ heparin prior to procedure  - Labs reviewed -- okay to proceed     Thank you,  JOSÉ MIGUEL Rhoades  [phone]    HPI:     Consult for evaluation of obstructive uropathy received from Dr Bolden and reviewed with Dr. Jay.    Harry Lema is a 38 y.o. male with a PMH of HTN, obestiy, ALE, and nephrolithiasis s/p cystoscopy and right ureteral stent placement 2/29/24 who presented to [facility] on [facility]/11/24 for altered mental status. Patient intubated in ER shortly after arrival due to altered mental status and being unable to protect his airway. ER workup remarkable for hypothermia with temp of 93 F, leukocytosis of 21.7, hemoglobin 11.6, plt 309, Cr 5.09 (baseline Cr 1), K 2.9, lactate 2.2, CPK 5624, urine moderate leukocytes with too numerous to count WBCs, and CT abdomen with moderate right sided hydronephrosis with ureteral stent in place and small amount of stone debris in ureter adjacent to stent. Patient was admitted for further evaluation and management.  Not on blood thinning medication . Today the patient is intubated and sedated. Leukocytosis resolved and CPK improving but Cr remains elevated. Ultrasound shows mild to moderate hydronephrosis. Given concern for persistent hydronephrosis despite stent placement, MATILDA, and UTI; PCN placement indicated for source control and decompression.    The anticipated procedure was discussed in detail including risk of injury, infection, and 
[facility]  Consultation Note    Date of Service:  03/18/24    Historian(s): ICU staff, medical records, the patient himself even though only moderately.  Referral Source: ICU staff.    Chief Complaint   Patient with a history of acute hypoxic and hypercarbic respiratory failure, currently diagnosed with acute toxic versus metabolic encephalopathy of unclear etiology.  The patient has a history of being prescribed with opiates for flank pain, has renal failure which likely cause retention of medication and eventual hypoventilation and AMS.  Currently delusional, showing evidence of confabulation, reasons for which a psychiatric consult was requested.    History of Present Illness     Harry Lema is a 38 y.o. White (non-) male  with a history of polysubstance use disorder, including benzodiazepines and very possibly opioids, who is referred for a psychiatric consultation, as he has become increasingly delusional, believing that police were coming to arrest him, described by nursing staff has having active auditory hallucinations, in addition to concerns being raised about the patient abusing benzodiazepines prior to his admission, and did not need to proceed with with detoxification.  The patient was also taking over-the-counter supplement that may result in withdrawal as indicated by poison control, as it has been described to activate the mu opioid and CARA receptors so the reason for which with abruptly stopping of this OTC supplement, acute withdrawal could happen.  He was in general very difficult to obtain any clear history from the patient himself specifically in regards to any prior problems with chemical dependency and or any specific psychiatric problems that could have been associated to a mood disorder.    Psychiatric Treatment History     Self-injurious behavior/risky thoughts or behaviors (past suicidal ideation/attempt): Denies any prior history of thoughts of 
PSYCH FOLLOW UP NOTE      The patient was seeing him today, the case was discussed with his attending physician Dr. Carias, and the chart was reviewed.  As stated before this is a patient with a history of a mood disorder, worsened by history of substance use disorder, in addition to his describing psychotic like symptoms.  Concerns have been raised due to difficulties that the patient has had [facility] in the [facility] and specifically when he attempted to hang himself last Friday.  The attempt at care when his bedside sitter had been discontinued  since the patient had shown positive improvement.    Current Msexam bed  Is that of a patient who is very comfortably lying down on his bed, denying any actual medical problems at present, however still described himself as being depressed even though.  Specifically he denies any thoughts of harming self or others.  He continues to describe the presence of paranoid thoughts however the same incongruence noted before between the way he responds emotionally to what he described being strong paranoid thoughts about people wanting to harm him, remains present.    Suggestion  1.  Is the above-mentioned incongruency between his description of psychotic symptoms and a way he presented himself from a mood point of view, that may cause questions regarding how truthful he is with his suffering the paranoid thoughts, and other psychotic like symptoms that he has described before.  2.  The patient continues to be compliant with his prescription for duloxetine 60 mg a day, and a low-dose of haloperidol 2 mg twice a day.  With his showing no evidence of adverse effects with the prescription for the antipsychotic, a dose increase will follow.  3.  The patient does describe he is wanting to be discharged, since he knows that he will do better and will feel better at home with his parents.  4.  I suggested to his attending physician for the assigned inpatient  to please call 
PSYCHIATRIC FOLLOW UP NOTE    Attention is invited to the dictated psychiatric consultation report from 3/18/24 which is self-explanatory.  As a stated, patient with a rather complicated history of drug use, admitted after suffering with an acute hypoxic and hypercapnic respiratory failure, developing an acute toxic versus metabolic encephalopathy associated to same.  The patient has become delusional showing evidence of confabulation, this precipitated the psychiatric consultation.    Current Mental Status  Is that of a patient who appears to be calm, appeared to have a decrease of his delusional thoughts the same as showing a decrease of his auditory hallucinations this morning however appearing to have an increase of his withdrawal symptoms with a CIWA score between 16-20 this morning and apparently greater than 20 last night requiring a dose of 4 mg of lorazepam intravenously, and 1 hour later requiring a 3 mg dose of lorazepam also intravenously due to his CIWA scores.    Suggestions  1.  Continued the detoxification process following the CIWA protocol  2.  It is possible that the CIWA scores are being affected by the patient encephalopathic changes with his becoming agitated.  3.  Will discuss with the attending physician and nursing staff.    MARTHA Whitney MD. QUEENIE      
PSYCHIATRIC FOLLOW UP NOTE    Attention is invited to the original psychiatric consultation report, the same as the multiple follow-up notes which are self-explanatory.  In addition, specifically attention is invited to the patient's attending hospitalist note from yesterday in addition to the psychiatric follow-up note by the undersigned this being self-explanatory.  During today's session the patient was accompanied by his mother who was very positive about the patient being able to return back home to be followed as an outpatient.  During the session, the patient's mother expressed concerns about the patient's depression and potential for acting out behaviors increasing if he is to remain in the facility.  She is very agreeable for the patient to require further outpatient treatment, with several suggestions provided by the undersigned including follow-up with [facility] program, in addition to his being considered for further outpatient treatment with [facility] ([facility]), the latter being the choice of the undersigned since he will be able to be offered with case management through them.    Current MS exam  Is that of a patient who is coherent showing quality of continuity of associations without evidence of any psychotic symptoms or cognitive impairment being noticeably present.  During today's meeting, the patient denies any thoughts of harming self and or others, and continues to promise that he will be faithful with his outpatient treatment program.  He also was able to promise that if anything changes, he will be very glad to come back to the emergency room to be evaluated.    Suggestions  1.  The patient continues to deny at present any psychotic symptoms, the same as continues to denied any thoughts of harming self or others.  During the session he agrees to come back to the emergency room to be reevaluated if any changes occur in that respect.  2.  The patient was 
Psychiatry Follow Up    The patient was seen today in his room where his sister as well as the safety partner was present.  The case was discussed with his attending physician Dr. Carias.      As I approached the patient for the interview, I found him laying in bed comfortably with his sister and the safety partner by his bedside.  Patient presented as depressed and acknowledged feeling like that.  He acknowledged that he tried to kill himself by wrapping the cord around his neck day before yesterday.  He mentioned that has he has struggled with mood symptoms including depression as well as suicidal thoughts off and on for a long time and has been on multiple antidepressants including duloxetine, Celexa, Effexor, Paxil, and Wellbutrin.  None of these medications held and Wellbutrin in fact made him more suicidal.  He was guarded during the interview and it appears that he has had psychotic symptoms including auditory hallucinations yesterday.  Patient reports continued suicidal thoughts and hence would need continuation of the safety partner in his room.    I would recommend continuation of psychotropics as he has been receiving, without change for now.    Will reassess for admission to behavioral health unit on Monday.      
drug use disorder being for obvious reasons concerning.  4.  If he requires the administration of medications as needed, a low-dose of haloperidol could be effective also.  5.  Please maintain a sitter at all times.  6.  I will be asking my coverage to see the patient tomorrow, and give the same concerns remain regarding the patient having psychotic symptoms and also potential for self-harm, a transfer to the behavioral unit may be indicated.    MARTHA Whitney MD. QUEENIE  
thyromegaly  CVS: S1S2 heard,  no rub  RS: + air entry b/l  Abd: Soft  Neuro: intubated  Extrm:no cyanosis, clubbing   Skin: no visible  Rash  Musculoskeletal: No gross joints or bone deformities     Procedures/imaging: see electronic medical records for all procedures, Xrays and details which were not copied into this note but were reviewed prior to creation of Plan      Impression & Plan:   IMPRESSION:   Acute kidney injury.  Appears to be related to postobstructive diuresis which could have started around the time when he had the stent placement.  The hydronephrosis is still persistent but does not appear to be a new finding.  Another possibility is sepsis related to urinary source and septic ATN/MATILDA.  Urinalysis shows ketones apart from infection.    Question of rhabdomyolysis with high CK.  Hydronephrosis status post stent placement  Hypertension  Obesity  Obstructive sleep apnea   PLAN:    Aggressive hydration  Monitor output  Monitor daily labs  Record input  Keep MAP>65  Continue with ramirez catheter  Abx per primary team  If doesn't turn around then dialysis is indicated.  Trend CK.         SHADIA WOOD MD  March 12, 2024  [facility]  Office [phone]

## 2024-03-26 NOTE — DISCHARGE INSTRUCTIONS
Discharge Instructions    Patient: Harry Lema MRN: [id number]  CSN: [id number]    YOB: 1985  Age: 38 y.o.  Sex: male    DOA: 3/11/2024       DIET:  cardiac diet    ACTIVITY: activity as tolerated      ADDITIONAL INFORMATION: If you experience any of the following symptoms but not limited to Fever, chills, nausea, vomiting, diarrhea, change in mentation, falling, bleeding, shortness of breath, chest pain, please call your primary care physician or return to the emergency room if you cannot get hold of your doctor:     FOLLOW UP CARE:   Follow-up Information     Jumana Lam DO. Schedule an appointment as soon as possible for a   visit in 1 week(s).    Specialty: Family Medicine  Contact information:  [address]  [place] [facility] [address] [phone]             Trevor Bolden MD. Schedule an appointment as soon as possible for a   visit in 1 week(s).    Specialty: Urology  Contact information:  [address]  [place] [place] [address] [phone]      Psychiatrist in 1 week                      Mark Macedo MD  3/26/2024 1:34 PM

## 2024-03-26 NOTE — PLAN OF CARE
Problem: Discharge Planning  Goal: Discharge to home or other facility with appropriate resources  3/12/2024 1332 by Donald Ybarra RN  Outcome: Progressing  3/12/2024 0652 by Summer Monroe RN  Outcome: Progressing     Problem: Safety - Medical Restraint  Goal: Remains free of injury from restraints (Restraint for Interference with Medical Device)  Description: INTERVENTIONS:  1. Determine that other, less restrictive measures have been tried or would not be effective before applying the restraint  2. Evaluate the patient's condition at the time of restraint application  3. Inform patient/family regarding the reason for restraint  4. Q2H: Monitor safety, psychosocial status, comfort, nutrition and hydration  3/12/2024 1332 by Donald Ybarra RN  Outcome: Progressing  Flowsheets  Taken 3/12/2024 1200  Remains free of injury from restraints (restraint for interference with medical device): Every 2 hours: Monitor safety, psychosocial status, comfort, nutrition and hydration  Taken 3/12/2024 1000  Remains free of injury from restraints (restraint for interference with medical device): Every 2 hours: Monitor safety, psychosocial status, comfort, nutrition and hydration  Taken 3/12/2024 0800  Remains free of injury from restraints (restraint for interference with medical device): Every 2 hours: Monitor safety, psychosocial status, comfort, nutrition and hydration  3/12/2024 0652 by Summer Monroe RN  Outcome: Progressing  Flowsheets  Taken 3/12/2024 0600 by Summer Monroe RN  Remains free of injury from restraints (restraint for interference with medical device): Every 2 hours: Monitor safety, psychosocial status, comfort, nutrition and hydration  Taken 3/12/2024 0400 by Summer Monroe RN  Remains free of injury from restraints (restraint for interference with medical device): Every 2 hours: Monitor safety, psychosocial status, comfort, nutrition and hydration  Taken 3/12/2024 0200 by Summer Monroe 
  Problem: Discharge Planning  Goal: Discharge to home or other facility with appropriate resources  3/19/2024 2223 by Ketty Corral RN  Outcome: Progressing  Flowsheets (Taken 3/19/2024 1945)  Discharge to home or other facility with appropriate resources: Identify barriers to discharge with patient and caregiver  3/19/2024 1643 by Donald Ybarra RN  Outcome: Progressing     Problem: Safety - Medical Restraint  Goal: Remains free of injury from restraints (Restraint for Interference with Medical Device)  Description: INTERVENTIONS:  1. Determine that other, less restrictive measures have been tried or would not be effective before applying the restraint  2. Evaluate the patient's condition at the time of restraint application  3. Inform patient/family regarding the reason for restraint  4. Q2H: Monitor safety, psychosocial status, comfort, nutrition and hydration  3/19/2024 2223 by Ketty Corral RN  Outcome: Progressing  Flowsheets (Taken 3/19/2024 2039)  Remains free of injury from restraints (restraint for interference with medical device):   Determine that other, less restrictive measures have been tried or would not be effective before applying the restraint   Evaluate the patient's condition at the time of restraint application   Inform patient/family regarding the reason for restraint   Every 2 hours: Monitor safety, psychosocial status, comfort, nutrition and hydration  3/19/2024 1643 by Donald Ybarra RN  Outcome: Progressing     Problem: Safety - Adult  Goal: Free from fall injury  3/19/2024 2223 by Ketty Corral RN  Outcome: Progressing  3/19/2024 1643 by Donald Ybarra RN  Outcome: Progressing     Problem: Nutrition Deficit:  Goal: Optimize nutritional status  3/19/2024 2223 by Ketty Corral RN  Outcome: Progressing  3/19/2024 1643 by Donald Ybarra RN  Outcome: Progressing     Problem: Occupational Therapy - Adult  Goal: By Discharge: Performs self-care activities at highest level of 
  Problem: Discharge Planning  Goal: Discharge to home or other facility with appropriate resources  3/22/2024 0041 by Ketty Corral RN  Outcome: Progressing  3/21/2024 1608 by Tova Chowdary RN  Outcome: Progressing  3/21/2024 1606 by Tova Chowdary RN  Outcome: Progressing  3/21/2024 1604 by Tova Chowdary RN  Outcome: Progressing  3/21/2024 1559 by Tova Chowdary RN  Outcome: Progressing     Problem: Safety - Adult  Goal: Free from fall injury  3/22/2024 0041 by Ketty Corral RN  Outcome: Progressing  3/21/2024 1608 by Tova Chowdary RN  Outcome: Progressing  3/21/2024 1606 by Tova Chowdary RN  Outcome: Progressing  3/21/2024 1604 by Tova Chowdary RN  Outcome: Progressing  3/21/2024 1559 by Tova Chowdary RN  Outcome: Progressing     Problem: Nutrition Deficit:  Goal: Optimize nutritional status  3/22/2024 0041 by Ketty Corral RN  Outcome: Progressing  3/21/2024 1608 by Tova Chowdary RN  Outcome: Progressing  3/21/2024 1606 by Tova Chowdary RN  Outcome: Progressing  3/21/2024 1604 by Tova Chowdary RN  Outcome: Progressing  3/21/2024 1559 by Tova Chowdary RN  Outcome: Progressing     Problem: Pain  Goal: Verbalizes/displays adequate comfort level or baseline comfort level  3/22/2024 0041 by Ketty Corral RN  Outcome: Progressing  3/21/2024 1608 by Tova Chowdary RN  Outcome: Progressing  3/21/2024 1606 by Tova Chowdary RN  Outcome: Progressing  3/21/2024 1604 by Tova Chowdary RN  Outcome: Progressing  3/21/2024 1559 by Tova Chowdary RN  Outcome: Progressing     Problem: Confusion  Goal: Confusion, delirium, dementia, or psychosis is improved or at baseline  Description: INTERVENTIONS:  1. Assess for possible contributors to thought disturbance, including medications, impaired vision or hearing, underlying metabolic abnormalities, dehydration, psychiatric diagnoses, and notify attending LIP  2. [place] high risk fall 
  Problem: Discharge Planning  Goal: Discharge to home or other facility with appropriate resources  3/23/2024 1209 by Roseanna Hampton RN  Outcome: Progressing  Flowsheets (Taken 3/23/2024 0415)  Discharge to home or other facility with appropriate resources:   Identify barriers to discharge with patient and caregiver   Arrange for needed discharge resources and transportation as appropriate   Identify discharge learning needs (meds, wound care, etc)  3/23/2024 0023 by Ketty Corral RN  Outcome: Progressing  Flowsheets (Taken 3/22/2024 1918)  Discharge to home or other facility with appropriate resources: Identify barriers to discharge with patient and caregiver     Problem: Safety - Adult  Goal: Free from fall injury  3/23/2024 1209 by Roseanna Hampton RN  Outcome: Progressing  3/23/2024 0023 by Ketty Corral RN  Outcome: Progressing     Problem: Nutrition Deficit:  Goal: Optimize nutritional status  3/23/2024 1209 by Roseanna Hampton RN  Outcome: Progressing  3/23/2024 0023 by Ketty Corral RN  Outcome: Progressing     Problem: Pain  Goal: Verbalizes/displays adequate comfort level or baseline comfort level  3/23/2024 1209 by Roseanna Hampton RN  Outcome: Progressing  3/23/2024 0023 by Ketty Corrla RN  Outcome: Progressing     Problem: Confusion  Goal: Confusion, delirium, dementia, or psychosis is improved or at baseline  3/23/2024 1209 by Roseanna Hampton RN  Outcome: Progressing  3/23/2024 0023 by Ketty Corral RN  Outcome: Progressing     Problem: ABCDS Injury Assessment  Goal: Absence of physical injury  3/23/2024 1209 by Roseanna Hampton RN  Outcome: Progressing  3/23/2024 0023 by Ketty Corral RN  Outcome: Progressing     Problem: Self Harm/Suicidality  Goal: Will have no self-injury during hospital stay  3/23/2024 1209 by Roseanna Hampton RN  Outcome: Progressing  3/23/2024 0023 by Ketty Corral RN  Outcome: Progressing     
  Problem: Discharge Planning  Goal: Discharge to home or other facility with appropriate resources  3/24/2024 0011 by Luciana Fuller RN  Outcome: Progressing  3/23/2024 1209 by Roseanna Hampton RN  Outcome: Progressing     Problem: Safety - Adult  Goal: Free from fall injury  3/24/2024 0011 by Luciana Fuller RN  Outcome: Progressing  3/23/2024 1209 by Roseanna Hampton RN  Outcome: Progressing     Problem: Nutrition Deficit:  Goal: Optimize nutritional status  3/24/2024 0011 by Luciana Fuller RN  Outcome: Progressing  3/23/2024 1209 by Roseanna Hampton RN  Outcome: Progressing     Problem: Pain  Goal: Verbalizes/displays adequate comfort level or baseline comfort level  3/24/2024 0011 by Luciana Fuller RN  Outcome: Progressing  3/23/2024 1209 by Roseanna Hampton RN  Outcome: Progressing     
  Problem: Discharge Planning  Goal: Discharge to home or other facility with appropriate resources  Outcome: Adequate for Discharge  Flowsheets (Taken 3/26/2024 0747)  Discharge to home or other facility with appropriate resources:   Identify barriers to discharge with patient and caregiver   Arrange for needed discharge resources and transportation as appropriate     Problem: Safety - Adult  Goal: Free from fall injury  Outcome: Adequate for Discharge     Problem: Nutrition Deficit:  Goal: Optimize nutritional status  Outcome: Adequate for Discharge     Problem: Pain  Goal: Verbalizes/displays adequate comfort level or baseline comfort level  Outcome: Adequate for Discharge  Flowsheets (Taken 3/26/2024 0747)  Verbalizes/displays adequate comfort level or baseline comfort level:   Encourage patient to monitor pain and request assistance   Assess pain using appropriate pain scale     Problem: Confusion  Goal: Confusion, delirium, dementia, or psychosis is improved or at baseline  Description: INTERVENTIONS:  1. Assess for possible contributors to thought disturbance, including medications, impaired vision or hearing, underlying metabolic abnormalities, dehydration, psychiatric diagnoses, and notify attending LIP  2. [place] high risk fall precautions, as indicated  3. Provide frequent short contacts to provide reality reorientation, refocusing and direction  4. Decrease environmental stimuli, including noise as appropriate  5. Monitor and intervene to maintain adequate nutrition, hydration, elimination, sleep and activity  6. If unable to ensure safety without constant attention obtain sitter and review sitter guidelines with assigned personnel  7. Initiate Psychosocial CNS and Spiritual Care consult, as indicated  Outcome: Adequate for Discharge     Problem: ABCDS Injury Assessment  Goal: Absence of physical injury  Outcome: Adequate for Discharge     Problem: Self Harm/Suicidality  Goal: Will have no self-injury 
  Problem: Discharge Planning  Goal: Discharge to home or other facility with appropriate resources  Outcome: Progressing     Problem: Safety - Adult  Goal: Free from fall injury  Outcome: Progressing     
  Problem: Discharge Planning  Goal: Discharge to home or other facility with appropriate resources  Outcome: Progressing     Problem: Safety - Adult  Goal: Free from fall injury  Outcome: Progressing     Problem: Pain  Goal: Verbalizes/displays adequate comfort level or baseline comfort level  Outcome: Progressing  Flowsheets (Taken 3/24/2024 1383 by Laura Joyner RN)  Verbalizes/displays adequate comfort level or baseline comfort level:   Encourage patient to monitor pain and request assistance   Assess pain using appropriate pain scale     
  Problem: Discharge Planning  Goal: Discharge to home or other facility with appropriate resources  Outcome: Progressing     Problem: Safety - Medical Restraint  Goal: Remains free of injury from restraints (Restraint for Interference with Medical Device)  Description: INTERVENTIONS:  1. Determine that other, less restrictive measures have been tried or would not be effective before applying the restraint  2. Evaluate the patient's condition at the time of restraint application  3. Inform patient/family regarding the reason for restraint  4. Q2H: Monitor safety, psychosocial status, comfort, nutrition and hydration  Outcome: Progressing  Flowsheets  Taken 3/13/2024 0200 by Summer Monroe RN  Remains free of injury from restraints (restraint for interference with medical device):   Every 2 hours: Monitor safety, psychosocial status, comfort, nutrition and hydration   Determine that other, less restrictive measures have been tried or would not be effective before applying the restraint  Taken 3/13/2024 0000 by Summer Monroe RN  Remains free of injury from restraints (restraint for interference with medical device):   Every 2 hours: Monitor safety, psychosocial status, comfort, nutrition and hydration   Determine that other, less restrictive measures have been tried or would not be effective before applying the restraint  Taken 3/12/2024 1800 by Donald Ybarra RN  Remains free of injury from restraints (restraint for interference with medical device): Determine that other, less restrictive measures have been tried or would not be effective before applying the restraint  Taken 3/12/2024 1600 by Donald Ybarra RN  Remains free of injury from restraints (restraint for interference with medical device): Every 2 hours: Monitor safety, psychosocial status, comfort, nutrition and hydration  Taken 3/12/2024 1400 by Donald Ybarra RN  Remains free of injury from restraints (restraint for interference with medical 
  Problem: Discharge Planning  Goal: Discharge to home or other facility with appropriate resources  Outcome: Progressing  Flowsheets (Taken 3/20/2024 1930)  Discharge to home or other facility with appropriate resources:   Identify barriers to discharge with patient and caregiver   Arrange for needed discharge resources and transportation as appropriate   Identify discharge learning needs (meds, wound care, etc)     Problem: Safety - Adult  Goal: Free from fall injury  Outcome: Progressing     Problem: Nutrition Deficit:  Goal: Optimize nutritional status  Outcome: Progressing     Problem: Pain  Goal: Verbalizes/displays adequate comfort level or baseline comfort level  Outcome: Progressing     Problem: Confusion  Goal: Confusion, delirium, dementia, or psychosis is improved or at baseline  Description: INTERVENTIONS:  1. Assess for possible contributors to thought disturbance, including medications, impaired vision or hearing, underlying metabolic abnormalities, dehydration, psychiatric diagnoses, and notify attending LIP  2. [employer] [place] high risk fall precautions, as indicated  3. Provide frequent short contacts to provide reality reorientation, refocusing and direction  4. Decrease environmental stimuli, including noise as appropriate  5. Monitor and intervene to maintain adequate nutrition, hydration, elimination, sleep and activity  6. If unable to ensure safety without constant attention obtain sitter and review sitter guidelines with assigned personnel  7. Initiate Psychosocial CNS and Spiritual Care consult, as indicated  Outcome: Progressing     Problem: ABCDS Injury Assessment  Goal: Absence of physical injury  Outcome: Progressing       
  Problem: Occupational Therapy - Adult  Goal: By Discharge: Performs self-care activities at highest level of function for planned discharge setting.  See evaluation for individualized goals.  Description: Occupational Therapy Goals:  Initiated 3/16/2024 to be met within 7-10 days.    1.  Patient will perform grooming with supervision/set-up while standing at the sink for > 2 min with Good balance.   2.  Patient will perform bathing with supervision/set-up.  3.  Patient will perform lower body dressing with supervision/set-up.  4.  Patient will perform toilet transfers with supervision/set-up.  5.  Patient will perform all aspects of toileting with supervision/set-up.  6.  Patient will participate in upper extremity therapeutic exercise/activities with supervision/set-up for 8 minutes to improve endurance and UB strength needed for ADLs    7.  Patient will utilize energy conservation techniques during functional activities with verbal cues.    PLOF: Pt lives with family, independent.    Outcome: Adequate for Discharge   OCCUPATIONAL THERAPY RE-EVALUATION/DISCHARGE    Patient: Harry Lema (38 y.o. male)  Date: 3/24/2024  Primary Diagnosis: Sepsis ([facility]) [A41.9]  Acute renal failure (ARF) ([facility]) [N17.9]       Precautions: Fall Risk, General Precautions, Contact Precautions, Seizure, Other (comment) (SI w/ sitter),  ,  ,  ,  ,  ,  ,      ASSESSMENT AND RECOMMENDATIONS:  Bed mobility: Mod I supine <-> sit edge of bed. LB dress: Mod I doff and don slipper socks using cross leg technique. Toilet transfer: Mod I w/o AD, good balance, pt declined need to void. Pt reports performing sponge bath with bath wipes early this day with Mod I. Pt seated on edge of bed, dad and sitter present.    Maximum therapeutic gains met at current level of care and patient will be discharged from occupational therapy at this time.    Further Equipment Recommendations for Discharge: shower chair    At this time and based on an AM-PAC score, 
  Problem: Occupational Therapy - Adult  Goal: By Discharge: Performs self-care activities at highest level of function for planned discharge setting.  See evaluation for individualized goals.  Description: Occupational Therapy Goals:  Initiated 3/16/2024 to be met within 7-10 days.    1.  Patient will perform grooming with supervision/set-up while standing at the sink for > 2 min with Good balance.   2.  Patient will perform bathing with supervision/set-up.  3.  Patient will perform lower body dressing with supervision/set-up.  4.  Patient will perform toilet transfers with supervision/set-up.  5.  Patient will perform all aspects of toileting with supervision/set-up.  6.  Patient will participate in upper extremity therapeutic exercise/activities with supervision/set-up for 8 minutes to improve endurance and UB strength needed for ADLs    7.  Patient will utilize energy conservation techniques during functional activities with verbal cues.    PLOF: Pt lives with family, independent.    Outcome: Progressing   OCCUPATIONAL THERAPY TREATMENT    Patient: Harry Lema (38 y.o. male)  Date: 3/22/2024  Diagnosis: Sepsis ([facility]) [A41.9]  Acute renal failure (ARF) ([facility]) [N17.9] Sepsis ([facility])      Precautions: Fall Risk, General Precautions, Contact Precautions, Seizure (Suicide precautions)    Chart, occupational therapy assessment, plan of care, and goals were reviewed.  ASSESSMENT:  Pt seen for am ADL retraining w/set-up, seated EOB. (See functional levels below) Pt requires increase time for task completion. Pt tolerates standing for jerry-care w/CGA. Reviewed energy conservation techniques w/ADLs and benefits of shower chair. Pt c/o fatigue and request return to supine. Encouraged OOB/EOB for all meals.     Progression toward goals:  []          Improving appropriately and progressing toward goals  [x]          Improving slowly and progressing toward goals  []          Not making progress toward goals and plan of care 
  Problem: Pain  Goal: Verbalizes/displays adequate comfort level or baseline comfort level  Outcome: Not Progressing     Problem: Discharge Planning  Goal: Discharge to home or other facility with appropriate resources  Outcome: Progressing     Problem: Safety - Adult  Goal: Free from fall injury  Outcome: Progressing     Problem: Nutrition Deficit:  Goal: Optimize nutritional status  Outcome: Progressing     Problem: Physical Therapy - Adult  Goal: By Discharge: Performs mobility at highest level of function for planned discharge setting.  See evaluation for individualized goals.  Description: Initiated  3/16/2024  to be met within 7-10 days.  Re-evaluation 3/22/24    1.  Patient will move from supine to sit and sit to supine , scoot up and down, and roll side to side in bed with modified independence.    2.  Patient will transfer from bed to chair and chair to bed with independence.  3.  Patient will perform sit to stand with independence.  4.  Patient will ambulate with independence for 250 feet.  5.  Patient will ascend/descend 4 stairs with b/l handrail(s) with modified independence.    PLOF: Pt lives with Parents in two story home with 3 FARHAT with handrail.  Pt is independent with all mobility, no AD.   3/22/2024 1042 by Viky Velázquez PT  Outcome: Progressing     Problem: Occupational Therapy - Adult  Goal: By Discharge: Performs self-care activities at highest level of function for planned discharge setting.  See evaluation for individualized goals.  Description: Occupational Therapy Goals:  Initiated 3/16/2024 to be met within 7-10 days.    1.  Patient will perform grooming with supervision/set-up while standing at the sink for > 2 min with Good balance.   2.  Patient will perform bathing with supervision/set-up.  3.  Patient will perform lower body dressing with supervision/set-up.  4.  Patient will perform toilet transfers with supervision/set-up.  5.  Patient will perform all aspects of toileting with 
  Problem: Physical Therapy - Adult  Goal: By Discharge: Performs mobility at highest level of function for planned discharge setting.  See evaluation for individualized goals.  Description: Initiated  3/16/2024  to be met within 7-10 days.    1.  Patient will move from supine to sit and sit to supine , scoot up and down, and roll side to side in bed with modified independence.    2.  Patient will transfer from bed to chair and chair to bed with independence.  3.  Patient will perform sit to stand with independence.  4.  Patient will ambulate with independence for 250 feet.  5.  Patient will ascend/descend 4 stairs with b/l handrail(s) with modified independence.    PLOF: Pt lives with Parents in two story home with 3 FARHAT with handrail.  Pt is independent with all mobility, no AD.   Outcome: Progressing   PHYSICAL THERAPY EVALUATION    Patient: Harry Lema (38 y.o. male)  Date: 3/16/2024  Primary Diagnosis: Sepsis ([facility]) [A41.9]  Acute renal failure (ARF) ([facility]) [N17.9]       Precautions: Fall Risk, General Precautions,      ASSESSMENT :  Pt resting in bed upon entering room for PT evaluation, NAD and agreeable to session.  Co-tx with OT due to medical complexity.  Pt is A&Ox1.  Upon starting eval pt stated \"do you hear that noise, the police are coming for me about medication and barricading outside my room.\" Ensured pt this was not the case and encouraged to continue with mobility testing and sitting up on the EOB.  Pt required [place] and max verbal cues to perform supine to sit transfer, CGA to scoot to EOB.  Pt with good sitting balance, gross b/l LE strength WFL.  Pt performed STS with CGA and chair placed in front of him for balance if needed.  Pt denies any dizziness in standing, returns seated for rest break.  Performs one more STS with CGA and takes 2-3 side steps towards HOB with CGA and shuffling gait.  Pt is somewhat impulsive with tasks and is unaware of need for any assistance at this time.  Pt 
  Problem: Physical Therapy - Adult  Goal: By Discharge: Performs mobility at highest level of function for planned discharge setting.  See evaluation for individualized goals.  Description: Initiated  3/16/2024  to be met within 7-10 days.    1.  Patient will move from supine to sit and sit to supine , scoot up and down, and roll side to side in bed with modified independence.    2.  Patient will transfer from bed to chair and chair to bed with independence.  3.  Patient will perform sit to stand with independence.  4.  Patient will ambulate with independence for 250 feet.  5.  Patient will ascend/descend 4 stairs with b/l handrail(s) with modified independence.    PLOF: Pt lives with Parents in two story home with 3 FARHAT with handrail.  Pt is independent with all mobility, no AD.   Outcome: Progressing   PHYSICAL THERAPY RE-EVALUATION    Patient: Harry Lema (38 y.o. male)  Date: 3/22/2024  Primary Diagnosis: Sepsis ([facility]) [A41.9]  Acute renal failure (ARF) ([facility]) [N17.9]       Precautions: Fall Risk, General Precautions, Contact Precautions, Seizure (Suicide precautions),  ,  ,  ,  ,  ,  ,      ASSESSMENT :  Pt cleared by nursing for participation in PT re-evaluation s/p prolonged admission and weekly reassessment, goals reviewed and still appropriate. Pt received in bed in NAD and with flat affect. Pt mod I with bed mobility with use of bed rails to EOB. Pt demonstrates good seated balance and completes MMT without LOB. Pt 4+/5 BLE for knee extension, hip flexion, ankle DF/PF with intact symmetrical sensation. Pt completes TE at EOB without incident and minimal rest breaks required. Pt sit to stand ind, demonstrates good standing balance with no c/o SOB, dizziness, or lightheadedness. Pt able to ambulate within room with supervision no AD with wide [place]. Pt returns to recliner with BLE elevated. Pt will continue to benefit from skilled acute care PT during admission to address deficits in strength to progress to 
  Problem: Physical Therapy - Adult  Goal: By Discharge: Performs mobility at highest level of function for planned discharge setting.  See evaluation for individualized goals.  Description: Initiated  3/16/2024  to be met within 7-10 days.  Re-evaluation 3/22/24    1.  Patient will move from supine to sit and sit to supine , scoot up and down, and roll side to side in bed with modified independence.    2.  Patient will transfer from bed to chair and chair to bed with independence.  3.  Patient will perform sit to stand with independence.  4.  Patient will ambulate with independence for 250 feet.  5.  Patient will ascend/descend 4 stairs with b/l handrail(s) with modified independence.    PLOF: Pt lives with Parents in two story home with 3 FARHAT with handrail.  Pt is independent with all mobility, no AD.   Outcome: Adequate for Discharge     PHYSICAL THERAPY TREATMENT/DISCHARGE    Patient: Harry Lema (38 y.o. male)  Date: 3/24/2024  Diagnosis: Sepsis (Formerly [facility]) [A41.9]  Acute renal failure (ARF) (Formerly [facility]) [N17.9] Sepsis (Formerly [facility])      Precautions: Fall Risk, General Precautions, Contact Precautions, Seizure (Suicide precautions),  ,  ,  ,  ,  ,  ,      ASSESSMENT:  Pt ambulated in hallway with supervision, no LOB and no symptomatic complaints reported. Pt enjoyed sitting in the chair near the window. Pt educated on benefits of sun exposure for mood regulation and circadian rhythm reset, pt verbalized understanding yet declined opening blinds in his room. Pt encouraged to ambulate with family and/or nursing later today. Pt completed standing therex in room. Pt no longer requires skilled PT services, has met plateau. Please re-consult if appropriate, recommend continued exercises to improve conditioning and strength.       PLAN:  Maximum therapeutic gains met at current level of care and patient will be discharged from physical therapy at this time.  Rationale for discharge:  []     Goals Achieved  [x]     Plateau Reached  []  
  Problem: SLP Adult - Impaired Swallowing  Goal: By Discharge: Advance to least restrictive diet without signs or symptoms of aspiration for planned discharge setting.  See evaluation for individualized goals.  Description: Patient will:  1. Tolerate PO trials with 0 s/s overt distress in 4/5 trials- met  2. Utilize compensatory swallow strategies/maneuvers (decrease bite/sip, size/rate, alt. liq/sol) with min cues in 4/5 trials- met    Rec:     Regular diet with thin liquids  Aspiration precautions  HOB >45 during po intake, remain >30 for 30-45 minutes after po   Small bites/sips; alternate liquid/solid with slow feeding rate   Oral care TID  Meds per pt preference      Outcome: Completed   SPEECH LANGUAGE PATHOLOGY DYSPHAGIA TREATMENT & DISCHARGE    Patient: Harry Lema (38 y.o. male)  Date: 3/21/2024  Diagnosis: Sepsis (formerly [facility]) [A41.9]  Acute renal failure (ARF) (formerly [facility]) [N17.9] Sepsis (formerly [facility])      Precautions: Aspiration  PLOF: As per H&P     ASSESSMENT:  Pt was seen sitting on EOB for follow up dysphagia management. Pt tolerated reg solid, puree, and thin liquids +/- straw consecutive swallows without any overt s/sx of aspiration. Mastication was appropriate with timely a-p transit. Positive oral clearance observed across all trials. Pt safe for continuation of reg solid diet with thin liquids, aspiration precautions, oral care TID, and meds as tolerated. No further skilled SLP services are indicated at this time. Please re-consult if needed.    Progression toward goals:  [x]         Goals met/approximated  []         Not making progress/Not appropriate - will d/c POC     PLAN:  Recommendations and Planned Interventions:  Maximum therapeutic gains met; safest, least restrictive diet achieved. D/C ST intervention at this time.      SUBJECTIVE:   Patient stated “My IV keeps beeping.”    OBJECTIVE:   Daily Assessment:  Baseline Assessment  Communication Observation: Functional  Follows Directions: Simple  Current 
  Problem: Safety - Adult  Goal: Free from fall injury  3/21/2024 1608 by Tova Chowdary RN  Outcome: Progressing  3/21/2024 1606 by Tova Chowdary RN  Outcome: Progressing  3/21/2024 1604 by Tova Chowdary RN  Outcome: Progressing  3/21/2024 1559 by Tova Chowdary RN  Outcome: Progressing  3/21/2024 0405 by Alexandre Quevedo RN  Outcome: Progressing     Problem: Pain  Goal: Verbalizes/displays adequate comfort level or baseline comfort level  3/21/2024 1608 by Tova Chowdary RN  Outcome: Progressing  3/21/2024 1606 by Tova Chowdary RN  Outcome: Progressing  3/21/2024 1604 by Tova Chowdary RN  Outcome: Progressing  3/21/2024 1559 by Tova Chowdary RN  Outcome: Progressing  3/21/2024 0405 by Alexandre Quevedo RN  Outcome: Progressing     Problem: Confusion  Goal: Confusion, delirium, dementia, or psychosis is improved or at baseline  Description: INTERVENTIONS:  1. Assess for possible contributors to thought disturbance, including medications, impaired vision or hearing, underlying metabolic abnormalities, dehydration, psychiatric diagnoses, and notify attending LIP  2. [place] high risk fall precautions, as indicated  3. Provide frequent short contacts to provide reality reorientation, refocusing and direction  4. Decrease environmental stimuli, including noise as appropriate  5. Monitor and intervene to maintain adequate nutrition, hydration, elimination, sleep and activity  6. If unable to ensure safety without constant attention obtain sitter and review sitter guidelines with assigned personnel  7. Initiate Psychosocial CNS and Spiritual Care consult, as indicated  3/21/2024 1608 by Tova Chowdary RN  Outcome: Progressing  3/21/2024 1606 by Tova Chowdary RN  Outcome: Progressing  3/21/2024 1604 by Tova Chowdary RN  Outcome: Progressing  3/21/2024 1559 by Tova Chowdary RN  Outcome: Progressing  3/21/2024 0405 by Alexandre Quevedo RN  Outcome: Progressing   
Decrease environmental stimuli, including noise as appropriate  5. Monitor and intervene to maintain adequate nutrition, hydration, elimination, sleep and activity  6. If unable to ensure safety without constant attention obtain sitter and review sitter guidelines with assigned personnel  7. Initiate Psychosocial CNS and Spiritual Care consult, as indicated  3/25/2024 0245 by Luciana Fuller RN  Outcome: Progressing  Flowsheets (Taken 3/24/2024 2045)  Effect of thought disturbance (confusion, delirium, dementia, or psychosis) are managed with adequate functional status:   Assess for contributors to thought disturbance, including medications, impaired vision or hearing, underlying metabolic abnormalities, dehydration, psychiatric diagnoses, notify LIP   [place] high risk fall precautions, as indicated   Decrease environmental stimuli, including noise as appropriate   Monitor and intervene to maintain adequate nutrition, hydration, elimination, sleep and activity     Problem: ABCDS Injury Assessment  Goal: Absence of physical injury  3/25/2024 1310 by Laura Joyner RN  Outcome: Progressing  3/25/2024 0245 by Luciana Fuller RN  Outcome: Progressing     Problem: Self Harm/Suicidality  Goal: Will have no self-injury during hospital stay  Description: INTERVENTIONS:  1.  Ensure constant observer at bedside with Q15M safety checks  2.  Maintain a safe environment  3.  Secure patient belongings  4.  Ensure family/visitors adhere to safety recommendations  5.  Ensure safety tray has been added to patient's diet order  6.  Every shift and PRN: Re-assess suicidal risk via Frequent Screener    3/25/2024 0245 by Luciana Fuller RN  Outcome: Progressing  Flowsheets (Taken 3/24/2024 2045)  Will have no self-injury during hospital stay: Maintain a safe environment     
EENMT
NAIMA Wheeler  Remains free of injury from restraints (restraint for interference with medical device): Determine that other, less restrictive measures have been tried or would not be effective before applying the restraint  Taken 3/11/2024 1908 by Diane Reesndiz RN  Remains free of injury from restraints (restraint for interference with medical device): Determine that other, less restrictive measures have been tried or would not be effective before applying the restraint     
appropriately and progressing toward goals  []      Improving slowly and progressing toward goals  []      Not making progress toward goals and plan of care will be adjusted     PLAN:  Patient continues to benefit from skilled intervention to address the above impairments.  Continue treatment per established plan of care.    Further Equipment Recommendations for Discharge:  none    AMPAC: AM-PAC Inpatient Mobility Raw Score : 20        Current research shows that an AM-PAC score of 18 (14 without stairs) or greater is associated with a discharge to the patient's home setting. Based on an AM-PAC score and their current functional mobility deficits, it is recommended that the patient have 2-3 sessions per week of Physical Therapy at d/c to increase the patient's independence.    This AMPAC score should be considered in conjunction with interdisciplinary team recommendations to determine the most appropriate discharge setting. Patient's social support, diagnosis, medical stability, and prior level of function should also be taken into consideration.     SUBJECTIVE:   Patient stated, \"I'm feeling better.\"    OBJECTIVE DATA SUMMARY:   Critical Behavior:  Orientation  Overall Orientation Status: Within Functional Limits  Orientation Level: Oriented X4  Cognition  Overall Cognitive Status: Exceptions  Arousal/Alertness: Delayed responses to stimuli  Following Commands: Follows multistep commands with increased time;Follows multistep commands with repitition  Attention Span: Attends with cues to redirect  Memory: Decreased recall of recent events  Safety Judgement: Decreased awareness of need for assistance  Problem Solving: Assistance required to generate solutions;Assistance required to implement solutions  Insights: Decreased awareness of deficits  Initiation: Requires cues for some  Sequencing: Requires cues for some    Functional Mobility Training:  Bed Mobility:  Bed Mobility Training  Bed Mobility Training: 
been tried or would not be effective before applying the restraint  Taken 3/13/2024 0400 by Summer Monroe RN  Remains free of injury from restraints (restraint for interference with medical device): Every 2 hours: Monitor safety, psychosocial status, comfort, nutrition and hydration  3/13/2024 0349 by Summer Monroe RN  Outcome: Progressing  Flowsheets  Taken 3/13/2024 0200 by Summer Monroe RN  Remains free of injury from restraints (restraint for interference with medical device):   Every 2 hours: Monitor safety, psychosocial status, comfort, nutrition and hydration   Determine that other, less restrictive measures have been tried or would not be effective before applying the restraint  Taken 3/13/2024 0026 by Jessica Thompson RN  Remains free of injury from restraints (restraint for interference with medical device):   Determine that other, less restrictive measures have been tried or would not be effective before applying the restraint   Evaluate the patient's condition at the time of restraint application   Inform patient/family regarding the reason for restraint   Every 2 hours: Monitor safety, psychosocial status, comfort, nutrition and hydration  Taken 3/13/2024 0000 by Summer Monroe RN  Remains free of injury from restraints (restraint for interference with medical device):   Every 2 hours: Monitor safety, psychosocial status, comfort, nutrition and hydration   Determine that other, less restrictive measures have been tried or would not be effective before applying the restraint  Taken 3/12/2024 2200 by Jessica Thompson RN  Remains free of injury from restraints (restraint for interference with medical device):   Determine that other, less restrictive measures have been tried or would not be effective before applying the restraint   Evaluate the patient's condition at the time of restraint application   Inform patient/family regarding the reason for restraint   Every 2 hours: Monitor safety, 
care will be adjusted     PLAN:  Patient continues to benefit from skilled intervention to address the above impairments.  Continue treatment per established plan of care.    Further Equipment Recommendations for Discharge: possibly shower chair and assistive device as determined by PT    AMPAC: AM-PAC Inpatient Daily Activity Raw Score: 15    Current research shows that an AM-PAC score of 17 or less is not associated with a discharge to the patient's home setting. Based on an AM-PAC score and their current ADL deficits; it is recommended that the patient have 5-7 sessions per week of Occupational Therapy at d/c to increase the patient's independence.  Currently, this patient demonstrates the potential endurance, and/or tolerance for 3 hours of therapy each day at d/c.      This AMPA score should be considered in conjunction with interdisciplinary team recommendations to determine the most appropriate discharge setting. Patient's social support, diagnosis, medical stability, and prior level of function should also be taken into consideration.     SUBJECTIVE:   Patient stated, \"I'm a professional gambler.\"    OBJECTIVE DATA SUMMARY:   Cognitive/Behavioral Status:  Orientation  Orientation Level: Oriented X4    Functional Mobility and Transfers for ADLs:   Bed Mobility:  Bed Mobility Training  Bed Mobility Training: Yes  Supine to Sit: Stand-by assistance  Scooting: Stand-by assistance     Transfers:  Transfer Training  Transfer Training: Yes  Sit to Stand: Contact-guard assistance  Stand Pivot Transfers: Contact-guard assistance  Bed to Chair: Contact-guard assistance    Balance:  Balance  Sitting: Intact  Standing: Impaired  Standing - Static: Fair (fair plus)  Standing - Dynamic: Fair    ADL Intervention:  Grooming: Stand by assistance  Face/hand hygiene and oral care, seated EOB    UE Therapeutic Exercises:   AROM BUE shoulder flexion, rolls backwards 10x  AROM BUE scapula retraction/protraction 10x  AROM BUE 
or baseline comfort level  Outcome: Progressing     
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 Leahy 7/11     Stenosis, cervical spine 07/11/2017    Ulcer of the stomach and intestine      Past Surgical History:   Procedure Laterality Date    BACK SURGERY  7/7/2011    lamenectomy & diskectomy.    IR NEPHROSTOMY PERCUTANEOUS RIGHT  3/12/2024    IR NEPHROSTOMY PERCUTANEOUS RIGHT 3/12/2024 Robert Jay MD [facility] RAD ANGIO IR    TONSILLECTOMY AND ADENOIDECTOMY      UROLOGICAL SURGERY  02/29/2024    CYSTOSCOPY,RIGHT URETEROSCOPY RETROGRADE LASER LITHOTRIPSY, STENT PLACEMENT;  Juliet Olmstead MD     Prior Level of Function/Home Situation:  Social/Functional History  Lives With: Parent  Type of Home: House  Home Layout: Performs ADL's on one level, Two level  Home Access: Stairs to enter with rails  Entrance Stairs - Number of Steps: 3  Entrance Stairs - Rails: Both  Bathroom Shower/Tub: Walk-in shower  Bathroom Toilet: Standard  Bathroom Equipment: Grab bars in shower, Built-in shower seat  Bathroom Accessibility: Not accessible  Home Equipment: None  Receives Help From: Family  ADL Assistance: Independent  Homemaking Assistance: Independent  Ambulation Assistance: Independent  Transfer Assistance: Independent  Active : Yes  Mode of Transportation: Car  Occupation: Unemployed    Daily Assessment:  Baseline Assessment  Respiratory Status: O2 via nasual cannula  O2 Device: Nasal cannula  Liters of Oxygen: 2 L  Communication Observation: Functional  Follows Directions: Simple  Current Diet : NPO  Current Liquid Diet : NPO  Dentition: Adequate, Some missing teeth  Patient Positioning: Upright in bed  Baseline Vocal Quality: Weak  Volitional Cough: Weak    Orientation:  Person    Oral Assessment:  Oral Motor 
Injury Assessment  Goal: Absence of physical injury  3/23/2024 0023 by Ketty Corral, RN  Outcome: Progressing  3/22/2024 1523 by Astrid Martines RN  Outcome: Progressing     Problem: Self Harm/Suicidality  Goal: Will have no self-injury during hospital stay  Description: INTERVENTIONS:  1.  Ensure constant observer at bedside with Q15M safety checks  2.  Maintain a safe environment  3.  Secure patient belongings  4.  Ensure family/visitors adhere to safety recommendations  5.  Ensure safety tray has been added to patient's diet order  6.  Every shift and PRN: Re-assess suicidal risk via Frequent Screener    3/23/2024 0023 by Ketty Corral, RN  Outcome: Progressing  3/22/2024 1523 by Astrid Martines RN  Outcome: Progressing     Problem: Pain  Goal: Verbalizes/displays adequate comfort level or baseline comfort level  3/23/2024 0023 by Ketty Corral, RN  Outcome: Progressing  3/22/2024 1523 by Astrid Martines, RN  Outcome: Not Progressing     
Yes  Overall Level of Assistance: Contact-guard assistance  Sit to Stand: Contact-guard assistance  Stand to Sit: Contact-guard assistance    ADL Assessment:   Feeding: Stand by assistance  Grooming: Stand by assistance  UE Bathing: Stand by assistance  LE Bathing: Contact guard assistance  UE Dressing: Stand by assistance  LE Dressing: Contact guard assistance  Toileting: Contact guard assistance     ADL Intervention:  Self-feeding and grooming at EOB with SBA due to tremors bilateral  LB dressing- donned socks with CGA with partial crossed LE technique and bending fwd method    Pain:  Pain level pre-treatment: 0-3/10   Pain level post-treatment: 0/10     Activity Tolerance:   Activity Tolerance: Treatment limited secondary to decreased cognition  Please refer to the flowsheet for vital signs taken during this treatment.    After treatment:   [] Patient left in no apparent distress sitting up in chair  [x] Patient left in no apparent distress in bed  [x] Call bell left within reach  [x] Nursing and MD notified  [] Caregiver present  [x] Bed alarm activated    COMMUNICATION/EDUCATION:   Patient Education  Education Given To: Patient  Education Provided: Role of Therapy;Plan of Care;ADL Adaptive Strategies;Transfer Training;Energy Conservation;Fall Prevention Strategies;Orientation  Education Method: Demonstration;Verbal;Teach Back  Barriers to Learning: Cognition  Education Outcome: Continued education needed    Thank you for this referral.  Pepito Beebe OTR/L  Minutes: 17    Eval Complexity: Decision Making: Low Complexity

## 2024-03-27 ENCOUNTER — HOME HEALTH ADMISSION (OUTPATIENT)
Age: 39
End: 2024-03-27
Payer: COMMERCIAL

## 2024-03-27 NOTE — HOME CARE
Spoke with patient's mother, Juliana. They decline PT OT. Would appreciate a nurse med teaching and drain care teaching. Verified address information and explained services.   Verbalized understanding.     Susan Tejeda RN, BSN  [facility]

## 2024-03-28 PROBLEM — A41.9 SEPSIS WITH ACUTE RENAL FAILURE AND TUBULAR NECROSIS WITHOUT SEPTIC SHOCK (HCC): Status: ACTIVE | Noted: 2024-03-28

## 2024-03-28 PROBLEM — N17.0 SEPSIS WITH ACUTE RENAL FAILURE AND TUBULAR NECROSIS WITHOUT SEPTIC SHOCK (HCC): Status: ACTIVE | Noted: 2024-03-28

## 2024-03-28 PROBLEM — R65.20 SEPSIS WITH ACUTE RENAL FAILURE AND TUBULAR NECROSIS WITHOUT SEPTIC SHOCK (HCC): Status: ACTIVE | Noted: 2024-03-28

## 2024-03-28 PROBLEM — M62.82 NON-TRAUMATIC RHABDOMYOLYSIS: Status: ACTIVE | Noted: 2024-03-28

## 2024-03-29 ENCOUNTER — HOME CARE VISIT (OUTPATIENT)
Age: 39
End: 2024-03-29

## 2024-03-30 ENCOUNTER — HOME CARE VISIT (OUTPATIENT)
Age: 39
End: 2024-03-30
Payer: COMMERCIAL

## 2024-03-30 PROCEDURE — G0299 HHS/HOSPICE OF RN EA 15 MIN: HCPCS

## 2024-04-01 VITALS
TEMPERATURE: 98 F | DIASTOLIC BLOOD PRESSURE: 84 MMHG | SYSTOLIC BLOOD PRESSURE: 140 MMHG | HEART RATE: 89 BPM | RESPIRATION RATE: 20 BRPM | OXYGEN SATURATION: 98 %

## 2024-04-01 ASSESSMENT — ENCOUNTER SYMPTOMS
DYSPNEA ACTIVITY LEVEL: AFTER AMBULATING LESS THAN 10 FT
ABDOMINAL PAIN: 1
CONSTIPATION: 1
HEMOPTYSIS: 0

## 2024-04-02 NOTE — HOME HEALTH
Skilled Reason for admission/summary of clinical condition: Admitted care of this 38-year old male due to recent hospitalization for ureteral calculus, now has left flank nephrostomy.  Patient is having constipation issues, unsure of last time he had bowel movement.  Abdomen soft, non-distended, patient is passing gas, hypoactive bowel sounds in all 4 quadrants.  Encouraged patient to increase fluid intake, maybe try warm liquids, continue laxative advised during hospital stay and to include more fiber in diet.  Advised patient that if he does not have a bowel movement by the evening, to try an enema and to continue to monitor passing gas, if this ceases and abdominal pain/bloating/tenderness, to go to ER-patient and caregivers verbalized understanding.  Nephrostomy site shows no signs of infection-draining clear yellow urine, patient still voiding as well without difficulty.  Patient sweats a lot, mother monitoring nephrostomy dressing closely and has changed/reinforced as needed already.  This nurse reinforced this visit.  Patient probably would benefit from physical therapy, but not at this time as patient does not feel up to it.  Advised mother/patient that if he improves and wishes to have physical therapy, please to let the agency clinicians know to request order-verbalized understanding.    This patient is homebound for the following reasons Requires considerable and taxing effort to leave the home , Requires the assistance of 1 or more persons to leave the home  and Only leaves the home for medical reasons or [employer] services and are infrequent and of short duration for other reasons .    Caregiver: mother and father.  Caregiver assists with ADL's, medications, meal preparation, transportation to MD appointments.    Medications reconciled and all medications are available in the home this visit.      The following education was provided regarding medications: side effects, dosages, purposes,

## 2024-04-04 ENCOUNTER — HOME CARE VISIT (OUTPATIENT)
Age: 39
End: 2024-04-04
Payer: COMMERCIAL

## 2024-04-04 PROCEDURE — G0300 HHS/HOSPICE OF LPN EA 15 MIN: HCPCS

## 2024-04-06 ENCOUNTER — HOME CARE VISIT (OUTPATIENT)
Age: 39
End: 2024-04-06
Payer: COMMERCIAL

## 2024-04-06 VITALS
SYSTOLIC BLOOD PRESSURE: 148 MMHG | OXYGEN SATURATION: 97 % | TEMPERATURE: 97.1 F | HEART RATE: 76 BPM | RESPIRATION RATE: 18 BRPM | DIASTOLIC BLOOD PRESSURE: 96 MMHG

## 2024-04-06 PROCEDURE — G0299 HHS/HOSPICE OF RN EA 15 MIN: HCPCS

## 2024-04-06 ASSESSMENT — ENCOUNTER SYMPTOMS
STOOL DESCRIPTION: FORMED
PAIN LOCATION - PAIN QUALITY: ACHE

## 2024-04-06 NOTE — HOME HEALTH
and nephrostomy tube care, education    Patient and/or caregiver notified and agrees to changes in the Plan of Care: Yes.     The following discharge planning was discussed with the pt/caregiver: Patient will be discharged to the community under the care of his PCP when all goals met.

## 2024-04-08 VITALS
HEART RATE: 69 BPM | DIASTOLIC BLOOD PRESSURE: 63 MMHG | TEMPERATURE: 97.7 F | RESPIRATION RATE: 16 BRPM | SYSTOLIC BLOOD PRESSURE: 142 MMHG | OXYGEN SATURATION: 96 %

## 2024-04-08 ASSESSMENT — ENCOUNTER SYMPTOMS: PAIN LOCATION - PAIN QUALITY: UNK

## 2024-04-08 NOTE — HOME HEALTH
SKILLED REASON for visit:ureteral calculus      CAREGIVER INVOLVEMENT:family is available as needed for assistance with iadls, adls, meal prep, medication management, taking to md appointments. MEDICATIONS: reviewed and all medications are available in the home this visit. Patient denies any medication changes at this time of assessment.     EDUCATION PROVIDED: regarding medications, medication interactions, and look alike medications (specify): reviewed side effects, purposes, dosage, frequencies. High risk medication teaching regarding anticoagulants, hyperglycemic agents or opiod narcotics performed (specify) na Medications are effective at this time. SUPPLIES: by type and quantity ordered/delivered this visit include: n/a     PATIENT EDUCATION ON THIS VISIT:  The patient was reviewed to monitor the urine for quantity, color, and smell. The patient was recommended to keep the drainage bag below the level of the kidney when up or lying down.      PATIENT LEVEL OF UNDERSTANDING: patient/cg has a partial understanding of education that was provided at this time by engaging in all education provided and is able to verbalize understanding, pt denies any questions or concerns at this time.   SKILLED CARE PERFORMED THIS VISIT-outer dressing change to drain -transparent film     PATIENT RESPONSE TO PROCEDURE PERFORMED: patient tolerated procedure with no signs of discomfort, grimacing or pain, no complications or concerns noted. Agency Progress toward goals: goals/teaching reviewed. patient is progressing towards goals at this time, Patient's Progress towards personal goals: pt reporting they are progressing towards their goals at this time.   Home exercise program: HEP deep breathing exercises Continued need for the following skills:  teaching Plan for next visit: nrsg routinePatient and/or caregiver notified and agrees to changes in the Plan Care NA The following discharge planning was discussed with the pt/caregiver:

## 2024-04-09 ENCOUNTER — HOME CARE VISIT (OUTPATIENT)
Age: 39
End: 2024-04-09
Payer: COMMERCIAL

## 2024-04-09 VITALS
SYSTOLIC BLOOD PRESSURE: 140 MMHG | HEART RATE: 65 BPM | OXYGEN SATURATION: 98 % | DIASTOLIC BLOOD PRESSURE: 102 MMHG | TEMPERATURE: 97.6 F | RESPIRATION RATE: 17 BRPM

## 2024-04-09 PROCEDURE — G0299 HHS/HOSPICE OF RN EA 15 MIN: HCPCS

## 2024-04-09 ASSESSMENT — ENCOUNTER SYMPTOMS: CONSTIPATION: 1

## 2024-04-09 NOTE — HOME HEALTH
Skilled reason for visit: Medication management, disease education, nephrostomy tube management.    Caregiver involvement: Patients mother is present and active in his care.    Medications reviewed and medications are available in the home this visit, except for the new order for Bactrim which is being picked up by mother later today.     The following education was provided regarding medications: Patient encouraged to continue taking meds as ordered. Patient was diagnosed with a UTI yesterday at MD appointment and will start on Batrim DS BID for 10 days. Patient informed to call his doctor right away if exhibiting any of these signs: rash; red, swollen, blistered, or peeling skin; red or irritated eyes; sores in mouth, throat, nose, or eyes; fever, chills, or sore throat; cough that is new or worse; feeling very tired or weak; any bruising or bleeding; or signs of liver problems like dark urine, feeling tired, not hungry, upset stomach or stomach pain, light-colored stools, throwing up, or yellow skin or eyes.     MD notified of any discrepancies/look a-like medications/medication interactions: N/A    Medications are effective at this time.      Home health supplies by type and quantity ordered/delivered this visit include: 2 bottles of wound cleansers and 1 pack of 4x4 gauze delivered during visit.     Patient education provided this visit: During today's visit patient noted of having elevated BP. BP was rechecked approx. 10 minutes later and still elvated. Call placed to  office.  spoke with Steph, office nurse who stated  was on lunch but will notify her of reading when she returns. Steph verbalized she will call patient if new orders are given. Patient and mother informed on the importance of monitoring BP and asked to recheck in 30min. If no call back from MD and BP remains elevated, please go to ER. Patient verbalized constipation and has had it since hospital DC 2 weeks ago.  notified Steph at

## 2024-04-10 ASSESSMENT — ENCOUNTER SYMPTOMS: PAIN LOCATION - PAIN QUALITY: ACHE

## 2024-04-11 ENCOUNTER — HOME CARE VISIT (OUTPATIENT)
Age: 39
End: 2024-04-11
Payer: COMMERCIAL

## 2024-04-11 PROCEDURE — G0300 HHS/HOSPICE OF LPN EA 15 MIN: HCPCS

## 2024-04-15 VITALS
HEART RATE: 60 BPM | DIASTOLIC BLOOD PRESSURE: 62 MMHG | TEMPERATURE: 97.7 F | SYSTOLIC BLOOD PRESSURE: 121 MMHG | OXYGEN SATURATION: 99 % | RESPIRATION RATE: 16 BRPM

## 2024-04-15 NOTE — HOME HEALTH
SKILLED REASON for visit:  see primary dx       CAREGIVER INVOLVEMENT:family is available as needed for assistance with iadls, adls, meal prep, medication management, taking to md appointments. MEDICATIONS: reviewed and all medications are available in the home this visit. Patient denies any medication changes at this time of assessment.     EDUCATION PROVIDED: regarding medications, medication interactions, and look alike medications (specify): reviewed side effects, purposes, dosage, frequencies. High risk medication teaching regarding anticoagulants, hyperglycemic agents or opiod narcotics performed (specify) na Medications are effective at this time. SUPPLIES: by type and quantity ordered/delivered this visit include: n/a     PATIENT EDUCATION ON THIS VISIT: on infection -kidney infection may include: Fever, chills, nausea and / or vomiting, Pain higher in the back ( around the upper sides and waist ).      PATIENT LEVEL OF UNDERSTANDING: patient/cg has a partial understanding of education that was provided at this time by engaging in all education provided and is able to verbalize understanding, pt denies any questions or concerns at this time.   SKILLED CARE PERFORMED THIS VISIT- drain care per orders    PATIENT RESPONSE TO PROCEDURE PERFORMED: patient tolerated procedure with no signs of discomfort, grimacing or pain, no complications or concerns noted. Agency Progress toward goals: goals/teaching reviewed. patient is progressing towards goals at this time, Patient's Progress towards personal goals: pt reporting they are progressing towards their goals at this time.   Home exercise program: HEP deep breathing exercises Continued need for the following skills:  teaching Plan for next visit: nrsg routinePatient and/or caregiver notified and agrees to changes in the Plan Care NA The following discharge planning was discussed with the pt/caregiver: Patient will be discharged once education has completed, patient

## 2024-04-18 ENCOUNTER — APPOINTMENT (OUTPATIENT)
Facility: HOSPITAL | Age: 39
End: 2024-04-18
Payer: COMMERCIAL

## 2024-04-18 ENCOUNTER — HOSPITAL ENCOUNTER (EMERGENCY)
Facility: HOSPITAL | Age: 39
Discharge: HOME | End: 2024-04-19
Attending: STUDENT IN AN ORGANIZED HEALTH CARE EDUCATION/TRAINING PROGRAM
Payer: COMMERCIAL

## 2024-04-18 ENCOUNTER — HOME CARE VISIT (OUTPATIENT)
Age: 39
End: 2024-04-18
Payer: COMMERCIAL

## 2024-04-18 VITALS
DIASTOLIC BLOOD PRESSURE: 83 MMHG | SYSTOLIC BLOOD PRESSURE: 128 MMHG | RESPIRATION RATE: 16 BRPM | WEIGHT: 270 LBS | BODY MASS INDEX: 34.65 KG/M2 | HEIGHT: 74 IN | HEART RATE: 67 BPM | OXYGEN SATURATION: 98 % | TEMPERATURE: 98.2 F

## 2024-04-18 DIAGNOSIS — N20.0 KIDNEY STONE: ICD-10-CM

## 2024-04-18 DIAGNOSIS — N39.0 ACUTE URINARY TRACT INFECTION: Primary | ICD-10-CM

## 2024-04-18 LAB
ALBUMIN SERPL-MCNC: 3.7 G/DL (ref 3.4–5)
ALBUMIN/GLOB SERPL: 1 (ref 0.8–1.7)
ALP SERPL-CCNC: 135 U/L (ref 45–117)
ALT SERPL-CCNC: 42 U/L (ref 16–61)
ANION GAP SERPL CALC-SCNC: 3 MMOL/L (ref 3–18)
ANION GAP SERPL CALC-SCNC: 4 MMOL/L (ref 3–18)
APPEARANCE UR: ABNORMAL
AST SERPL-CCNC: 17 U/L (ref 10–38)
BACTERIA URNS QL MICRO: ABNORMAL /HPF
BASOPHILS # BLD: 0.1 K/UL (ref 0–0.1)
BASOPHILS NFR BLD: 1 % (ref 0–2)
BILIRUB SERPL-MCNC: 0.3 MG/DL (ref 0.2–1)
BILIRUB UR QL: ABNORMAL
BUN SERPL-MCNC: 21 MG/DL (ref 7–18)
BUN SERPL-MCNC: 22 MG/DL (ref 7–18)
BUN/CREAT SERPL: 17 (ref 12–20)
BUN/CREAT SERPL: 18 (ref 12–20)
CALCIUM SERPL-MCNC: 9.1 MG/DL (ref 8.5–10.1)
CALCIUM SERPL-MCNC: 9.5 MG/DL (ref 8.5–10.1)
CHLORIDE SERPL-SCNC: 108 MMOL/L (ref 100–111)
CHLORIDE SERPL-SCNC: 109 MMOL/L (ref 100–111)
CO2 SERPL-SCNC: 24 MMOL/L (ref 21–32)
CO2 SERPL-SCNC: 24 MMOL/L (ref 21–32)
COLOR UR: ABNORMAL
CREAT SERPL-MCNC: 1.2 MG/DL (ref 0.6–1.3)
CREAT SERPL-MCNC: 1.32 MG/DL (ref 0.6–1.3)
DIFFERENTIAL METHOD BLD: ABNORMAL
EOSINOPHIL # BLD: 0.2 K/UL (ref 0–0.4)
EOSINOPHIL NFR BLD: 3 % (ref 0–5)
EPITH CASTS URNS QL MICRO: ABNORMAL /LPF (ref 0–5)
ERYTHROCYTE [DISTWIDTH] IN BLOOD BY AUTOMATED COUNT: 14.3 % (ref 11.6–14.5)
GLOBULIN SER CALC-MCNC: 3.7 G/DL (ref 2–4)
GLUCOSE SERPL-MCNC: 102 MG/DL (ref 74–99)
GLUCOSE SERPL-MCNC: 105 MG/DL (ref 74–99)
GLUCOSE UR STRIP.AUTO-MCNC: NEGATIVE MG/DL
HCT VFR BLD AUTO: 37.4 % (ref 36–48)
HGB BLD-MCNC: 12.1 G/DL (ref 13–16)
HGB UR QL STRIP: ABNORMAL
IMM GRANULOCYTES # BLD AUTO: 0 K/UL (ref 0–0.04)
IMM GRANULOCYTES NFR BLD AUTO: 0 % (ref 0–0.5)
KETONES UR QL STRIP.AUTO: NEGATIVE MG/DL
LEUKOCYTE ESTERASE UR QL STRIP.AUTO: ABNORMAL
LIPASE SERPL-CCNC: 56 U/L (ref 13–75)
LYMPHOCYTES # BLD: 2.1 K/UL (ref 0.9–3.6)
LYMPHOCYTES NFR BLD: 28 % (ref 21–52)
MCH RBC QN AUTO: 29.1 PG (ref 24–34)
MCHC RBC AUTO-ENTMCNC: 32.4 G/DL (ref 31–37)
MCV RBC AUTO: 89.9 FL (ref 78–100)
MONOCYTES # BLD: 0.5 K/UL (ref 0.05–1.2)
MONOCYTES NFR BLD: 7 % (ref 3–10)
NEUTS SEG # BLD: 4.4 K/UL (ref 1.8–8)
NEUTS SEG NFR BLD: 60 % (ref 40–73)
NITRITE UR QL STRIP.AUTO: POSITIVE
NRBC # BLD: 0 K/UL (ref 0–0.01)
NRBC BLD-RTO: 0 PER 100 WBC
PH UR STRIP: 5.5 (ref 5–8)
PLATELET # BLD AUTO: 550 K/UL (ref 135–420)
PMV BLD AUTO: 7.9 FL (ref 9.2–11.8)
POTASSIUM SERPL-SCNC: 4.5 MMOL/L (ref 3.5–5.5)
POTASSIUM SERPL-SCNC: 4.6 MMOL/L (ref 3.5–5.5)
PROT SERPL-MCNC: 7.4 G/DL (ref 6.4–8.2)
PROT UR STRIP-MCNC: 300 MG/DL
RBC # BLD AUTO: 4.16 M/UL (ref 4.35–5.65)
RBC #/AREA URNS HPF: ABNORMAL /HPF (ref 0–5)
SODIUM SERPL-SCNC: 135 MMOL/L (ref 136–145)
SODIUM SERPL-SCNC: 137 MMOL/L (ref 136–145)
SP GR UR REFRACTOMETRY: 1.03 (ref 1–1.03)
UROBILINOGEN UR QL STRIP.AUTO: 1 EU/DL (ref 0.2–1)
WBC # BLD AUTO: 7.3 K/UL (ref 4.6–13.2)
WBC URNS QL MICRO: ABNORMAL /HPF (ref 0–4)

## 2024-04-18 PROCEDURE — 99285 EMERGENCY DEPT VISIT HI MDM: CPT

## 2024-04-18 PROCEDURE — 85025 COMPLETE CBC W/AUTO DIFF WBC: CPT

## 2024-04-18 PROCEDURE — 6360000002 HC RX W HCPCS: Performed by: STUDENT IN AN ORGANIZED HEALTH CARE EDUCATION/TRAINING PROGRAM

## 2024-04-18 PROCEDURE — 81001 URINALYSIS AUTO W/SCOPE: CPT

## 2024-04-18 PROCEDURE — 6360000004 HC RX CONTRAST MEDICATION: Performed by: STUDENT IN AN ORGANIZED HEALTH CARE EDUCATION/TRAINING PROGRAM

## 2024-04-18 PROCEDURE — 6370000000 HC RX 637 (ALT 250 FOR IP): Performed by: STUDENT IN AN ORGANIZED HEALTH CARE EDUCATION/TRAINING PROGRAM

## 2024-04-18 PROCEDURE — 96375 TX/PRO/DX INJ NEW DRUG ADDON: CPT

## 2024-04-18 PROCEDURE — 74177 CT ABD & PELVIS W/CONTRAST: CPT

## 2024-04-18 PROCEDURE — 87086 URINE CULTURE/COLONY COUNT: CPT

## 2024-04-18 PROCEDURE — 83690 ASSAY OF LIPASE: CPT

## 2024-04-18 PROCEDURE — 96374 THER/PROPH/DIAG INJ IV PUSH: CPT

## 2024-04-18 PROCEDURE — 2580000003 HC RX 258: Performed by: STUDENT IN AN ORGANIZED HEALTH CARE EDUCATION/TRAINING PROGRAM

## 2024-04-18 PROCEDURE — 80053 COMPREHEN METABOLIC PANEL: CPT

## 2024-04-18 RX ORDER — ACETAMINOPHEN 325 MG/1
650 TABLET ORAL
Status: COMPLETED | OUTPATIENT
Start: 2024-04-18 | End: 2024-04-18

## 2024-04-18 RX ORDER — KETOROLAC TROMETHAMINE 15 MG/ML
15 INJECTION, SOLUTION INTRAMUSCULAR; INTRAVENOUS ONCE
Status: COMPLETED | OUTPATIENT
Start: 2024-04-18 | End: 2024-04-18

## 2024-04-18 RX ORDER — FENTANYL CITRATE 50 UG/ML
50 INJECTION, SOLUTION INTRAMUSCULAR; INTRAVENOUS
Status: COMPLETED | OUTPATIENT
Start: 2024-04-18 | End: 2024-04-18

## 2024-04-18 RX ADMIN — IOPAMIDOL 100 ML: 612 INJECTION, SOLUTION INTRAVENOUS at 17:30

## 2024-04-18 RX ADMIN — ACETAMINOPHEN 325MG 650 MG: 325 TABLET ORAL at 15:55

## 2024-04-18 RX ADMIN — WATER 1000 MG: 1 INJECTION INTRAMUSCULAR; INTRAVENOUS; SUBCUTANEOUS at 16:17

## 2024-04-18 RX ADMIN — KETOROLAC TROMETHAMINE 15 MG: 15 INJECTION, SOLUTION INTRAMUSCULAR; INTRAVENOUS at 20:56

## 2024-04-18 RX ADMIN — FENTANYL CITRATE 50 MCG: 50 INJECTION INTRAMUSCULAR; INTRAVENOUS at 15:52

## 2024-04-18 RX ADMIN — ACETAMINOPHEN 325MG 650 MG: 325 TABLET ORAL at 20:56

## 2024-04-18 ASSESSMENT — PAIN DESCRIPTION - LOCATION
LOCATION: ABDOMEN;FLANK;BACK
LOCATION: ABDOMEN;BACK;GROIN
LOCATION: ABDOMEN;BACK;GROIN

## 2024-04-18 ASSESSMENT — PAIN DESCRIPTION - PAIN TYPE
TYPE: ACUTE PAIN
TYPE: ACUTE PAIN

## 2024-04-18 ASSESSMENT — PAIN SCALES - GENERAL
PAINLEVEL_OUTOF10: 6
PAINLEVEL_OUTOF10: 6
PAINLEVEL_OUTOF10: 7
PAINLEVEL_OUTOF10: 4

## 2024-04-18 ASSESSMENT — PAIN - FUNCTIONAL ASSESSMENT
PAIN_FUNCTIONAL_ASSESSMENT: 0-10
PAIN_FUNCTIONAL_ASSESSMENT: 0-10

## 2024-04-18 ASSESSMENT — PAIN DESCRIPTION - DESCRIPTORS
DESCRIPTORS: SHARP
DESCRIPTORS: SHARP

## 2024-04-18 ASSESSMENT — PAIN DESCRIPTION - FREQUENCY: FREQUENCY: CONTINUOUS

## 2024-04-18 NOTE — ED PROVIDER NOTES
EMERGENCY DEPARTMENT HISTORY AND PHYSICAL EXAM    9:55 AM      Date: 4/18/2024  Patient Name: Harry Lema    History of Presenting Illness     Chief Complaint   Patient presents with    nephrostomy pulled out       History From: Patient    Harry Lema is a 38 y.o. male   HPI  38-year-old male with history of hypoxic respiratory failure requiring intubation extubated on 3/13/2024, nephrolithiasis status post stent placement and nephrostomy tube placed by IR, pneumonia, rhabdomyolysis nursing Notes were all reviewed and agreed with or any disagreements were addressed in the HPI.  Patient states that he noticed that his nephrostomy tube was dislodged today.  States that he is also having abdominal pain in the lower abdomen, mild to moderate, intermittent.  Also noticing hematuria.  When assessing ROS he denies any nausea, vomiting, chest pain, shortness of breath, change in bowel movement, or any other changes.    PCP: Jumana Lam, DO    No current facility-administered medications for this encounter.     Current Outpatient Medications   Medication Sig Dispense Refill    ciprofloxacin (CIPRO) 500 MG tablet Take 1 tablet by mouth 2 times daily for 7 days 14 tablet 0    amoxicillin (AMOXIL) 875 MG tablet 1 tablet Orally with food twice daiy for 10 days      QUEtiapine (SEROQUEL) 50 MG tablet Take 1 tablet by mouth nightly      FLUCONAZOLE PO Take by mouth      fluconazole (DIFLUCAN) 100 MG tablet Take 1 tablet by mouth daily for 7 days 7 tablet 0    sulfamethoxazole-trimethoprim (BACTRIM DS;SEPTRA DS) 800-160 MG per tablet Take 1 tablet by mouth 2 times daily Take one tab by mouth twice daily for 10 days.      cloNIDine (CATAPRES) 0.3 MG/24HR PTWK Place 1 patch onto the skin once a week 4 patch 0    carvedilol (COREG) 12.5 MG tablet Take 1 tablet by mouth 2 times daily (with meals) 60 tablet 3    gabapentin (NEURONTIN) 100 MG capsule Take 3 capsules by mouth 3 times daily.      DULoxetine (CYMBALTA) 30 MG

## 2024-04-18 NOTE — ED TRIAGE NOTES
Nephrostomy tube pulled out overnight while sleeping. Pain to flank, groin and lower stomach.  Pt AOX4, ambulatory.   +MRSA hx to nephrostomy site, per pt, on fluconazole and amoxicillin

## 2024-04-19 LAB
BACTERIA SPEC CULT: NORMAL
CC UR VC: NORMAL
SERVICE CMNT-IMP: NORMAL

## 2024-04-19 RX ORDER — CIPROFLOXACIN 500 MG/1
500 TABLET, FILM COATED ORAL 2 TIMES DAILY
Qty: 14 TABLET | Refills: 0 | Status: SHIPPED | OUTPATIENT
Start: 2024-04-19 | End: 2024-04-26

## 2024-04-19 NOTE — ED NOTES
Pt ambulated from bathroom with steady gait. Updated on plan of care. Pt Ct completed awaiting read.   
Pt updated on plan of care awaiting urology for consult.   
g/dL    Globulin 3.7 2.0 - 4.0 g/dL    Albumin/Globulin Ratio 1.0 0.8 - 1.7     Lipase    Collection Time: 04/18/24  3:35 PM   Result Value Ref Range    Lipase 56 13 - 75 U/L   Urinalysis    Collection Time: 04/18/24  3:35 PM   Result Value Ref Range    Color, UA DARK YELLOW      Appearance CLOUDY      Specific Gravity, UA 1.027 1.005 - 1.030      pH, Urine 5.5 5.0 - 8.0      Protein,  (A) NEG mg/dL    Glucose, UA Negative NEG mg/dL    Ketones, Urine Negative NEG mg/dL    Bilirubin Urine SMALL (A) NEG      Blood, Urine LARGE (A) NEG      Urobilinogen, Urine 1.0 0.2 - 1.0 EU/dL    Nitrite, Urine Positive (A) NEG      Leukocyte Esterase, Urine MODERATE (A) NEG     Urinalysis, Micro    Collection Time: 04/18/24  3:35 PM   Result Value Ref Range    WBC, UA 15 to 20 0 - 4 /hpf    RBC, UA TOO NUMEROUS TO COUNT 0 - 5 /hpf    Epithelial Cells UA 2+ 0 - 5 /lpf    BACTERIA, URINE FEW (A) NEG /hpf   BMP    Collection Time: 04/18/24  8:47 PM   Result Value Ref Range    Sodium 135 (L) 136 - 145 mmol/L    Potassium 4.6 3.5 - 5.5 mmol/L    Chloride 108 100 - 111 mmol/L    CO2 24 21 - 32 mmol/L    Anion Gap 3 3.0 - 18 mmol/L    Glucose 105 (H) 74 - 99 mg/dL    BUN 21 (H) 7.0 - 18 MG/DL    Creatinine 1.20 0.6 - 1.3 MG/DL    Bun/Cre Ratio 18 12 - 20      Est, Glom Filt Rate 80 >60 ml/min/1.73m2    Calcium 9.1 8.5 - 10.1 MG/DL      .  CT ABDOMEN PELVIS W IV CONTRAST Additional Contrast? None   Final Result      1.  No acute findings along the gastrointestinal tract.        2.  Intrarenal stones bilaterally.  No post-obstructive changes.  Right ureteral   stent in place.        D/W urology PA.  There is no obstruction or hydro.  The patient can go home and follow-up as an outpatient.  He does have a nitrite positive UTI and he will be discharged home on ciprofloxacin.  Urology will call the patient for follow-up.

## 2024-04-24 ENCOUNTER — HOME CARE VISIT (OUTPATIENT)
Age: 39
End: 2024-04-24
Payer: COMMERCIAL

## 2024-04-24 VITALS
SYSTOLIC BLOOD PRESSURE: 134 MMHG | DIASTOLIC BLOOD PRESSURE: 76 MMHG | OXYGEN SATURATION: 96 % | HEART RATE: 76 BPM | RESPIRATION RATE: 16 BRPM | TEMPERATURE: 98.1 F

## 2024-04-24 PROCEDURE — G0299 HHS/HOSPICE OF RN EA 15 MIN: HCPCS

## 2024-04-24 ASSESSMENT — ENCOUNTER SYMPTOMS
STOOL DESCRIPTION: SOFT
HEMOPTYSIS: 0
PAIN LOCATION - PAIN QUALITY: SHARP, BURNING
DYSPNEA ACTIVITY LEVEL: AFTER AMBULATING MORE THAN 20 FT
DIARRHEA: 1

## 2024-05-02 ENCOUNTER — HOME CARE VISIT (OUTPATIENT)
Age: 39
End: 2024-05-02
Payer: COMMERCIAL

## 2024-05-02 PROCEDURE — G0300 HHS/HOSPICE OF LPN EA 15 MIN: HCPCS

## 2024-05-03 VITALS
HEART RATE: 73 BPM | RESPIRATION RATE: 18 BRPM | SYSTOLIC BLOOD PRESSURE: 132 MMHG | OXYGEN SATURATION: 97 % | DIASTOLIC BLOOD PRESSURE: 88 MMHG | TEMPERATURE: 98.5 F

## 2024-05-03 ASSESSMENT — ENCOUNTER SYMPTOMS: PAIN LOCATION - PAIN QUALITY: SHARP

## 2024-05-03 NOTE — HOME HEALTH
Skilled reason for visit: Patient education and wound care    Caregiver involvement: Mother    Medications reviewed and all medications are available in the home this visit.  Y  The following education was provided regarding medications:  N/A  MD notified of any discrepancies/look a-like medications/medication interactions: N  Medications are effective at this time.  Y    Home health supplies by type and quantity ordered/delivered this visit include: wound supplies in home    Patient education provided this visit: infection control, wound care    Sharps education provided: n/a    Patient level of understanding of education provided: verbalizes understanding    Patient response to procedure performed:  n/a    Agency Progress toward goals: agency will continue visits as ordered for patient     Patient's Progress towards personal goals: Patient continues agency and provider visits as ordered.    Home exercise program: N/A    Continued need for the following skills: Wound care and education    Plan for next visit: Patient education    Patient and/or caregiver notified and agrees to changes in the Plan of Care: yes     The following discharge planning was discussed with the pt/caregiver:N/A

## 2024-05-08 ENCOUNTER — HOME CARE VISIT (OUTPATIENT)
Age: 39
End: 2024-05-08
Payer: COMMERCIAL

## 2024-05-10 RX ORDER — CARVEDILOL 12.5 MG/1
12.5 TABLET ORAL 2 TIMES DAILY WITH MEALS
Qty: 180 TABLET | Refills: 3 | Status: SHIPPED | OUTPATIENT
Start: 2024-05-10

## 2024-05-11 ENCOUNTER — HOME CARE VISIT (OUTPATIENT)
Age: 39
End: 2024-05-11
Payer: COMMERCIAL

## 2024-05-11 ASSESSMENT — ENCOUNTER SYMPTOMS
PAIN LOCATION - PAIN QUALITY: SHARP TO DULL
DYSPNEA ACTIVITY LEVEL: AFTER AMBULATING MORE THAN 20 FT
HEMOPTYSIS: 0
STOOL DESCRIPTION: FORMED

## 2024-05-11 NOTE — HOME HEALTH
Patient/ mother declined further [facility] Homecare Services. DC assess completed by phone with patient/ mother.  Patient to f/u with urology as previously scheduled.

## 2024-05-22 RX ORDER — CLONIDINE 0.3 MG/24H
1 PATCH, EXTENDED RELEASE TRANSDERMAL WEEKLY
Qty: 12 PATCH | Refills: 3 | Status: SHIPPED | OUTPATIENT
Start: 2024-05-22

## 2024-06-18 ENCOUNTER — HOSPITAL ENCOUNTER (OUTPATIENT)
Facility: HOSPITAL | Age: 39
Discharge: HOME | End: 2024-06-21

## 2024-06-18 ENCOUNTER — HOSPITAL ENCOUNTER (OUTPATIENT)
Facility: HOSPITAL | Age: 39
Discharge: HOME | End: 2024-06-21
Payer: MEDICAID

## 2024-06-18 DIAGNOSIS — N20.0 NEPHROLITHIASIS: ICD-10-CM

## 2024-06-18 LAB
ABO + RH BLD: NORMAL
BLOOD GROUP ANTIBODIES SERPL: NORMAL
LABCORP SPECIMEN COLLECTION: NORMAL
SPECIMEN EXP DATE BLD: NORMAL

## 2024-06-18 PROCEDURE — 86850 RBC ANTIBODY SCREEN: CPT

## 2024-06-18 PROCEDURE — 36415 COLL VENOUS BLD VENIPUNCTURE: CPT

## 2024-06-18 PROCEDURE — 86901 BLOOD TYPING SEROLOGIC RH(D): CPT

## 2024-06-18 PROCEDURE — 99001 SPECIMEN HANDLING PT-LAB: CPT

## 2024-06-18 PROCEDURE — 86900 BLOOD TYPING SEROLOGIC ABO: CPT

## 2024-06-18 NOTE — PROGRESS NOTES
Instructions for your surgery at [facility]      Today's Date:  6/18/2024      Patient's Name:  Harry Lema           Surgery Date:  06/27/2024              Please enter the main entrance of the hospital and check-in at the  located in the lobby. Once checked in at the , you will take the elevators to the second floor, and report to the waiting room on the left. The room will say Procedure Registration.    Do NOT eat or drink anything, including candy, gum, or ice chips after midnight prior to your surgery, unless you have specific instructions from your surgeon or anesthesia provider to do so.  Brush your teeth before coming to the hospital. You may swish with water, but do not swallow.  No smoking/Vaping/E-Cigarettes 24 hours prior to the day of surgery.  No alcohol 24 hours prior to the day of surgery.  No recreational drugs for one week prior to the day of surgery.  Bring Photo ID, Insurance information, and Co-pay if required on day of surgery.  Bring in pertinent legal documents, such as, Medical Power of , DNR, Advance Directive, etc.  Leave all valuables, including money/purse, at home.  Remove all jewelry, including ALL body piercings, nail polish, acrylic nails, and makeup (including mascara); no lotions, powders, deodorant, or perfume/cologne/after shave on the skin.  Follow instruction for Hibiclens washes and CHG wipes from surgeon's office.   Glasses and dentures may be worn to the hospital. They must be removed prior to surgery. Please bring case/container for glasses or dentures.   Contact lenses should not be worn on day of surgery.   Call your doctor's office if symptoms of a cold or illness develop within 24-48 hours prior to your surgery.  Call your doctor's office if you have any questions concerning insurance or co-pays.  15. AN ADULT (relative or friend 18 years or older) MUST DRIVE YOU HOME AFTER YOUR SURGERY.  16. Please make

## 2024-06-19 LAB
APTT PPP: 28 SEC (ref 24–33)
BLD GP AB SCN SERPL QL: NEGATIVE
ERYTHROCYTE [DISTWIDTH] IN BLOOD BY AUTOMATED COUNT: 14 % (ref 11.6–15.4)
HCT VFR BLD AUTO: 36.3 % (ref 37.5–51)
HGB BLD-MCNC: 11.8 G/DL (ref 13–17.7)
INR PPP: 0.9 (ref 0.9–1.2)
MCH RBC QN AUTO: 27.7 PG (ref 26.6–33)
MCHC RBC AUTO-ENTMCNC: 32.5 G/DL (ref 31.5–35.7)
PLATELET # BLD AUTO: 492 X10E3/UL (ref 150–450)
PROTHROMBIN TIME: 10.3 SEC (ref 9.1–12)
RBC # BLD AUTO: 4.26 X10E6/UL (ref 4.14–5.8)
RH BLD: POSITIVE
WBC # BLD AUTO: 6.8 X10E3/UL (ref 3.4–10.8)

## 2024-06-20 LAB — BACTERIA UR CULT: NORMAL

## 2024-06-21 LAB
BUN SERPL-MCNC: 19 MG/DL (ref 6–20)
BUN/CREAT SERPL: 15 (ref 9–20)
CALCIUM SERPL-MCNC: 9.1 MG/DL (ref 8.7–10.2)
CHLORIDE SERPL-SCNC: 112 MMOL/L (ref 96–106)
CO2 SERPL-SCNC: 15 MMOL/L (ref 20–29)
CREAT SERPL-MCNC: 1.3 MG/DL (ref 0.76–1.27)
EGFRCR SERPLBLD CKD-EPI 2021: 73 ML/MIN/1.73
GLUCOSE SERPL-MCNC: 144 MG/DL (ref 70–99)
POTASSIUM SERPL-SCNC: 3.4 MMOL/L (ref 3.5–5.2)
SODIUM SERPL-SCNC: 143 MMOL/L (ref 134–144)

## 2024-06-26 ENCOUNTER — ANESTHESIA EVENT (OUTPATIENT)
Facility: HOSPITAL | Age: 39
End: 2024-06-26
Payer: MEDICAID

## 2024-06-27 ENCOUNTER — APPOINTMENT (OUTPATIENT)
Facility: HOSPITAL | Age: 39
End: 2024-06-27
Attending: UROLOGY
Payer: MEDICAID

## 2024-06-27 ENCOUNTER — HOSPITAL ENCOUNTER (OUTPATIENT)
Facility: HOSPITAL | Age: 39
Discharge: HOME | End: 2024-06-27
Attending: UROLOGY | Admitting: UROLOGY
Payer: MEDICAID

## 2024-06-27 ENCOUNTER — ANESTHESIA (OUTPATIENT)
Facility: HOSPITAL | Age: 39
End: 2024-06-27
Payer: MEDICAID

## 2024-06-27 VITALS
OXYGEN SATURATION: 100 % | BODY MASS INDEX: 35.75 KG/M2 | WEIGHT: 278.6 LBS | HEIGHT: 74 IN | TEMPERATURE: 97.7 F | SYSTOLIC BLOOD PRESSURE: 127 MMHG | HEART RATE: 77 BPM | RESPIRATION RATE: 16 BRPM | DIASTOLIC BLOOD PRESSURE: 80 MMHG

## 2024-06-27 DIAGNOSIS — N20.0 KIDNEY STONE: Primary | ICD-10-CM

## 2024-06-27 DIAGNOSIS — F51.04 CHRONIC INSOMNIA: ICD-10-CM

## 2024-06-27 LAB
AMPHET UR QL SCN: NEGATIVE
BARBITURATES UR QL SCN: NEGATIVE
BENZODIAZ UR QL: NEGATIVE
CANNABINOIDS UR QL SCN: NEGATIVE
COCAINE UR QL SCN: NEGATIVE
Lab: ABNORMAL
METHADONE UR QL: NEGATIVE
OPIATES UR QL: POSITIVE
PCP UR QL: NEGATIVE

## 2024-06-27 PROCEDURE — 3600000012 HC SURGERY LEVEL 2 ADDTL 15MIN: Performed by: UROLOGY

## 2024-06-27 PROCEDURE — 6360000002 HC RX W HCPCS: Performed by: NURSE ANESTHETIST, CERTIFIED REGISTERED

## 2024-06-27 PROCEDURE — 2720000010 HC SURG SUPPLY STERILE: Performed by: UROLOGY

## 2024-06-27 PROCEDURE — 6370000000 HC RX 637 (ALT 250 FOR IP): Performed by: NURSE ANESTHETIST, CERTIFIED REGISTERED

## 2024-06-27 PROCEDURE — 2580000003 HC RX 258: Performed by: UROLOGY

## 2024-06-27 PROCEDURE — 7100000001 HC PACU RECOVERY - ADDTL 15 MIN: Performed by: UROLOGY

## 2024-06-27 PROCEDURE — 6370000000 HC RX 637 (ALT 250 FOR IP): Performed by: UROLOGY

## 2024-06-27 PROCEDURE — 88300 SURGICAL PATH GROSS: CPT

## 2024-06-27 PROCEDURE — C1894 INTRO/SHEATH, NON-LASER: HCPCS | Performed by: UROLOGY

## 2024-06-27 PROCEDURE — 6360000004 HC RX CONTRAST MEDICATION: Performed by: UROLOGY

## 2024-06-27 PROCEDURE — A4217 STERILE WATER/SALINE, 500 ML: HCPCS | Performed by: UROLOGY

## 2024-06-27 PROCEDURE — 7100000010 HC PHASE II RECOVERY - FIRST 15 MIN: Performed by: UROLOGY

## 2024-06-27 PROCEDURE — 87086 URINE CULTURE/COLONY COUNT: CPT

## 2024-06-27 PROCEDURE — 2580000003 HC RX 258: Performed by: NURSE ANESTHETIST, CERTIFIED REGISTERED

## 2024-06-27 PROCEDURE — 2500000003 HC RX 250 WO HCPCS

## 2024-06-27 PROCEDURE — 7100000011 HC PHASE II RECOVERY - ADDTL 15 MIN: Performed by: UROLOGY

## 2024-06-27 PROCEDURE — 3600000002 HC SURGERY LEVEL 2 BASE: Performed by: UROLOGY

## 2024-06-27 PROCEDURE — 3700000000 HC ANESTHESIA ATTENDED CARE: Performed by: UROLOGY

## 2024-06-27 PROCEDURE — 3700000001 HC ADD 15 MINUTES (ANESTHESIA): Performed by: UROLOGY

## 2024-06-27 PROCEDURE — 7100000000 HC PACU RECOVERY - FIRST 15 MIN: Performed by: UROLOGY

## 2024-06-27 PROCEDURE — 6360000002 HC RX W HCPCS: Performed by: UROLOGY

## 2024-06-27 PROCEDURE — C2617 STENT, NON-COR, TEM W/O DEL: HCPCS | Performed by: UROLOGY

## 2024-06-27 PROCEDURE — 87106 FUNGI IDENTIFICATION YEAST: CPT

## 2024-06-27 PROCEDURE — C1747 HC ENDOSCOPE, SINGLE, URINARY TRACT: HCPCS | Performed by: UROLOGY

## 2024-06-27 PROCEDURE — 6360000002 HC RX W HCPCS

## 2024-06-27 PROCEDURE — C1769 GUIDE WIRE: HCPCS | Performed by: UROLOGY

## 2024-06-27 PROCEDURE — C1726 CATH, BAL DIL, NON-VASCULAR: HCPCS | Performed by: UROLOGY

## 2024-06-27 PROCEDURE — 2580000003 HC RX 258

## 2024-06-27 PROCEDURE — 80307 DRUG TEST PRSMV CHEM ANLYZR: CPT

## 2024-06-27 PROCEDURE — C1758 CATHETER, URETERAL: HCPCS | Performed by: UROLOGY

## 2024-06-27 PROCEDURE — 2709999900 HC NON-CHARGEABLE SUPPLY: Performed by: UROLOGY

## 2024-06-27 PROCEDURE — 74420 UROGRAPHY RTRGR +-KUB: CPT

## 2024-06-27 PROCEDURE — 82360 CALCULUS ASSAY QUANT: CPT

## 2024-06-27 DEVICE — URETERAL STENT
Type: IMPLANTABLE DEVICE | Site: URETER | Status: FUNCTIONAL
Brand: PERCUFLEX™ PLUS

## 2024-06-27 RX ORDER — PHENAZOPYRIDINE HYDROCHLORIDE 200 MG/1
200 TABLET, FILM COATED ORAL 3 TIMES DAILY PRN
Qty: 15 TABLET | Refills: 0 | Status: SHIPPED | OUTPATIENT
Start: 2024-06-27 | End: 2024-07-02

## 2024-06-27 RX ORDER — SODIUM CHLORIDE, SODIUM LACTATE, POTASSIUM CHLORIDE, CALCIUM CHLORIDE 600; 310; 30; 20 MG/100ML; MG/100ML; MG/100ML; MG/100ML
INJECTION, SOLUTION INTRAVENOUS CONTINUOUS
Status: DISCONTINUED | OUTPATIENT
Start: 2024-06-27 | End: 2024-06-27 | Stop reason: HOSPADM

## 2024-06-27 RX ORDER — IBUPROFEN 600 MG/1
600 TABLET ORAL EVERY 6 HOURS PRN
COMMUNITY

## 2024-06-27 RX ORDER — ONDANSETRON 2 MG/ML
INJECTION INTRAMUSCULAR; INTRAVENOUS PRN
Status: DISCONTINUED | OUTPATIENT
Start: 2024-06-27 | End: 2024-06-27 | Stop reason: SDUPTHER

## 2024-06-27 RX ORDER — FAMOTIDINE 20 MG/1
20 TABLET, FILM COATED ORAL ONCE
Status: COMPLETED | OUTPATIENT
Start: 2024-06-27 | End: 2024-06-27

## 2024-06-27 RX ORDER — NALOXONE HYDROCHLORIDE 0.4 MG/ML
INJECTION, SOLUTION INTRAMUSCULAR; INTRAVENOUS; SUBCUTANEOUS PRN
Status: DISCONTINUED | OUTPATIENT
Start: 2024-06-27 | End: 2024-06-27 | Stop reason: HOSPADM

## 2024-06-27 RX ORDER — SULFAMETHOXAZOLE AND TRIMETHOPRIM 800; 160 MG/1; MG/1
1 TABLET ORAL 2 TIMES DAILY
Qty: 20 TABLET | Refills: 0 | Status: SHIPPED | OUTPATIENT
Start: 2024-06-27 | End: 2024-07-07

## 2024-06-27 RX ORDER — ONDANSETRON 2 MG/ML
4 INJECTION INTRAMUSCULAR; INTRAVENOUS
Status: DISCONTINUED | OUTPATIENT
Start: 2024-06-27 | End: 2024-06-27 | Stop reason: HOSPADM

## 2024-06-27 RX ORDER — ROCURONIUM BROMIDE 10 MG/ML
INJECTION, SOLUTION INTRAVENOUS PRN
Status: DISCONTINUED | OUTPATIENT
Start: 2024-06-27 | End: 2024-06-27 | Stop reason: SDUPTHER

## 2024-06-27 RX ORDER — TAMSULOSIN HYDROCHLORIDE 0.4 MG/1
0.4 CAPSULE ORAL DAILY
Status: DISCONTINUED | OUTPATIENT
Start: 2024-06-27 | End: 2024-06-27 | Stop reason: HOSPADM

## 2024-06-27 RX ORDER — OXYCODONE HYDROCHLORIDE AND ACETAMINOPHEN 5; 325 MG/1; MG/1
1 TABLET ORAL EVERY 6 HOURS PRN
Qty: 5 TABLET | Refills: 0 | Status: SHIPPED | OUTPATIENT
Start: 2024-06-27 | End: 2024-06-30

## 2024-06-27 RX ORDER — PROPOFOL 10 MG/ML
INJECTION, EMULSION INTRAVENOUS PRN
Status: DISCONTINUED | OUTPATIENT
Start: 2024-06-27 | End: 2024-06-27 | Stop reason: SDUPTHER

## 2024-06-27 RX ORDER — OXYBUTYNIN CHLORIDE 5 MG/1
10 TABLET, EXTENDED RELEASE ORAL NIGHTLY
Status: DISCONTINUED | OUTPATIENT
Start: 2024-06-27 | End: 2024-06-27 | Stop reason: HOSPADM

## 2024-06-27 RX ORDER — SODIUM CHLORIDE 0.9 % (FLUSH) 0.9 %
5-40 SYRINGE (ML) INJECTION PRN
Status: DISCONTINUED | OUTPATIENT
Start: 2024-06-27 | End: 2024-06-27 | Stop reason: HOSPADM

## 2024-06-27 RX ORDER — SODIUM CHLORIDE 9 MG/ML
INJECTION, SOLUTION INTRAVENOUS PRN
Status: DISCONTINUED | OUTPATIENT
Start: 2024-06-27 | End: 2024-06-27 | Stop reason: HOSPADM

## 2024-06-27 RX ORDER — ULTRASOUND COUPLING MEDIUM
GEL (GRAM) TOPICAL PRN
Status: DISCONTINUED | OUTPATIENT
Start: 2024-06-27 | End: 2024-06-27 | Stop reason: ALTCHOICE

## 2024-06-27 RX ORDER — LIDOCAINE HYDROCHLORIDE 20 MG/ML
INJECTION, SOLUTION EPIDURAL; INFILTRATION; INTRACAUDAL; PERINEURAL PRN
Status: DISCONTINUED | OUTPATIENT
Start: 2024-06-27 | End: 2024-06-27 | Stop reason: SDUPTHER

## 2024-06-27 RX ORDER — TAMSULOSIN HYDROCHLORIDE 0.4 MG/1
0.4 CAPSULE ORAL DAILY
Qty: 15 CAPSULE | Refills: 0 | Status: SHIPPED | OUTPATIENT
Start: 2024-06-27 | End: 2024-07-12

## 2024-06-27 RX ORDER — FENTANYL CITRATE 50 UG/ML
INJECTION, SOLUTION INTRAMUSCULAR; INTRAVENOUS PRN
Status: DISCONTINUED | OUTPATIENT
Start: 2024-06-27 | End: 2024-06-27 | Stop reason: SDUPTHER

## 2024-06-27 RX ORDER — FENTANYL CITRATE 50 UG/ML
50 INJECTION, SOLUTION INTRAMUSCULAR; INTRAVENOUS EVERY 5 MIN PRN
Status: DISCONTINUED | OUTPATIENT
Start: 2024-06-27 | End: 2024-06-27 | Stop reason: HOSPADM

## 2024-06-27 RX ORDER — SODIUM CHLORIDE 0.9 % (FLUSH) 0.9 %
5-40 SYRINGE (ML) INJECTION EVERY 12 HOURS SCHEDULED
Status: DISCONTINUED | OUTPATIENT
Start: 2024-06-27 | End: 2024-06-27 | Stop reason: HOSPADM

## 2024-06-27 RX ORDER — LIDOCAINE HYDROCHLORIDE 10 MG/ML
1 INJECTION, SOLUTION EPIDURAL; INFILTRATION; INTRACAUDAL; PERINEURAL
Status: DISCONTINUED | OUTPATIENT
Start: 2024-06-27 | End: 2024-06-27 | Stop reason: HOSPADM

## 2024-06-27 RX ORDER — OXYCODONE HYDROCHLORIDE AND ACETAMINOPHEN 5; 325 MG/1; MG/1
1 TABLET ORAL EVERY 4 HOURS PRN
Status: DISCONTINUED | OUTPATIENT
Start: 2024-06-27 | End: 2024-06-27 | Stop reason: HOSPADM

## 2024-06-27 RX ORDER — PROCHLORPERAZINE EDISYLATE 5 MG/ML
10 INJECTION INTRAMUSCULAR; INTRAVENOUS
Status: DISCONTINUED | OUTPATIENT
Start: 2024-06-27 | End: 2024-06-27 | Stop reason: HOSPADM

## 2024-06-27 RX ORDER — IODIXANOL 320 MG/ML
INJECTION, SOLUTION INTRAVASCULAR PRN
Status: DISCONTINUED | OUTPATIENT
Start: 2024-06-27 | End: 2024-06-27 | Stop reason: ALTCHOICE

## 2024-06-27 RX ORDER — SUCCINYLCHOLINE/SOD CL,ISO/PF 100 MG/5ML
SYRINGE (ML) INTRAVENOUS PRN
Status: DISCONTINUED | OUTPATIENT
Start: 2024-06-27 | End: 2024-06-27 | Stop reason: SDUPTHER

## 2024-06-27 RX ORDER — DEXAMETHASONE SODIUM PHOSPHATE 4 MG/ML
INJECTION, SOLUTION INTRA-ARTICULAR; INTRALESIONAL; INTRAMUSCULAR; INTRAVENOUS; SOFT TISSUE PRN
Status: DISCONTINUED | OUTPATIENT
Start: 2024-06-27 | End: 2024-06-27 | Stop reason: SDUPTHER

## 2024-06-27 RX ORDER — PHENAZOPYRIDINE HYDROCHLORIDE 200 MG/1
200 TABLET, FILM COATED ORAL
Status: DISCONTINUED | OUTPATIENT
Start: 2024-06-27 | End: 2024-06-27 | Stop reason: HOSPADM

## 2024-06-27 RX ORDER — OXYBUTYNIN CHLORIDE 5 MG/1
5 TABLET, EXTENDED RELEASE ORAL DAILY PRN
Qty: 7 TABLET | Refills: 0 | Status: SHIPPED | OUTPATIENT
Start: 2024-06-27 | End: 2024-07-04

## 2024-06-27 RX ORDER — MIDAZOLAM HYDROCHLORIDE 1 MG/ML
INJECTION INTRAMUSCULAR; INTRAVENOUS PRN
Status: DISCONTINUED | OUTPATIENT
Start: 2024-06-27 | End: 2024-06-27 | Stop reason: SDUPTHER

## 2024-06-27 RX ADMIN — DEXAMETHASONE SODIUM PHOSPHATE 8 MG: 4 INJECTION, SOLUTION INTRAMUSCULAR; INTRAVENOUS at 11:09

## 2024-06-27 RX ADMIN — OXYBUTYNIN CHLORIDE 10 MG: 5 TABLET, EXTENDED RELEASE ORAL at 13:53

## 2024-06-27 RX ADMIN — FENTANYL CITRATE 100 MCG: 50 INJECTION INTRAMUSCULAR; INTRAVENOUS at 12:19

## 2024-06-27 RX ADMIN — FAMOTIDINE 20 MG: 20 TABLET ORAL at 09:50

## 2024-06-27 RX ADMIN — HYDROMORPHONE HYDROCHLORIDE 0.5 MG: 1 INJECTION, SOLUTION INTRAMUSCULAR; INTRAVENOUS; SUBCUTANEOUS at 12:56

## 2024-06-27 RX ADMIN — ONDANSETRON 4 MG: 2 INJECTION INTRAMUSCULAR; INTRAVENOUS at 12:00

## 2024-06-27 RX ADMIN — FENTANYL CITRATE 50 MCG: 50 INJECTION INTRAMUSCULAR; INTRAVENOUS at 11:21

## 2024-06-27 RX ADMIN — Medication 160 MG: at 10:56

## 2024-06-27 RX ADMIN — GENTAMICIN SULFATE 520 MG: 40 INJECTION, SOLUTION INTRAMUSCULAR; INTRAVENOUS at 11:09

## 2024-06-27 RX ADMIN — ROCURONIUM BROMIDE 10 MG: 10 INJECTION, SOLUTION INTRAVENOUS at 10:55

## 2024-06-27 RX ADMIN — DEXMEDETOMIDINE HYDROCHLORIDE 10 MCG: 100 INJECTION, SOLUTION INTRAVENOUS at 10:49

## 2024-06-27 RX ADMIN — PROPOFOL 200 MG: 10 INJECTION, EMULSION INTRAVENOUS at 10:55

## 2024-06-27 RX ADMIN — AMPICILLIN SODIUM 1000 MG: 1 INJECTION, POWDER, FOR SOLUTION INTRAMUSCULAR; INTRAVENOUS at 10:59

## 2024-06-27 RX ADMIN — ROCURONIUM BROMIDE 10 MG: 10 INJECTION, SOLUTION INTRAVENOUS at 11:38

## 2024-06-27 RX ADMIN — OXYCODONE HYDROCHLORIDE AND ACETAMINOPHEN 1 TABLET: 5; 325 TABLET ORAL at 13:47

## 2024-06-27 RX ADMIN — LIDOCAINE HYDROCHLORIDE 60 MG: 20 INJECTION, SOLUTION EPIDURAL; INFILTRATION; INTRACAUDAL; PERINEURAL at 10:55

## 2024-06-27 RX ADMIN — HYDROMORPHONE HYDROCHLORIDE 0.5 MG: 1 INJECTION, SOLUTION INTRAMUSCULAR; INTRAVENOUS; SUBCUTANEOUS at 12:42

## 2024-06-27 RX ADMIN — DEXMEDETOMIDINE HYDROCHLORIDE 6 MCG: 100 INJECTION, SOLUTION INTRAVENOUS at 11:24

## 2024-06-27 RX ADMIN — TAMSULOSIN HYDROCHLORIDE 0.4 MG: 0.4 CAPSULE ORAL at 13:49

## 2024-06-27 RX ADMIN — MIDAZOLAM 2 MG: 1 INJECTION, SOLUTION INTRAMUSCULAR; INTRAVENOUS at 10:49

## 2024-06-27 RX ADMIN — PROPOFOL 50 MG: 10 INJECTION, EMULSION INTRAVENOUS at 11:20

## 2024-06-27 RX ADMIN — ROCURONIUM BROMIDE 20 MG: 10 INJECTION, SOLUTION INTRAVENOUS at 11:20

## 2024-06-27 RX ADMIN — SODIUM CHLORIDE, POTASSIUM CHLORIDE, SODIUM LACTATE AND CALCIUM CHLORIDE: 600; 310; 30; 20 INJECTION, SOLUTION INTRAVENOUS at 09:50

## 2024-06-27 RX ADMIN — FENTANYL CITRATE 50 MCG: 50 INJECTION INTRAMUSCULAR; INTRAVENOUS at 10:55

## 2024-06-27 RX ADMIN — PHENAZOPYRIDINE 200 MG: 200 TABLET ORAL at 13:50

## 2024-06-27 ASSESSMENT — PAIN DESCRIPTION - LOCATION
LOCATION: GROIN
LOCATION: GROIN
LOCATION: PELVIS;FLANK
LOCATION: GROIN;FLANK
LOCATION: FLANK
LOCATION: GROIN

## 2024-06-27 ASSESSMENT — PAIN DESCRIPTION - DESCRIPTORS
DESCRIPTORS: BURNING;SORE
DESCRIPTORS: ACHING;CRAMPING
DESCRIPTORS: ACHING

## 2024-06-27 ASSESSMENT — PAIN DESCRIPTION - PAIN TYPE
TYPE: SURGICAL PAIN

## 2024-06-27 ASSESSMENT — PAIN SCALES - GENERAL
PAINLEVEL_OUTOF10: 5
PAINLEVEL_OUTOF10: 7
PAINLEVEL_OUTOF10: 5
PAINLEVEL_OUTOF10: 7
PAINLEVEL_OUTOF10: 5
PAINLEVEL_OUTOF10: 7

## 2024-06-27 ASSESSMENT — PAIN - FUNCTIONAL ASSESSMENT: PAIN_FUNCTIONAL_ASSESSMENT: 0-10

## 2024-06-27 ASSESSMENT — PAIN DESCRIPTION - ORIENTATION
ORIENTATION: RIGHT

## 2024-06-27 NOTE — ANESTHESIA POSTPROCEDURE EVALUATION
Department of Anesthesiology  Postprocedure Note    Patient: Harry Lema  MRN: [id number]  YOB: 1985  Date of evaluation: 6/27/2024    Procedure Summary       Date: 06/27/24 Room / Location: [facility] MAIN 08 / [facility] MAIN OR    Anesthesia Start: 1049 Anesthesia Stop: 1225    Procedure: RIGHT, URETEROSCOPY LASER LITHOTRIPSY JJ STENT EXCHANGE (Right: Kidney) Diagnosis:       Kidney stone      (Kidney stone [N20.0])    Surgeons: Trevor Bolden MD Responsible Provider: Bryson Sales Jr., MD    Anesthesia Type: General ASA Status: 3            Anesthesia Type: General    Garrett Phase I: Garrett Score: 10    Garrett Phase II:      Anesthesia Post Evaluation    Patient location during evaluation: bedside  Airway patency: patent  Cardiovascular status: hemodynamically stable  Respiratory status: acceptable  Hydration status: stable  Pain management: adequate    No notable events documented.

## 2024-06-27 NOTE — OP NOTE
Operative Note  Patient: Harry Lema               Sex: male             MRN: [id number]  YOB: 1985      Age:  38 y.o.              Preoperative Diagnosis: Kidney stone [N20.0]  Postoperative Diagnosis:  * No post-op diagnosis entered *  Surgeon: Trevor Bolden     Indication: This is a 37 y/o man with residual stones after first stage URS here today for 2nd stage URS.  Preop culture + treated with Abx.     Procedure:    1) Cystoscopy  2) Retrograde pyelogram with interpretation  3) < 1 hour physician fluoroscopy imaging interpretation time  4) Right side Ureteroscopy, laser lithotripsy and stone extraction   5) JJ ureteral stent placement     Findings:    1). Normal cystoscopy   2). Retrograde pyelogram with moderate hydro   3). Total stone burden ~1cm, lasered and basket extracted   4). 6x28 JJ stent placed     Narrative of events: The patient was brought to the operative suite. Anesthesia was induced and preoperative antibiotics were administered. They were then placed in the dorsal lithotomy position and their external genitalia was prepped and draped in the usual fashion. A surgical timeout was performed confirming the patient's name, date of birth, laterality, and antibiotics. All were in agreement. A 22 Maltese cystourethroscope was then inserted into the patients bladder. The urethra revealed no abnormalities. Prostate was non obstructing.  Thorough cystoscopy was performed with both the 30 degree and 70 degree lens.     The right ureteral orifice was cannulated with a 0.035 sensor tip wire and confirmed in the renal pelvis.  Stent was then removed.  Semirigid ureteroscopy was performed and no mid ureteral stone was noted.  A duel lumen was advanced over this and retrograde pyelogram was performed showing hydro and no filling defects.  Second wire was advanced and duel lumen was removed.  12/14 Ureteral access sheath was advanced to the UPJ under direct fluoroscopic guidance and flexible

## 2024-06-27 NOTE — H&P
H&P    Patient: Harry Lema               Sex: male          DOA: 6/27/2024       YOB: 1985      Age:  38 y.o.        LOS:  LOS: 0 days                ASSESSMENT:   1. Right residual stones from previous URS with indwelling JJ stent  2. Previous sepsis and rhabdo now resolved     Culture mixed   On Abx   Proceed to URS, possible PCNl if stent encrusted       Trevor Bolden MD  [phone] Office  [phone]  Pager    CC: stones     HISTORY OF PRESENT ILLNESS:  Harry Lema is a 38 y.o. male s/p initial URS in 3/2024 complicated post op by sepsis and hydro in setting of stent with placement of nephrostomy tube which since has fallen out.   Preop culture mixed, on levaquin.  Doing well otherwise.          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 Leahy 7/11     Stenosis, cervical spine 07/11/2017    Ulcer of the stomach and intestine        Past Surgical History:   Procedure Laterality Date    BACK SURGERY  7/7/2011    lamenectomy & diskectomy.    IR NEPHROSTOMY PERCUTANEOUS RIGHT  3/12/2024    IR NEPHROSTOMY PERCUTANEOUS RIGHT 3/12/2024 Robert Jay MD [facility] ANGIO IR    TONSILLECTOMY AND ADENOIDECTOMY

## 2024-06-27 NOTE — PERIOP NOTE
Patient /Family /Designee has been informed that [facility] is not responsible for patient belongings per policy and the signed [facility] Patient Agreement document.  Personal items should be sent home or checked in with security.  Patient /Family /Designee selected the following action:                            [x]  Send personal items home with a family member or friend                                                 []  Check in personal items with security, excluding clothing                            []  Maintain personal items at the bedside, against recommendation                                 by Mario Liriano [facility]                                   ** If patient /family /designee chooses to maintain personal items at the bedside,                                      Complete the patient belongings inventory in the EMR.   Belongings are with mother, Juliana Lema.  Number: [phone]

## 2024-06-27 NOTE — ANESTHESIA PRE PROCEDURE
Department of Anesthesiology  Preprocedure Note       Name:  Harry Lema   Age:  38 y.o.  :  1985                                          MRN:  [id number]         Date:  2024      Surgeon: Surgeon(s):  Trevor Bolden MD    Procedure: Procedure(s):  RIGHT SUPINE MINI PERCUTANEOUS NEPHROLITHOTOMY; NEPHROSTOMY TUBE EXCHANGE; URETEROSCOPY, LASER LITHOTRIPSY; DOUBLE J STENT; C-ARM; HOLMIUM    Medications prior to admission:   Prior to Admission medications    Medication Sig Start Date End Date Taking? Authorizing Provider   ibuprofen (ADVIL;MOTRIN) 600 MG tablet Take 1 tablet by mouth every 6 hours as needed for Pain   Yes Provider, MD Biju   acetaminophen (TYLENOL) 500 MG tablet Take 2 tablets by mouth every 6 hours as needed 5/10/24   Biju Farnsworth MD   LINZESS 290 MCG CAPS capsule TAKE 1 CAP EACH DAY ON AN EMPTY STOMACH AT LEAST 30 MINUTES PRIOR TO A MEAL SAME TIME EACH DAY 24   Biju Farnsworth MD   gabapentin (NEURONTIN) 300 MG capsule Take 1 capsule by mouth 3 times daily. 24   Biju Farnsworth MD   ondansetron (ZOFRAN-ODT) 4 MG disintegrating tablet Take 1 tablet by mouth 3 times daily as needed for Nausea or Vomiting 24   Erika Cerna APRN - CNP   cloNIDine (CATAPRES) 0.3 MG/24HR PTWK Place 1 patch onto the skin once a week 24   Brandi Morton APRN - NP   carvedilol (COREG) 12.5 MG tablet Take 1 tablet by mouth 2 times daily (with meals) 5/10/24   Brandi Morton APRN - NP   QUEtiapine (SEROQUEL) 50 MG tablet Take 2 tablets by mouth nightly 4/3/24   Biju Farnsworth MD   DULoxetine (CYMBALTA) 30 MG extended release capsule Take 3 capsules by mouth daily 10/19/20   Automatic Reconciliation, Ar   pantoprazole (PROTONIX) 20 MG tablet Take 2 tablets by mouth as needed 10/19/20   Automatic Reconciliation, Ar   topiramate (TOPAMAX) 100 MG tablet Take 1 tablet by mouth daily 20   Automatic Reconciliation, Ar       Current medications:

## 2024-06-27 NOTE — DISCHARGE INSTRUCTIONS
Discharge Instructions          ADDITIONAL INFORMATION:   You have indwelling ureteral stents which frequently causes slight discomfort in the flank region and bladder spasms.  If you are bothered by these symptoms, please contact our office and we can presribe you flomax as needed for flank discomfort or Ditropan for bladder spasms.  The indwelling stent will need to be removed/exchanged as directed below.  If the stent is left in place without appropriate follow-up, it may become encrusted with stone and/or infected.   If that occurs, you will require multiple additional surgeries to fix this.  It is very important you follow up for appropriate removal or exchange of ureteral stents.      If you experience any fevers > 100.4, significant nausea/vomiting, or significant worsening of pain, please contact us at the number below.  It is common to experience mild, recurrent blood in your urine and this is likely due to stent irritation.  If the bleeding continues to worsen with passage of clots, you are unable to urinate, or you experience significant light-headedness, please contact us at the number below.       FOLLOW UP CARE:  You have a return appointment with Dr. Bolden in 1-2 weeks for removal of the ureteral stent.      Following this you have an appt in ~ 2months for X ray, Ultrasound and review of your 24 hour urine studies.         DISCHARGE SUMMARY from Nurse    PATIENT INSTRUCTIONS:    After general anesthesia or intravenous sedation, for 24 hours or while taking prescription Narcotics:  Limit your activities  Do not drive and operate hazardous machinery  Do not make important personal or business decisions  Do  not drink alcoholic beverages  If you have not urinated within 8 hours after discharge, please contact your surgeon on call.    Report the following to your surgeon:  Excessive pain, swelling, redness or odor of or around the surgical area  Temperature over 100.5  Nausea and vomiting lasting  Detail Level: Detailed Quality 410: Psoriasis Clinical Response To Oral Systemic Or Biologic Mediations: Patient has been on biologic or system therapy for less than 6 months

## 2024-06-29 LAB
BACTERIA SPEC CULT: ABNORMAL
CC UR VC: ABNORMAL
SERVICE CMNT-IMP: ABNORMAL

## 2024-07-09 LAB
COLOR STONE: NORMAL
COM MFR STONE: 20 %
HYDROXYAPATITE: 80 %
LABORATORY COMMENT REPORT: NORMAL
Lab: NORMAL
Lab: NORMAL
PHOTO: NORMAL
SIZE STONE: NORMAL MM
SPECIMEN SOURCE: NORMAL
STONE ANALYSIS-IMP: NORMAL
STONE COMPOSITION: NORMAL
WT STONE: 64 MG

## 2024-09-03 RX ORDER — CLONIDINE 0.3 MG/24H
1 PATCH, EXTENDED RELEASE TRANSDERMAL WEEKLY
Qty: 12 PATCH | Refills: 3 | Status: SHIPPED | OUTPATIENT
Start: 2024-09-03

## 2024-09-04 RX ORDER — CLONIDINE 0.3 MG/24H
1 PATCH, EXTENDED RELEASE TRANSDERMAL WEEKLY
Qty: 12 PATCH | Refills: 3 | OUTPATIENT
Start: 2024-09-04

## 2024-12-23 ENCOUNTER — TELEPHONE (OUTPATIENT)
Age: 39
End: 2024-12-23

## (undated) DEVICE — URETERAL ACCESS SHEATH SET: Brand: NAVIGATOR

## (undated) DEVICE — SOLUTION IRRIG 1000ML 0.9% SOD CHL USP POUR PLAS BTL

## (undated) DEVICE — KIT OR TURNOVER

## (undated) DEVICE — SOLUTION IV 1000ML 0.9% SOD CHL

## (undated) DEVICE — KIT CLN UP BON SECOURS MARYV

## (undated) DEVICE — DUAL LUMEN URETERAL CATHETER

## (undated) DEVICE — LUB SURG MEDC STRL 2OZ TUBE MC -- MEDICHOICE

## (undated) DEVICE — LEGGINGS, PAIR, 31X48, STERILE: Brand: MEDLINE

## (undated) DEVICE — 4-PORT MANIFOLD: Brand: NEPTUNE 2

## (undated) DEVICE — JELLY LUBRICATING 2.7GM H2O SOL GREASELESS E-Z

## (undated) DEVICE — SOLUTION IRRIG 3000ML 0.9% SOD CHL FLX CONT 0797208] ICU MEDICAL INC]

## (undated) DEVICE — CATHETER,URETHRAL,REDRUBBER,STRL,10FR: Brand: MEDLINE INDUSTRIES, INC.

## (undated) DEVICE — Device

## (undated) DEVICE — TUBING IRRIG L77IN DIA0.241IN L BOR FOR CYSTO W/ NVENT

## (undated) DEVICE — BAG PRSS INFUS IV OR ART 3000 CC W STPCOCK NO THUMBWHEEL W

## (undated) DEVICE — Z DUP USE 2565107 PACK SURG PROC LEG CYSTO T-DRAPE REINF TBL CVR HND TWL

## (undated) DEVICE — GAUZE,SPONGE,4"X4",12PLY,STERILE,LF,2'S: Brand: MEDLINE

## (undated) DEVICE — SYR 10ML LUER LOK 1/5ML GRAD --

## (undated) DEVICE — NITINOL STONE RETRIEVAL BASKET: Brand: ZERO TIP

## (undated) DEVICE — KIT PRB L445MM DIA3.4MM GRN W/ STONE CTCH DISP SWISS

## (undated) DEVICE — 3M™ BAIR PAWS FLEX™ WARMING GOWN, STANDARD, 20 PER CASE 81003: Brand: BAIR PAWS™

## (undated) DEVICE — CATH NEPHROSTOMY BLLN DILAT XFORCE 15CM 8MM

## (undated) DEVICE — STERILE POLYISOPRENE POWDER-FREE SURGICAL GLOVES: Brand: PROTEXIS

## (undated) DEVICE — COVER SURG EQUIP W36XL28IN W/ E BND ARND BTM

## (undated) DEVICE — STER SINGLE BASIN SET W/BOWLS: Brand: CARDINAL HEALTH

## (undated) DEVICE — GUIDEWIRE ENDOSCP L150CM DIA0.035IN TIP 3CM PTFE NIT

## (undated) DEVICE — TRAY PREP DRY W/ PREM GLV 2 APPL 6 SPNG 2 UNDPD 1 OVERWRAP

## (undated) DEVICE — BAG DRAINAGE CUST DISP

## (undated) DEVICE — GOWN,PREVENTION PLUS,XLN/XL,ST,24/CS: Brand: MEDLINE

## (undated) DEVICE — 1016 S-DRAPE IRRIG POUCH 10/BOX: Brand: STERI-DRAPE™

## (undated) DEVICE — PERCUTANEOUS ACCESS NEEDLE: Brand: NAVIGUIDE

## (undated) DEVICE — TAPE ADH CLTH SILK H2O REPELLENT CURAD

## (undated) DEVICE — GUIDEWIRE URO L145CM DIA0.038IN TIP L6CM STR FLX BENT

## (undated) DEVICE — PERCUTANEOUS ACCESS NEEDLE

## (undated) DEVICE — FLEX ADVANTAGE 3000CC: Brand: FLEX ADVANTAGE

## (undated) DEVICE — STERILE LATEX POWDER-FREE SURGICAL GLOVESWITH NITRILE COATING: Brand: PROTEXIS

## (undated) DEVICE — POSITIONER HD REST FOAM CMFRT TCH

## (undated) DEVICE — URETERAL ACCESS SHEATH SET: Brand: NAVIGATOR HD

## (undated) DEVICE — GUIDEWIRE VASC L180CM DIA0.035IN L15CM STR TIP PTFE S STL

## (undated) DEVICE — DRAPE PERC PROC W335XL196CM LINGEMAN

## (undated) DEVICE — DRAINBAG,ANTI-REFLUX TOWER,L/F,2000ML,LL: Brand: MEDLINE

## (undated) DEVICE — MEDI-VAC NON-CONDUCTIVE SUCTION TUBING: Brand: CARDINAL HEALTH

## (undated) DEVICE — VALVE HEMSTAS LG BOR FOR ASCOPE 4 CYSTOSCOPE ACCESSPLUS

## (undated) DEVICE — KENDALL SCD EXPRESS SLEEVES, KNEE LENGTH, MEDIUM: Brand: KENDALL SCD

## (undated) DEVICE — GUIDEWIRE ENDOSCP ANGLED 8 CM 0.035 INX150 CM BENT ZIPWIRE

## (undated) DEVICE — CATHETER BLLN DIL 10 MMX15 CM NEPHROSTOMY X-FORCE N30

## (undated) DEVICE — CHECK-FLO ADAPTER: Brand: CHECK-FLO

## (undated) DEVICE — TABLE COVER: Brand: CONVERTORS

## (undated) DEVICE — SYRINGE MED 10ML LUERLOCK TIP W/O SFTY DISP

## (undated) DEVICE — GDWIRE 3CM FLX-TIP 0.038X150CM -- BX/5 SENSOR

## (undated) DEVICE — UROLOGY SET

## (undated) DEVICE — GAUZE SPONGES,16 PLY: Brand: CURITY

## (undated) DEVICE — DRAPE C ARM UNIV W41XL74IN CLR PLAS XR VELC CLSR POLY STRP

## (undated) DEVICE — GDWIRE UROL STR 150CM FLX TP -- BX/5 SENSOR

## (undated) DEVICE — SINGLE-USE DIGITAL FLEXIBLE URETEROSCOPE: Brand: LITHOVUE

## (undated) DEVICE — SYRINGE 20ML LL S/C 50

## (undated) DEVICE — PACK PROCEDURE SURG MAJ W/ BASIN LF

## (undated) DEVICE — DRAPE TWL SURG 16X26IN BLU ORB04] ALLCARE INC]

## (undated) DEVICE — SOLUTION IRRIG 3000ML H2O STRL BAG

## (undated) DEVICE — 3M™ TEGADERM™ TRANSPARENT FILM DRESSING FRAME STYLE, 1629, 8 IN X 12 IN (20 CM X 30 CM), 10/CT 8CT/CASE: Brand: 3M™ TEGADERM™

## (undated) DEVICE — SYRINGE MED 30ML STD CLR PLAS LUERLOCK TIP N CTRL DISP